# Patient Record
Sex: MALE | Race: BLACK OR AFRICAN AMERICAN | NOT HISPANIC OR LATINO | Employment: FULL TIME | URBAN - METROPOLITAN AREA
[De-identification: names, ages, dates, MRNs, and addresses within clinical notes are randomized per-mention and may not be internally consistent; named-entity substitution may affect disease eponyms.]

---

## 2017-09-06 ENCOUNTER — OFFICE VISIT (OUTPATIENT)
Dept: URGENT CARE | Facility: CLINIC | Age: 37
End: 2017-09-06
Payer: COMMERCIAL

## 2017-09-06 VITALS
BODY MASS INDEX: 29.84 KG/M2 | OXYGEN SATURATION: 97 % | WEIGHT: 240 LBS | DIASTOLIC BLOOD PRESSURE: 89 MMHG | HEART RATE: 89 BPM | TEMPERATURE: 97 F | SYSTOLIC BLOOD PRESSURE: 136 MMHG | RESPIRATION RATE: 18 BRPM | HEIGHT: 75 IN

## 2017-09-06 DIAGNOSIS — J01.00 ACUTE NON-RECURRENT MAXILLARY SINUSITIS: Primary | ICD-10-CM

## 2017-09-06 PROCEDURE — 96372 THER/PROPH/DIAG INJ SC/IM: CPT | Mod: S$GLB,,, | Performed by: PHYSICIAN ASSISTANT

## 2017-09-06 PROCEDURE — 99203 OFFICE O/P NEW LOW 30 MIN: CPT | Mod: S$GLB,,, | Performed by: PHYSICIAN ASSISTANT

## 2017-09-06 PROCEDURE — 3008F BODY MASS INDEX DOCD: CPT | Mod: S$GLB,,, | Performed by: PHYSICIAN ASSISTANT

## 2017-09-06 RX ORDER — FLUTICASONE PROPIONATE 50 MCG
2 SPRAY, SUSPENSION (ML) NASAL DAILY
Qty: 1 BOTTLE | Refills: 0 | Status: SHIPPED | OUTPATIENT
Start: 2017-09-06 | End: 2018-09-06

## 2017-09-06 RX ORDER — BETAMETHASONE SODIUM PHOSPHATE AND BETAMETHASONE ACETATE 3; 3 MG/ML; MG/ML
6 INJECTION, SUSPENSION INTRA-ARTICULAR; INTRALESIONAL; INTRAMUSCULAR; SOFT TISSUE
Status: COMPLETED | OUTPATIENT
Start: 2017-09-06 | End: 2017-09-06

## 2017-09-06 RX ORDER — AMOXICILLIN AND CLAVULANATE POTASSIUM 875; 125 MG/1; MG/1
1 TABLET, FILM COATED ORAL 2 TIMES DAILY
Qty: 20 TABLET | Refills: 0 | Status: SHIPPED | OUTPATIENT
Start: 2017-09-06 | End: 2017-09-16

## 2017-09-06 RX ADMIN — BETAMETHASONE SODIUM PHOSPHATE AND BETAMETHASONE ACETATE 6 MG: 3; 3 INJECTION, SUSPENSION INTRA-ARTICULAR; INTRALESIONAL; INTRAMUSCULAR; SOFT TISSUE at 02:09

## 2017-09-06 NOTE — PROGRESS NOTES
"Subjective:       Patient ID: Gallo Maria Jr. is a 37 y.o. male.    Vitals:  height is 6' 3" (1.905 m) and weight is 108.9 kg (240 lb). His temperature is 97.3 °F (36.3 °C). His blood pressure is 136/89 and his pulse is 89. His respiration is 18 and oxygen saturation is 97%.     Chief Complaint: Cough and Sinus Problem    Cough   This is a new problem. The current episode started in the past 7 days. The problem has been unchanged. The problem occurs constantly. The cough is productive of sputum. Associated symptoms include headaches, nasal congestion, postnasal drip and a sore throat. Pertinent negatives include no chest pain, chills, ear pain, eye redness, fever, myalgias, shortness of breath or wheezing. Treatments tried: Alkaselzter cold & cough. The treatment provided no relief. His past medical history is significant for asthma and bronchitis.   Sinus Problem   This is a new problem. The current episode started in the past 7 days. The problem is unchanged. There has been no fever. Associated symptoms include congestion, coughing, headaches, sinus pressure and a sore throat. Pertinent negatives include no chills, ear pain, hoarse voice or shortness of breath. Treatments tried: Alkaseltzer cold & cough. The treatment provided no relief.     Review of Systems   Constitution: Negative for chills, fever and malaise/fatigue.   HENT: Positive for congestion, postnasal drip, sinus pressure and sore throat. Negative for ear pain and hoarse voice.    Eyes: Negative for discharge and redness.   Cardiovascular: Negative for chest pain, dyspnea on exertion and leg swelling.   Respiratory: Positive for cough and sputum production. Negative for shortness of breath and wheezing.    Musculoskeletal: Negative for myalgias.   Gastrointestinal: Negative for abdominal pain and nausea.   Neurological: Positive for headaches.       Objective:      Physical Exam   Constitutional: He is oriented to person, place, and time. He " appears well-developed and well-nourished. He is cooperative.  Non-toxic appearance. He does not appear ill. No distress.   HENT:   Head: Normocephalic and atraumatic.   Right Ear: Hearing, tympanic membrane, external ear and ear canal normal.   Left Ear: Hearing, tympanic membrane, external ear and ear canal normal.   Nose: Mucosal edema and rhinorrhea present. No nasal deformity. No epistaxis. Right sinus exhibits maxillary sinus tenderness. Right sinus exhibits no frontal sinus tenderness. Left sinus exhibits maxillary sinus tenderness. Left sinus exhibits no frontal sinus tenderness.   Mouth/Throat: Uvula is midline and mucous membranes are normal. No trismus in the jaw. Normal dentition. No uvula swelling. Posterior oropharyngeal erythema present. No tonsillar exudate.   Eyes: Conjunctivae and lids are normal. No scleral icterus.   Sclera clear bilat   Neck: Trachea normal, full passive range of motion without pain and phonation normal. Neck supple.   Cardiovascular: Normal rate, regular rhythm, normal heart sounds, intact distal pulses and normal pulses.    Pulmonary/Chest: Effort normal and breath sounds normal. No respiratory distress.   Abdominal: Soft. Normal appearance and bowel sounds are normal. He exhibits no distension. There is no tenderness.   Musculoskeletal: Normal range of motion. He exhibits no edema or deformity.   Neurological: He is alert and oriented to person, place, and time. He exhibits normal muscle tone. Coordination normal.   Skin: Skin is warm, dry and intact. He is not diaphoretic. No pallor.   Psychiatric: He has a normal mood and affect. His speech is normal and behavior is normal. Judgment and thought content normal. Cognition and memory are normal.   Nursing note and vitals reviewed.      Assessment:       1. Acute non-recurrent maxillary sinusitis        Plan:         Acute non-recurrent maxillary sinusitis  -     betamethasone acetate-betamethasone sodium phosphate injection 6  mg; Inject 1 mL (6 mg total) into the muscle one time.  -     amoxicillin-clavulanate 875-125mg (AUGMENTIN) 875-125 mg per tablet; Take 1 tablet by mouth 2 (two) times daily.  Dispense: 20 tablet; Refill: 0  -     fluticasone (FLONASE) 50 mcg/actuation nasal spray; 2 sprays by Each Nare route once daily.  Dispense: 1 Bottle; Refill: 0

## 2017-09-06 NOTE — PATIENT INSTRUCTIONS
Sinusitis (Antibiotic Treatment)    The sinuses are air-filled spaces within the bones of the face. They connect to the inside of the nose. Sinusitis is an inflammation of the tissue lining the sinus cavity. Sinus inflammation can occur during a cold. It can also be due to allergies to pollens and other particles in the air. Sinusitis can cause symptoms of sinus congestion and fullness. A sinus infection causes fever, headache and facial pain. There is often green or yellow drainage from the nose or into the back of the throat (post-nasal drip). You have been given antibiotics to treat this condition.  Home care:  · Take the full course of antibiotics as instructed. Do not stop taking them, even if you feel better.  · Drink plenty of water, hot tea, and other liquids. This may help thin mucus. It also may promote sinus drainage.  · Heat may help soothe painful areas of the face. Use a towel soaked in hot water. Or,  the shower and direct the hot spray onto your face. Using a vaporizer along with a menthol rub at night may also help.   · An expectorant containing guaifenesin may help thin the mucus and promote drainage from the sinuses.  · Over-the-counter decongestants may be used unless a similar medicine was prescribed. Nasal sprays work the fastest. Use one that contains phenylephrine or oxymetazoline. First blow the nose gently. Then use the spray. Do not use these medicines more often than directed on the label or symptoms may get worse. You may also use tablets containing pseudoephedrine. Avoid products that combine ingredients, because side effects may be increased. Read labels. You can also ask the pharmacist for help. (NOTE: Persons with high blood pressure should not use decongestants. They can raise blood pressure.)  · Over-the-counter antihistamines may help if allergies contributed to your sinusitis.    · Do not use nasal rinses or irrigation during an acute sinus infection, unless told to by  your health care provider. Rinsing may spread the infection to other sinuses.  · Use acetaminophen or ibuprofen to control pain, unless another pain medicine was prescribed. (If you have chronic liver or kidney disease or ever had a stomach ulcer, talk with your doctor before using these medicines. Aspirin should never be used in anyone under 18 years of age who is ill with a fever. It may cause severe liver damage.)  · Don't smoke. This can worsen symptoms.  Follow-up care  Follow up with your healthcare provider or our staff if you are not improving within the next week.  When to seek medical advice  Call your healthcare provider if any of these occur:  · Facial pain or headache becoming more severe  · Stiff neck  · Unusual drowsiness or confusion  · Swelling of the forehead or eyelids  · Vision problems, including blurred or double vision  · Fever of 100.4ºF (38ºC) or higher, or as directed by your healthcare provider  · Seizure  · Breathing problems  · Symptoms not resolving within 10 days  Date Last Reviewed: 4/13/2015  © 1484-5917 [employer]. [address] [place] [address]. All rights reserved. This information is not intended as a substitute for professional medical care. Always follow your healthcare professional's instructions.      Please follow up with your Primary care provider within 2-5 days if your signs ans symptoms have not resolved or worsen.     If your condition worsens or fails to improve we recommend that you receive another evaluation at the emergency room immediately or contact your primary medical clinic to discuss your concerns.   You must understand that you have received an Urgent Care treatment only and that you may be released before all of your medical problems are known or treated. You, the patient, will arrange for follow up care as instructed.

## 2017-09-08 ENCOUNTER — TELEPHONE (OUTPATIENT)
Dept: URGENT CARE | Facility: CLINIC | Age: 37
End: 2017-09-08

## 2019-05-07 DIAGNOSIS — Z00.00 ROUTINE GENERAL MEDICAL EXAMINATION AT A HEALTH CARE FACILITY: Primary | ICD-10-CM

## 2019-05-09 ENCOUNTER — HOSPITAL ENCOUNTER (OUTPATIENT)
Dept: RADIOLOGY | Facility: HOSPITAL | Age: 39
Discharge: HOME | End: 2019-05-09
Attending: INTERNAL MEDICINE

## 2019-05-09 ENCOUNTER — OFFICE VISIT (OUTPATIENT)
Dept: INTERNAL MEDICINE | Facility: CLINIC | Age: 39
End: 2019-05-09

## 2019-05-09 ENCOUNTER — HOSPITAL ENCOUNTER (OUTPATIENT)
Dept: CARDIOLOGY | Facility: CLINIC | Age: 39
Discharge: HOME | End: 2019-05-09

## 2019-05-09 ENCOUNTER — CLINICAL SUPPORT (OUTPATIENT)
Dept: INTERNAL MEDICINE | Facility: CLINIC | Age: 39
End: 2019-05-09

## 2019-05-09 VITALS
HEIGHT: 75 IN | SYSTOLIC BLOOD PRESSURE: 110 MMHG | DIASTOLIC BLOOD PRESSURE: 78 MMHG | HEART RATE: 60 BPM | WEIGHT: 255.31 LBS | BODY MASS INDEX: 31.75 KG/M2

## 2019-05-09 DIAGNOSIS — E66.09 CLASS 1 OBESITY DUE TO EXCESS CALORIES WITHOUT SERIOUS COMORBIDITY WITH BODY MASS INDEX (BMI) OF 31.0 TO 31.9 IN ADULT: ICD-10-CM

## 2019-05-09 DIAGNOSIS — Z00.00 ROUTINE GENERAL MEDICAL EXAMINATION AT A HEALTH CARE FACILITY: ICD-10-CM

## 2019-05-09 DIAGNOSIS — Z00.00 ROUTINE GENERAL MEDICAL EXAMINATION AT A HEALTH CARE FACILITY: Primary | ICD-10-CM

## 2019-05-09 DIAGNOSIS — R73.09 ELEVATED HEMOGLOBIN A1C: Chronic | ICD-10-CM

## 2019-05-09 DIAGNOSIS — R79.89 HIGH SERUM LOW-DENSITY LIPOPROTEIN (LDL): ICD-10-CM

## 2019-05-09 LAB
ALBUMIN SERPL BCP-MCNC: 4 G/DL (ref 3.5–5.2)
ALP SERPL-CCNC: 46 U/L (ref 55–135)
ALT SERPL W/O P-5'-P-CCNC: 36 U/L (ref 10–44)
ANION GAP SERPL CALC-SCNC: 9 MMOL/L (ref 8–16)
AST SERPL-CCNC: 25 U/L (ref 10–40)
BILIRUB SERPL-MCNC: 0.4 MG/DL (ref 0.1–1)
BUN SERPL-MCNC: 11 MG/DL (ref 6–20)
CALCIUM SERPL-MCNC: 9.6 MG/DL (ref 8.7–10.5)
CHLORIDE SERPL-SCNC: 104 MMOL/L (ref 95–110)
CHOLEST SERPL-MCNC: 256 MG/DL (ref 120–199)
CHOLEST/HDLC SERPL: 4.7 {RATIO} (ref 2–5)
CO2 SERPL-SCNC: 27 MMOL/L (ref 23–29)
COMPLEXED PSA SERPL-MCNC: 1.3 NG/ML (ref 0–4)
CREAT SERPL-MCNC: 1 MG/DL (ref 0.5–1.4)
ERYTHROCYTE [DISTWIDTH] IN BLOOD BY AUTOMATED COUNT: 13 % (ref 11.5–14.5)
EST. GFR  (AFRICAN AMERICAN): >60 ML/MIN/1.73 M^2
EST. GFR  (NON AFRICAN AMERICAN): >60 ML/MIN/1.73 M^2
ESTIMATED AVG GLUCOSE: 123 MG/DL (ref 68–131)
GLUCOSE SERPL-MCNC: 95 MG/DL (ref 70–110)
HBA1C MFR BLD HPLC: 5.9 % (ref 4–5.6)
HCT VFR BLD AUTO: 43.3 % (ref 40–54)
HDLC SERPL-MCNC: 55 MG/DL (ref 40–75)
HDLC SERPL: 21.5 % (ref 20–50)
HGB BLD-MCNC: 14.4 G/DL (ref 14–18)
LDLC SERPL CALC-MCNC: 178 MG/DL (ref 63–159)
MCH RBC QN AUTO: 27.3 PG (ref 27–31)
MCHC RBC AUTO-ENTMCNC: 33.3 G/DL (ref 32–36)
MCV RBC AUTO: 82 FL (ref 82–98)
NONHDLC SERPL-MCNC: 201 MG/DL
PLATELET # BLD AUTO: 355 K/UL (ref 150–350)
PMV BLD AUTO: 9.5 FL (ref 9.2–12.9)
POTASSIUM SERPL-SCNC: 4.2 MMOL/L (ref 3.5–5.1)
PROT SERPL-MCNC: 7.8 G/DL (ref 6–8.4)
RBC # BLD AUTO: 5.28 M/UL (ref 4.6–6.2)
SODIUM SERPL-SCNC: 140 MMOL/L (ref 136–145)
TRIGL SERPL-MCNC: 115 MG/DL (ref 30–150)
TSH SERPL DL<=0.005 MIU/L-ACNC: 1.43 UIU/ML (ref 0.4–4)
WBC # BLD AUTO: 8.22 K/UL (ref 3.9–12.7)

## 2019-05-09 PROCEDURE — 84153 ASSAY OF PSA TOTAL: CPT

## 2019-05-09 PROCEDURE — 99385 PREV VISIT NEW AGE 18-39: CPT | Mod: S$GLB,,, | Performed by: INTERNAL MEDICINE

## 2019-05-09 PROCEDURE — 84443 ASSAY THYROID STIM HORMONE: CPT

## 2019-05-09 PROCEDURE — 71046 X-RAY EXAM CHEST 2 VIEWS: CPT | Mod: TC,FY

## 2019-05-09 PROCEDURE — 93010 ELECTROCARDIOGRAM REPORT: CPT | Mod: S$GLB,,, | Performed by: INTERNAL MEDICINE

## 2019-05-09 PROCEDURE — 99385 PR PREVENTIVE VISIT,NEW,18-39: ICD-10-PCS | Mod: S$GLB,,, | Performed by: INTERNAL MEDICINE

## 2019-05-09 PROCEDURE — 93005 ELECTROCARDIOGRAM TRACING: CPT | Mod: S$GLB,,, | Performed by: INTERNAL MEDICINE

## 2019-05-09 PROCEDURE — 80053 COMPREHEN METABOLIC PANEL: CPT

## 2019-05-09 PROCEDURE — 93005 EKG 12-LEAD: ICD-10-PCS | Mod: S$GLB,,, | Performed by: INTERNAL MEDICINE

## 2019-05-09 PROCEDURE — 71046 XR CHEST PA AND LATERAL: ICD-10-PCS | Mod: 26,,, | Performed by: RADIOLOGY

## 2019-05-09 PROCEDURE — 99999 PR PBB SHADOW E&M-EST. PATIENT-LVL III: ICD-10-PCS | Mod: PBBFAC,,, | Performed by: INTERNAL MEDICINE

## 2019-05-09 PROCEDURE — 85027 COMPLETE CBC AUTOMATED: CPT

## 2019-05-09 PROCEDURE — 83036 HEMOGLOBIN GLYCOSYLATED A1C: CPT

## 2019-05-09 PROCEDURE — 99999 PR PBB SHADOW E&M-EST. PATIENT-LVL III: CPT | Mod: PBBFAC,,, | Performed by: INTERNAL MEDICINE

## 2019-05-09 PROCEDURE — 93010 EKG 12-LEAD: ICD-10-PCS | Mod: S$GLB,,, | Performed by: INTERNAL MEDICINE

## 2019-05-09 PROCEDURE — 71046 X-RAY EXAM CHEST 2 VIEWS: CPT | Mod: 26,,, | Performed by: RADIOLOGY

## 2019-05-09 PROCEDURE — 80061 LIPID PANEL: CPT

## 2019-05-09 NOTE — LETTER
May 9, 2019     [address] St[address]by   [address]  [place] [address]    Dear Mr. Maria    Thank you for allowing me to serve you and perform your Executive Health exam on 5/9/2019.  Attached to this letter you will find a copy of your After Visit Summary which includes a copy of all labs and other results, your current medical problem list (if any), a list of all diagnoses from today (if any), a list of all current medication, your vital signs (including Blood pressure, Height, weight, BMI) and associated plans/goals that we discussed while you were in the office.     If you have any questions please feel free to contact the office.         HIRA Hadley II, MD

## 2019-05-09 NOTE — PATIENT INSTRUCTIONS
Here are the results of your recent labs and tests.   Any significant abnormalities have been discussed and those recommendations are found in the diagnosis list on this visit summary.    Your Chest xray was: Normal  Your EKG was normal and did not show any concerning changes.     Recent Results (from the past 168 hour(s))   Comprehensive metabolic panel    Collection Time: 05/09/19  7:23 AM   Result Value Ref Range    Sodium 140 136 - 145 mmol/L    Potassium 4.2 3.5 - 5.1 mmol/L    Chloride 104 95 - 110 mmol/L    CO2 27 23 - 29 mmol/L    Glucose 95 70 - 110 mg/dL    BUN, Bld 11 6 - 20 mg/dL    Creatinine 1.0 0.5 - 1.4 mg/dL    Calcium 9.6 8.7 - 10.5 mg/dL    Total Protein 7.8 6.0 - 8.4 g/dL    Albumin 4.0 3.5 - 5.2 g/dL    Total Bilirubin 0.4 0.1 - 1.0 mg/dL    Alkaline Phosphatase 46 (L) 55 - 135 U/L    AST 25 10 - 40 U/L    ALT 36 10 - 44 U/L    Anion Gap 9 8 - 16 mmol/L    eGFR if African American >60.0 >60 mL/min/1.73 m^2    eGFR if non African American >60.0 >60 mL/min/1.73 m^2   CBC Without Differential    Collection Time: 05/09/19  7:23 AM   Result Value Ref Range    WBC 8.22 3.90 - 12.70 K/uL    RBC 5.28 4.60 - 6.20 M/uL    Hemoglobin 14.4 14.0 - 18.0 g/dL    Hematocrit 43.3 40.0 - 54.0 %    Mean Corpuscular Volume 82 82 - 98 fL    Mean Corpuscular Hemoglobin 27.3 27.0 - 31.0 pg    Mean Corpuscular Hemoglobin Conc 33.3 32.0 - 36.0 g/dL    RDW 13.0 11.5 - 14.5 %    Platelets 355 (H) 150 - 350 K/uL    MPV 9.5 9.2 - 12.9 fL   Lipid panel    Collection Time: 05/09/19  7:23 AM   Result Value Ref Range    Cholesterol 256 (H) 120 - 199 mg/dL    Triglycerides 115 30 - 150 mg/dL    HDL 55 40 - 75 mg/dL    LDL Cholesterol 178.0 (H) 63.0 - 159.0 mg/dL    Hdl/Cholesterol Ratio 21.5 20.0 - 50.0 %    Total Cholesterol/HDL Ratio 4.7 2.0 - 5.0    Non-HDL Cholesterol 201 mg/dL   Hemoglobin A1c    Collection Time: 05/09/19  7:23 AM   Result Value Ref Range    Hemoglobin A1C 5.9 (H) 4.0 - 5.6 %    Estimated Avg Glucose 123 68  - 131 mg/dL   TSH    Collection Time: 05/09/19  7:23 AM   Result Value Ref Range    TSH 1.432 0.400 - 4.000 uIU/mL   PSA, Screening    Collection Time: 05/09/19  7:23 AM   Result Value Ref Range    PSA, SCREEN 1.3 0.00 - 4.00 ng/mL      Prevention Guidelines, Men Ages 40 to 49  Screening tests and vaccines are an important part of managing your health. Health counseling is essential, too. Below are guidelines for these, for men ages 40 to 49. Talk with your healthcare provider to make sure youre up to date on what you need.  Screening Who needs it How often   Alcohol misuse All men in this age group At routine exams   Blood pressure All men in this age group Every 2 years if your blood pressure reading is less than 120/80 mm Hg; yearly if your systolic blood pressure reading is 120 to 139 mm Hg, or your diastolic blood pressure reading is 80 to 89 mm Hg   Depression All men in this age group At routine exams   Type 2 diabetes or prediabetes All adults beginning at age 45 and adults without symptoms at any age who are overweight or obese and have 1 or more other risk factors for diabetes At least every 3 years (yearly if blood sugar has begun to rise)   Hepatitis C Men at increased risk for infection - talk with your healthcare provider At routine exams   High cholesterol or triglycerides All men ages 35 and older, and younger men at high risk for coronary artery disease At least every 5 years   HIV All men At routine exams   Obesity All men in this age group At routine exams   Prostate cancer Starting at age 45, talk to healthcare provider about risks and benefits of digital rectal exam (MANINDER) and prostate-specific antigen (PSA) screening1 At routine exams   Syphilis Men at increased risk for infection - talk with your healthcare provider At routine exams   Tuberculosis Men at increased risk for infection - talk with your healthcare provider Check with your healthcare provider   Vision All men in this age group  Every 2 to 4 years if no risk factors for eye disease2   Vaccine Who needs it How often   Chickenpox (varicella) All men in this age group who have no record of this infection or vaccine 2 doses; the second dose should be given at least 4 weeks after the first dose   Hepatitis A Men at increased risk for infection - talk with your healthcare provider 2 doses given at least 6 months apart   Hepatitis B Men at increased risk for infection - talk with your healthcare provider 3 doses over 6 months; second dose should be given 1 month after the first dose; the third dose should be given at least 2 months after the second dose and at least 4 months after the first dose   Haemophilus influenzae Type B (HIB) Men at increased risk for infection - talk with your healthcare provider 1 to 3 doses   Influenza (flu) All men in this age group Once a year   Measles, mumps, rubella (MMR) All men in this age group who have no record of these infections or vaccines 1 or 2 doses   Meningococcal Men at increased risk for infection - talk with your healthcare provider 1 or more doses   Pneumococcal conjugate vaccine (PCV13) and pneumococcal polysaccharide vaccine (PPSV23) Men at increased risk for infection - talk with your healthcare provider PCV13: 1 dose ages 19 to 65 (protects against 13 types of pneumococcal bacteria)     PPSV23: 1 to 2 doses through age 64, or 1 dose at 65 or older (protects against 23 types of pneumococcal bacteria)      Tetanus/diphtheria/  pertussis (Td/Tdap) booster All men in this age group Td every 10 years, or a one-time dose of Tdap instead of a Td booster after age 18, then Td every 10 years   Counseling Who needs it How often   Diet and exercise Men who are overweight or obese When diagnosed, and then at routine exams   Sexually transmitted infection prevention Men at increased risk for infection - talk with your healthcare provider At routine exams   Use of daily aspirin Men ages 45 to 79 at risk for  cardiovascular health problems At routine exams   Use of tobacco and the health effects it can cause All men in this age group Every exam   [address] [place] [address] [facility]   [facility] [employer]  Date Last Reviewed: 2/1/2017  © 6604-4164 [employer]. [address] [place] [address], [place] [address]. All rights reserved. This information is not intended as a substitute for professional medical care. Always follow your healthcare professional's instructions.

## 2019-05-09 NOTE — PROGRESS NOTES
"  Gallo Maria Jr. is a 38 y.o. Black or  male who presents for an Executive health visit with no  other complaints today.   All chronic problems as listed in the active problem list have been stable and well controlled recently. The active problem list was reviewed and reconciled today and is up to date as of today.  Patient Active Problem List   Diagnosis    Family history of prostate cancer    History of pituitary tumor    High serum low-density lipoprotein (LDL)    Elevated hemoglobin A1c - at risk for diabetes      He denies any recent ER visits or hospitalizations.     He states that he is compliant with all prescribed medications and he has experienced no side effects. I have reviewed all current medications and updated the current medication list during this encounter.     PMFSH: all information reviewed and updated today.     All labs and tests from earlier today reviewed. Abnormalities (if any) are addressed in the assessment and plan.     Review of Systems   Constitutional: Negative for chills, fatigue and fever.   Respiratory: Negative for cough, chest tightness, shortness of breath and wheezing.    Cardiovascular: Negative for chest pain, palpitations and leg swelling.   Gastrointestinal: Negative for abdominal pain, blood in stool, constipation and diarrhea.   Neurological: Negative for dizziness, tremors, syncope and headaches.   Psychiatric/Behavioral: Negative for agitation and dysphoric mood.     Vitals:    05/09/19 0820   BP: 110/78   BP Location: Left arm   Patient Position: Sitting   BP Method: Large (Manual)   Pulse: 60   Weight: 115.8 kg (255 lb 4.7 oz)   Height: 6' 3" (1.905 m)    Body mass index is 31.91 kg/m².    Physical Exam   Constitutional: He is oriented to person, place, and time. He appears well-developed and well-nourished.   HENT:   Nose: Nose normal.   Mouth/Throat: Oropharynx is clear and moist.   Eyes: Pupils are equal, round, and reactive to light. EOM " are normal.   Neck: Neck supple. No JVD present. No thyromegaly present.   Cardiovascular: Normal rate, regular rhythm, normal heart sounds and intact distal pulses.   Pulmonary/Chest: Effort normal and breath sounds normal. He has no wheezes. He has no rales.   Abdominal: Soft. Bowel sounds are normal. He exhibits no mass. There is no tenderness.   Musculoskeletal: Normal range of motion.   Lymphadenopathy:     He has no cervical adenopathy.   Neurological: He is alert and oriented to person, place, and time. He has normal reflexes.   Psychiatric: He has a normal mood and affect. His behavior is normal.   Vitals reviewed.        1. Routine general medical examination at a health care facility    2. High serum low-density lipoprotein (LDL)  Comments:  Diet, exercise and weight loss recommened.  Repeat Lipid profile recommended in 12 months.     3. Elevated hemoglobin A1c - at risk for diabetes  Comments:  Weight loss recommended.  Follow up with your PCP in 6 months for repeat a1c level.     4. Class 1 obesity due to excess calories without serious comorbidity with body mass index (BMI) of 31.0 to 31.9 in adult

## 2019-05-09 NOTE — LETTER
May 9, 2019        Jerome Christian MD  [address]  [address]  [facility] [address]             [facility] - Internal Medicine  [address]  [place] [address]  Phone: [phone]  Fax: [phone]   Patient: Gallo Maria Jr.   MR Number: [id number]   YOB: 1980   Date of Visit: 5/9/2019       Dear Dr. Christian:    Gallo Maria Jr. was seen today for an executive phsyical through his employer.  He has asked that I forward this information to you. I have asked that he follow up with you in 6 months to have his A1c rechecked and monitor his progress.  Please see the attached visit summary for details.     HIRA Hadley II, MD            CC  No Recipients    Enclosure

## 2019-05-28 ENCOUNTER — OFFICE VISIT (OUTPATIENT)
Dept: CARDIOLOGY | Facility: CLINIC | Age: 39
End: 2019-05-28
Payer: COMMERCIAL

## 2019-05-28 VITALS
SYSTOLIC BLOOD PRESSURE: 120 MMHG | OXYGEN SATURATION: 98 % | BODY MASS INDEX: 31.58 KG/M2 | WEIGHT: 254 LBS | DIASTOLIC BLOOD PRESSURE: 80 MMHG | HEIGHT: 75 IN | HEART RATE: 66 BPM

## 2019-05-28 DIAGNOSIS — R06.02 SOB (SHORTNESS OF BREATH): ICD-10-CM

## 2019-05-28 DIAGNOSIS — R07.89 CHEST TIGHTNESS: Primary | ICD-10-CM

## 2019-05-28 DIAGNOSIS — E78.2 MIXED HYPERLIPIDEMIA: ICD-10-CM

## 2019-05-28 DIAGNOSIS — Z87.898 HISTORY OF PITUITARY TUMOR: Chronic | ICD-10-CM

## 2019-05-28 PROCEDURE — 99205 PR OFFICE/OUTPT VISIT, NEW, LEVL V, 60-74 MIN: ICD-10-PCS | Mod: S$GLB,,, | Performed by: INTERNAL MEDICINE

## 2019-05-28 PROCEDURE — 99999 PR PBB SHADOW E&M-EST. PATIENT-LVL III: ICD-10-PCS | Mod: PBBFAC,,, | Performed by: INTERNAL MEDICINE

## 2019-05-28 PROCEDURE — 99999 PR PBB SHADOW E&M-EST. PATIENT-LVL III: CPT | Mod: PBBFAC,,, | Performed by: INTERNAL MEDICINE

## 2019-05-28 PROCEDURE — 99205 OFFICE O/P NEW HI 60 MIN: CPT | Mod: S$GLB,,, | Performed by: INTERNAL MEDICINE

## 2019-05-28 RX ORDER — ATORVASTATIN CALCIUM 80 MG/1
80 TABLET, FILM COATED ORAL DAILY
Qty: 90 TABLET | Refills: 3 | Status: SHIPPED | OUTPATIENT
Start: 2019-05-28 | End: 2020-02-17 | Stop reason: SDUPTHER

## 2019-05-28 NOTE — PROGRESS NOTES
[facility]      Chief Complaint   Patient presents with    Shortness of Breath    Chest Pain     Tightness       Patient ID: Gallo Maria Jr. is a 38 y.o. male with a past medical history of pituitary adenoma s/p surgical excision, ADD, who presents for an initial appointment.  Pertinent history/events are as follows:     -Pt presents for evaluation of chest tightness and SOB.     HPI:  Mr. Maria reports episodes of acute onset SOB with chest tightness, which started 3 weeks ago.  Episodes usually occur when resting or sitting down.  No episodes on exertion.  Episodes last approximately 1 minute.  No associated n/v/d.  No smoking history.  Family history of CAD with MI in his father at age 62.  Works as an  at [employer].  Works out 3 days a week with lifting weights and swimming.  Ten year ASCVD risk score is low at 2.9%,    Past Medical History:   Diagnosis Date    ADD (attention deficit disorder)     treated by outside psychiatrist.    Asthma     BPH with urinary obstruction 2/5/2015    Decreased libido 8/31/2015    Erectile dysfunction 10/27/2015    History of migraine headaches     Hypogonadism male 10/27/2015    Pituitary adenoma 8/9/2012     Past Surgical History:   Procedure Laterality Date    RESECTION-TRANSPHENOIDAL PITUITARY ADENOMA N/A 9/25/2015    Performed by Derek Blair MD at [facility] OR [address] [place] [address]    SURGICAL REMOVAL OF PITUITARY TUMOR BY TRANSSPHENOIDAL APPROACH  2015    Pituitary Adeonma via Dr. Blair 2015    WISDOM TOOTH EXTRACTION       Social History     Socioeconomic History    Marital status:      Spouse name: Not on file    Number of children: Not on file    Years of education: Not on file    Highest education level: Not on file   Occupational History    Not on file   Social Needs    Financial resource strain: Not on file    Food insecurity:     Worry: Not on file     Inability: Not on file    Transportation needs:     Medical:  Not on file     Non-medical: Not on file   Tobacco Use    Smoking status: Never Smoker    Smokeless tobacco: Never Used   Substance and Sexual Activity    Alcohol use: No    Drug use: No    Sexual activity: Yes     Partners: Female   Lifestyle    Physical activity:     Days per week: Not on file     Minutes per session: Not on file    Stress: Not on file   Relationships    Social connections:     Talks on phone: Not on file     Gets together: Not on file     Attends Yarsani service: Not on file     Active member of club or organization: Not on file     Attends meetings of clubs or organizations: Not on file     Relationship status: Not on file   Other Topics Concern    Not on file   Social History Narrative    Not on file     Family History   Problem Relation Age of Onset    Diabetes Maternal Grandmother     Diabetes Maternal Grandfather     Dementia Paternal Grandmother     Cancer Paternal Grandfather     Prostate cancer Paternal Grandfather     Hyperlipidemia Father     Benign prostatic hyperplasia Father     Prostate cancer Paternal Aunt     Prostate cancer Paternal Uncle     No Known Problems Mother     No Known Problems Sister     No Known Problems Brother     No Known Problems Brother     Coronary artery disease Neg Hx        Review of patient's allergies indicates:  No Known Allergies    Medication List with Changes/Refills   Current Medications    PSYLLIUM (METAMUCIL) PACKET    Take 1 packet by mouth once daily.       Review of Systems  Constitution: Denies chills, fever, and sweats.  HENT: Denies headaches or blurry vision.  Cardiovascular: Positive for chest tightness.  Respiratory: Positive for shortness of breath.  Gastrointestinal: Denies abdominal pain, nausea, or vomiting.  Musculoskeletal: Denies muscle cramps.  Neurological: Denies dizziness or focal weakness.  Psychiatric/Behavioral: Normal mental status.  Hematologic/Lymphatic: Denies bleeding problem or easy  "bruising/bleeding.  Skin: Denies rash or suspicious lesions    Physical Examination  /80 (BP Location: Left arm, Patient Position: Sitting, BP Method: X-Large (Manual))   Pulse 66   Ht 6' 3" (1.905 m)   Wt 115.2 kg (254 lb)   SpO2 98%   BMI 31.75 kg/m²     Constitutional: No acute distress, conversant  HEENT: Sclera anicteric, Pupils equal, round and reactive to light, extraocular motions intact, Oropharynx clear  Neck: No JVD, no carotid bruits  Cardiovascular: regular rate and rhythm, no murmur, rubs or gallops, normal S1/S2  Pulmonary: Clear to auscultation bilaterally  Abdominal: Abdomen soft, nontender, nondistended, positive bowel sounds  Extremities: No lower extremity edema,   Pulses:  Carotid pulses are 2+ on the right side, and 2+ on the left side.  Radial pulses are 2+ on the right side, and 2+ on the left side.   Femoral pulses are 2+ on the right side, and 2+ on the left side.  Popliteal pulses are 2+ on the right side, and 2+ on the left side.   Dorsalis pedis pulses are 2+ on the right side, and 2+ on the left side.   Posterior tibial pulses are 2+ on the right side, and 2+ on the left side.    Skin: No ecchymosis, erythema, or ulcers  Psych: Alert and oriented x 3, appropriate affect  Neuro: CNII-XII intact, no focal deficits    Labs:  Most Recent Data  CBC:   Lab Results   Component Value Date    WBC 8.22 05/09/2019    HGB 14.4 05/09/2019    HCT 43.3 05/09/2019     (H) 05/09/2019    MCV 82 05/09/2019    RDW 13.0 05/09/2019     BMP:   Lab Results   Component Value Date     05/09/2019    K 4.2 05/09/2019     05/09/2019    CO2 27 05/09/2019    BUN 11 05/09/2019    CREATININE 1.0 05/09/2019    GLU 95 05/09/2019    CALCIUM 9.6 05/09/2019    MG 2.4 09/28/2015    PHOS 2.8 09/28/2015     LFTS;   Lab Results   Component Value Date    PROT 7.8 05/09/2019    ALBUMIN 4.0 05/09/2019    BILITOT 0.4 05/09/2019    AST 25 05/09/2019    ALKPHOS 46 (L) 05/09/2019    ALT 36 05/09/2019 "     COAGS:   Lab Results   Component Value Date    INR 1.0 09/25/2015     FLP:   Lab Results   Component Value Date    CHOL 256 (H) 05/09/2019    HDL 55 05/09/2019    LDLCALC 178.0 (H) 05/09/2019    TRIG 115 05/09/2019    CHOLHDL 21.5 05/09/2019     CARDIAC: No results found for: TROPONINI, CKMB, BNP      EKG 5/9/2019:  Sinus bradycardia with no ischemic ST/T wave changes      Assessment/Plan:  Gallo Maria Jr. is a 38 y.o. male with a past medical history of HLD, obesity, pituitary adenoma s/p surgical excision, ADD, who presents for an initial appointment.      1. SOB/Chest Tightness- Pt presents with SOB and chest tightness as described.   Has several risk factors for CAD.  Check echo treadmill nuclear stress test to evaluate further.     2. HLD- Ten year ASCVD risk score is low at 2.9%, however LDL elevated at 178 with total cholesterol of 256.  Start atorvastatin 80 mg daily and ASA 81 mg daily..      2. Obesity- Refer to nutrition for assistance with weight loss.    Follow up in 2 weeks    Total duration of face to face visit time 30 minutes.  Total time spent counseling greater than fifty percent of total visit time.  Counseling included discussion regarding imaging findings, diagnosis, possibilities, treatment options, risks and benefits.  The patient had many questions regarding the options and long-term effects.    Tonny Child MD, PhD  Interventional Cardiology

## 2019-05-30 ENCOUNTER — TELEPHONE (OUTPATIENT)
Dept: NEUROSURGERY | Facility: CLINIC | Age: 39
End: 2019-05-30

## 2019-05-30 DIAGNOSIS — D35.2 PITUITARY ADENOMA: Primary | ICD-10-CM

## 2019-06-19 ENCOUNTER — OFFICE VISIT (OUTPATIENT)
Dept: CARDIOLOGY | Facility: CLINIC | Age: 39
End: 2019-06-19
Payer: COMMERCIAL

## 2019-06-19 VITALS
WEIGHT: 248.19 LBS | HEART RATE: 83 BPM | DIASTOLIC BLOOD PRESSURE: 82 MMHG | HEIGHT: 75 IN | OXYGEN SATURATION: 98 % | SYSTOLIC BLOOD PRESSURE: 110 MMHG | BODY MASS INDEX: 30.86 KG/M2

## 2019-06-19 DIAGNOSIS — R07.89 CHEST TIGHTNESS: Primary | ICD-10-CM

## 2019-06-19 DIAGNOSIS — R06.02 SOB (SHORTNESS OF BREATH): ICD-10-CM

## 2019-06-19 DIAGNOSIS — E78.2 MIXED HYPERLIPIDEMIA: ICD-10-CM

## 2019-06-19 DIAGNOSIS — E66.9 OBESITY (BMI 30-39.9): ICD-10-CM

## 2019-06-19 PROCEDURE — 99215 PR OFFICE/OUTPT VISIT, EST, LEVL V, 40-54 MIN: ICD-10-PCS | Mod: S$GLB,,, | Performed by: INTERNAL MEDICINE

## 2019-06-19 PROCEDURE — 99999 PR PBB SHADOW E&M-EST. PATIENT-LVL III: ICD-10-PCS | Mod: PBBFAC,,, | Performed by: INTERNAL MEDICINE

## 2019-06-19 PROCEDURE — 99999 PR PBB SHADOW E&M-EST. PATIENT-LVL III: CPT | Mod: PBBFAC,,, | Performed by: INTERNAL MEDICINE

## 2019-06-19 PROCEDURE — 99215 OFFICE O/P EST HI 40 MIN: CPT | Mod: S$GLB,,, | Performed by: INTERNAL MEDICINE

## 2019-06-19 NOTE — PROGRESS NOTES
[facility]      Chief Complaint   Patient presents with    Results     Echo, Nuclear       Patient ID: Gallo Maria Jr. is a 38 y.o. male with a past medical history of pituitary adenoma s/p surgical excision, ADD, who presents for a follow up appointment.  Pertinent history/events are as follows:     -Pt presents for evaluation of chest tightness and SOB.     -At our initial clinic visit on 5/28/2019, Mr. Maria reported episodes of acute onset SOB with chest tightness, which started 3 weeks ago.  Episodes usually occur when resting or sitting down.  No episodes on exertion.  Episodes last approximately 1 minute.  No associated n/v/d.  No smoking history.  Family history of CAD with MI in his father at age 62.  Works as an  at [employer].  Works out 3 days a week with lifting weights and swimming.  Ten year ASCVD risk score is low at 2.9%,  Plan:   SOB/Chest Tightness- Pt presents with SOB and chest tightness as described.   Has several risk factors for CAD.  Check echo treadmill nuclear stress test to evaluate further.   HLD- Ten year ASCVD risk score is low at 2.9%, however LDL elevated at 178 with total cholesterol of 256.  Start atorvastatin 80 mg daily and ASA 81 mg daily..    Obesity- Refer to nutrition for assistance with weight loss.    HPI:  Mr. Maria reports no further episodes of SOB and chest tightness as previously described.  Treadmill nuclear stress test from 6/5/2019 is negative for ischemia or infarct.  Echo from 6/5/2019 showed normal left ventricular systolic function with EF of 55%; normal LV diastolic function; normal right ventricular systolic function; intermediate central venous pressure (8 mm Hg).     Past Medical History:   Diagnosis Date    ADD (attention deficit disorder)     treated by outside psychiatrist.    Asthma     BPH with urinary obstruction 2/5/2015    Decreased libido 8/31/2015    Erectile dysfunction 10/27/2015    History of  migraine headaches     Hypogonadism male 10/27/2015    Pituitary adenoma 8/9/2012     Past Surgical History:   Procedure Laterality Date    RESECTION-TRANSPHENOIDAL PITUITARY ADENOMA N/A 9/25/2015    Performed by [facility] at [facility] OR [address] [place] [address]    SURGICAL REMOVAL OF PITUITARY TUMOR BY TRANSSPHENOIDAL APPROACH  2015    Pituitary Adeonma via Dr. Blair 2015    WISDOM TOOTH EXTRACTION       Social History     Socioeconomic History    Marital status:      Spouse name: Not on file    Number of children: Not on file    Years of education: Not on file    Highest education level: Not on file   Occupational History    Not on file   Social Needs    Financial resource strain: Not on file    Food insecurity:     Worry: Not on file     Inability: Not on file    Transportation needs:     Medical: Not on file     Non-medical: Not on file   Tobacco Use    Smoking status: Never Smoker    Smokeless tobacco: Never Used   Substance and Sexual Activity    Alcohol use: No    Drug use: No    Sexual activity: Yes     Partners: Female   Lifestyle    Physical activity:     Days per week: Not on file     Minutes per session: Not on file    Stress: Not on file   Relationships    Social connections:     Talks on phone: Not on file     Gets together: Not on file     Attends Jewish service: Not on file     Active member of club or organization: Not on file     Attends meetings of clubs or organizations: Not on file     Relationship status: Not on file   Other Topics Concern    Not on file   Social History Narrative    Not on file     Family History   Problem Relation Age of Onset    Diabetes Maternal Grandmother     Diabetes Maternal Grandfather     Dementia Paternal Grandmother     Cancer Paternal Grandfather     Prostate cancer Paternal Grandfather     Hyperlipidemia Father     Benign prostatic hyperplasia Father     Prostate cancer Paternal Aunt     Prostate cancer Paternal Uncle     No Known  "Problems Mother     No Known Problems Sister     No Known Problems Brother     No Known Problems Brother     Coronary artery disease Neg Hx        Review of patient's allergies indicates:  No Known Allergies    Medication List with Changes/Refills   Current Medications    ATORVASTATIN (LIPITOR) 80 MG TABLET    Take 1 tablet (80 mg total) by mouth once daily.    PSYLLIUM (METAMUCIL) PACKET    Take 1 packet by mouth once daily.       Review of Systems  Constitution: Denies chills, fever, and sweats.  HENT: Denies headaches or blurry vision.  Cardiovascular: Negative for chest tightness.  Respiratory: Negative  for shortness of breath.  Gastrointestinal: Denies abdominal pain, nausea, or vomiting.  Musculoskeletal: Denies muscle cramps.  Neurological: Denies dizziness or focal weakness.  Psychiatric/Behavioral: Normal mental status.  Hematologic/Lymphatic: Denies bleeding problem or easy bruising/bleeding.  Skin: Denies rash or suspicious lesions    Physical Examination  /82 (BP Location: Left arm, Patient Position: Sitting, BP Method: X-Large (Manual))   Pulse 83   Ht 6' 3" (1.905 m)   Wt 112.6 kg (248 lb 3.2 oz)   SpO2 98%   BMI 31.02 kg/m²     Constitutional: No acute distress, conversant  HEENT: Sclera anicteric, Pupils equal, round and reactive to light, extraocular motions intact, Oropharynx clear  Neck: No JVD, no carotid bruits  Cardiovascular: regular rate and rhythm, no murmur, rubs or gallops, normal S1/S2  Pulmonary: Clear to auscultation bilaterally  Abdominal: Abdomen soft, nontender, nondistended, positive bowel sounds  Extremities: No lower extremity edema,   Pulses:  Carotid pulses are 2+ on the right side, and 2+ on the left side.  Radial pulses are 2+ on the right side, and 2+ on the left side.   Femoral pulses are 2+ on the right side, and 2+ on the left side.  Popliteal pulses are 2+ on the right side, and 2+ on the left side.   Dorsalis pedis pulses are 2+ on the right side, and 2+ " on the left side.   Posterior tibial pulses are 2+ on the right side, and 2+ on the left side.    Skin: No ecchymosis, erythema, or ulcers  Psych: Alert and oriented x 3, appropriate affect  Neuro: CNII-XII intact, no focal deficits    Labs:  Most Recent Data  CBC:   Lab Results   Component Value Date    WBC 8.22 05/09/2019    HGB 14.4 05/09/2019    HCT 43.3 05/09/2019     (H) 05/09/2019    MCV 82 05/09/2019    RDW 13.0 05/09/2019     BMP:   Lab Results   Component Value Date     05/09/2019    K 4.2 05/09/2019     05/09/2019    CO2 27 05/09/2019    BUN 11 05/09/2019    CREATININE 1.0 05/09/2019    GLU 95 05/09/2019    CALCIUM 9.6 05/09/2019    MG 2.4 09/28/2015    PHOS 2.8 09/28/2015     LFTS;   Lab Results   Component Value Date    PROT 7.8 05/09/2019    ALBUMIN 4.0 05/09/2019    BILITOT 0.4 05/09/2019    AST 25 05/09/2019    ALKPHOS 46 (L) 05/09/2019    ALT 36 05/09/2019     COAGS:   Lab Results   Component Value Date    INR 1.0 09/25/2015     FLP:   Lab Results   Component Value Date    CHOL 256 (H) 05/09/2019    HDL 55 05/09/2019    LDLCALC 178.0 (H) 05/09/2019    TRIG 115 05/09/2019    CHOLHDL 21.5 05/09/2019     CARDIAC: No results found for: TROPONINI, CKMB, BNP      EKG 5/9/2019:  Sinus bradycardia with no ischemic ST/T wave changes    Treadmill Stress Test 6/5/2019:  EKG portion:  ECG Stress A Kalpesh protocol stress test was performed.    ECG Stress The patient reported no symptoms during the stress test.    Stress ECG: There was no ST segment deviation identified during the protocol.    Stress ECG: There were no arrhythmias during stress.    ECG Stress Overall, the patient's exercise capacity was average. Total exercise time was 10 minutes 21 seconds sec.    ECG Conclusion: The ECG portion of the test negative for ischemia.    ECG Stress Nuclear portion of this study will be reported separately.  Imaging portion:  There is appropriate wall motion with no significant fixed or  reversible perfusion defect.  The stress end-diastolic and end systolic volumes measure 78 and 26 mL respectively.  The stress ejection fraction is 67%.    Echo 6/5/2019:  · Normal left ventricular systolic function. The estimated ejection fraction is 55%  · Normal LV diastolic function.  · Normal right ventricular systolic function.  · Intermediate central venous pressure (8 mm Hg).      Assessment/Plan:  Gallo Maria Jr. is a 38 y.o. male with a past medical history of HLD, obesity, pituitary adenoma s/p surgical excision, ADD, who presents for an initial appointment.      1. SOB/Chest Tightness- Mr. Maria reports no further episodes of SOB and chest tightness as previously described.  Treadmill nuclear stress test from 6/5/2019 is negative for ischemia or infarct.  Echo from 6/5/2019 showed normal left ventricular systolic function with EF of 55%; normal LV diastolic function; normal right ventricular systolic function; intermediate central venous pressure (8 mm Hg).  Continue aggressive risk factor modification, including weight loss.     2. HLD- Ten year ASCVD risk score is low at 2.9%, however LDL elevated at 178 with total cholesterol of 256.  Start atorvastatin 80 mg daily and ASA 81 mg daily..      2. Obesity- Refer to nutrition for assistance with weight loss.    Follow up in 6 months with lipids and cmp prior    Total duration of face to face visit time 30 minutes.  Total time spent counseling greater than fifty percent of total visit time.  Counseling included discussion regarding imaging findings, diagnosis, possibilities, treatment options, risks and benefits.  The patient had many questions regarding the options and long-term effects.    Tonny Child MD, PhD  Interventional Cardiology

## 2019-06-19 NOTE — PATIENT INSTRUCTIONS
Assessment/Plan:  Gallo Maria Jr. is a 38 y.o. male with a past medical history of HLD, obesity, pituitary adenoma s/p surgical excision, ADD, who presents for an initial appointment.      1. SOB/Chest Tightness- Mr. Maria reports no further episodes of SOB and chest tightness as previously described.  Treadmill nuclear stress test from 6/5/2019 is negative for ischemia or infarct.  Echo from 6/5/2019 showed normal left ventricular systolic function with EF of 55%; normal LV diastolic function; normal right ventricular systolic function; intermediate central venous pressure (8 mm Hg).  Continue aggressive risk factor modification, including weight loss.     2. HLD- Ten year ASCVD risk score is low at 2.9%, however LDL elevated at 178 with total cholesterol of 256.  Start atorvastatin 80 mg daily and ASA 81 mg daily..      2. Obesity- Refer to nutrition for assistance with weight loss.    Follow up in 6 months with lipids and cmp prior

## 2019-07-12 ENCOUNTER — PATIENT MESSAGE (OUTPATIENT)
Dept: NEUROSURGERY | Facility: CLINIC | Age: 39
End: 2019-07-12

## 2019-08-06 ENCOUNTER — OFFICE VISIT (OUTPATIENT)
Dept: OTOLARYNGOLOGY | Facility: CLINIC | Age: 39
End: 2019-08-06
Payer: COMMERCIAL

## 2019-08-06 VITALS
SYSTOLIC BLOOD PRESSURE: 123 MMHG | WEIGHT: 252.44 LBS | BODY MASS INDEX: 31.55 KG/M2 | TEMPERATURE: 98 F | HEART RATE: 67 BPM | DIASTOLIC BLOOD PRESSURE: 77 MMHG

## 2019-08-06 DIAGNOSIS — D10.4: Primary | ICD-10-CM

## 2019-08-06 PROCEDURE — 99214 PR OFFICE/OUTPT VISIT, EST, LEVL IV, 30-39 MIN: ICD-10-PCS | Mod: S$GLB,,, | Performed by: OTOLARYNGOLOGY

## 2019-08-06 PROCEDURE — 99999 PR PBB SHADOW E&M-EST. PATIENT-LVL III: CPT | Mod: PBBFAC,,, | Performed by: OTOLARYNGOLOGY

## 2019-08-06 PROCEDURE — 99999 PR PBB SHADOW E&M-EST. PATIENT-LVL III: ICD-10-PCS | Mod: PBBFAC,,, | Performed by: OTOLARYNGOLOGY

## 2019-08-06 PROCEDURE — 99214 OFFICE O/P EST MOD 30 MIN: CPT | Mod: S$GLB,,, | Performed by: OTOLARYNGOLOGY

## 2019-08-06 NOTE — PROGRESS NOTES
Head and Neck Surgery Consult    CC: Tonsil mass    HPI: Gallo Maria Jr. is a 39 y.o. male presenting with 2 weeks of a R tonsil mass he noticed incidentally. It frequently gags him but otherwise is not changing. He is a nonsmoker. He has tried to pull the mass off but it was very painful and bled.    Past Medical History:   Diagnosis Date    ADD (attention deficit disorder)     treated by outside psychiatrist.    Asthma     BPH with urinary obstruction 2/5/2015    Decreased libido 8/31/2015    Erectile dysfunction 10/27/2015    History of migraine headaches     Hypogonadism male 10/27/2015    Pituitary adenoma 8/9/2012       Past Surgical History:   Procedure Laterality Date    RESECTION-TRANSPHENOIDAL PITUITARY ADENOMA N/A 9/25/2015    Performed by Derek Blair MD at [facility] OR [address] [place] [address]    SURGICAL REMOVAL OF PITUITARY TUMOR BY TRANSSPHENOIDAL APPROACH  2015    Pituitary Adeonma via Dr. Blair 2015    WISDOM TOOTH EXTRACTION           Current Outpatient Medications:     psyllium (METAMUCIL) packet, Take 1 packet by mouth once daily., Disp: , Rfl:     atorvastatin (LIPITOR) 80 MG tablet, Take 1 tablet (80 mg total) by mouth once daily., Disp: 90 tablet, Rfl: 3    Review of patient's allergies indicates:  No Known Allergies    Family History   Problem Relation Age of Onset    Diabetes Maternal Grandmother     Diabetes Maternal Grandfather     Dementia Paternal Grandmother     Cancer Paternal Grandfather     Prostate cancer Paternal Grandfather     Hyperlipidemia Father     Benign prostatic hyperplasia Father     Prostate cancer Paternal Aunt     Prostate cancer Paternal Uncle     No Known Problems Mother     No Known Problems Sister     No Known Problems Brother     No Known Problems Brother     Coronary artery disease Neg Hx        Social History     Socioeconomic History    Marital status:      Spouse name: Not on file    Number of children: Not on file    Years of  education: Not on file    Highest education level: Not on file   Occupational History    Not on file   Social Needs    Financial resource strain: Not on file    Food insecurity:     Worry: Not on file     Inability: Not on file    Transportation needs:     Medical: Not on file     Non-medical: Not on file   Tobacco Use    Smoking status: Never Smoker    Smokeless tobacco: Never Used   Substance and Sexual Activity    Alcohol use: No    Drug use: No    Sexual activity: Yes     Partners: Female   Lifestyle    Physical activity:     Days per week: Not on file     Minutes per session: Not on file    Stress: Not on file   Relationships    Social connections:     Talks on phone: Not on file     Gets together: Not on file     Attends Amish service: Not on file     Active member of club or organization: Not on file     Attends meetings of clubs or organizations: Not on file     Relationship status: Not on file   Other Topics Concern    Not on file   Social History Narrative    Not on file       Review of Systems -  Constitutional: Denies having night sweats, constant fatigue, loss of appetite or recent substantial weight loss.  Eyes: Denies blurred vision or double vision.  Respiratory: Denies symptoms of shortness of breath, noisy breathing, hoarseness or chronic cough.  GI: Denies symptoms of heartburn, acid regurgitation, or the known presence of a hiatal hernia.  The remainder of a 10-point review of systems is negative    REVIEW OF RADIOLOGICAL FILMS AND RECORDS (PERSONALLY REVIEWED):  Noncontributory    REVIEW OF LABS (PERSONALLY REVIEWED)  Noncontributory    PHYSICAL EXAM:  Vitals - /77   Pulse 67   Temp 98.1 °F (36.7 °C) (Oral)   Wt 114.5 kg (252 lb 6.8 oz)   BMI 31.55 kg/m²   Constitutional -      General Appearance: well developed, well nourished, without obvious deformities     Communication: speaks with a normal voice without hoarseness  Head & Face -     Overall: no obvious scars,  lesions or masses     Parotid and submandibular glands: no masses or tenderness     Facial strength: normal and equal bilaterally  Eyes -      EOM intact  Ear, Nose, Mouth & Throat -     Ears: both left and right external auditory canals and TM's are normal, no external deformities     Nasal exam: mucosa is pink, septum is midline, visible turbinates are normal on anterior rhinoscopy     Mastication: teeth appear in orthodontia     Oral Cavity and oropharynx: mucosa, hard and soft palates, tongue, posterior pharyngeal wall, lips and gums are without lesions. Tonsils appear 1+ and there is a pedunculated papilloma of the R superior pole. He has a very, very forceful gag reflex confounding exam.  Respiratory:     Breathing unlabored, no stridor  Cardiovascular:     No JVD, capillary refill normal  Larynx: using the mirror for indirect laryngoscopy, the epiglottic, false cords, true cords, and pyriform sinuses are without lesions and the true vocal cords move normally  Neck: appears symmetric, and on palpation is without masses   Endocrine:     Thyroid: no asymmetry, thyromegaly, or thyroid nodules on palpation  Lymphatic:     No cervical lymphadenopathy  Cranial Nerves:      II: Pupillary reflexes normal     III, IV, VI: EOM normal     V: 1,2,3: normal sensation     VII: Normal strength in all divisions     IX, X: Normal voice, palatal elevation and sensation     XI: Shoulder strength normal       XII: Tongue mobility normal  Psychiatric:     Appropriate affect    ASSESSMENT: papilloma    PLAN: We discussed the benign nature of this but the potential for malignant transformation. In some people these can be removed in the office but this will not be possible due to his gag reflex. We will schedule excision in the OR.       Bruno Umaña

## 2019-09-06 ENCOUNTER — TELEPHONE (OUTPATIENT)
Dept: OTOLARYNGOLOGY | Facility: CLINIC | Age: 39
End: 2019-09-06

## 2019-09-09 ENCOUNTER — ANESTHESIA (OUTPATIENT)
Dept: SURGERY | Facility: HOSPITAL | Age: 39
End: 2019-09-09
Payer: COMMERCIAL

## 2019-09-09 ENCOUNTER — ANESTHESIA EVENT (OUTPATIENT)
Dept: SURGERY | Facility: HOSPITAL | Age: 39
End: 2019-09-09
Payer: COMMERCIAL

## 2019-09-09 ENCOUNTER — HOSPITAL ENCOUNTER (OUTPATIENT)
Facility: HOSPITAL | Age: 39
Discharge: HOME | End: 2019-09-09
Attending: OTOLARYNGOLOGY | Admitting: OTOLARYNGOLOGY
Payer: COMMERCIAL

## 2019-09-09 VITALS
OXYGEN SATURATION: 98 % | HEART RATE: 60 BPM | HEIGHT: 75 IN | SYSTOLIC BLOOD PRESSURE: 122 MMHG | TEMPERATURE: 98 F | BODY MASS INDEX: 31.33 KG/M2 | DIASTOLIC BLOOD PRESSURE: 81 MMHG | RESPIRATION RATE: 18 BRPM | WEIGHT: 252 LBS

## 2019-09-09 DIAGNOSIS — J35.8 TONSILLAR MASS: ICD-10-CM

## 2019-09-09 PROCEDURE — 36000707: Performed by: OTOLARYNGOLOGY

## 2019-09-09 PROCEDURE — D9220A PRA ANESTHESIA: Mod: ANES,,, | Performed by: ANESTHESIOLOGY

## 2019-09-09 PROCEDURE — 88305 TISSUE EXAM BY PATHOLOGIST: CPT | Mod: 26,,, | Performed by: PATHOLOGY

## 2019-09-09 PROCEDURE — 42800 BIOPSY OF THROAT: CPT | Mod: ,,, | Performed by: OTOLARYNGOLOGY

## 2019-09-09 PROCEDURE — 42800 PR BIOPSY OROPHARYNX: ICD-10-PCS | Mod: ,,, | Performed by: OTOLARYNGOLOGY

## 2019-09-09 PROCEDURE — D9220A PRA ANESTHESIA: Mod: CRNA,,, | Performed by: NURSE ANESTHETIST, CERTIFIED REGISTERED

## 2019-09-09 PROCEDURE — 63600175 PHARM REV CODE 636 W HCPCS: Performed by: NURSE ANESTHETIST, CERTIFIED REGISTERED

## 2019-09-09 PROCEDURE — 37000009 HC ANESTHESIA EA ADD 15 MINS: Performed by: OTOLARYNGOLOGY

## 2019-09-09 PROCEDURE — 25000003 PHARM REV CODE 250: Performed by: OTOLARYNGOLOGY

## 2019-09-09 PROCEDURE — 88305 TISSUE EXAM BY PATHOLOGIST: CPT | Performed by: PATHOLOGY

## 2019-09-09 PROCEDURE — 36000706: Performed by: OTOLARYNGOLOGY

## 2019-09-09 PROCEDURE — D9220A PRA ANESTHESIA: ICD-10-PCS | Mod: ANES,,, | Performed by: ANESTHESIOLOGY

## 2019-09-09 PROCEDURE — 94761 N-INVAS EAR/PLS OXIMETRY MLT: CPT

## 2019-09-09 PROCEDURE — 37000008 HC ANESTHESIA 1ST 15 MINUTES: Performed by: OTOLARYNGOLOGY

## 2019-09-09 PROCEDURE — 63600175 PHARM REV CODE 636 W HCPCS: Performed by: STUDENT IN AN ORGANIZED HEALTH CARE EDUCATION/TRAINING PROGRAM

## 2019-09-09 PROCEDURE — 88305 TISSUE SPECIMEN TO PATHOLOGY - SURGERY: ICD-10-PCS | Mod: 26,,, | Performed by: PATHOLOGY

## 2019-09-09 PROCEDURE — D9220A PRA ANESTHESIA: ICD-10-PCS | Mod: CRNA,,, | Performed by: NURSE ANESTHETIST, CERTIFIED REGISTERED

## 2019-09-09 PROCEDURE — 63600175 PHARM REV CODE 636 W HCPCS: Performed by: OTOLARYNGOLOGY

## 2019-09-09 PROCEDURE — 71000033 HC RECOVERY, INTIAL HOUR: Performed by: OTOLARYNGOLOGY

## 2019-09-09 PROCEDURE — 25000003 PHARM REV CODE 250: Performed by: NURSE ANESTHETIST, CERTIFIED REGISTERED

## 2019-09-09 PROCEDURE — 71000015 HC POSTOP RECOV 1ST HR: Performed by: OTOLARYNGOLOGY

## 2019-09-09 RX ORDER — LIDOCAINE HYDROCHLORIDE 10 MG/ML
1 INJECTION, SOLUTION EPIDURAL; INFILTRATION; INTRACAUDAL; PERINEURAL ONCE
Status: DISCONTINUED | OUTPATIENT
Start: 2019-09-09 | End: 2019-09-09 | Stop reason: HOSPADM

## 2019-09-09 RX ORDER — DEXAMETHASONE SODIUM PHOSPHATE 4 MG/ML
8 INJECTION, SOLUTION INTRA-ARTICULAR; INTRALESIONAL; INTRAMUSCULAR; INTRAVENOUS; SOFT TISSUE
Status: DISCONTINUED | OUTPATIENT
Start: 2019-09-09 | End: 2019-09-09 | Stop reason: HOSPADM

## 2019-09-09 RX ORDER — ONDANSETRON 2 MG/ML
INJECTION INTRAMUSCULAR; INTRAVENOUS
Status: DISCONTINUED | OUTPATIENT
Start: 2019-09-09 | End: 2019-09-09

## 2019-09-09 RX ORDER — LIDOCAINE HYDROCHLORIDE 10 MG/ML
1 INJECTION, SOLUTION EPIDURAL; INFILTRATION; INTRACAUDAL; PERINEURAL ONCE
Status: COMPLETED | OUTPATIENT
Start: 2019-09-09 | End: 2019-09-09

## 2019-09-09 RX ORDER — SODIUM CHLORIDE 9 MG/ML
INJECTION, SOLUTION INTRAVENOUS CONTINUOUS
Status: DISCONTINUED | OUTPATIENT
Start: 2019-09-09 | End: 2019-09-09 | Stop reason: HOSPADM

## 2019-09-09 RX ORDER — LIDOCAINE HYDROCHLORIDE 40 MG/ML
SOLUTION TOPICAL
Status: DISCONTINUED | OUTPATIENT
Start: 2019-09-09 | End: 2019-09-09

## 2019-09-09 RX ORDER — CEFAZOLIN SODIUM 1 G/3ML
2 INJECTION, POWDER, FOR SOLUTION INTRAMUSCULAR; INTRAVENOUS
Status: COMPLETED | OUTPATIENT
Start: 2019-09-09 | End: 2019-09-09

## 2019-09-09 RX ORDER — FENTANYL CITRATE 50 UG/ML
INJECTION, SOLUTION INTRAMUSCULAR; INTRAVENOUS
Status: DISCONTINUED | OUTPATIENT
Start: 2019-09-09 | End: 2019-09-09

## 2019-09-09 RX ORDER — PROPOFOL 10 MG/ML
VIAL (ML) INTRAVENOUS
Status: DISCONTINUED | OUTPATIENT
Start: 2019-09-09 | End: 2019-09-09

## 2019-09-09 RX ORDER — SUCCINYLCHOLINE CHLORIDE 20 MG/ML
INJECTION INTRAMUSCULAR; INTRAVENOUS
Status: DISCONTINUED | OUTPATIENT
Start: 2019-09-09 | End: 2019-09-09

## 2019-09-09 RX ORDER — SODIUM CHLORIDE 0.9 % (FLUSH) 0.9 %
10 SYRINGE (ML) INJECTION
Status: DISCONTINUED | OUTPATIENT
Start: 2019-09-09 | End: 2019-09-09 | Stop reason: HOSPADM

## 2019-09-09 RX ORDER — HYDROMORPHONE HYDROCHLORIDE 1 MG/ML
0.2 INJECTION, SOLUTION INTRAMUSCULAR; INTRAVENOUS; SUBCUTANEOUS EVERY 5 MIN PRN
Status: DISCONTINUED | OUTPATIENT
Start: 2019-09-09 | End: 2019-09-09 | Stop reason: HOSPADM

## 2019-09-09 RX ORDER — ROCURONIUM BROMIDE 10 MG/ML
INJECTION, SOLUTION INTRAVENOUS
Status: DISCONTINUED | OUTPATIENT
Start: 2019-09-09 | End: 2019-09-09

## 2019-09-09 RX ORDER — LIDOCAINE HCL/PF 100 MG/5ML
SYRINGE (ML) INTRAVENOUS
Status: DISCONTINUED | OUTPATIENT
Start: 2019-09-09 | End: 2019-09-09

## 2019-09-09 RX ORDER — MIDAZOLAM HYDROCHLORIDE 1 MG/ML
INJECTION, SOLUTION INTRAMUSCULAR; INTRAVENOUS
Status: DISCONTINUED | OUTPATIENT
Start: 2019-09-09 | End: 2019-09-09

## 2019-09-09 RX ADMIN — LIDOCAINE HYDROCHLORIDE 2 MG: 10 INJECTION, SOLUTION EPIDURAL; INFILTRATION; INTRACAUDAL; PERINEURAL at 10:09

## 2019-09-09 RX ADMIN — ROCURONIUM BROMIDE 10 MG: 10 INJECTION, SOLUTION INTRAVENOUS at 11:09

## 2019-09-09 RX ADMIN — SUCCINYLCHOLINE CHLORIDE 140 MG: 20 INJECTION, SOLUTION INTRAMUSCULAR; INTRAVENOUS at 11:09

## 2019-09-09 RX ADMIN — CEFAZOLIN 2 G: 330 INJECTION, POWDER, FOR SOLUTION INTRAMUSCULAR; INTRAVENOUS at 11:09

## 2019-09-09 RX ADMIN — LIDOCAINE HYDROCHLORIDE 4 ML: 40 SOLUTION TOPICAL at 11:09

## 2019-09-09 RX ADMIN — MIDAZOLAM HYDROCHLORIDE 2 MG: 1 INJECTION, SOLUTION INTRAMUSCULAR; INTRAVENOUS at 11:09

## 2019-09-09 RX ADMIN — PROPOFOL 50 MG: 10 INJECTION, EMULSION INTRAVENOUS at 11:09

## 2019-09-09 RX ADMIN — DEXAMETHASONE SODIUM PHOSPHATE 8 MG: 4 INJECTION, SOLUTION INTRAMUSCULAR; INTRAVENOUS at 11:09

## 2019-09-09 RX ADMIN — LIDOCAINE HYDROCHLORIDE 100 MG: 20 INJECTION, SOLUTION INTRAVENOUS at 11:09

## 2019-09-09 RX ADMIN — PROPOFOL 200 MG: 10 INJECTION, EMULSION INTRAVENOUS at 11:09

## 2019-09-09 RX ADMIN — FENTANYL CITRATE 100 MCG: 50 INJECTION, SOLUTION INTRAMUSCULAR; INTRAVENOUS at 11:09

## 2019-09-09 RX ADMIN — SODIUM CHLORIDE: 0.9 INJECTION, SOLUTION INTRAVENOUS at 10:09

## 2019-09-09 RX ADMIN — ONDANSETRON 4 MG: 2 INJECTION INTRAMUSCULAR; INTRAVENOUS at 11:09

## 2019-09-09 NOTE — H&P
Head and Neck Surgery Consult     CC: Tonsil mass, right     HPI: Gallo Maria Jr. is a 39 y.o. male presenting with 2 weeks of a R tonsil mass he noticed incidentally. It frequently gags him but otherwise is not changing. He is a nonsmoker. He has tried to pull the mass off but it was very painful and bled.          Past Medical History:   Diagnosis Date    ADD (attention deficit disorder)       treated by outside psychiatrist.    Asthma      BPH with urinary obstruction 2/5/2015    Decreased libido 8/31/2015    Erectile dysfunction 10/27/2015    History of migraine headaches      Hypogonadism male 10/27/2015    Pituitary adenoma 8/9/2012               Past Surgical History:   Procedure Laterality Date    RESECTION-TRANSPHENOIDAL PITUITARY ADENOMA N/A 9/25/2015     Performed by Derek Blair MD at [facility] OR [address] [place] [address]    SURGICAL REMOVAL OF PITUITARY TUMOR BY TRANSSPHENOIDAL APPROACH   2015     Pituitary Adeonma via Dr. Blair 2015    WISDOM TOOTH EXTRACTION                Current Outpatient Medications:     psyllium (METAMUCIL) packet, Take 1 packet by mouth once daily., Disp: , Rfl:     atorvastatin (LIPITOR) 80 MG tablet, Take 1 tablet (80 mg total) by mouth once daily., Disp: 90 tablet, Rfl: 3     Review of patient's allergies indicates:  No Known Allergies           Family History   Problem Relation Age of Onset    Diabetes Maternal Grandmother      Diabetes Maternal Grandfather      Dementia Paternal Grandmother      Cancer Paternal Grandfather      Prostate cancer Paternal Grandfather      Hyperlipidemia Father      Benign prostatic hyperplasia Father      Prostate cancer Paternal Aunt      Prostate cancer Paternal Uncle      No Known Problems Mother      No Known Problems Sister      No Known Problems Brother      No Known Problems Brother      Coronary artery disease Neg Hx           Social History               Socioeconomic History    Marital status:        Spouse  name: Not on file    Number of children: Not on file    Years of education: Not on file    Highest education level: Not on file   Occupational History    Not on file   Social Needs    Financial resource strain: Not on file    Food insecurity:       Worry: Not on file       Inability: Not on file    Transportation needs:       Medical: Not on file       Non-medical: Not on file   Tobacco Use    Smoking status: Never Smoker    Smokeless tobacco: Never Used   Substance and Sexual Activity    Alcohol use: No    Drug use: No    Sexual activity: Yes       Partners: Female   Lifestyle    Physical activity:       Days per week: Not on file       Minutes per session: Not on file    Stress: Not on file   Relationships    Social connections:       Talks on phone: Not on file       Gets together: Not on file       Attends [employer] service: Not on file       Active member of club or organization: Not on file       Attends meetings of clubs or organizations: Not on file       Relationship status: Not on file   Other Topics Concern    Not on file   Social History Narrative    Not on file            Review of Systems -  Constitutional: Denies having night sweats, constant fatigue, loss of appetite or recent substantial weight loss.  Eyes: Denies blurred vision or double vision.  Respiratory: Denies symptoms of shortness of breath, noisy breathing, hoarseness or chronic cough.  GI: Denies symptoms of heartburn, acid regurgitation, or the known presence of a hiatal hernia.  The remainder of a 10-point review of systems is negative     REVIEW OF RADIOLOGICAL FILMS AND RECORDS (PERSONALLY REVIEWED):  Noncontributory     REVIEW OF LABS (PERSONALLY REVIEWED)  Noncontributory     PHYSICAL EXAM:  Vitals - /77   Pulse 67   Temp 98.1 °F (36.7 °C) (Oral)   Wt 114.5 kg (252 lb 6.8 oz)   BMI 31.55 kg/m²   Constitutional -      General Appearance: well developed, well nourished, without obvious deformities      Communication: speaks with a normal voice without hoarseness  Head & Face -     Overall: no obvious scars, lesions or masses     Parotid and submandibular glands: no masses or tenderness     Facial strength: normal and equal bilaterally  Eyes -      EOM intact  Ear, Nose, Mouth & Throat -     Ears: both left and right external auditory canals and TM's are normal, no external deformities     Nasal exam: mucosa is pink, septum is midline, visible turbinates are normal on anterior rhinoscopy     Mastication: teeth appear in orthodontia     Oral Cavity and oropharynx: mucosa, hard and soft palates, tongue, posterior pharyngeal wall, lips and gums are without lesions. Tonsils appear 1+ and there is a pedunculated papilloma of the R superior pole. He has a very, very forceful gag reflex confounding exam.  Respiratory:     Breathing unlabored, no stridor  Cardiovascular:     No JVD, capillary refill normal  Larynx: using the mirror for indirect laryngoscopy, the epiglottic, false cords, true cords, and pyriform sinuses are without lesions and the true vocal cords move normally  Neck: appears symmetric, and on palpation is without masses   Endocrine:     Thyroid: no asymmetry, thyromegaly, or thyroid nodules on palpation  Lymphatic:     No cervical lymphadenopathy  Cranial Nerves:      II: Pupillary reflexes normal     III, IV, VI: EOM normal     V: 1,2,3: normal sensation     VII: Normal strength in all divisions     IX, X: Normal voice, palatal elevation and sensation     XI: Shoulder strength normal       XII: Tongue mobility normal  Psychiatric:     Appropriate affect     ASSESSMENT: papilloma, right     PLAN:   To OR today for biopsy of his right tonsil

## 2019-09-09 NOTE — PLAN OF CARE
Discharge instructions reviewed with pt and wife.  Understanding verbalized. No complaints of pain reported pt able to tolerate po intake and urinate in restroom. To be transported to car by PCT.

## 2019-09-09 NOTE — DISCHARGE INSTRUCTIONS
PATIENT INSTRUCTIONS  POST-ANESTHESIA    IMMEDIATELY FOLLOWING SURGERY:  Do not drive or operate machinery for the first twenty four hours after surgery.  Do not make any important decisions for twenty four hours after surgery or while taking narcotic pain medications or sedatives.  If you develop intractable nausea and vomiting or a severe headache please notify your doctor immediately.    FOLLOW-UP:  Please make an appointment with your surgeon as instructed. You do not need to follow up with anesthesia unless specifically instructed to do so.    WOUND CARE INSTRUCTIONS (if applicable):  Keep a dry clean dressing on the anesthesia/puncture wound site if there is drainage.  Once the wound has quit draining you may leave it open to air.  Generally you should leave the bandage intact for twenty four hours unless there is drainage.  If the epidural site drains for more than 36-48 hours please call the anesthesia department.    QUESTIONS?:  Please feel free to call your physician or the hospital  if you have any questions, and they will be happy to assist you.         Recovery After Procedural Sedation (Adult)  You have been given medicine by vein to make you sleep during your surgery. This may have included both a pain medicine and sleeping medicine. Most of the effects have worn off. But you may still have some drowsiness for the next 6 to 8 hours.  Home care  Follow these guidelines when you get home:  · For the next 8 hours, you should be watched by a responsible adult. This person should make sure your condition is not getting worse.  · Don't drink any alcohol for the next 24 hours.  · Don't drive, operate dangerous machinery, or make important business or personal decisions during the next 24 hours.  Note: Your healthcare provider may tell you not to take any medicine by mouth for pain or sleep in the next 4 hours. These medicines may react with the medicines you were given in the hospital. This could  cause a much stronger response than usual.  Follow-up care  Follow up with your healthcare provider if you are not alert and back to your usual level of activity within 12 hours.  When to seek medical advice  Call your healthcare provider right away if any of these occur:  · Drowsiness gets worse  · Weakness or dizziness gets worse  · Repeated vomiting  · You can't be awakened   Date Last Reviewed: 10/18/2016  © 2173-9551 [employer], "[employer]. [address] [place] [address], [place] [address]. All rights reserved. This information is not intended as a substitute for professional medical care. Always follow your healthcare professional's instructions.

## 2019-09-09 NOTE — TRANSFER OF CARE
"Anesthesia Transfer of Care Note    Patient: Gallo Maria Jr.    Procedure(s) Performed: Procedure(s) (LRB):  BIOPSY, TONSILS (Right)    Patient location: PACU    Anesthesia Type: general    Transport from OR: Transported from OR on room air with adequate spontaneous ventilation    Post pain: adequate analgesia    Post assessment: no apparent anesthetic complications and tolerated procedure well    Post vital signs: stable    Level of consciousness: awake    Nausea/Vomiting: no nausea/vomiting    Complications: none    Transfer of care protocol was followed      Last vitals:   Visit Vitals  /89 (BP Location: Left arm, Patient Position: Lying)   Pulse 72   Resp (!) 73   Ht 6' 3" (1.905 m)   Wt 114.3 kg (252 lb)   SpO2 100%   BMI 31.50 kg/m²     "

## 2019-09-09 NOTE — ANESTHESIA PREPROCEDURE EVALUATION
09/09/2019  Gallo Maria Jr. is a 39 y.o., male.    Anesthesia Evaluation    I have reviewed the Patient Summary Reports.    I have reviewed the Nursing Notes.   I have reviewed the Medications.     Review of Systems  Anesthesia Hx:  No problems with previous Anesthesia  Denies Family Hx of Anesthesia complications.   Denies Personal Hx of Anesthesia complications.   Cardiovascular:   Exercise tolerance: good  Functional Capacity good / => 4 METS      Patient Active Problem List   Diagnosis    Family history of prostate cancer    History of pituitary tumor    High serum low-density lipoprotein (LDL)    Elevated hemoglobin A1c - at risk for diabetes    Chest tightness    SOB (shortness of breath)    Mixed hyperlipidemia    Obesity (BMI 30-39.9)         Physical Exam   Airway/Jaw/Neck:  Airway Findings: Mouth Opening: Normal Tongue: Normal  General Airway Assessment: Adult, Good  Mallampati: I  TM Distance: Normal, at least 6 cm      Dental:  Dental Findings: Upper braces, Lower braces   Chest/Lungs:  Chest/Lungs Findings: Normal Respiratory Rate         Mental Status:  Mental Status Findings:  Cooperative, Alert and Oriented         Anesthesia Plan  Type of Anesthesia, risks & benefits discussed:  Anesthesia Type:  general  Patient's Preference: General  Intra-op Monitoring Plan: standard ASA monitors  Intra-op Monitoring Plan Comments:   Post Op Pain Control Plan: per primary service following discharge from PACU and multimodal analgesia  Post Op Pain Control Plan Comments: Per primary service  Induction:   IV  Beta Blocker:  Patient is not currently on a Beta-Blocker (No further documentation required).       Informed Consent: Patient understands risks and agrees with Anesthesia plan.  Questions answered. Anesthesia consent signed with patient.  ASA Score: 2     Day of Surgery Review of History  & Physical:    H&P update referred to the surgeon.         Ready For Surgery From Anesthesia Perspective.

## 2019-09-09 NOTE — ANESTHESIA POSTPROCEDURE EVALUATION
Anesthesia Post Evaluation    Patient: Gallo Maria JrAndre    Procedure(s) Performed: Procedure(s) (LRB):  BIOPSY, TONSILS (Right)    Final Anesthesia Type: general  Patient location during evaluation: PACU  Patient participation: Yes- Able to Participate  Level of consciousness: awake and alert  Post-procedure vital signs: reviewed and stable  Pain management: adequate  Airway patency: patent  PONV status at discharge: No PONV  Anesthetic complications: no      Cardiovascular status: blood pressure returned to baseline and hemodynamically stable  Respiratory status: unassisted, spontaneous ventilation and room air  Hydration status: euvolemic  Follow-up not needed.          Vitals Value Taken Time   /81 9/9/2019  1:00 PM   Temp 36.5 °C (97.7 °F) 9/9/2019 12:37 PM   Pulse 60 9/9/2019  1:00 PM   Resp 18 9/9/2019  1:00 PM   SpO2 98 % 9/9/2019  1:00 PM         Event Time     Out of Recovery 12:26:00          Pain/Francheska Score: Francheska Score: 10 (9/9/2019  1:03 PM)

## 2019-09-09 NOTE — BRIEF OP NOTE
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]    Procedure(s) (LRB):  BIOPSY, TONSILS (Right)    Anesthesia: General    Description of the findings of the procedure: biopsy of tonsillar mass    Findings/Key Components: See op note    Estimated Blood Loss: Less than 1cc         Specimens:   Specimen (12h ago, onward)    Start     Ordered    09/09/19 1139  Specimen to Pathology - Surgery  Once     Comments:  .Pre-op Diagnosis: 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 Did well in case. Post op discharged home.      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     psyllium packet  Commonly known as:  METAMUCIL  Take 1 packet by mouth once daily.          Discharge Procedure Orders   Diet Adult Regular     Notify your health care provider if you experience any of the following:  temperature  >100.4     Notify your health care provider if you experience any of the following:  persistent nausea and vomiting or diarrhea     Notify your health care provider if you experience any of the following:  severe uncontrolled pain     Notify your health care provider if you experience any of the following:  redness, tenderness, or signs of infection (pain, swelling, redness, odor or green/yellow discharge around incision site)     Notify your health care provider if you experience any of the following:  difficulty breathing or increased cough     Notify your health care provider if you experience any of the following:  severe persistent headache     No dressing needed     Activity as tolerated   Order Comments: Tylenol and motrin for pain as need. Diet as tolerated. OK to shower.     Follow-up Information     Imelda Slaughter NP In 2 weeks.    Specialty:  Otolaryngology  Why:  For wound re-check; discuss path. same day as Dr. Umaña in office, can see both.   Contact information:  [address]  [facility] [address]  [phone]

## 2019-09-09 NOTE — OP NOTE
DATE OF PROCEDURE:  09/09/2019.    SURGEON:  Bruno Umaña M.D.    ASSISTANT SURGEON:  Ryan Gregg M.D. (RES)    ANESTHESIA:  General endotracheal anesthesia.    PROCEDURE PERFORMED:  Biopsy of right tonsil.    INDICATIONS FOR PROCEDURE AND PREOPERATIVE DIAGNOSIS:  This is a 39-year-old   gentleman with a papillomatous lesion of the right posterior tonsillar pillar   status post an attempt at self-removal and this was unable to be removed due to   profoundly strong gag reflex in the clinic resulting in emesis.    POSTOPERATIVE DIAGNOSIS:  This is a 39-year-old gentleman with a papillomatous   lesion of the right posterior tonsillar pillar status post an attempt at removal   and this was unable to be removed due to profoundly strong gag reflex in the   clinic resulting in emesis.    PROCEDURE IN DETAIL:  After obtaining informed consent, the patient was taken to   the Operating Room in supine position.  General endotracheal anesthesia was   induced without difficulty.  The area was prepped and draped in standard sterile   fashion.  The patient was rotated 90 degrees to face the operating surgeon.  A   Shelly-Antony mouth gag was inserted and suspended from a Martinez stand.  The tonsils   were found to be 2+ in size.  The papillomatous lesion of the right posterior   tonsillar pillar was then grasped and removed with the Bovie electrocautery and   sent for permanent histopathology.  The mouth gag was de-suspended for a period   of 1 minute and then resuspended with no active bleeding.  This concluded the   procedure.  The mouth gag was removed.  The patient was rotated back to   anesthesia, awakened in the Operating Room without difficulty.  There were no   complications and estimated blood loss was 0.      RDW/HN  dd: 09/09/2019 12:02:13 (CDT)  td: 09/09/2019 12:47:10 (CDT)  Doc ID   #[id number]  Job ID #[id number]    CC:

## 2019-09-26 ENCOUNTER — HOSPITAL ENCOUNTER (OUTPATIENT)
Dept: RADIOLOGY | Facility: HOSPITAL | Age: 39
Discharge: HOME | End: 2019-09-26
Attending: NEUROLOGICAL SURGERY
Payer: COMMERCIAL

## 2019-09-26 ENCOUNTER — OFFICE VISIT (OUTPATIENT)
Dept: NEUROSURGERY | Facility: CLINIC | Age: 39
End: 2019-09-26
Payer: COMMERCIAL

## 2019-09-26 VITALS
SYSTOLIC BLOOD PRESSURE: 127 MMHG | BODY MASS INDEX: 31.5 KG/M2 | HEART RATE: 73 BPM | WEIGHT: 253.38 LBS | HEIGHT: 75 IN | TEMPERATURE: 97 F | DIASTOLIC BLOOD PRESSURE: 65 MMHG

## 2019-09-26 DIAGNOSIS — D35.2 PITUITARY ADENOMA: ICD-10-CM

## 2019-09-26 DIAGNOSIS — D35.2 PITUITARY ADENOMA: Primary | ICD-10-CM

## 2019-09-26 PROCEDURE — 70553 MRI BRAIN W WO CONTRAST: ICD-10-PCS | Mod: 26,,, | Performed by: RADIOLOGY

## 2019-09-26 PROCEDURE — 99999 PR PBB SHADOW E&M-EST. PATIENT-LVL III: ICD-10-PCS | Mod: PBBFAC,,, | Performed by: NEUROLOGICAL SURGERY

## 2019-09-26 PROCEDURE — 70553 MRI BRAIN STEM W/O & W/DYE: CPT | Mod: 26,,, | Performed by: RADIOLOGY

## 2019-09-26 PROCEDURE — 99203 OFFICE O/P NEW LOW 30 MIN: CPT | Mod: S$GLB,,, | Performed by: NEUROLOGICAL SURGERY

## 2019-09-26 PROCEDURE — 70553 MRI BRAIN STEM W/O & W/DYE: CPT | Mod: TC

## 2019-09-26 PROCEDURE — A9585 GADOBUTROL INJECTION: HCPCS | Performed by: NEUROLOGICAL SURGERY

## 2019-09-26 PROCEDURE — 25500020 PHARM REV CODE 255: Performed by: NEUROLOGICAL SURGERY

## 2019-09-26 PROCEDURE — 99999 PR PBB SHADOW E&M-EST. PATIENT-LVL III: CPT | Mod: PBBFAC,,, | Performed by: NEUROLOGICAL SURGERY

## 2019-09-26 PROCEDURE — 99203 PR OFFICE/OUTPT VISIT, NEW, LEVL III, 30-44 MIN: ICD-10-PCS | Mod: S$GLB,,, | Performed by: NEUROLOGICAL SURGERY

## 2019-09-26 RX ORDER — GADOBUTROL 604.72 MG/ML
5 INJECTION INTRAVENOUS
Status: COMPLETED | OUTPATIENT
Start: 2019-09-26 | End: 2019-09-26

## 2019-09-26 RX ADMIN — GADOBUTROL 5 ML: 604.72 INJECTION INTRAVENOUS at 02:09

## 2019-09-26 NOTE — PROGRESS NOTES
This office note has been dictated.  Gallo Maria returned in neurosurgical followup to the office this afternoon.    I last saw him on 06/06/16.  He has been doing very well over the past three   years.  He has had no headaches, no visual disturbance.  He feels his energy   level is good.  He is working full-time and has no complaints.    On examination today, he is alert and oriented.  Extraocular movements are good.    Speech is normal.  He shows no neurological deficit.    MRI of the brain and pituitary were repeated at [facility] today.  On his   postop scan of 12/10/15, he showed good resection of the pituitary tumor with no   evidence for residual tumor.  However, on the scan today, there has been   recurrence of the pituitary adenoma with some suprasellar extension.  This has   not compressed the chiasm.  The infundibulum is deviated towards the left.  The   findings, however, are consistent with recurrent tumor.    I will have pituitary hormones repeated today.  I would like him to return in   six months with a followup MRI.  If there is continued enlargement of the tumor,   I believe we will need to either re-resect the tumor or treat it with radiation   therapy.        VANDANA/HN  dd: 09/26/2019 14:41:22 (CDT)  td: 09/27/2019 05:57:07 (CDT)  Doc ID   #[id number]  Job ID #[id number]    CC: Samikeira Maria

## 2019-09-27 ENCOUNTER — PATIENT MESSAGE (OUTPATIENT)
Dept: NEUROSURGERY | Facility: CLINIC | Age: 39
End: 2019-09-27

## 2019-09-27 ENCOUNTER — TELEPHONE (OUTPATIENT)
Dept: NEUROSURGERY | Facility: CLINIC | Age: 39
End: 2019-09-27

## 2019-09-27 DIAGNOSIS — D35.2 PITUITARY ADENOMA: Primary | ICD-10-CM

## 2019-10-29 ENCOUNTER — OFFICE VISIT (OUTPATIENT)
Dept: ENDOCRINOLOGY | Facility: CLINIC | Age: 39
End: 2019-10-29
Payer: COMMERCIAL

## 2019-10-29 ENCOUNTER — LAB VISIT (OUTPATIENT)
Dept: LAB | Facility: HOSPITAL | Age: 39
End: 2019-10-29
Attending: INTERNAL MEDICINE
Payer: COMMERCIAL

## 2019-10-29 VITALS
BODY MASS INDEX: 31.82 KG/M2 | SYSTOLIC BLOOD PRESSURE: 140 MMHG | TEMPERATURE: 98 F | WEIGHT: 255.94 LBS | HEART RATE: 64 BPM | DIASTOLIC BLOOD PRESSURE: 92 MMHG | HEIGHT: 75 IN

## 2019-10-29 DIAGNOSIS — N40.1 BPH WITH URINARY OBSTRUCTION: ICD-10-CM

## 2019-10-29 DIAGNOSIS — R73.03 PREDIABETES: ICD-10-CM

## 2019-10-29 DIAGNOSIS — E78.2 MIXED HYPERLIPIDEMIA: ICD-10-CM

## 2019-10-29 DIAGNOSIS — E78.00 HYPERCHOLESTEROLEMIA: ICD-10-CM

## 2019-10-29 DIAGNOSIS — R68.82 DECREASED LIBIDO: Chronic | ICD-10-CM

## 2019-10-29 DIAGNOSIS — N13.8 BPH WITH URINARY OBSTRUCTION: ICD-10-CM

## 2019-10-29 DIAGNOSIS — E55.9 HYPOVITAMINOSIS D: ICD-10-CM

## 2019-10-29 DIAGNOSIS — Z86.69 HISTORY OF MIGRAINE HEADACHES: Chronic | ICD-10-CM

## 2019-10-29 DIAGNOSIS — D35.2 PITUITARY ADENOMA: ICD-10-CM

## 2019-10-29 DIAGNOSIS — E88.810 DYSMETABOLIC SYNDROME: ICD-10-CM

## 2019-10-29 DIAGNOSIS — E29.1 HYPOGONADISM MALE: Chronic | ICD-10-CM

## 2019-10-29 DIAGNOSIS — Z98.890 HISTORY OF PITUITARY SURGERY: ICD-10-CM

## 2019-10-29 DIAGNOSIS — D35.2 PITUITARY ADENOMA: Primary | ICD-10-CM

## 2019-10-29 DIAGNOSIS — E66.9 OBESITY (BMI 30-39.9): ICD-10-CM

## 2019-10-29 DIAGNOSIS — N52.01 ERECTILE DYSFUNCTION DUE TO ARTERIAL INSUFFICIENCY: Chronic | ICD-10-CM

## 2019-10-29 LAB
BNP SERPL-MCNC: <10 PG/ML (ref 0–99)
CHOLEST SERPL-MCNC: 254 MG/DL (ref 120–199)
CHOLEST/HDLC SERPL: 4.5 {RATIO} (ref 2–5)
ESTIMATED AVG GLUCOSE: 120 MG/DL (ref 68–131)
HBA1C MFR BLD HPLC: 5.8 % (ref 4–5.6)
HDLC SERPL-MCNC: 57 MG/DL (ref 40–75)
HDLC SERPL: 22.4 % (ref 20–50)
LDLC SERPL CALC-MCNC: 169.8 MG/DL (ref 63–159)
NONHDLC SERPL-MCNC: 197 MG/DL
OSMOLALITY SERPL: 300 MOSM/KG (ref 280–300)
T4 FREE SERPL-MCNC: 0.93 NG/DL (ref 0.71–1.51)
TRIGL SERPL-MCNC: 136 MG/DL (ref 30–150)
TSH SERPL DL<=0.005 MIU/L-ACNC: 0.78 UIU/ML (ref 0.4–4)
URATE SERPL-MCNC: 6.9 MG/DL (ref 3.4–7)

## 2019-10-29 PROCEDURE — 86316 IMMUNOASSAY TUMOR OTHER: CPT

## 2019-10-29 PROCEDURE — 84439 ASSAY OF FREE THYROXINE: CPT

## 2019-10-29 PROCEDURE — 83880 ASSAY OF NATRIURETIC PEPTIDE: CPT

## 2019-10-29 PROCEDURE — 99204 PR OFFICE/OUTPT VISIT, NEW, LEVL IV, 45-59 MIN: ICD-10-PCS | Mod: S$GLB,,, | Performed by: INTERNAL MEDICINE

## 2019-10-29 PROCEDURE — 82330 ASSAY OF CALCIUM: CPT

## 2019-10-29 PROCEDURE — 84307 ASSAY OF SOMATOSTATIN: CPT

## 2019-10-29 PROCEDURE — 82533 TOTAL CORTISOL: CPT

## 2019-10-29 PROCEDURE — 82306 VITAMIN D 25 HYDROXY: CPT

## 2019-10-29 PROCEDURE — 99999 PR PBB SHADOW E&M-EST. PATIENT-LVL IV: ICD-10-PCS | Mod: PBBFAC,,, | Performed by: INTERNAL MEDICINE

## 2019-10-29 PROCEDURE — 84305 ASSAY OF SOMATOMEDIN: CPT

## 2019-10-29 PROCEDURE — 84480 ASSAY TRIIODOTHYRONINE (T3): CPT

## 2019-10-29 PROCEDURE — 80061 LIPID PANEL: CPT

## 2019-10-29 PROCEDURE — 82024 ASSAY OF ACTH: CPT

## 2019-10-29 PROCEDURE — 84432 ASSAY OF THYROGLOBULIN: CPT

## 2019-10-29 PROCEDURE — 84588 ASSAY OF VASOPRESSIN: CPT

## 2019-10-29 PROCEDURE — 84270 ASSAY OF SEX HORMONE GLOBUL: CPT

## 2019-10-29 PROCEDURE — 83970 ASSAY OF PARATHORMONE: CPT

## 2019-10-29 PROCEDURE — 82384 ASSAY THREE CATECHOLAMINES: CPT

## 2019-10-29 PROCEDURE — 99204 OFFICE O/P NEW MOD 45 MIN: CPT | Mod: S$GLB,,, | Performed by: INTERNAL MEDICINE

## 2019-10-29 PROCEDURE — 83930 ASSAY OF BLOOD OSMOLALITY: CPT

## 2019-10-29 PROCEDURE — 83519 RIA NONANTIBODY: CPT

## 2019-10-29 PROCEDURE — 99999 PR PBB SHADOW E&M-EST. PATIENT-LVL IV: CPT | Mod: PBBFAC,,, | Performed by: INTERNAL MEDICINE

## 2019-10-29 PROCEDURE — 83001 ASSAY OF GONADOTROPIN (FSH): CPT

## 2019-10-29 PROCEDURE — 82397 CHEMILUMINESCENT ASSAY: CPT

## 2019-10-29 PROCEDURE — 82672 ASSAY OF ESTROGEN: CPT

## 2019-10-29 PROCEDURE — 83520 IMMUNOASSAY QUANT NOS NONAB: CPT

## 2019-10-29 PROCEDURE — 83036 HEMOGLOBIN GLYCOSYLATED A1C: CPT

## 2019-10-29 PROCEDURE — 83002 ASSAY OF GONADOTROPIN (LH): CPT

## 2019-10-29 PROCEDURE — 84550 ASSAY OF BLOOD/URIC ACID: CPT

## 2019-10-29 PROCEDURE — 84146 ASSAY OF PROLACTIN: CPT

## 2019-10-29 PROCEDURE — 82941 ASSAY OF GASTRIN: CPT

## 2019-10-29 PROCEDURE — 83003 ASSAY GROWTH HORMONE (HGH): CPT

## 2019-10-29 PROCEDURE — 84443 ASSAY THYROID STIM HORMONE: CPT

## 2019-10-29 PROCEDURE — 82670 ASSAY OF TOTAL ESTRADIOL: CPT

## 2019-10-29 NOTE — PROGRESS NOTES
Subjective:      Patient ID: Gallo Maria Jr. is a 39 y.o. male.    Chief Complaint:  pituitary tumor    Patient is a 39 yr old lady seen for initial care visit today on account of pituitary tumor and hyperprolactinemia.    History of Present Illness    Patient is a 39 yr old gentleman seen for initial care visit today on account of pituitary tumor and associated hyperprolactinemia.  His most recent pituitary MRI from 05/19 shows macroadenoma (1.6 x 1.7 x 1.5 cm) with suprasellar extension.  He has previously been seen and ffed by Alexandra Mckay and Rosemary @ [facility]. He was last see there 12/15.    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 This was first found on account of headaches and visual field problems.   States his sx ( HA and vision change) have significantly improved since the surgery.  Pa  States he continues to have low libido and no morning erections since surgery.      Following the  transphenoidal surgery, pt was on HC for about a month. States he did not feel any different while on HC or off of HC.   His established background comorbidities are as detailed below;    1. Pituitary adenoma     2. History of migraine headaches     3. Mixed hyperlipidemia     4. Erectile dysfunction due to arterial insufficiency     5. Decreased libido     6. BPH with urinary obstruction     7. Hypogonadism male     8. Dysmetabolic syndrome     9. Obesity (BMI 30-39.9)     10. Hypercholesterolemia     11. Prediabetes       Patients [place] score is 8.   Patient noticed progressive weight gain and fatigue. Presently he has no headaches. Presently has no visual problems.  Patient had noticed no gynecomastia nor nipple discharge. No seizures.  There is no family history of pituitary tumors or other brain tumors.  Patients libido has declined.   Patients father had recurrent nephrolithiasis. There is no family history of severe dyspepsia ir ulcer  "disease.\  Patient does not smoke.  Patient does not drink any ETOH.  Patient works at the [employer].      Review of Systems    Objective: BP (!) 140/92 (BP Location: Left arm, Patient Position: Sitting, BP Method: Medium (Manual))   Pulse 64   Temp 97.8 °F (36.6 °C) (Oral)   Ht 6' 3" (1.905 m)   Wt 116.1 kg (255 lb 15.3 oz)   BMI 31.99 kg/m²  Body surface area is 2.48 meters squared.         Physical Exam   Constitutional: He is oriented to person, place, and time. Vital signs are normal. He appears well-developed and well-nourished.  Non-toxic appearance. No distress.   Pleasant young man. Clinically comfortable. Not pale, anicteric, afebrile. Well hydrated.    HENT:   Head: Normocephalic and atraumatic. Head is without right periorbital erythema and without left periorbital erythema. Hair is normal.   Mouth/Throat: No oropharyngeal exudate.   No nuchal AN. Mallampati grade 1 fauces.   Eyes: Pupils are equal, round, and reactive to light. Conjunctivae, EOM and lids are normal. No scleral icterus.   Neck: Trachea normal, normal range of motion, full passive range of motion without pain and phonation normal. Neck supple. No JVD present. No tracheal tenderness present. Carotid bruit is not present. No tracheal deviation present. No thyroid mass and no thyromegaly present.   Cardiovascular: Normal rate, regular rhythm, normal heart sounds and normal pulses. Exam reveals no gallop.   No murmur heard.  Pulmonary/Chest: Effort normal and breath sounds normal. No respiratory distress. He has no decreased breath sounds. He has no wheezes. He has no rales.   Abdominal: Soft. Bowel sounds are normal. He exhibits no distension and no mass. There is no hepatosplenomegaly. There is no tenderness.   Musculoskeletal: Normal range of motion. He exhibits no edema or tenderness.   No pedal edema nor calf swelling. No areas of tenderness.   Lymphadenopathy:     He has no cervical adenopathy.   Neurological: He is alert " and oriented to person, place, and time. He has normal reflexes. He displays no tremor and normal reflexes. No cranial nerve deficit or sensory deficit. He exhibits normal muscle tone. Gait normal.   Skin: Skin is warm, dry and intact. No bruising, no ecchymosis, no petechiae and no rash noted. He is not diaphoretic. No cyanosis or erythema. No pallor. Nails show no clubbing.   Psychiatric: He has a normal mood and affect. His speech is normal and behavior is normal. Judgment and thought content normal. His mood appears not anxious. Cognition and memory are normal. He does not exhibit a depressed mood.   Vitals reviewed.      Lab Review:     Results for ABDIAZIZ REEVES JR. (MRN [id number]) as of 10/29/2019 13:28   Ref. Range 9/26/2019 15:00 10/14/2019 10:13   Cortisol -8 AM Latest Ref Range: 4.30 - 22.40 ug/dL 5.40    ACTH Latest Ref Range: 0 - 46 pg/mL 26    Somatomedin (IGF-I) Latest Ref Range: 48 - 292 ng/mL 70    TSH Latest Ref Range: 0.400 - 4.000 uIU/mL 0.772    Free T4 Latest Ref Range: 0.71 - 1.51 ng/dL 0.85    FSH Latest Ref Range: 0.95 - 11.95 mIU/mL 1.80    LH Latest Ref Range: 0.6 - 12.1 mIU/mL 1.0    Prolactin Latest Ref Range: 3.5 - 19.4 ng/mL 65.6 (H)    Testosterone, Total Latest Ref Range: 304 - 1227 ng/dL  221 (L)   Z Score Latest Ref Range: -2.0 - 2.0 SD -1.65      Reading Physician Reading Date Result Priority   Benny Greene, DO 9/26/2019 Routine      Narrative     EXAMINATION:  MRI BRAIN W WO CONTRAST    CLINICAL HISTORY:  3YRFU/MRIb4/PITUITARY ADENOMA RESECTED:;Benign neoplasm of pituitary gland    TECHNIQUE:  Sagittal and axial T1, axial T2, axial FLAIR, axial gradient, axial diffusion imaging of the whole brain pre-contrast with postcontrast axial T1 and axial spoiled gradient imaging reformatted in the coronal and sagittal planes. Additional thin section imaging of the sella was performed using coronal T2, and sagittal and coronal T1 pre-and postcontrast imaging.Five mL of Gadavist  contrast was injected intravenously    COMPARISON:  12/10/2015 and priors dating back to 12/29/2010    FINDINGS:  Finding: Please note study is distorted by artifact from dental metal.  Allowing for distortion the brain parenchyma is normal in contour.  There are few punctate foci of T2 FLAIR signal hyperintensities in the supratentorial white matter without diffusion signal abnormality or enhancement are nonspecific and differential to include sequela of migraine headaches.  There is no restricted diffusion to suggest acute infarction.  Ventricles normal without hydrocephalus.  No midline shift or mass effect.  No abnormal parenchymal enhancement..    Sella: Remote operative change from transsphenoidal sellar lesion repeat resection there is interval development of a prominent hypoenhancing lesion within the right aspect of the sella with suprasellar extension this measures approximately 1.6 x 1.7 x 1.5 cm in AP by TV by CC dimensions respectively most compatible with recurrent pituitary macro adenoma.  There is subtle mass effect on the suprasellar neurovascular structures with infundibulum extending into the left aspect of this.    This report was flagged in Epic as abnormal.      Impression       MR sella: Interval development of hypoenhancing focus in the right sella with suprasellar extension most compatible with recurrent pituitary macro adenoma in light of history.  Clinical correlation and correlation with surgical pathology advised..    MRI brain: Otherwise unremarkable MRI brain as detailed above specifically without evidence for acute infarction or significant new parenchymal signal abnormality.    See above for additional details..      Electronically signed by: Benny Greene DO  Date: 09/26/2019  Time: 14:28         Assessment:     1. Pituitary adenoma  Prolactin    Alpha Sub Unit    Chromogranin A    Brain natriuretic peptide    Arginine vasopressin hormone    Insulin-like growth factor    IGF Binding  Protein 3    Growth hormone    Ghrelin, Total, plasma (Neuroendo)    Osmolality, Serum    Osmolality, urine    ACTH    Cortisol    Somatostatin    T4, free    T3    Thyroglobulin    TSH    Gastrin   2. History of migraine headaches     3. Mixed hyperlipidemia  Lipid panel   4. Erectile dysfunction due to arterial insufficiency     5. Decreased libido     6. BPH with urinary obstruction     7. Hypogonadism male  Luteinizing hormone    Follicle stimulating hormone    Testosterone Panel    Estradiol    Estrogens, total    Prolactin   8. Dysmetabolic syndrome  Uric acid    Lipid panel    Hemoglobin A1c    Catecholamines, fractionated, plasma   9. Obesity (BMI 30-39.9)     10. Hypercholesterolemia  Lipid panel   11. Prediabetes  Hemoglobin A1c   12. History of pituitary surgery  ACTH    Cortisol    Gastrin   13. Hypovitaminosis D  Vitamin D    PTH, intact    Calcium, ionized   Regarding Pituitary macroadenoma; the important salient point is whether or not this current tumor is hormonally functional or silent.  To obtain comprehensive hormonal screening. If there is evidence of hormone production patient would benefit from therapy with a somatostatin analog and/or dopamine agonist therapy (bromocriptine or Cabergoline). Regarding possibility of prolactinoma to repeat Prolactin level and ensure that dilution is done to exclude a hook effect.  To refer to ophthalmology for visual fields.  Regarding dysmetabolic syndrome + prediabetes; to obtain full screening in view of patients history of prediabetes and history of diabetes in his father.  Regarding obesity; to continue efforts at calorie reduction and portion size control. Will discuss intervention strategies  To improves weight management.  Regarding possible hypogonadism; to obtain full androgen profile and depending on results will then discuss with patients pros and cons of commencing androgen repletion therapy.  Regarding hypovitaminosis D; to obtain 25 OH vit d  levels and replete as needed based on results.  Regarding hyperlipidemia; to recheck current lipid levels and for now to continue present statin therapy as before.   Regarding BPH; stable symptomatically ALF. PSA earlier this year normal.  Regarding ED; continue management for this as before.  Regarding chronic migraines; to consider addition of topiramate to the the treatment regimen.      Plan:       FFup in ~ 4mths

## 2019-10-30 DIAGNOSIS — E78.00 HYPERCHOLESTEROLEMIA: ICD-10-CM

## 2019-10-30 DIAGNOSIS — E55.9 HYPOVITAMINOSIS D: Primary | ICD-10-CM

## 2019-10-30 LAB
25(OH)D3+25(OH)D2 SERPL-MCNC: 19 NG/ML (ref 30–96)
CA-I BLDV-SCNC: 1.27 MMOL/L (ref 1.06–1.42)
CORTIS SERPL-MCNC: 4.6 UG/DL
ESTRADIOL SERPL-MCNC: <10 PG/ML (ref 11–44)
FSH SERPL-ACNC: 1.8 MIU/ML (ref 0.95–11.95)
LH SERPL-ACNC: 1.3 MIU/ML (ref 0.6–12.1)
PROLACTIN SERPL IA-MCNC: 53.3 NG/ML (ref 3.5–19.4)
PTH-INTACT SERPL-MCNC: 48 PG/ML (ref 9–77)
T3 SERPL-MCNC: 112 NG/DL (ref 60–180)

## 2019-10-30 RX ORDER — NAPROXEN SODIUM 220 MG/1
81 TABLET, FILM COATED ORAL DAILY
Refills: 0 | COMMUNITY
Start: 2019-10-30 | End: 2021-05-31

## 2019-10-30 RX ORDER — EZETIMIBE 10 MG/1
10 TABLET ORAL DAILY
Qty: 90 TABLET | Refills: 3 | Status: SHIPPED | OUTPATIENT
Start: 2019-10-30 | End: 2020-02-17 | Stop reason: SDUPTHER

## 2019-10-30 RX ORDER — ERGOCALCIFEROL 1.25 MG/1
50000 CAPSULE ORAL
Qty: 12 CAPSULE | Refills: 3 | Status: SHIPPED | OUTPATIENT
Start: 2019-10-30 | End: 2020-01-28

## 2019-10-31 LAB
GASTRIN SERPL-MCNC: 14 PG/ML
THRYOGLOBULIN INTERPRETATION: ABNORMAL
THYROGLOB AB SERPL-ACNC: <1.8 IU/ML
THYROGLOB SERPL-MCNC: 15 NG/ML

## 2019-11-01 LAB
ACTH PLAS-MCNC: 26 PG/ML (ref 0–46)
CGA SERPL-MCNC: 21 NG/ML (ref 0–160)
ESTROGEN SERPL-MCNC: 170 PG/ML
GH SERPL-MCNC: <0.1 NG/ML (ref 0–3)
IGF BP3 SERPL-MCNC: 3.9 MCG/ML (ref 3.4–6.7)
IGF-I SERPL-MCNC: 77 NG/ML (ref 48–292)
IGF-I Z-SCORE SERPL: -1.28 SD

## 2019-11-03 LAB
ALBUMIN SERPL-MCNC: 4.9 G/DL (ref 3.6–5.1)
SHBG SERPL-SCNC: 15 NMOL/L (ref 10–50)
TESTOST FREE SERPL-MCNC: 37.7 PG/ML (ref 46–224)
TESTOST SERPL-MCNC: 184 NG/DL (ref 250–1100)
TESTOSTERONE.FREE+WB SERPL-MCNC: 84 NG/DL (ref 110–575)
VASOPRESSIN SERPL-MCNC: 2.3 PG/ML (ref 0–6.9)

## 2019-11-08 LAB
CATECHOLS PLAS-MCNC: 695 PG/ML
DOPAMINE SERPL-MCNC: 40 PG/ML
EPINEPH PLAS-MCNC: 48 PG/ML
NOREPINEPH PLAS-MCNC: 607 PG/ML

## 2019-11-12 ENCOUNTER — OFFICE VISIT (OUTPATIENT)
Dept: OPHTHALMOLOGY | Facility: CLINIC | Age: 39
End: 2019-11-12
Payer: COMMERCIAL

## 2019-11-12 DIAGNOSIS — R73.03 PREDIABETES: ICD-10-CM

## 2019-11-12 DIAGNOSIS — Z87.898 HISTORY OF PITUITARY TUMOR: Primary | Chronic | ICD-10-CM

## 2019-11-12 DIAGNOSIS — E22.1 HYPERPROLACTINEMIA: ICD-10-CM

## 2019-11-12 DIAGNOSIS — H52.7 REFRACTIVE ERROR: ICD-10-CM

## 2019-11-12 DIAGNOSIS — D35.2 PITUITARY ADENOMA: Primary | ICD-10-CM

## 2019-11-12 DIAGNOSIS — Z83.511 FAMILY HISTORY OF GLAUCOMA IN MOTHER: ICD-10-CM

## 2019-11-12 LAB
A-PGH SER-MCNC: <0.1 NG/ML
SOMATOSTAT PLAS-MCNC: <21 PG/ML

## 2019-11-12 PROCEDURE — 92004 COMPRE OPH EXAM NEW PT 1/>: CPT | Mod: S$GLB,,, | Performed by: OPHTHALMOLOGY

## 2019-11-12 PROCEDURE — 99999 PR PBB SHADOW E&M-EST. PATIENT-LVL II: CPT | Mod: PBBFAC,,, | Performed by: OPHTHALMOLOGY

## 2019-11-12 PROCEDURE — 99999 PR PBB SHADOW E&M-EST. PATIENT-LVL II: ICD-10-PCS | Mod: PBBFAC,,, | Performed by: OPHTHALMOLOGY

## 2019-11-12 PROCEDURE — 92004 PR EYE EXAM, NEW PATIENT,COMPREHESV: ICD-10-PCS | Mod: S$GLB,,, | Performed by: OPHTHALMOLOGY

## 2019-11-12 NOTE — PROGRESS NOTES
HPI     DLS: 1/4/16 (Dr. Diop)     New pt here for comprehensive eye exam. Ref by Neuro Dr. Kyle Blair, and   Endo: Dr Walker   Pt states no complaints with eyes, no changes in vision. Was told that   pituitary tumor has begun to grow again. And was told to get thorough eye   exam. Denies pain in eyes today, Not seeing any F/F. Pt states does not   use any gtts.     Agree with above. Maternal aunt had glaucoma, mother may have had it as   well.     Last edited by Yuko Caceres MD on 11/12/2019  9:24 AM.   (History)        ROS     Positive for: Cardiovascular (recent HTN - not yet on meds)    Negative for: Endocrine (Pituitary adenoma s/p resection, possible recent   enlargement on MRI; pre-DM), Eyes (denies surgery/trauma), Respiratory   (denies asthma), Heme/Lymph (not taking daily ASA)    Last edited by Yuko Caceres MD on 11/12/2019  9:24 AM.   (History)        Assessment /Plan     For exam results, see Encounter Report.    History of pituitary tumor  -     [place] Visual Field - OU - Extended - Both Eyes; Future    Prediabetes    Family history of glaucoma in mother    Refractive error          Recent enlargement on MRI. Last saw Dr. Diop 1/4/16. RTC for 30-2 HVF, send result to my inbox.    No DR.     C:D ratio and IOP appear WNL. Mother and maternal aunt with glaucoma. Observe.    Declines MRx

## 2019-11-12 NOTE — LETTER
November 12, 2019      Bhupendra Mckeon MD  [address]  [facility] [address]           [address]  [facility] [address]  Phone: [phone]          Patient: Gallo Maria Jr.   MR Number: [id number]   YOB: 1980   Date of Visit: 11/12/2019       Dear Dr. Bhupendra Mckeon:    Thank you for referring Gallo Maria to me for evaluation. Attached you will find relevant portions of my assessment and plan of care.    If you have questions, please do not hesitate to call me. I look forward to following Gallo Maria along with you.    Sincerely,    Yukokenia Caceres MD    Enclosure  CC:  No Recipients    If you would like to receive this communication electronically, please contact [email or web address] or [phone] to request more information on [employer] access.    For providers and/or their staff who would like to refer a patient to [facility], please contact us through our one-stop-shop provider referral line, [facility], at [phone].    If you feel you have received this communication in error or would no longer like to receive these types of communications, please e-mail [email or web address]

## 2019-11-13 ENCOUNTER — TELEPHONE (OUTPATIENT)
Dept: ENDOCRINOLOGY | Facility: CLINIC | Age: 39
End: 2019-11-13

## 2019-11-13 NOTE — TELEPHONE ENCOUNTER
----- Message from Bhupendra Mckeon MD sent at 11/13/2019 11:48 AM CST -----  Regarding: Repeat blood draw  Kindly inform the patient to kindly RTC at his earliest convenience to get his prolactin level rechecked. The orders are already in the system.  Thanks    Bhupendra Mckeon MD

## 2020-01-20 ENCOUNTER — OFFICE VISIT (OUTPATIENT)
Dept: OPHTHALMOLOGY | Facility: CLINIC | Age: 40
End: 2020-01-20
Payer: COMMERCIAL

## 2020-01-20 ENCOUNTER — CLINICAL SUPPORT (OUTPATIENT)
Dept: OPHTHALMOLOGY | Facility: CLINIC | Age: 40
End: 2020-01-20
Payer: COMMERCIAL

## 2020-01-20 DIAGNOSIS — Z87.898 HISTORY OF PITUITARY TUMOR: Primary | ICD-10-CM

## 2020-01-20 DIAGNOSIS — Z83.511 FAMILY HISTORY OF GLAUCOMA IN MOTHER: ICD-10-CM

## 2020-01-20 DIAGNOSIS — H52.7 REFRACTIVE ERROR: ICD-10-CM

## 2020-01-20 DIAGNOSIS — R73.03 PREDIABETES: ICD-10-CM

## 2020-01-20 DIAGNOSIS — Z87.898 HISTORY OF PITUITARY TUMOR: ICD-10-CM

## 2020-01-20 PROCEDURE — 99999 PR PBB SHADOW E&M-EST. PATIENT-LVL III: ICD-10-PCS | Mod: PBBFAC,,, | Performed by: OPHTHALMOLOGY

## 2020-01-20 PROCEDURE — 99999 PR PBB SHADOW E&M-EST. PATIENT-LVL III: CPT | Mod: PBBFAC,,, | Performed by: OPHTHALMOLOGY

## 2020-01-20 PROCEDURE — 92083 EXTENDED VISUAL FIELD XM: CPT | Mod: S$GLB,,, | Performed by: OPHTHALMOLOGY

## 2020-01-20 PROCEDURE — 99211 PR OFFICE/OUTPT VISIT, EST, LEVL I: ICD-10-PCS | Mod: S$GLB,,, | Performed by: OPHTHALMOLOGY

## 2020-01-20 PROCEDURE — 92083 HUMPHREY VISUAL FIELD - OU - BOTH EYES: ICD-10-PCS | Mod: S$GLB,,, | Performed by: OPHTHALMOLOGY

## 2020-01-20 PROCEDURE — 99211 OFF/OP EST MAY X REQ PHY/QHP: CPT | Mod: S$GLB,,, | Performed by: OPHTHALMOLOGY

## 2020-01-20 NOTE — PROGRESS NOTES
HPI     Pt here for 30-2 HVF. States no new complaints. Denies pain in eyes or   F/F. Not using any gtts.     Last edited by Cydney Sanchez on 1/20/2020  9:19 AM. (History)            Assessment /Plan     For exam results, see Encounter Report.    History of pituitary tumor    Prediabetes    Family history of glaucoma in mother    Refractive error          Recent enlargement on MRI. Last saw Dr. Diop 1/4/16. 30-2 HVF WNL OU today.    No DR.     C:D ratio and IOP appear WNL. Mother and maternal aunt with glaucoma. Observe.    Declined last MRx

## 2020-01-30 ENCOUNTER — OFFICE VISIT (OUTPATIENT)
Dept: ENDOCRINOLOGY | Facility: CLINIC | Age: 40
End: 2020-01-30
Payer: COMMERCIAL

## 2020-01-30 VITALS
SYSTOLIC BLOOD PRESSURE: 120 MMHG | TEMPERATURE: 98 F | HEART RATE: 66 BPM | DIASTOLIC BLOOD PRESSURE: 84 MMHG | WEIGHT: 257.63 LBS | BODY MASS INDEX: 32.03 KG/M2 | HEIGHT: 75 IN

## 2020-01-30 DIAGNOSIS — E22.1 HYPERPROLACTINEMIA: ICD-10-CM

## 2020-01-30 DIAGNOSIS — D35.2 PITUITARY ADENOMA: ICD-10-CM

## 2020-01-30 DIAGNOSIS — E29.1 SECONDARY MALE HYPOGONADISM: Primary | ICD-10-CM

## 2020-01-30 DIAGNOSIS — E66.9 OBESITY (BMI 30-39.9): ICD-10-CM

## 2020-01-30 DIAGNOSIS — R73.03 PREDIABETES: ICD-10-CM

## 2020-01-30 PROCEDURE — 99214 PR OFFICE/OUTPT VISIT, EST, LEVL IV, 30-39 MIN: ICD-10-PCS | Mod: S$GLB,,, | Performed by: INTERNAL MEDICINE

## 2020-01-30 PROCEDURE — 99999 PR PBB SHADOW E&M-EST. PATIENT-LVL III: ICD-10-PCS | Mod: PBBFAC,,, | Performed by: INTERNAL MEDICINE

## 2020-01-30 PROCEDURE — 99214 OFFICE O/P EST MOD 30 MIN: CPT | Mod: S$GLB,,, | Performed by: INTERNAL MEDICINE

## 2020-01-30 PROCEDURE — 99999 PR PBB SHADOW E&M-EST. PATIENT-LVL III: CPT | Mod: PBBFAC,,, | Performed by: INTERNAL MEDICINE

## 2020-01-30 NOTE — ASSESSMENT & PLAN NOTE
bmi 32.2   - with pre-diabetes   - encourage pt to exercise as tolerated. Maintain healthy eating habits   - monitor weight

## 2020-01-30 NOTE — PATIENT INSTRUCTIONS
For the pituitary: Recheck 8am, fasting labs to ensure testosterone is safe and recheck proalctin.   - If prolactin still elevated, would recommend cabergoline 0.5 mg twice a week (and recheck level after 1-2 months to ensure it's the right dose for you)    For testosterone: Options include gel, patch, injections   For the shot, would use 1 mL every 2 weeks.    Follow-up with neurosurgery around March/April to check on the MRI before.

## 2020-01-30 NOTE — ASSESSMENT & PLAN NOTE
Last A1C still in prediabetes range   - IGF normal, no evidence for cushings   - monitor for now

## 2020-01-30 NOTE — ASSESSMENT & PLAN NOTE
Testosterone low   - LH, FSH normal   - suggestive of secondary hypongadism, 2/2 pituitary tumor   - recheck 8am fasting testosterone to confirm along with PSA, CBC to ensure safe to supplement   - pt not interested in additional kids. Discussed decreased spermatogenesis with testosterone supplementation   - discussed dose forms, pt prefers injections   - will see labs and then start treatment. Monitor labs for response, dose adjustment

## 2020-01-30 NOTE — ASSESSMENT & PLAN NOTE
Pit macroadenoma   - last MRI 9/2019   - s/p surgery 2015, not hormonally active based on pathology at that time   - IGF, TSH, free T4 normal   - prolactin high   - testosterone low. LH, FSH inappropriately normal.   - Sodium normal, no DI symptoms   - has repeat MRI ordered, recommend pt f/u with neurosurgery for repeat MRI after about 6 months (around March 2020) to ensure stability   - already up to date on visual fields, normal per patient 1/2020.   - continue to monitor for new/concerning symptoms in addition to regular follow-up

## 2020-01-30 NOTE — ASSESSMENT & PLAN NOTE
Prolactin mildly elevated. Stable on last check   - wasn't elevated before initial surgery   - discussed potential for this now becoming a prolactinoma vs more likely stalk effect (tumor pressing on stalk releasing extra proalctin)   - recheck, macroprolactin. Consider cabergoline if still elevated

## 2020-01-30 NOTE — Clinical Note
Hey,This is a yolanda you had seen back in September. Pituitary tumor, surgery 2015, looks like he's got some recurrence on his last MRI from September. There is a repeat MRI order in the system from you, but doesn't look like it is scheduled yet and I couldn't see if he had any follow-up appointments set up for you. Did you want to see this yolanda again at some point? March would be 6 months from prior MRI.Thanks, - Kyle

## 2020-01-30 NOTE — PROGRESS NOTES
Subjective:      Chief Complaint: Hypogonadism and Brain Tumor (pituitary macroadenoma)    HPI: Gallo Maria Jr. is a 39 y.o. male who is here for a follow-up evaluation for pituitary adenoma. Last seen 10/2019, though this is his first visit with me.    Hx pituitary macroedenoma   - s/p surgery 9/25/2015 (1.7x0.8 cm in size), path with null cell tumor. Dx on imaging for headaches, visual field problems.     Last MRI 5/2019:   1.6x1.7x1.5 cm hypoenhancing lesion in the right aspect of the sella w/o suprasellar extension. Subtle mass effecct on neurovascular structures.     Last labs: 10/2019, 320 pm   Prolactin 53 (down from 65 in 9/2019)   Testosterone 184   Free T4 0.93   TSH 0.7   ACTH 26   IGF normal (77)   A1C 5.8    Today, pt reports feeling okay overall. Decreased libido. Denies ED. Decreased AM erections.   No headaches. Had visual fields checekd 1/20/2020, was okay per patient.         Reviewed past medical, family, social history and updated as appropriate.    Review of Systems   Constitutional: Negative for unexpected weight change.   HENT: Negative for trouble swallowing.    Eyes: Negative for visual disturbance.   Respiratory: Negative for shortness of breath.    Cardiovascular: Negative for palpitations.   Gastrointestinal: Negative for constipation and diarrhea.   Endocrine: Negative for polydipsia and polyuria.   Genitourinary: Negative for frequency.        +ED   Musculoskeletal: Negative for back pain.   Neurological: Negative for headaches.     Objective:     Vitals:    01/30/20 1132   BP: 120/84   Pulse: 66   Temp: 97.8 °F (36.6 °C)     BP Readings from Last 5 Encounters:   01/30/20 120/84   10/29/19 (!) 140/92   09/26/19 127/65   09/09/19 122/81   08/06/19 123/77     Physical Exam    Wt Readings from Last 10 Encounters:   01/30/20 1132 116.8 kg (257 lb 9.7 oz)   10/29/19 1314 116.1 kg (255 lb 15.3 oz)   09/26/19 1411 114.9 kg (253 lb 6.4 oz)   09/09/19 1008 114.3 kg (252 lb)   08/06/19  1309 114.5 kg (252 lb 6.8 oz)   06/19/19 0853 112.6 kg (248 lb 3.2 oz)   05/28/19 0834 115.2 kg (254 lb)   05/09/19 0820 115.8 kg (255 lb 4.7 oz)   09/06/17 1444 108.9 kg (240 lb)   10/31/16 1418 104.9 kg (231 lb 6 oz)       Lab Results   Component Value Date    HGBA1C 5.8 (H) 10/29/2019     Lab Results   Component Value Date    CHOL 254 (H) 10/29/2019    HDL 57 10/29/2019    LDLCALC 169.8 (H) 10/29/2019    TRIG 136 10/29/2019    CHOLHDL 22.4 10/29/2019     Lab Results   Component Value Date     05/09/2019    K 4.2 05/09/2019     05/09/2019    CO2 27 05/09/2019    GLU 95 05/09/2019    BUN 11 05/09/2019    CREATININE 1.0 05/09/2019    CALCIUM 9.6 05/09/2019    PROT 7.8 05/09/2019    ALBUMIN 4.9 10/29/2019    BILITOT 0.4 05/09/2019    ALKPHOS 46 (L) 05/09/2019    AST 25 05/09/2019    ALT 36 05/09/2019    ANIONGAP 9 05/09/2019    ESTGFRAFRICA >60.0 05/09/2019    EGFRNONAA >60.0 05/09/2019    TSH 0.779 10/29/2019      No results found for: MICALBCREAT    Assessment/Plan:     Pituitary adenoma  Pit macroadenoma   - last MRI 9/2019   - s/p surgery 2015, not hormonally active based on pathology at that time   - IGF, TSH, free T4 normal   - prolactin high   - testosterone low. LH, FSH inappropriately normal.   - Sodium normal, no DI symptoms   - has repeat MRI ordered, recommend pt f/u with neurosurgery for repeat MRI after about 6 months (around March 2020) to ensure stability   - already up to date on visual fields, normal per patient 1/2020.   - continue to monitor for new/concerning symptoms in addition to regular follow-up    Secondary male hypogonadism  Testosterone low   - LH, FSH normal   - suggestive of secondary hypongadism, 2/2 pituitary tumor   - recheck 8am fasting testosterone to confirm along with PSA, CBC to ensure safe to supplement   - pt not interested in additional kids. Discussed decreased spermatogenesis with testosterone supplementation   - discussed dose forms, pt prefers injections   -  will see labs and then start treatment. Monitor labs for response, dose adjustment      Hyperprolactinemia  Prolactin mildly elevated. Stable on last check   - wasn't elevated before initial surgery   - discussed potential for this now becoming a prolactinoma vs more likely stalk effect (tumor pressing on stalk releasing extra proalctin)   - recheck, macroprolactin. Consider cabergoline if still elevated      Prediabetes  Last A1C still in prediabetes range   - IGF normal, no evidence for cushings   - monitor for now      Obesity (BMI 30-39.9)  bmi 32.2   - with pre-diabetes   - encourage pt to exercise as tolerated. Maintain healthy eating habits   - monitor weight        Follow up in about 6 months (around 7/30/2020).

## 2020-02-08 ENCOUNTER — PATIENT MESSAGE (OUTPATIENT)
Dept: ENDOCRINOLOGY | Facility: CLINIC | Age: 40
End: 2020-02-08

## 2020-02-12 ENCOUNTER — LAB VISIT (OUTPATIENT)
Dept: LAB | Facility: HOSPITAL | Age: 40
End: 2020-02-12
Attending: INTERNAL MEDICINE
Payer: COMMERCIAL

## 2020-02-12 ENCOUNTER — OFFICE VISIT (OUTPATIENT)
Dept: URGENT CARE | Facility: CLINIC | Age: 40
End: 2020-02-12
Payer: COMMERCIAL

## 2020-02-12 VITALS
HEIGHT: 75 IN | HEART RATE: 84 BPM | OXYGEN SATURATION: 96 % | TEMPERATURE: 99 F | WEIGHT: 257 LBS | RESPIRATION RATE: 16 BRPM | BODY MASS INDEX: 31.95 KG/M2 | DIASTOLIC BLOOD PRESSURE: 75 MMHG | SYSTOLIC BLOOD PRESSURE: 123 MMHG

## 2020-02-12 DIAGNOSIS — E22.1 HYPERPROLACTINEMIA: ICD-10-CM

## 2020-02-12 DIAGNOSIS — J32.9 SINUSITIS, UNSPECIFIED CHRONICITY, UNSPECIFIED LOCATION: Primary | ICD-10-CM

## 2020-02-12 DIAGNOSIS — R06.2 NOCTURNAL COUGH WITH WHEEZE: ICD-10-CM

## 2020-02-12 DIAGNOSIS — E29.1 SECONDARY MALE HYPOGONADISM: ICD-10-CM

## 2020-02-12 DIAGNOSIS — R05.8 NOCTURNAL COUGH WITH WHEEZE: ICD-10-CM

## 2020-02-12 DIAGNOSIS — D35.2 PITUITARY ADENOMA: ICD-10-CM

## 2020-02-12 LAB
BASOPHILS # BLD AUTO: 0.03 K/UL (ref 0–0.2)
BASOPHILS NFR BLD: 0.3 % (ref 0–1.9)
COMPLEXED PSA SERPL-MCNC: 1.1 NG/ML (ref 0–4)
DIFFERENTIAL METHOD: ABNORMAL
EOSINOPHIL # BLD AUTO: 0.4 K/UL (ref 0–0.5)
EOSINOPHIL NFR BLD: 4.3 % (ref 0–8)
ERYTHROCYTE [DISTWIDTH] IN BLOOD BY AUTOMATED COUNT: 13.3 % (ref 11.5–14.5)
HCT VFR BLD AUTO: 41.8 % (ref 40–54)
HGB BLD-MCNC: 13.6 G/DL (ref 14–18)
IMM GRANULOCYTES # BLD AUTO: 0.07 K/UL (ref 0–0.04)
IMM GRANULOCYTES NFR BLD AUTO: 0.7 % (ref 0–0.5)
LYMPHOCYTES # BLD AUTO: 2.6 K/UL (ref 1–4.8)
LYMPHOCYTES NFR BLD: 26 % (ref 18–48)
MCH RBC QN AUTO: 27.1 PG (ref 27–31)
MCHC RBC AUTO-ENTMCNC: 32.5 G/DL (ref 32–36)
MCV RBC AUTO: 83 FL (ref 82–98)
MONOCYTES # BLD AUTO: 1 K/UL (ref 0.3–1)
MONOCYTES NFR BLD: 9.4 % (ref 4–15)
NEUTROPHILS # BLD AUTO: 6 K/UL (ref 1.8–7.7)
NEUTROPHILS NFR BLD: 59.3 % (ref 38–73)
NRBC BLD-RTO: 0 /100 WBC
PLATELET # BLD AUTO: 354 K/UL (ref 150–350)
PMV BLD AUTO: 9.9 FL (ref 9.2–12.9)
RBC # BLD AUTO: 5.01 M/UL (ref 4.6–6.2)
WBC # BLD AUTO: 10.15 K/UL (ref 3.9–12.7)

## 2020-02-12 PROCEDURE — 83001 ASSAY OF GONADOTROPIN (FSH): CPT

## 2020-02-12 PROCEDURE — 85025 COMPLETE CBC W/AUTO DIFF WBC: CPT

## 2020-02-12 PROCEDURE — 83002 ASSAY OF GONADOTROPIN (LH): CPT

## 2020-02-12 PROCEDURE — 84153 ASSAY OF PSA TOTAL: CPT

## 2020-02-12 PROCEDURE — 84403 ASSAY OF TOTAL TESTOSTERONE: CPT

## 2020-02-12 PROCEDURE — 84146 ASSAY OF PROLACTIN: CPT

## 2020-02-12 PROCEDURE — 36415 COLL VENOUS BLD VENIPUNCTURE: CPT

## 2020-02-12 PROCEDURE — 99214 OFFICE O/P EST MOD 30 MIN: CPT | Mod: S$GLB,,, | Performed by: NURSE PRACTITIONER

## 2020-02-12 PROCEDURE — 99214 PR OFFICE/OUTPT VISIT, EST, LEVL IV, 30-39 MIN: ICD-10-PCS | Mod: S$GLB,,, | Performed by: NURSE PRACTITIONER

## 2020-02-12 RX ORDER — AMOXICILLIN AND CLAVULANATE POTASSIUM 875; 125 MG/1; MG/1
1 TABLET, FILM COATED ORAL 2 TIMES DAILY
Qty: 20 TABLET | Refills: 0 | Status: SHIPPED | OUTPATIENT
Start: 2020-02-12 | End: 2020-02-22

## 2020-02-12 RX ORDER — ALBUTEROL SULFATE 90 UG/1
1-2 AEROSOL, METERED RESPIRATORY (INHALATION)
Qty: 18 G | Refills: 0 | Status: SHIPPED | OUTPATIENT
Start: 2020-02-12 | End: 2021-05-31

## 2020-02-12 RX ORDER — BENZONATATE 100 MG/1
200 CAPSULE ORAL 3 TIMES DAILY PRN
Qty: 40 CAPSULE | Refills: 0 | Status: SHIPPED | OUTPATIENT
Start: 2020-02-12 | End: 2021-05-31

## 2020-02-12 NOTE — PROGRESS NOTES
"Subjective:       Patient ID: Gallo Maria Jr. is a 39 y.o. male.    Vitals:  height is 6' 3" (1.905 m) and weight is 116.6 kg (257 lb). His oral temperature is 98.7 °F (37.1 °C). His blood pressure is 123/75 and his pulse is 84. His respiration is 16 and oxygen saturation is 96%.     Chief Complaint: Sinus Problem    Sinus Problem   This is a new problem. Episode onset: 2 weeks. The problem has been gradually worsening since onset. There has been no fever. He is experiencing no pain. Associated symptoms include coughing, headaches, a hoarse voice, sinus pressure, sneezing and a sore throat. Pertinent negatives include no chills, congestion, diaphoresis, ear pain, neck pain, shortness of breath or swollen glands. Treatments tried: claritin, thera flu, alfred seltza cold. The treatment provided no relief.       Constitution: Negative for chills, sweating, fatigue and fever.   HENT: Positive for sinus pressure and sore throat. Negative for ear pain, congestion, sinus pain and voice change.    Neck: Negative for neck pain and painful lymph nodes.   Eyes: Negative for eye redness.   Respiratory: Positive for chest tightness, cough and sputum production. Negative for bloody sputum, COPD, shortness of breath, stridor, wheezing and asthma.    Gastrointestinal: Negative for nausea and vomiting.   Musculoskeletal: Negative for muscle ache.   Skin: Negative for rash.   Allergic/Immunologic: Positive for sneezing. Negative for seasonal allergies and asthma.   Neurological: Positive for headaches.   Hematologic/Lymphatic: Negative for swollen lymph nodes.       Objective:      Physical Exam   Constitutional: He is oriented to person, place, and time. He appears well-developed and well-nourished. He is cooperative.  Non-toxic appearance. He does not have a sickly appearance. He does not appear ill. No distress.   HENT:   Head: Normocephalic and atraumatic.   Right Ear: Hearing, tympanic membrane, external ear and ear canal " normal.   Left Ear: Hearing, tympanic membrane, external ear and ear canal normal.   Nose: Mucosal edema, rhinorrhea and purulent discharge present. No nasal deformity. No epistaxis. Right sinus exhibits maxillary sinus tenderness. Right sinus exhibits no frontal sinus tenderness. Left sinus exhibits maxillary sinus tenderness. Left sinus exhibits no frontal sinus tenderness.   Mouth/Throat: Uvula is midline, oropharynx is clear and moist and mucous membranes are normal. No trismus in the jaw. Normal dentition. No uvula swelling. No oropharyngeal exudate, posterior oropharyngeal edema or posterior oropharyngeal erythema.   Eyes: Conjunctivae and lids are normal. No scleral icterus.   Neck: Trachea normal, full passive range of motion without pain and phonation normal. Neck supple. No neck rigidity. No edema and no erythema present.   Cardiovascular: Normal rate, regular rhythm, normal heart sounds, intact distal pulses and normal pulses.   Pulmonary/Chest: Effort normal and breath sounds normal. No accessory muscle usage or stridor. No tachypnea. No respiratory distress. He has no decreased breath sounds. He has no wheezes. He has no rhonchi. He has no rales.   Abdominal: Normal appearance.   Musculoskeletal: Normal range of motion. He exhibits no edema or deformity.   Lymphadenopathy:     He has no cervical adenopathy.        Right cervical: No superficial cervical, no deep cervical and no posterior cervical adenopathy present.       Left cervical: No superficial cervical, no deep cervical and no posterior cervical adenopathy present.   Neurological: He is alert and oriented to person, place, and time. He exhibits normal muscle tone. Coordination normal.   Skin: Skin is warm, dry, intact, not diaphoretic and not pale.   Psychiatric: He has a normal mood and affect. His speech is normal and behavior is normal. Judgment and thought content normal. Cognition and memory are normal.   Nursing note and vitals  "reviewed.        Assessment:       1. Sinusitis, unspecified chronicity, unspecified location    2. Nocturnal cough with wheeze        Plan:         Sinusitis, unspecified chronicity, unspecified location  -     amoxicillin-clavulanate 875-125mg (AUGMENTIN) 875-125 mg per tablet; Take 1 tablet by mouth 2 (two) times daily. for 10 days  Dispense: 20 tablet; Refill: 0    Nocturnal cough with wheeze  -     albuterol (PROVENTIL/VENTOLIN HFA) 90 mcg/actuation inhaler; Inhale 1-2 puffs into the lungs every 4 to 6 hours as needed. Rescue  Dispense: 18 g; Refill: 0  -     benzonatate (TESSALON PERLES) 100 MG capsule; Take 2 capsules (200 mg total) by mouth 3 (three) times daily as needed for Cough. 1-2 capsules  Dispense: 40 capsule; Refill: 0      Patient Instructions   Please follow up with your Primary care provider within 2-5 days if your signs and symptoms have not resolved or worsen.  The usual course of cold symptoms are 10-14 days.     If your condition worsens or fails to improve we recommend that you receive another evaluation at the emergency room immediately or contact your primary medical clinic to discuss your concerns.     You must understand that you have received an Urgent Care treatment only and that you may be released before all of your medical problems are known or treated.   You, the patient, will arrange for follow up care as instructed.     Tylenol or Ibuprofen can also be used as directed for pain/fever unless you have an allergy to them or medical condition such as stomach ulcers, kidney or liver disease or blood thinners etc for which you should not be taking these type of medications.     Take over the counter cough medication as directed as needed for cough.  You should avoid medications with pseudoephedrine or phenylephrine (any medication with "D") if you have high blood pressure as this can cause an elevation in your blood pressure. Instead consider Corcidin HBP as needed to prevent an " elevated blood pressure.     Natural remedies of symptoms (as needed) include humidification, saline nasal sprays, and/or steamy showers.  Increase fluids, warm tea with honey, cough drops as needed.  You may also use salt water gargles for sore throat.    IF you received a steroid shot today - As discussed, this can elevate your blood pressure, elevate your blood sugar, water weight gain, nervous energy, redness to the face and dimpling of the skin at the injection site.       You have been given Augmentin for an infection today.  Please take all the antibiotic as prescribed on the bottle.      Augmentin is hard on the stomach and should always be taken with food.      Augmentin can cause diarrhea.  As with any antibiotics, use probiotics and/or high culture yogurt about 2 hours apart from the antibiotic and about 1 week after the antibiotic to replace the gut oscar lost with antibiotic use.      If you are female and on BCP use additional methods to prevent pregnancy while on antibiotics and for one cycle after.       Sinusitis (Antibiotic Treatment)    The sinuses are air-filled spaces within the bones of the face. They connect to the inside of the nose. Sinusitis is an inflammation of the tissue lining the sinus cavity. Sinus inflammation can occur during a cold. It can also be due to allergies to pollens and other particles in the air. Sinusitis can cause symptoms of sinus congestion and fullness. A sinus infection causes fever, headache and facial pain. There is often green or yellow drainage from the nose or into the back of the throat (post-nasal drip). You have been given antibiotics to treat this condition.  Home care:  · Take the full course of antibiotics as instructed. Do not stop taking them, even if you feel better.  · Drink plenty of water, hot tea, and other liquids. This may help thin mucus. It also may promote sinus drainage.  · Heat may help soothe painful areas of the face. Use a towel soaked in  hot water. Or,  the shower and direct the hot spray onto your face. Using a vaporizer along with a menthol rub at night may also help.   · An expectorant containing guaifenesin may help thin the mucus and promote drainage from the sinuses.  · Over-the-counter decongestants may be used unless a similar medicine was prescribed. Nasal sprays work the fastest. Use one that contains phenylephrine or oxymetazoline. First blow the nose gently. Then use the spray. Do not use these medicines more often than directed on the label or symptoms may get worse. You may also use tablets containing pseudoephedrine. Avoid products that combine ingredients, because side effects may be increased. Read labels. You can also ask the pharmacist for help. (NOTE: Persons with high blood pressure should not use decongestants. They can raise blood pressure.)  · Over-the-counter antihistamines may help if allergies contributed to your sinusitis.    · Do not use nasal rinses or irrigation during an acute sinus infection, unless told to by your health care provider. Rinsing may spread the infection to other sinuses.  · Use acetaminophen or ibuprofen to control pain, unless another pain medicine was prescribed. (If you have chronic liver or kidney disease or ever had a stomach ulcer, talk with your doctor before using these medicines. Aspirin should never be used in anyone under 18 years of age who is ill with a fever. It may cause severe liver damage.)  · Don't smoke. This can worsen symptoms.  Follow-up care  Follow up with your healthcare provider or our staff if you are not improving within the next week.  When to seek medical advice  Call your healthcare provider if any of these occur:  · Facial pain or headache becoming more severe  · Stiff neck  · Unusual drowsiness or confusion  · Swelling of the forehead or eyelids  · Vision problems, including blurred or double vision  · Fever of 100.4ºF (38ºC) or higher, or as directed by your  healthcare provider  · Seizure  · Breathing problems  · Symptoms not resolving within 10 days  Date Last Reviewed: 4/13/2015  © 9070-8578 [employer]. [address] [place] [address], [place] [address]. All rights reserved. This information is not intended as a substitute for professional medical care. Always follow your healthcare professional's instructions.

## 2020-02-12 NOTE — PATIENT INSTRUCTIONS
"Please follow up with your Primary care provider within 2-5 days if your signs and symptoms have not resolved or worsen.  The usual course of cold symptoms are 10-14 days.     If your condition worsens or fails to improve we recommend that you receive another evaluation at the emergency room immediately or contact your primary medical clinic to discuss your concerns.     You must understand that you have received an Urgent Care treatment only and that you may be released before all of your medical problems are known or treated.   You, the patient, will arrange for follow up care as instructed.     Tylenol or Ibuprofen can also be used as directed for pain/fever unless you have an allergy to them or medical condition such as stomach ulcers, kidney or liver disease or blood thinners etc for which you should not be taking these type of medications.     Take over the counter cough medication as directed as needed for cough.  You should avoid medications with pseudoephedrine or phenylephrine (any medication with "D") if you have high blood pressure as this can cause an elevation in your blood pressure. Instead consider Corcidin HBP as needed to prevent an elevated blood pressure.     Natural remedies of symptoms (as needed) include humidification, saline nasal sprays, and/or steamy showers.  Increase fluids, warm tea with honey, cough drops as needed.  You may also use salt water gargles for sore throat.    IF you received a steroid shot today - As discussed, this can elevate your blood pressure, elevate your blood sugar, water weight gain, nervous energy, redness to the face and dimpling of the skin at the injection site.       You have been given Augmentin for an infection today.  Please take all the antibiotic as prescribed on the bottle.      Augmentin is hard on the stomach and should always be taken with food.      Augmentin can cause diarrhea.  As with any antibiotics, use probiotics and/or high culture yogurt " about 2 hours apart from the antibiotic and about 1 week after the antibiotic to replace the gut oscar lost with antibiotic use.      If you are female and on BCP use additional methods to prevent pregnancy while on antibiotics and for one cycle after.       Sinusitis (Antibiotic Treatment)    The sinuses are air-filled spaces within the bones of the face. They connect to the inside of the nose. Sinusitis is an inflammation of the tissue lining the sinus cavity. Sinus inflammation can occur during a cold. It can also be due to allergies to pollens and other particles in the air. Sinusitis can cause symptoms of sinus congestion and fullness. A sinus infection causes fever, headache and facial pain. There is often green or yellow drainage from the nose or into the back of the throat (post-nasal drip). You have been given antibiotics to treat this condition.  Home care:  · Take the full course of antibiotics as instructed. Do not stop taking them, even if you feel better.  · Drink plenty of water, hot tea, and other liquids. This may help thin mucus. It also may promote sinus drainage.  · Heat may help soothe painful areas of the face. Use a towel soaked in hot water. Or,  the shower and direct the hot spray onto your face. Using a vaporizer along with a menthol rub at night may also help.   · An expectorant containing guaifenesin may help thin the mucus and promote drainage from the sinuses.  · Over-the-counter decongestants may be used unless a similar medicine was prescribed. Nasal sprays work the fastest. Use one that contains phenylephrine or oxymetazoline. First blow the nose gently. Then use the spray. Do not use these medicines more often than directed on the label or symptoms may get worse. You may also use tablets containing pseudoephedrine. Avoid products that combine ingredients, because side effects may be increased. Read labels. You can also ask the pharmacist for help. (NOTE: Persons with high  blood pressure should not use decongestants. They can raise blood pressure.)  · Over-the-counter antihistamines may help if allergies contributed to your sinusitis.    · Do not use nasal rinses or irrigation during an acute sinus infection, unless told to by your health care provider. Rinsing may spread the infection to other sinuses.  · Use acetaminophen or ibuprofen to control pain, unless another pain medicine was prescribed. (If you have chronic liver or kidney disease or ever had a stomach ulcer, talk with your doctor before using these medicines. Aspirin should never be used in anyone under 18 years of age who is ill with a fever. It may cause severe liver damage.)  · Don't smoke. This can worsen symptoms.  Follow-up care  Follow up with your healthcare provider or our staff if you are not improving within the next week.  When to seek medical advice  Call your healthcare provider if any of these occur:  · Facial pain or headache becoming more severe  · Stiff neck  · Unusual drowsiness or confusion  · Swelling of the forehead or eyelids  · Vision problems, including blurred or double vision  · Fever of 100.4ºF (38ºC) or higher, or as directed by your healthcare provider  · Seizure  · Breathing problems  · Symptoms not resolving within 10 days  Date Last Reviewed: 4/13/2015  © 4413-1888 [employer]. [address] [place] [address], [place] [address]. All rights reserved. This information is not intended as a substitute for professional medical care. Always follow your healthcare professional's instructions.

## 2020-02-13 ENCOUNTER — PATIENT MESSAGE (OUTPATIENT)
Dept: ENDOCRINOLOGY | Facility: CLINIC | Age: 40
End: 2020-02-13

## 2020-02-13 LAB
FSH SERPL-ACNC: 2.5 MIU/ML (ref 0.95–11.95)
LH SERPL-ACNC: 2.9 MIU/ML (ref 0.6–12.1)
TESTOST SERPL-MCNC: 131 NG/DL (ref 304–1227)

## 2020-02-14 LAB
ANNOTATION COMMENT IMP: ABNORMAL
MACROPROLACTIN SERPL-MCNC: 56.6 NG/ML (ref 2.7–13.1)
MACROPROLACTIN/PROLACTIN MFR SERPL: 10 %
PROLACTIN SERPL IA-MCNC: 63.2 NG/ML (ref 4–15.2)

## 2020-02-15 ENCOUNTER — TELEPHONE (OUTPATIENT)
Dept: URGENT CARE | Facility: CLINIC | Age: 40
End: 2020-02-15

## 2020-02-15 NOTE — TELEPHONE ENCOUNTER
Patient call back --dos ----02/12/20 patient did not answer and voicemail was full . Unable to leave a message .

## 2020-02-17 ENCOUNTER — TELEPHONE (OUTPATIENT)
Dept: ENDOCRINOLOGY | Facility: CLINIC | Age: 40
End: 2020-02-17

## 2020-02-17 ENCOUNTER — PATIENT MESSAGE (OUTPATIENT)
Dept: ENDOCRINOLOGY | Facility: CLINIC | Age: 40
End: 2020-02-17

## 2020-02-17 DIAGNOSIS — E29.1 SECONDARY MALE HYPOGONADISM: ICD-10-CM

## 2020-02-17 DIAGNOSIS — E78.00 HYPERCHOLESTEROLEMIA: ICD-10-CM

## 2020-02-17 DIAGNOSIS — D35.2 PITUITARY ADENOMA: Primary | ICD-10-CM

## 2020-02-17 DIAGNOSIS — E22.1 HYPERPROLACTINEMIA: ICD-10-CM

## 2020-02-17 RX ORDER — ATORVASTATIN CALCIUM 80 MG/1
80 TABLET, FILM COATED ORAL DAILY
Qty: 90 TABLET | Refills: 3 | Status: SHIPPED | OUTPATIENT
Start: 2020-02-17 | End: 2021-02-23

## 2020-02-17 RX ORDER — CABERGOLINE 0.5 MG/1
0.5 TABLET ORAL
Qty: 10 TABLET | Refills: 11 | Status: SHIPPED | OUTPATIENT
Start: 2020-02-17 | End: 2021-02-23

## 2020-02-17 RX ORDER — EZETIMIBE 10 MG/1
10 TABLET ORAL DAILY
Qty: 90 TABLET | Refills: 3 | Status: SHIPPED | OUTPATIENT
Start: 2020-02-17 | End: 2021-05-31

## 2020-02-17 RX ORDER — TESTOSTERONE CYPIONATE 200 MG/ML
200 INJECTION, SOLUTION INTRAMUSCULAR
Qty: 2 ML | Refills: 5 | Status: SHIPPED | OUTPATIENT
Start: 2020-02-17 | End: 2020-08-11

## 2020-02-17 NOTE — TELEPHONE ENCOUNTER
Labs reviewed.    Testosterone low.    PSA normal    Hgb low.    Okay to start testosterone. 200 mg IM q2 weeks. Repeat labs mid-way between injections before next visit.    Prolactin still elevated.   Cabergoline 0.5 mg twice a week.    Due for repeat MRI next month.    Has visit with Dr. Mckeon 4/13, should get repeat labs before then.

## 2020-02-28 ENCOUNTER — PATIENT MESSAGE (OUTPATIENT)
Dept: NEUROSURGERY | Facility: CLINIC | Age: 40
End: 2020-02-28

## 2020-03-03 ENCOUNTER — HOSPITAL ENCOUNTER (OUTPATIENT)
Dept: RADIOLOGY | Facility: HOSPITAL | Age: 40
Discharge: HOME | End: 2020-03-03
Attending: NEUROLOGICAL SURGERY
Payer: COMMERCIAL

## 2020-03-03 DIAGNOSIS — D35.2 PITUITARY ADENOMA: ICD-10-CM

## 2020-03-03 PROCEDURE — 70553 MRI BRAIN STEM W/O & W/DYE: CPT | Mod: 26,,, | Performed by: RADIOLOGY

## 2020-03-03 PROCEDURE — 70553 MRI BRAIN STEM W/O & W/DYE: CPT | Mod: TC

## 2020-03-03 PROCEDURE — 70553 MRI BRAIN W WO CONTRAST: ICD-10-PCS | Mod: 26,,, | Performed by: RADIOLOGY

## 2020-03-03 PROCEDURE — 25500020 PHARM REV CODE 255: Performed by: NEUROLOGICAL SURGERY

## 2020-03-03 PROCEDURE — A9585 GADOBUTROL INJECTION: HCPCS | Performed by: NEUROLOGICAL SURGERY

## 2020-03-03 RX ORDER — GADOBUTROL 604.72 MG/ML
5 INJECTION INTRAVENOUS
Status: COMPLETED | OUTPATIENT
Start: 2020-03-03 | End: 2020-03-03

## 2020-03-03 RX ADMIN — GADOBUTROL 5 ML: 604.72 INJECTION INTRAVENOUS at 09:03

## 2020-04-06 ENCOUNTER — OFFICE VISIT (OUTPATIENT)
Dept: NEUROSURGERY | Facility: CLINIC | Age: 40
End: 2020-04-06
Payer: COMMERCIAL

## 2020-04-06 DIAGNOSIS — D35.2 PITUITARY ADENOMA: Primary | ICD-10-CM

## 2020-04-06 PROCEDURE — 99213 PR OFFICE/OUTPT VISIT, EST, LEVL III, 20-29 MIN: ICD-10-PCS | Mod: 95,,, | Performed by: NEUROLOGICAL SURGERY

## 2020-04-06 PROCEDURE — 99213 OFFICE O/P EST LOW 20 MIN: CPT | Mod: 95,,, | Performed by: NEUROLOGICAL SURGERY

## 2020-04-06 NOTE — PROGRESS NOTES
Gallo Maria was seen as a virtual visit this morning.  Headaches have improved over the last several months.  He feels his vision is stable and visual fields done at [facility] on 01/20/20 were normal.  He has had endocrinological follow-up with Dr. Holt.  Prolactin was somewhat elevated to around 60 and he was started on Dostinex.  Original pathology did not suggest that this is a prolactin producing tumor and some of this may represent stalk effect.  However the patient feels that he has somewhat better energy since being on the medication.  He says he has gained some weight.  He continues to work full-time.    On brief exam through the virtual visit he is alert and oriented and speaking clearly.  He shows full extraocular movements and equal pupils.  As noted, formal visual fields in January were normal.  He shows no specific focal neurological deficit.    MRI of the brain and pituitary was done at [facility] on 3/03/2020 and compared to his last study of 09/26/2020.  There may be very slight enlargement of the pituitary adenoma.  The vertical diameter was measured as about 1 mm larger on the current scan and the CSF space between the optic chiasm and diaphragma is less apparent on the present scan.    At this point he is stable with respect to vision and overall his endocrine status is reasonably stable.  He does have decreased testosterone.  Testosterone injection was apparently considered but has not been done yet.  He remains on Dostinex for his mildly elevated prolactin.  I believe we can leave we can follow up with MRI in about 6 months.  It is likely that he will eventually require surgical retreatment for the pituitary adenoma.

## 2020-04-07 ENCOUNTER — NURSE TRIAGE (OUTPATIENT)
Dept: ADMINISTRATIVE | Facility: CLINIC | Age: 40
End: 2020-04-07

## 2020-04-07 NOTE — TELEPHONE ENCOUNTER
Samikeira Patel Candace Irby called in asking about COVID19 precaution and days to remain home before returning to work.  He was advised to remain home for isolation for at least 7 days after the onset of symptoms and his symptoms should be improving and afebrile. He was advised to contact HR for company specific guideline on when to return to work.      His co-worker was tested positive for COVID19 on Friday, and he developed a fever on Saturday.  He was seen by a doctor and got sent for testing.  However, his test was denined because his fever broke. Right now, he is only endorsing mild SOB, dry cough, without any fever.  He prefers stay home, and would not get further testing. He's advised to call in again or schedule virtual visits with PCP if fever returns or symptom worsen, call 911 for severe SOB, severe weakness or bluish lips or face or other life-thretening signs or symptoms.     He is enrolled in the COVID19 symptom tracker.     Reason for Disposition   1] COVID-19 infection diagnosed or suspected AND [2] mild symptoms (fever, cough) AND [2] no trouble breathing or other complications   COVID-19 Home Isolation, questions about    Additional Information   Negative: SEVERE difficulty breathing (e.g., struggling for each breath, speaks in single words)   Negative: Difficult to awaken or acting confused (e.g., disoriented, slurred speech)   Negative: Bluish (or gray) lips or face now   Negative: Shock suspected (e.g., cold/pale/clammy skin, too weak to stand, low BP, rapid pulse)   Negative: Sounds like a life-threatening emergency to the triager   Negative: [1] COVID-19 suspected (e.g., cough, fever, shortness of breath) AND [2] public health department recommends testing   Negative: [1] COVID-19 exposure AND [2] no symptoms   Negative: COVID-19 and Breastfeeding, questions about   Negative: SEVERE or constant chest pain (Exception: mild central chest pain, present only when coughing)   Negative:  MODERATE difficulty breathing (e.g., speaks in phrases, SOB even at rest, pulse 100-120)   Negative: Patient sounds very sick or weak to the triager   Negative: Chest pain   Negative: Fever > 103 F (39.4 C)   Negative: [1] Fever > 101 F (38.3 C) AND [2] age > 60   Negative: [1] Fever > 100.0 F (37.8 C) AND [2] bedridden (e.g., nursing home patient, CVA, chronic illness, recovering from surgery)   Negative: HIGH RISK patient (e.g., age > 64 years, diabetes, heart or lung disease, weak immune system)   Negative: Fever present > 3 days (72 hours)   Negative: [1] Fever returns after gone for over 24 hours AND [2] symptoms worse or not improved   Negative: [1] Continuous (nonstop) coughing interferes with work or school AND [2] no improvement using cough treatment per protocol   Negative: Cough present > 3 weeks   Negative: COVID-19, questions about   Negative: COVID-19 Prevention and Healthy Living, questions about   Negative: COVID-19 Testing, questions about   Negative: MILD difficulty breathing (e.g., minimal/no SOB at rest, SOB with walking, pulse <100)    Protocols used: CORONAVIRUS (COVID-19) DIAGNOSED OR [id number]

## 2020-04-13 ENCOUNTER — OFFICE VISIT (OUTPATIENT)
Dept: ENDOCRINOLOGY | Facility: CLINIC | Age: 40
End: 2020-04-13
Payer: COMMERCIAL

## 2020-04-13 DIAGNOSIS — E78.00 HYPERCHOLESTEROLEMIA: ICD-10-CM

## 2020-04-13 DIAGNOSIS — E78.2 MIXED HYPERLIPIDEMIA: ICD-10-CM

## 2020-04-13 DIAGNOSIS — E22.1 HYPERPROLACTINEMIA: ICD-10-CM

## 2020-04-13 DIAGNOSIS — R73.03 PREDIABETES: ICD-10-CM

## 2020-04-13 DIAGNOSIS — N40.0 BENIGN PROSTATIC HYPERPLASIA, UNSPECIFIED WHETHER LOWER URINARY TRACT SYMPTOMS PRESENT: ICD-10-CM

## 2020-04-13 DIAGNOSIS — E29.1 SECONDARY MALE HYPOGONADISM: ICD-10-CM

## 2020-04-13 DIAGNOSIS — D35.2 PITUITARY ADENOMA: Primary | ICD-10-CM

## 2020-04-13 DIAGNOSIS — Z98.890 HISTORY OF PITUITARY SURGERY: ICD-10-CM

## 2020-04-13 PROCEDURE — 99214 OFFICE O/P EST MOD 30 MIN: CPT | Mod: 95,,, | Performed by: INTERNAL MEDICINE

## 2020-04-13 PROCEDURE — 99214 PR OFFICE/OUTPT VISIT, EST, LEVL IV, 30-39 MIN: ICD-10-PCS | Mod: 95,,, | Performed by: INTERNAL MEDICINE

## 2020-04-13 NOTE — PROGRESS NOTES
Subjective:      Patient ID: Gallo Maria Jr. is a 39 y.o. male.    Chief Complaint:    Patient is a 39 yr old gentleman seen in [facility] today on account of pituitary tumor and hyperprolactinemia.  He is being seen in [facility] via video televisit. Since this is a televisit, examination portions of this visit are limited.    History of Present Illness    Patient is a 39 yr old gentleman seen in [facility]  today on account of pituitary tumor and associated hyperprolactinemia. He is being seen via vide televisit.  The patient location is: home  The chief complaint leading to consultation is: [facility] regarding pituitary adenoma + hyperprolactinemia  Visit type: Virtual visit with synchronous audio and video  Total time spent with patient: ~ 30 min  Each patient to whom he or she provides medical services by telemedicine is:  (1) informed of the relationship between the physician and patient and the respective role of any other health care provider with respect to management of the patient; and (2) notified that he or she may decline to receive medical services by telemedicine and may withdraw from such care at any time.    Notes:    His most recent pituitary MRI from 03/2020 shows macroadenoma (1.7 x 1.7 x 1.6 cm) with suprasellar extension which suggest subtle increase in lesion size compared to dimensions from 2019. He remains on dostinex 0.5mg 2 x weekly and testosterone repletion therapy on account of hypogonadism;  20mmg every 14 days  He has previously been seen and ffed by Alexandra Mckay and Rosemary @ [facility]. He was last see there 12/15.     Patient had   transphenoidal pituitary surgery 9/25/15  for Pituitary macroadenoma; path - Pituitary null cells. This was first found on account of headaches and visual field problems.   States his sx ( HA and vision change) have significantly improved since the surgery.  Patient has not noted any nipple discharge. No gynecomastia noted. He has not any significant change in smeel  sesnation  States he continues to have low libido and no morning erections since surgery.      Following the  transphenoidal surgery, pt was on HC for about a month. States he did not feel any different while on HC or off of HC.   His established background comorbidities are as detailed below;     1. Pituitary adenoma      2. History of migraine headaches      3. Mixed hyperlipidemia      4. Erectile dysfunction due to arterial insufficiency      5. Decreased libido      6. BPH with urinary obstruction      7. Hypogonadism male      8. Dysmetabolic syndrome      9. Obesity (BMI 30-39.9)      10. Hypercholesterolemia      11. Prediabetes         Patients [place] score is 8.   Patient noticed progressive weight gain and fatigue. Presently he has no headaches. Presently has no visual problems.  Patient had noticed no gynecomastia nor nipple discharge. No seizures.  There is no family history of pituitary tumors or other brain tumors.  Patients libido has declined.   Patients father had recurrent nephrolithiasis. There is no family history of severe dyspepsia ir ulcer disease.\  Patient does not smoke.  Patient does not drink any ETOH.  Patient works at the [employer].  Patient has not started androgen repletion yet.    Review of Systems   Constitutional: Negative for fatigue and unexpected weight change.   HENT: Negative for facial swelling, trouble swallowing and voice change.    Eyes: Negative for photophobia and visual disturbance (no visual blurring nor visual field defects).   Respiratory: Negative for shortness of breath.    Cardiovascular: Negative for chest pain, palpitations and leg swelling.   Gastrointestinal: Negative for constipation, diarrhea, nausea and vomiting.   Endocrine: Negative for polydipsia and polyuria.   Genitourinary: Negative for dysuria and frequency.        +ED   Musculoskeletal: Negative for back pain and myalgias.   Skin: Negative for color change, pallor and rash.    Neurological: Negative for headaches.   Hematological: Does not bruise/bleed easily.   Psychiatric/Behavioral: Negative for confusion and sleep disturbance.       Objective: Nil; televisit     Physical Exam    Lab Review:     Results for ABDIAZIZ REEVES JR. (MRN [id number]) as of 4/13/2020 08:27   Ref. Range 10/29/2019 15:20 2/12/2020 09:45 3/3/2020 09:30   WBC Latest Ref Range: 3.90 - 12.70 K/uL  10.15    RBC Latest Ref Range: 4.60 - 6.20 M/uL  5.01    Hemoglobin Latest Ref Range: 14.0 - 18.0 g/dL  13.6 (L)    Hematocrit Latest Ref Range: 40.0 - 54.0 %  41.8    MCV Latest Ref Range: 82 - 98 fL  83    MCH Latest Ref Range: 27.0 - 31.0 pg  27.1    MCHC Latest Ref Range: 32.0 - 36.0 g/dL  32.5    RDW Latest Ref Range: 11.5 - 14.5 %  13.3    Platelets Latest Ref Range: 150 - 350 K/uL  354 (H)    MPV Latest Ref Range: 9.2 - 12.9 fL  9.9    Gran% Latest Ref Range: 38.0 - 73.0 %  59.3    Gran # (ANC) Latest Ref Range: 1.8 - 7.7 K/uL  6.0    Lymph% Latest Ref Range: 18.0 - 48.0 %  26.0    Lymph # Latest Ref Range: 1.0 - 4.8 K/uL  2.6    Mono% Latest Ref Range: 4.0 - 15.0 %  9.4    Mono # Latest Ref Range: 0.3 - 1.0 K/uL  1.0    Eosinophil% Latest Ref Range: 0.0 - 8.0 %  4.3    Eos # Latest Ref Range: 0.0 - 0.5 K/uL  0.4    Basophil% Latest Ref Range: 0.0 - 1.9 %  0.3    Baso # Latest Ref Range: 0.00 - 0.20 K/uL  0.03    nRBC Latest Ref Range: 0 /100 WBC  0    Differential Method Unknown  Automated    Immature Grans (Abs) Latest Ref Range: 0.00 - 0.04 K/uL  0.07 (H)    Immature Granulocytes Latest Ref Range: 0.0 - 0.5 %  0.7 (H)    Gastrin Latest Ref Range: <100 pg/mL 14     Calcium, Ion Latest Ref Range: 1.06 - 1.42 mmol/L 1.27     Uric Acid Latest Ref Range: 3.4 - 7.0 mg/dL 6.9     Triglycerides Latest Ref Range: 30 - 150 mg/dL 136     Cholesterol Latest Ref Range: 120 - 199 mg/dL 254 (H)     HDL Latest Ref Range: 40 - 75 mg/dL 57     Hdl/Cholesterol Ratio Latest Ref Range: 20.0 - 50.0 % 22.4     LDL Cholesterol  External Latest Ref Range: 63.0 - 159.0 mg/dL 169.8 (H)     Non-HDL Cholesterol Latest Units: mg/dL 197     Total Cholesterol/HDL Ratio Latest Ref Range: 2.0 - 5.0  4.5     BNP Latest Ref Range: 0 - 99 pg/mL <10     Macroprolactin Comment Unknown  SEE BELOW    Vit D, 25-Hydroxy Latest Ref Range: 30 - 96 ng/mL 19 (L)     Antidiuretic Hormone Latest Ref Range: 0.0 - 6.9 pg/mL 2.3     Catecholamine, Plasma Latest Units: pg/mL 695     Cortisol Latest Units: ug/dL 4.60     Dopamine Latest Units: pg/mL 40 (H)     Epinephrine Latest Units: pg/mL 48     Norepinephrine Latest Units: pg/mL 607     Hemoglobin A1C External Latest Ref Range: 4.0 - 5.6 % 5.8 (H)     Estimated Avg Glucose Latest Ref Range: 68 - 131 mg/dL 120     ACTH Latest Ref Range: 0 - 46 pg/mL 26     Growth Hormone Latest Ref Range: 0.0 - 3.0 ng/mL <0.1     Insulin-Like GFBP-3 Latest Ref Range: 3.4 - 6.7 mcg/mL 3.9     Somatomedin (IGF-I) Latest Ref Range: 48 - 292 ng/mL 77     MCRPL Percent Latest Ref Range: <=60 %  10    Prolactin, Total Latest Ref Range: 4.0 - 15.2 ng/mL  63.2 (H)    Prolactin, Unprecipitated Latest Ref Range: 2.7 - 13.1 ng/mL  56.6 (H)    TSH Latest Ref Range: 0.400 - 4.000 uIU/mL 0.779     T3, Total Latest Ref Range: 60 - 180 ng/dL 112     Free T4 Latest Ref Range: 0.71 - 1.51 ng/dL 0.93     Thyroglobulin Interpretation Unknown SEE BELOW     Thyroglobulin Antibody Screen Latest Ref Range: <4.0 IU/mL <1.8     Thyroglobulin, Tumor Marker Latest Units: ng/mL 15 (H)     PTH Latest Ref Range: 9.0 - 77.0 pg/mL 48.0     Alpha Sub Unit, Serum Latest Ref Range: <=0.5 ng/mL <0.1     Chromogranin A Latest Ref Range: 0 - 160 ng/mL 21     PSA, SCREEN Latest Ref Range: 0.00 - 4.00 ng/mL  1.1    Albumin Latest Ref Range: 3.6 - 5.1 g/dL 4.9     Estradiol Latest Ref Range: 11 - 44 pg/mL <10 (A)     Estrogen Latest Units: pg/mL 170     FSH Latest Ref Range: 0.95 - 11.95 mIU/mL 1.80 2.50    LH Latest Ref Range: 0.6 - 12.1 mIU/mL 1.3 2.9    Prolactin Latest  Ref Range: 3.5 - 19.4 ng/mL 53.3 (H)     Sex Hormone Binding Globulin Latest Ref Range: 10 - 50 nmol/L 15     Testosterone Latest Ref Range: 250 - 1100 ng/dL 184 (L)     Testosterone, Bioavailable Latest Ref Range: 110.0 - 575.0 ng/dL 84.0 (L)     Testosterone, Free Latest Ref Range: 46.0 - 224.0 pg/mL 37.7 (L)     Testosterone, Total Latest Ref Range: 304 - 1227 ng/dL  131 (L)    Z Score Latest Ref Range: -2.0 - 2.0 SD -1.28     MRI BRAIN W WO CONTRAST Unknown   Rpt   Osmolality Latest Ref Range: 280 - 300 mOsm/kg 300     Somatostatin Latest Units: pg/mL <21         Assessment:     1. Pituitary adenoma  Chromogranin A    Prolactin    Alpha Sub Unit    Macroprolactin, Serum   2. Hypercholesterolemia     3. History of pituitary surgery     4. Secondary male hypogonadism  Testosterone Panel    Testosterone Panel    Dihydrotestosterone    Estradiol    Estrogens, total   5. Hyperprolactinemia  Prolactin    Macroprolactin, Serum   6. Prediabetes  Comprehensive metabolic panel    Uric acid    Hemoglobin A1c   7. Mixed hyperlipidemia  Lipid panel   8. Benign prostatic hyperplasia, unspecified whether lower urinary tract symptoms present  PSA, total and free        Regarding Pituitary macroadenoma; the important salient point is whether or not this current tumor is hormonally functional or silent.  To continue dostinex at present dose for unless results of ffup labs show evidence of increasing levels. Regarding possibility of prolactinoma to repeat Prolactin level and ensure that dilution is done to exclude a hook effect.  To refer to ophthalmology for visual fields.  Regarding dysmetabolic syndrome + prediabetes; to obtain full screening in view of patients history of prediabetes and history of diabetes in his father.  Regarding obesity; to continue efforts at calorie reduction and portion size control. Will discuss intervention strategies  To improves weight management.  Regarding possible hypogonadism; to obtain repeat  full androgen profile and after that to commence prescribed androgen repletion therapy.  Regarding hypovitaminosis D; to obtain 25 OH vit d levels and replete as needed based on results.  Regarding hyperlipidemia; to recheck current lipid levels and for now to continue present statin therapy as before.   Regarding BPH; stable symptomatically ALF. To recheck PSA.  Regarding ED; continue management for this as before.  Regarding chronic migraines; to consider addition of topiramate to the the treatment regimen.    Plan:     FFup in ~ 6mths.

## 2020-06-26 ENCOUNTER — LAB VISIT (OUTPATIENT)
Dept: LAB | Facility: HOSPITAL | Age: 40
End: 2020-06-26
Attending: INTERNAL MEDICINE
Payer: COMMERCIAL

## 2020-06-26 DIAGNOSIS — E22.1 HYPERPROLACTINEMIA: ICD-10-CM

## 2020-06-26 DIAGNOSIS — E29.1 SECONDARY MALE HYPOGONADISM: ICD-10-CM

## 2020-06-26 DIAGNOSIS — E78.2 MIXED HYPERLIPIDEMIA: ICD-10-CM

## 2020-06-26 DIAGNOSIS — D35.2 PITUITARY ADENOMA: ICD-10-CM

## 2020-06-26 DIAGNOSIS — R73.03 PREDIABETES: ICD-10-CM

## 2020-06-26 DIAGNOSIS — N40.0 BENIGN PROSTATIC HYPERPLASIA, UNSPECIFIED WHETHER LOWER URINARY TRACT SYMPTOMS PRESENT: ICD-10-CM

## 2020-06-26 LAB
ALBUMIN SERPL BCP-MCNC: 4.2 G/DL (ref 3.5–5.2)
ALP SERPL-CCNC: 50 U/L (ref 55–135)
ALT SERPL W/O P-5'-P-CCNC: 50 U/L (ref 10–44)
ANION GAP SERPL CALC-SCNC: 8 MMOL/L (ref 8–16)
AST SERPL-CCNC: 29 U/L (ref 10–40)
BILIRUB SERPL-MCNC: 0.4 MG/DL (ref 0.1–1)
BUN SERPL-MCNC: 13 MG/DL (ref 6–20)
CALCIUM SERPL-MCNC: 9.5 MG/DL (ref 8.7–10.5)
CHLORIDE SERPL-SCNC: 106 MMOL/L (ref 95–110)
CHOLEST SERPL-MCNC: 214 MG/DL (ref 120–199)
CHOLEST/HDLC SERPL: 5 {RATIO} (ref 2–5)
CO2 SERPL-SCNC: 27 MMOL/L (ref 23–29)
CREAT SERPL-MCNC: 0.9 MG/DL (ref 0.5–1.4)
EST. GFR  (AFRICAN AMERICAN): >60 ML/MIN/1.73 M^2
EST. GFR  (NON AFRICAN AMERICAN): >60 ML/MIN/1.73 M^2
ESTIMATED AVG GLUCOSE: 120 MG/DL (ref 68–131)
ESTRADIOL SERPL-MCNC: 20 PG/ML (ref 11–44)
GLUCOSE SERPL-MCNC: 97 MG/DL (ref 70–110)
HBA1C MFR BLD HPLC: 5.8 % (ref 4–5.6)
HDLC SERPL-MCNC: 43 MG/DL (ref 40–75)
HDLC SERPL: 20.1 % (ref 20–50)
LDLC SERPL CALC-MCNC: 155.6 MG/DL (ref 63–159)
NONHDLC SERPL-MCNC: 171 MG/DL
POTASSIUM SERPL-SCNC: 4 MMOL/L (ref 3.5–5.1)
PROLACTIN SERPL IA-MCNC: 1.6 NG/ML (ref 3.5–19.4)
PROSTATE SPECIFIC ANTIGEN, TOTAL: 1.4 NG/ML (ref 0–4)
PROT SERPL-MCNC: 7.8 G/DL (ref 6–8.4)
PSA FREE MFR SERPL: 27.86 %
PSA FREE SERPL-MCNC: 0.39 NG/ML (ref 0.01–1.5)
SODIUM SERPL-SCNC: 141 MMOL/L (ref 136–145)
TRIGL SERPL-MCNC: 77 MG/DL (ref 30–150)
URATE SERPL-MCNC: 7.2 MG/DL (ref 3.4–7)

## 2020-06-26 PROCEDURE — 80061 LIPID PANEL: CPT

## 2020-06-26 PROCEDURE — 80053 COMPREHEN METABOLIC PANEL: CPT

## 2020-06-26 PROCEDURE — 82670 ASSAY OF TOTAL ESTRADIOL: CPT

## 2020-06-26 PROCEDURE — 84550 ASSAY OF BLOOD/URIC ACID: CPT

## 2020-06-26 PROCEDURE — 83036 HEMOGLOBIN GLYCOSYLATED A1C: CPT

## 2020-06-26 PROCEDURE — 82397 CHEMILUMINESCENT ASSAY: CPT

## 2020-06-26 PROCEDURE — 84154 ASSAY OF PSA FREE: CPT

## 2020-06-26 PROCEDURE — 82040 ASSAY OF SERUM ALBUMIN: CPT | Mod: 91

## 2020-06-26 PROCEDURE — 84146 ASSAY OF PROLACTIN: CPT

## 2020-06-26 PROCEDURE — 86316 IMMUNOASSAY TUMOR OTHER: CPT

## 2020-06-26 PROCEDURE — 82642 DIHYDROTESTOSTERONE: CPT

## 2020-06-26 PROCEDURE — 84146 ASSAY OF PROLACTIN: CPT | Mod: 91

## 2020-06-26 PROCEDURE — 82672 ASSAY OF ESTROGEN: CPT

## 2020-06-26 PROCEDURE — 36415 COLL VENOUS BLD VENIPUNCTURE: CPT

## 2020-06-28 LAB
ANNOTATION COMMENT IMP: ABNORMAL
MACROPROLACTIN SERPL-MCNC: <2 NG/ML (ref 2.7–13.1)
MACROPROLACTIN/PROLACTIN MFR SERPL: ABNORMAL %
PROLACTIN SERPL IA-MCNC: 1.9 NG/ML (ref 4–15.2)

## 2020-06-29 LAB — ESTROGEN SERPL-MCNC: 121 PG/ML

## 2020-06-30 LAB
ANDROSTANOLONE SERPL-MCNC: 136 PG/ML (ref 112–955)
CGA SERPL-MCNC: 32 NG/ML (ref 0–160)

## 2020-07-01 LAB — A-PGH SER-MCNC: 0.1 NG/ML

## 2020-07-02 LAB
ALBUMIN SERPL-MCNC: 4.5 G/DL (ref 3.6–5.1)
SHBG SERPL-SCNC: 15 NMOL/L (ref 10–50)
TESTOST FREE SERPL-MCNC: 63.8 PG/ML (ref 46–224)
TESTOST SERPL-MCNC: 288 NG/DL (ref 250–1100)
TESTOSTERONE.FREE+WB SERPL-MCNC: 131.1 NG/DL (ref 110–575)

## 2020-09-18 ENCOUNTER — LAB VISIT (OUTPATIENT)
Dept: LAB | Facility: HOSPITAL | Age: 40
End: 2020-09-18
Attending: INTERNAL MEDICINE
Payer: COMMERCIAL

## 2020-09-18 ENCOUNTER — OFFICE VISIT (OUTPATIENT)
Dept: ENDOCRINOLOGY | Facility: CLINIC | Age: 40
End: 2020-09-18
Payer: COMMERCIAL

## 2020-09-18 VITALS
BODY MASS INDEX: 32.38 KG/M2 | SYSTOLIC BLOOD PRESSURE: 120 MMHG | WEIGHT: 260.38 LBS | HEART RATE: 56 BPM | DIASTOLIC BLOOD PRESSURE: 80 MMHG | HEIGHT: 75 IN | TEMPERATURE: 97 F

## 2020-09-18 DIAGNOSIS — N13.8 BPH WITH URINARY OBSTRUCTION: ICD-10-CM

## 2020-09-18 DIAGNOSIS — E78.2 MIXED HYPERLIPIDEMIA: ICD-10-CM

## 2020-09-18 DIAGNOSIS — N52.01 ERECTILE DYSFUNCTION DUE TO ARTERIAL INSUFFICIENCY: Chronic | ICD-10-CM

## 2020-09-18 DIAGNOSIS — Z87.898 HISTORY OF PITUITARY TUMOR: Chronic | ICD-10-CM

## 2020-09-18 DIAGNOSIS — E55.9 HYPOVITAMINOSIS D: ICD-10-CM

## 2020-09-18 DIAGNOSIS — R73.03 PREDIABETES: ICD-10-CM

## 2020-09-18 DIAGNOSIS — E29.1 HYPOGONADISM MALE: Chronic | ICD-10-CM

## 2020-09-18 DIAGNOSIS — Z98.890 HISTORY OF PITUITARY SURGERY: ICD-10-CM

## 2020-09-18 DIAGNOSIS — E88.810 DYSMETABOLIC SYNDROME: ICD-10-CM

## 2020-09-18 DIAGNOSIS — E78.00 HYPERCHOLESTEROLEMIA: ICD-10-CM

## 2020-09-18 DIAGNOSIS — N40.1 BPH WITH URINARY OBSTRUCTION: ICD-10-CM

## 2020-09-18 DIAGNOSIS — R68.82 DECREASED LIBIDO: Chronic | ICD-10-CM

## 2020-09-18 DIAGNOSIS — D35.2 PITUITARY ADENOMA: Primary | ICD-10-CM

## 2020-09-18 DIAGNOSIS — Z86.69 HISTORY OF MIGRAINE HEADACHES: Chronic | ICD-10-CM

## 2020-09-18 LAB
25(OH)D3+25(OH)D2 SERPL-MCNC: 20 NG/ML (ref 30–96)
BASOPHILS # BLD AUTO: 0.03 K/UL (ref 0–0.2)
BASOPHILS NFR BLD: 0.4 % (ref 0–1.9)
CA-I BLDV-SCNC: 1.24 MMOL/L (ref 1.06–1.42)
DIFFERENTIAL METHOD: ABNORMAL
EOSINOPHIL # BLD AUTO: 0.3 K/UL (ref 0–0.5)
EOSINOPHIL NFR BLD: 4.7 % (ref 0–8)
ERYTHROCYTE [DISTWIDTH] IN BLOOD BY AUTOMATED COUNT: 15 % (ref 11.5–14.5)
ESTIMATED AVG GLUCOSE: 114 MG/DL (ref 68–131)
HBA1C MFR BLD HPLC: 5.6 % (ref 4–5.6)
HCT VFR BLD AUTO: 51.7 % (ref 40–54)
HGB BLD-MCNC: 16 G/DL (ref 14–18)
IMM GRANULOCYTES # BLD AUTO: 0.01 K/UL (ref 0–0.04)
IMM GRANULOCYTES NFR BLD AUTO: 0.1 % (ref 0–0.5)
LYMPHOCYTES # BLD AUTO: 2.6 K/UL (ref 1–4.8)
LYMPHOCYTES NFR BLD: 37.9 % (ref 18–48)
MCH RBC QN AUTO: 26 PG (ref 27–31)
MCHC RBC AUTO-ENTMCNC: 30.9 G/DL (ref 32–36)
MCV RBC AUTO: 84 FL (ref 82–98)
MONOCYTES # BLD AUTO: 0.5 K/UL (ref 0.3–1)
MONOCYTES NFR BLD: 7.1 % (ref 4–15)
NEUTROPHILS # BLD AUTO: 3.4 K/UL (ref 1.8–7.7)
NEUTROPHILS NFR BLD: 49.8 % (ref 38–73)
NRBC BLD-RTO: 0 /100 WBC
PLATELET # BLD AUTO: 348 K/UL (ref 150–350)
PMV BLD AUTO: 10.5 FL (ref 9.2–12.9)
PTH-INTACT SERPL-MCNC: 40 PG/ML (ref 9–77)
RBC # BLD AUTO: 6.15 M/UL (ref 4.6–6.2)
URATE SERPL-MCNC: 7.2 MG/DL (ref 3.4–7)
WBC # BLD AUTO: 6.86 K/UL (ref 3.9–12.7)

## 2020-09-18 PROCEDURE — 85025 COMPLETE CBC W/AUTO DIFF WBC: CPT

## 2020-09-18 PROCEDURE — 99999 PR PBB SHADOW E&M-EST. PATIENT-LVL III: CPT | Mod: PBBFAC,,, | Performed by: INTERNAL MEDICINE

## 2020-09-18 PROCEDURE — 82040 ASSAY OF SERUM ALBUMIN: CPT

## 2020-09-18 PROCEDURE — 84550 ASSAY OF BLOOD/URIC ACID: CPT

## 2020-09-18 PROCEDURE — 82330 ASSAY OF CALCIUM: CPT

## 2020-09-18 PROCEDURE — 99999 PR PBB SHADOW E&M-EST. PATIENT-LVL III: ICD-10-PCS | Mod: PBBFAC,,, | Performed by: INTERNAL MEDICINE

## 2020-09-18 PROCEDURE — 83970 ASSAY OF PARATHORMONE: CPT

## 2020-09-18 PROCEDURE — 99214 OFFICE O/P EST MOD 30 MIN: CPT | Mod: S$GLB,,, | Performed by: INTERNAL MEDICINE

## 2020-09-18 PROCEDURE — 82306 VITAMIN D 25 HYDROXY: CPT

## 2020-09-18 PROCEDURE — 99214 PR OFFICE/OUTPT VISIT, EST, LEVL IV, 30-39 MIN: ICD-10-PCS | Mod: S$GLB,,, | Performed by: INTERNAL MEDICINE

## 2020-09-18 PROCEDURE — 83036 HEMOGLOBIN GLYCOSYLATED A1C: CPT

## 2020-09-18 NOTE — PROGRESS NOTES
Subjective:      Patient ID: Gallo Maria Jr. is a 40 y.o. male.    Chief Complaint:      Patient is a 40 yr old gentleman seen in [facility] today on account of pituitary tumor and hyperprolactinemia.      History of Present Illness    Patient is a 40 yr old gentleman seen in [facility]  today on account of pituitary tumor and associated hyperprolactinemia.    His most recent pituitary MRI from 03/2020 shows macroadenoma (1.7 x 1.7 x 1.6 cm) with suprasellar extension which suggest subtle increase in lesion size compared to dimensions from 2019. He remains on dostinex 0.5mg 2 x weekly and testosterone repletion therapy on account of hypogonadism;  20mmg every 14 days  He has previously been seen and ffed by Alexandra Mckay and Rosemary @ [facility]. He was last see there 12/15.     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 This was first found on account of headaches and visual field problems.   States his sx ( HA and vision change) have significantly improved since the surgery.  Patient has not noted any nipple discharge. No gynecomastia noted. He has not any significant change in smeel sesnation  States he continues to have low libido and no morning erections since surgery.      Following the  transphenoidal surgery, pt was on HC for about a month. States he did not feel any different while on HC or off of HC.   His established background comorbidities are as detailed below;     1. Pituitary adenoma      2. History of migraine headaches      3. Mixed hyperlipidemia      4. Erectile dysfunction due to arterial insufficiency      5. Decreased libido      6. BPH with urinary obstruction      7. Hypogonadism male      8. Dysmetabolic syndrome      9. Obesity (BMI 30-39.9)      10. Hypercholesterolemia      11. Prediabetes         Patients [place] score is 8.   Patient noticed progressive weight gain and fatigue. Presently he has no headaches. Presently has no  "visual problems; no blurring, tunnel vision nor peripheral visual deficits.  Patient had noticed no gynecomastia nor nipple discharge. No seizures.  There is no family history of pituitary tumors or other brain tumors.  Patients libido has declined.   Patients father had recurrent nephrolithiasis. There is no family history of severe dyspepsia ir ulcer disease.\  Patient does not smoke.  Patient does not drink any ETOH.  Patient works at the [employer].  Patient has not started androgen repletion yet.  Patient is presently on testosterone injections; 100mg every 7 days.      Review of Systems   Constitutional: Negative for fatigue and unexpected weight change.   HENT: Negative for facial swelling, trouble swallowing and voice change.    Eyes: Negative for photophobia and visual disturbance (no visual blurring nor visual field defects).   Respiratory: Negative for shortness of breath.    Cardiovascular: Negative for chest pain, palpitations and leg swelling.   Gastrointestinal: Negative for constipation, diarrhea, nausea and vomiting.   Endocrine: Negative for polydipsia and polyuria.   Genitourinary: Negative for dysuria and frequency.        +ED   Musculoskeletal: Negative for back pain and myalgias.   Skin: Negative for color change, pallor and rash.   Neurological: Negative for headaches.   Hematological: Does not bruise/bleed easily.   Psychiatric/Behavioral: Negative for confusion and sleep disturbance.       Objective:   /80 (BP Location: Right arm, Patient Position: Sitting, BP Method: Large (Manual))   Pulse (!) 56   Temp 97.3 °F (36.3 °C) (Temporal)   Ht 6' 3" (1.905 m)   Wt 118.1 kg (260 lb 5.8 oz)   BMI 32.54 kg/m²  Body surface area is 2.5 meters squared.         Physical Exam  Vitals signs reviewed.   Constitutional:       General: He is not in acute distress.     Appearance: He is well-developed. He is not toxic-appearing or diaphoretic.      Comments: Pleasant young man. Clinically " comfortable. Not pale, anicteric, afebrile. Well hydrated.    HENT:      Head: Normocephalic and atraumatic. No right periorbital erythema or left periorbital erythema. Hair is normal.      Mouth/Throat:      Pharynx: No oropharyngeal exudate.   Eyes:      General: Lids are normal. No scleral icterus.     Conjunctiva/sclera: Conjunctivae normal.      Pupils: Pupils are equal, round, and reactive to light.   Neck:      Musculoskeletal: Full passive range of motion without pain, normal range of motion and neck supple.      Thyroid: No thyroid mass or thyromegaly.      Vascular: No carotid bruit or JVD.      Trachea: Trachea and phonation normal. No tracheal tenderness or tracheal deviation.   Cardiovascular:      Rate and Rhythm: Normal rate and regular rhythm.      Pulses: Normal pulses.      Heart sounds: Normal heart sounds. No murmur. No gallop.    Pulmonary:      Effort: Pulmonary effort is normal. No respiratory distress.      Breath sounds: Normal breath sounds. No decreased breath sounds, wheezing or rales.   Abdominal:      General: Bowel sounds are normal. There is no distension.      Palpations: Abdomen is soft. There is no mass.      Tenderness: There is no abdominal tenderness.   Musculoskeletal: Normal range of motion.         General: No swelling or tenderness.      Comments: No pedal edema nor calf swelling. No areas of tenderness.   Lymphadenopathy:      Cervical: No cervical adenopathy.   Skin:     General: Skin is warm and dry.      Coloration: Skin is not pale.      Findings: No bruising, ecchymosis, erythema, petechiae or rash.      Nails: There is no clubbing.     Neurological:      Mental Status: He is alert and oriented to person, place, and time.      Cranial Nerves: No cranial nerve deficit.      Sensory: No sensory deficit.      Motor: No tremor or abnormal muscle tone.      Gait: Gait normal.      Deep Tendon Reflexes: Reflexes are normal and symmetric. Reflexes normal.   Psychiatric:          Mood and Affect: Mood is not anxious or depressed.         Speech: Speech normal.         Behavior: Behavior normal.         Thought Content: Thought content normal.         Judgment: Judgment normal.         Lab Review:     Results for ABDIAZIZ REEVES JR. (MRN [id number]) as of 9/18/2020 08:38   Ref. Range 6/26/2020 07:58   Sodium Latest Ref Range: 136 - 145 mmol/L 141   Potassium Latest Ref Range: 3.5 - 5.1 mmol/L 4.0   Chloride Latest Ref Range: 95 - 110 mmol/L 106   CO2 Latest Ref Range: 23 - 29 mmol/L 27   Anion Gap Latest Ref Range: 8 - 16 mmol/L 8   BUN, Bld Latest Ref Range: 6 - 20 mg/dL 13   Creatinine Latest Ref Range: 0.5 - 1.4 mg/dL 0.9   eGFR if non African American Latest Ref Range: >60 mL/min/1.73 m^2 >60   eGFR if  Latest Ref Range: >60 mL/min/1.73 m^2 >60   Glucose Latest Ref Range: 70 - 110 mg/dL 97   Calcium Latest Ref Range: 8.7 - 10.5 mg/dL 9.5   Alkaline Phosphatase Latest Ref Range: 55 - 135 U/L 50 (L)   PROTEIN TOTAL Latest Ref Range: 6.0 - 8.4 g/dL 7.8   Albumin Latest Ref Range: 3.5 - 5.2 g/dL 4.2   Uric Acid Latest Ref Range: 3.4 - 7.0 mg/dL 7.2 (H)   BILIRUBIN TOTAL Latest Ref Range: 0.1 - 1.0 mg/dL 0.4   AST Latest Ref Range: 10 - 40 U/L 29   ALT Latest Ref Range: 10 - 44 U/L 50 (H)   Triglycerides Latest Ref Range: 30 - 150 mg/dL 77   Cholesterol Latest Ref Range: 120 - 199 mg/dL 214 (H)   HDL Latest Ref Range: 40 - 75 mg/dL 43   Hdl/Cholesterol Ratio Latest Ref Range: 20.0 - 50.0 % 20.1   LDL Cholesterol External Latest Ref Range: 63.0 - 159.0 mg/dL 155.6   Non-HDL Cholesterol Latest Units: mg/dL 171   Total Cholesterol/HDL Ratio Latest Ref Range: 2.0 - 5.0  5.0   Macroprolactin Comment Unknown SEE BELOW   Hemoglobin A1C External Latest Ref Range: 4.0 - 5.6 % 5.8 (H)   Estimated Avg Glucose Latest Ref Range: 68 - 131 mg/dL 120   MCRPL Percent Unknown Test Not Performed   Prolactin, Total Latest Ref Range: 4.0 - 15.2 ng/mL 1.9 (L)   Prolactin, Unprecipitated Latest Ref  Range: 2.7 - 13.1 ng/mL <2.0 (L)   Alpha Sub Unit, Serum Latest Ref Range: <=0.5 ng/mL 0.1   Chromogranin A Latest Ref Range: 0 - 160 ng/mL 32   PSA Total Latest Ref Range: 0.00 - 4.00 ng/mL 1.4   PSA, Free Latest Ref Range: 0.01 - 1.50 ng/mL 0.39   PSA, Free Pct Latest Ref Range: Not established % 27.86   Albumin Latest Ref Range: 3.6 - 5.1 g/dL 4.5   Dihydrotestosterone Latest Ref Range: 112 - 955 pg/mL 136   Estradiol Latest Ref Range: 11 - 44 pg/mL 20   Estrogen Latest Units: pg/mL 121   Prolactin Latest Ref Range: 3.5 - 19.4 ng/mL 1.6 (L)   Sex Hormone Binding Globulin Latest Ref Range: 10 - 50 nmol/L 15   Testosterone Latest Ref Range: 250 - 1100 ng/dL 288   Testosterone, Bioavailable Latest Ref Range: 110.0 - 575.0 ng/dL 131.1   Testosterone, Free Latest Ref Range: 46.0 - 224.0 pg/mL 63.8       Assessment:     1. Pituitary adenoma     2. History of pituitary tumor     3. History of pituitary surgery     4. Dysmetabolic syndrome     5. Hypovitaminosis D  Vitamin D    PTH, intact    Calcium, Ionized    CBC auto differential   6. Hypogonadism male  CBC auto differential   7. BPH with urinary obstruction     8. Erectile dysfunction due to arterial insufficiency     9. Decreased libido     10. Mixed hyperlipidemia  Uric acid   11. Hypercholesterolemia  Uric acid   12. History of migraine headaches     13. Prediabetes  Hemoglobin A1C        Regarding Pituitary macroadenoma; the important salient point is whether or not this current tumor is hormonally functional or silent.  To continue dostinex at present dose for unless results of ffup labs show evidence of increasing levels. Regarding possibility of prolactinoma to repeat Prolactin level and ensure that dilution is done to exclude a hook effect.  To refer to ophthalmology for visual fields.  Regarding dysmetabolic syndrome + prediabetes; to obtain full screening in view of patients history of prediabetes and history of diabetes in his father.  Regarding obesity;  to continue efforts at calorie reduction and portion size control. Will discuss intervention strategies  To improves weight management.  Regarding possible hypogonadism; to continue androgen repletion therapy ~ 100mg every 7 days.. To obtain peak and trough levels of tesosterone  Regarding hypovitaminosis D; to obtain 25 OH vit d levels and replete as needed based on results.  Regarding hyperlipidemia; to recheck current lipid levels and for now to continue present statin therapy as before.   Regarding BPH; stable symptomatically ALF. To recheck PSA.  Regarding ED; continue management for this as before.  Regarding chronic migraines; to consider addition of topiramate to the the treatment regimen.       Plan:     FFup in ~ 6mths.

## 2020-09-19 DIAGNOSIS — E55.9 HYPOVITAMINOSIS D: Primary | ICD-10-CM

## 2020-09-19 RX ORDER — ERGOCALCIFEROL 1.25 MG/1
50000 CAPSULE ORAL
Qty: 12 CAPSULE | Refills: 3 | Status: SHIPPED | OUTPATIENT
Start: 2020-09-19 | End: 2020-12-18

## 2020-09-24 ENCOUNTER — PATIENT MESSAGE (OUTPATIENT)
Dept: ENDOCRINOLOGY | Facility: CLINIC | Age: 40
End: 2020-09-24

## 2020-09-24 LAB
ALBUMIN SERPL-MCNC: 4.8 G/DL (ref 3.6–5.1)
SHBG SERPL-SCNC: 19 NMOL/L (ref 10–50)
TESTOST FREE SERPL-MCNC: 197.7 PG/ML (ref 46–224)
TESTOST SERPL-MCNC: 878 NG/DL (ref 250–1100)
TESTOSTERONE.FREE+WB SERPL-MCNC: 432.5 NG/DL (ref 110–575)

## 2020-11-19 ENCOUNTER — OFFICE VISIT (OUTPATIENT)
Dept: URGENT CARE | Facility: CLINIC | Age: 40
End: 2020-11-19
Payer: COMMERCIAL

## 2020-11-19 VITALS
RESPIRATION RATE: 18 BRPM | DIASTOLIC BLOOD PRESSURE: 85 MMHG | HEART RATE: 76 BPM | HEIGHT: 75 IN | OXYGEN SATURATION: 97 % | SYSTOLIC BLOOD PRESSURE: 135 MMHG | TEMPERATURE: 98 F | BODY MASS INDEX: 30.84 KG/M2 | WEIGHT: 248 LBS
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
## 2020-11-19 DIAGNOSIS — M54.50 ACUTE BILATERAL LOW BACK PAIN WITHOUT SCIATICA: ICD-10-CM

## 2020-11-19 PROCEDURE — 99214 OFFICE O/P EST MOD 30 MIN: CPT | Mod: S$GLB,,, | Performed by: NURSE PRACTITIONER

## 2020-11-19 PROCEDURE — 99214 PR OFFICE/OUTPT VISIT, EST, LEVL IV, 30-39 MIN: ICD-10-PCS | Mod: S$GLB,,, | Performed by: NURSE PRACTITIONER

## 2020-11-19 PROCEDURE — 72100 X-RAY EXAM L-S SPINE 2/3 VWS: CPT | Mod: FY,S$GLB,, | Performed by: RADIOLOGY

## 2020-11-19 PROCEDURE — 72100 XR LUMBAR SPINE AP AND LATERAL: ICD-10-PCS | Mod: FY,S$GLB,, | Performed by: RADIOLOGY

## 2020-11-19 RX ORDER — METHOCARBAMOL 500 MG/1
500 TABLET, FILM COATED ORAL 3 TIMES DAILY
Qty: 21 TABLET | Refills: 0 | Status: SHIPPED | OUTPATIENT
Start: 2020-11-19 | End: 2020-11-26

## 2020-11-19 RX ORDER — NAPROXEN 500 MG/1
500 TABLET ORAL 2 TIMES DAILY WITH MEALS
Qty: 20 TABLET | Refills: 0 | Status: SHIPPED | OUTPATIENT
Start: 2020-11-19 | End: 2020-11-29
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
## 2020-11-22 ENCOUNTER — TELEPHONE (OUTPATIENT)
Dept: URGENT CARE | Facility: CLINIC | Age: 40
End: 2020-11-22

## 2020-11-22 NOTE — TELEPHONE ENCOUNTER
Call back DOS 11/19/20 - Spoke with patient, he said he is still sore but thinks he is getting better. He said that he has been using a TENS unit and muscle cream for back pain.

## 2020-11-24 ENCOUNTER — TELEPHONE (OUTPATIENT)
Dept: ORTHOPEDICS | Facility: CLINIC | Age: 40
End: 2020-11-24

## 2020-11-24 DIAGNOSIS — M51.36 DDD (DEGENERATIVE DISC DISEASE), LUMBAR: Primary | ICD-10-CM

## 2020-12-03 ENCOUNTER — OFFICE VISIT (OUTPATIENT)
Dept: ORTHOPEDICS | Facility: CLINIC | Age: 40
End: 2020-12-03
Payer: COMMERCIAL

## 2020-12-03 VITALS — BODY MASS INDEX: 33 KG/M2 | HEIGHT: 75 IN | WEIGHT: 265.44 LBS

## 2020-12-03 DIAGNOSIS — M54.50 ACUTE BILATERAL LOW BACK PAIN WITHOUT SCIATICA: Primary | ICD-10-CM

## 2020-12-03 PROCEDURE — 99999 PR PBB SHADOW E&M-EST. PATIENT-LVL III: CPT | Mod: PBBFAC,,, | Performed by: ORTHOPAEDIC SURGERY

## 2020-12-03 PROCEDURE — 99204 PR OFFICE/OUTPT VISIT, NEW, LEVL IV, 45-59 MIN: ICD-10-PCS | Mod: S$GLB,,, | Performed by: ORTHOPAEDIC SURGERY

## 2020-12-03 PROCEDURE — 99999 PR PBB SHADOW E&M-EST. PATIENT-LVL III: ICD-10-PCS | Mod: PBBFAC,,, | Performed by: ORTHOPAEDIC SURGERY

## 2020-12-03 PROCEDURE — 99204 OFFICE O/P NEW MOD 45 MIN: CPT | Mod: S$GLB,,, | Performed by: ORTHOPAEDIC SURGERY

## 2020-12-03 NOTE — LETTER
December 3, 2020      Leah Schumacher, NOEMY  [address]  [place] [address]           [facility] [address]  [address]  [facility] [address]  Phone: [phone]          Patient: Gallo Maria Jr.   MR Number: [id number]   YOB: 1980   Date of Visit: 12/3/2020       Dear Leah Schumacher:    Thank you for referring Gallo Maria to me for evaluation. Attached you will find relevant portions of my assessment and plan of care.    If you have questions, please do not hesitate to call me. I look forward to following Gallo Maria along with you.    Sincerely,    Neelam Case PA-C    Enclosure  CC:  No Recipients    If you would like to receive this communication electronically, please contact [email or web address] or [phone] to request more information on [employer] Park[employer] Link access.    For providers and/or their staff who would like to refer a patient to [facility], please contact us through our one-stop-shop provider referral line, [facility] , at [phone].    If you feel you have received this communication in error or would no longer like to receive these types of communications, please e-mail [email or web address]
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
## 2020-12-06 ENCOUNTER — PATIENT MESSAGE (OUTPATIENT)
Dept: ORTHOPEDICS | Facility: CLINIC | Age: 40
End: 2020-12-06

## 2020-12-07 ENCOUNTER — TELEPHONE (OUTPATIENT)
Dept: ORTHOPEDICS | Facility: CLINIC | Age: 40
End: 2020-12-07

## 2020-12-07 ENCOUNTER — PATIENT MESSAGE (OUTPATIENT)
Dept: ORTHOPEDICS | Facility: CLINIC | Age: 40
End: 2020-12-07

## 2020-12-07 DIAGNOSIS — M54.50 ACUTE BILATERAL LOW BACK PAIN WITHOUT SCIATICA: Primary | ICD-10-CM

## 2020-12-09 ENCOUNTER — TELEPHONE (OUTPATIENT)
Dept: ORTHOPEDICS | Facility: CLINIC | Age: 40
End: 2020-12-09

## 2020-12-09 ENCOUNTER — PATIENT MESSAGE (OUTPATIENT)
Dept: ORTHOPEDICS | Facility: CLINIC | Age: 40
End: 2020-12-09

## 2020-12-09 NOTE — TELEPHONE ENCOUNTER
Spoke to patient in regards to his paperwork being faxed to the office. Stated to patient that I will check with Neelam Case and give him a call back to confirm that it has been taken care of. Patient stated thank you. Thanks.

## 2020-12-09 NOTE — TELEPHONE ENCOUNTER
Return call to patient to confirm that I just received his paper work, and that Neelam was in the room with a patinet. Stated that I will make sure that she get the paper work. Patient stated thank you very much. Stated you are very welcome. Thanks.

## 2020-12-11 RX ORDER — NAPROXEN 500 MG/1
500 TABLET ORAL 2 TIMES DAILY
Qty: 60 TABLET | Refills: 0 | Status: SHIPPED | OUTPATIENT
Start: 2020-12-11 | End: 2021-01-07

## 2020-12-11 RX ORDER — METHOCARBAMOL 750 MG/1
750 TABLET, FILM COATED ORAL 3 TIMES DAILY
Qty: 90 TABLET | Refills: 0 | Status: SHIPPED | OUTPATIENT
Start: 2020-12-11 | End: 2021-01-20

## 2020-12-15 ENCOUNTER — TELEPHONE (OUTPATIENT)
Dept: ORTHOPEDICS | Facility: CLINIC | Age: 40
End: 2020-12-15
Payer: COMMERCIAL

## 2020-12-15 NOTE — TELEPHONE ENCOUNTER
----- Message from Neelam Case PA-C sent at 12/15/2020  8:17 AM CST -----  Contact: deena benitez office  Hey didn't we leave a message for this lady last week? He definitely can go full duty by the time he finishes physical therapy, if not sooner.  ----- Message -----  From: Kimmy Young LPN  Sent: 12/14/2020   5:00 PM CST  To: Neelam Case PA-C      ----- Message -----  From: Melvi Duran  Sent: 12/14/2020   4:04 PM CST  To: Manpreet Hussein is asking for a call in regards to when the pts restrictions will end  Will he be able to do full duty 02/04  Contact lena- [phone]

## 2020-12-15 NOTE — TELEPHONE ENCOUNTER
Left message advising that we have ordered physical therapy for this patient and that if he is needing testing done to determine what his abilities are at work, that would require an FCE test, which is not some thing that we offer or follow. We requested light duty if it was available but if not, he is able to return to work at regular capacity.

## 2021-01-21 ENCOUNTER — PATIENT MESSAGE (OUTPATIENT)
Dept: ENDOCRINOLOGY | Facility: CLINIC | Age: 41
End: 2021-01-21

## 2021-01-21 DIAGNOSIS — E29.1 SECONDARY MALE HYPOGONADISM: ICD-10-CM

## 2021-01-21 RX ORDER — TESTOSTERONE CYPIONATE 200 MG/ML
INJECTION, SOLUTION INTRAMUSCULAR
Qty: 6 ML | Refills: 3 | Status: SHIPPED | OUTPATIENT
Start: 2021-01-21 | End: 2021-07-21

## 2021-01-31 ENCOUNTER — PATIENT MESSAGE (OUTPATIENT)
Dept: ENDOCRINOLOGY | Facility: CLINIC | Age: 41
End: 2021-01-31

## 2021-02-01 ENCOUNTER — PATIENT MESSAGE (OUTPATIENT)
Dept: ENDOCRINOLOGY | Facility: CLINIC | Age: 41
End: 2021-02-01

## 2021-03-12 ENCOUNTER — PATIENT MESSAGE (OUTPATIENT)
Dept: ENDOCRINOLOGY | Facility: CLINIC | Age: 41
End: 2021-03-12

## 2021-05-04 ENCOUNTER — PATIENT MESSAGE (OUTPATIENT)
Dept: RESEARCH | Facility: HOSPITAL | Age: 41
End: 2021-05-04

## 2021-05-10 ENCOUNTER — PATIENT MESSAGE (OUTPATIENT)
Dept: RESEARCH | Facility: HOSPITAL | Age: 41
End: 2021-05-10

## 2021-05-10 ENCOUNTER — TELEPHONE (OUTPATIENT)
Dept: NEUROSURGERY | Facility: CLINIC | Age: 41
End: 2021-05-10

## 2021-05-10 ENCOUNTER — PATIENT MESSAGE (OUTPATIENT)
Dept: NEUROSURGERY | Facility: CLINIC | Age: 41
End: 2021-05-10

## 2021-05-10 DIAGNOSIS — D35.2 BENIGN NEOPLASM OF PITUITARY GLAND: ICD-10-CM

## 2021-05-12 ENCOUNTER — PATIENT MESSAGE (OUTPATIENT)
Dept: NEUROSURGERY | Facility: CLINIC | Age: 41
End: 2021-05-12

## 2021-05-31 ENCOUNTER — HOSPITAL ENCOUNTER (OUTPATIENT)
Dept: RADIOLOGY | Facility: HOSPITAL | Age: 41
Discharge: HOME | End: 2021-05-31
Attending: NEUROLOGICAL SURGERY
Payer: COMMERCIAL

## 2021-05-31 ENCOUNTER — OFFICE VISIT (OUTPATIENT)
Dept: NEUROSURGERY | Facility: CLINIC | Age: 41
End: 2021-05-31
Payer: COMMERCIAL

## 2021-05-31 VITALS
BODY MASS INDEX: 31.83 KG/M2 | WEIGHT: 256 LBS | TEMPERATURE: 98 F | HEIGHT: 75 IN | HEART RATE: 68 BPM | DIASTOLIC BLOOD PRESSURE: 86 MMHG | SYSTOLIC BLOOD PRESSURE: 136 MMHG

## 2021-05-31 DIAGNOSIS — D35.2 BENIGN NEOPLASM OF PITUITARY GLAND: ICD-10-CM

## 2021-05-31 DIAGNOSIS — D35.2 PITUITARY ADENOMA: Primary | ICD-10-CM

## 2021-05-31 PROCEDURE — 25500020 PHARM REV CODE 255: Performed by: NEUROLOGICAL SURGERY

## 2021-05-31 PROCEDURE — 99213 PR OFFICE/OUTPT VISIT, EST, LEVL III, 20-29 MIN: ICD-10-PCS | Mod: S$GLB,,, | Performed by: NEUROLOGICAL SURGERY

## 2021-05-31 PROCEDURE — 99999 PR PBB SHADOW E&M-EST. PATIENT-LVL III: CPT | Mod: PBBFAC,,, | Performed by: NEUROLOGICAL SURGERY

## 2021-05-31 PROCEDURE — 99213 OFFICE O/P EST LOW 20 MIN: CPT | Mod: S$GLB,,, | Performed by: NEUROLOGICAL SURGERY

## 2021-05-31 PROCEDURE — 99999 PR PBB SHADOW E&M-EST. PATIENT-LVL III: ICD-10-PCS | Mod: PBBFAC,,, | Performed by: NEUROLOGICAL SURGERY

## 2021-05-31 PROCEDURE — 70553 MRI BRAIN STEM W/O & W/DYE: CPT | Mod: TC

## 2021-05-31 PROCEDURE — A9585 GADOBUTROL INJECTION: HCPCS | Performed by: NEUROLOGICAL SURGERY

## 2021-05-31 PROCEDURE — 70553 MRI BRAIN STEM W/O & W/DYE: CPT | Mod: 26,,, | Performed by: RADIOLOGY

## 2021-05-31 PROCEDURE — 70553 MRI BRAIN W WO CONTRAST: ICD-10-PCS | Mod: 26,,, | Performed by: RADIOLOGY

## 2021-05-31 RX ORDER — GADOBUTROL 604.72 MG/ML
5 INJECTION INTRAVENOUS
Status: COMPLETED | OUTPATIENT
Start: 2021-05-31 | End: 2021-05-31

## 2021-05-31 RX ORDER — ERGOCALCIFEROL 1.25 MG/1
50000 CAPSULE ORAL
COMMUNITY
Start: 2021-05-24 | End: 2021-09-20 | Stop reason: SDUPTHER

## 2021-05-31 RX ADMIN — GADOBUTROL 5 ML: 604.72 INJECTION INTRAVENOUS at 10:05

## 2021-09-03 ENCOUNTER — PATIENT MESSAGE (OUTPATIENT)
Dept: NEUROSURGERY | Facility: CLINIC | Age: 41
End: 2021-09-03

## 2021-09-13 ENCOUNTER — OFFICE VISIT (OUTPATIENT)
Dept: ENDOCRINOLOGY | Facility: CLINIC | Age: 41
End: 2021-09-13
Payer: COMMERCIAL

## 2021-09-13 DIAGNOSIS — R68.82 DECREASED LIBIDO: Chronic | ICD-10-CM

## 2021-09-13 DIAGNOSIS — N52.01 ERECTILE DYSFUNCTION DUE TO ARTERIAL INSUFFICIENCY: Chronic | ICD-10-CM

## 2021-09-13 DIAGNOSIS — E29.1 HYPOGONADISM MALE: Chronic | ICD-10-CM

## 2021-09-13 DIAGNOSIS — E88.810 DYSMETABOLIC SYNDROME: ICD-10-CM

## 2021-09-13 DIAGNOSIS — N13.8 BPH WITH URINARY OBSTRUCTION: ICD-10-CM

## 2021-09-13 DIAGNOSIS — D35.2 PITUITARY ADENOMA: Primary | ICD-10-CM

## 2021-09-13 DIAGNOSIS — E78.2 MIXED HYPERLIPIDEMIA: ICD-10-CM

## 2021-09-13 DIAGNOSIS — N40.1 BPH WITH URINARY OBSTRUCTION: ICD-10-CM

## 2021-09-13 DIAGNOSIS — E55.9 HYPOVITAMINOSIS D: ICD-10-CM

## 2021-09-13 DIAGNOSIS — R73.03 PREDIABETES: ICD-10-CM

## 2021-09-13 DIAGNOSIS — Z98.890 HISTORY OF PITUITARY SURGERY: ICD-10-CM

## 2021-09-13 DIAGNOSIS — D35.2 PITUITARY MACROADENOMA: ICD-10-CM

## 2021-09-13 DIAGNOSIS — Z86.69 HISTORY OF MIGRAINE HEADACHES: Chronic | ICD-10-CM

## 2021-09-13 DIAGNOSIS — R73.09 DYSGLYCEMIA: ICD-10-CM

## 2021-09-13 DIAGNOSIS — E78.00 HYPERCHOLESTEROLEMIA: ICD-10-CM

## 2021-09-13 DIAGNOSIS — E66.9 OBESITY (BMI 30-39.9): ICD-10-CM

## 2021-09-13 DIAGNOSIS — E22.1 HYPERPROLACTINEMIA: ICD-10-CM

## 2021-09-13 DIAGNOSIS — Z80.42 FAMILY HISTORY OF PROSTATE CANCER: Chronic | ICD-10-CM

## 2021-09-13 PROCEDURE — 99215 PR OFFICE/OUTPT VISIT, EST, LEVL V, 40-54 MIN: ICD-10-PCS | Mod: 95,,, | Performed by: INTERNAL MEDICINE

## 2021-09-13 PROCEDURE — 99215 OFFICE O/P EST HI 40 MIN: CPT | Mod: 95,,, | Performed by: INTERNAL MEDICINE

## 2021-09-15 ENCOUNTER — LAB VISIT (OUTPATIENT)
Dept: LAB | Facility: HOSPITAL | Age: 41
End: 2021-09-15
Attending: INTERNAL MEDICINE
Payer: COMMERCIAL

## 2021-09-15 DIAGNOSIS — D35.2 PITUITARY ADENOMA: ICD-10-CM

## 2021-09-15 DIAGNOSIS — R68.82 DECREASED LIBIDO: Chronic | ICD-10-CM

## 2021-09-15 DIAGNOSIS — E22.1 HYPERPROLACTINEMIA: ICD-10-CM

## 2021-09-15 DIAGNOSIS — R73.09 DYSGLYCEMIA: ICD-10-CM

## 2021-09-15 DIAGNOSIS — D35.2 PITUITARY MACROADENOMA: ICD-10-CM

## 2021-09-15 DIAGNOSIS — E88.810 DYSMETABOLIC SYNDROME: ICD-10-CM

## 2021-09-15 DIAGNOSIS — R73.03 PREDIABETES: ICD-10-CM

## 2021-09-15 DIAGNOSIS — E29.1 HYPOGONADISM MALE: Chronic | ICD-10-CM

## 2021-09-15 DIAGNOSIS — N13.8 BPH WITH URINARY OBSTRUCTION: ICD-10-CM

## 2021-09-15 DIAGNOSIS — E55.9 HYPOVITAMINOSIS D: ICD-10-CM

## 2021-09-15 DIAGNOSIS — N40.1 BPH WITH URINARY OBSTRUCTION: ICD-10-CM

## 2021-09-15 DIAGNOSIS — E78.00 HYPERCHOLESTEROLEMIA: ICD-10-CM

## 2021-09-15 DIAGNOSIS — E78.2 MIXED HYPERLIPIDEMIA: ICD-10-CM

## 2021-09-15 PROBLEM — E79.0 HYPERURICEMIA: Status: ACTIVE | Noted: 2021-09-15

## 2021-09-15 LAB
25(OH)D3+25(OH)D2 SERPL-MCNC: 19 NG/ML (ref 30–96)
BNP SERPL-MCNC: <10 PG/ML (ref 0–99)
CA-I BLDV-SCNC: 1.31 MMOL/L (ref 1.06–1.42)
CHOLEST SERPL-MCNC: 211 MG/DL (ref 120–199)
CHOLEST/HDLC SERPL: 5 {RATIO} (ref 2–5)
CORTIS SERPL-MCNC: 11.1 UG/DL
ESTIMATED AVG GLUCOSE: 117 MG/DL (ref 68–131)
ESTRADIOL SERPL-MCNC: 32 PG/ML (ref 11–44)
FSH SERPL-ACNC: <0.1 MIU/ML (ref 0.95–11.95)
HBA1C MFR BLD: 5.7 % (ref 4–5.6)
HDLC SERPL-MCNC: 42 MG/DL (ref 40–75)
HDLC SERPL: 19.9 % (ref 20–50)
LDLC SERPL CALC-MCNC: 145.2 MG/DL (ref 63–159)
LH SERPL-ACNC: <0.2 MIU/ML (ref 0.6–12.1)
NONHDLC SERPL-MCNC: 169 MG/DL
OSMOLALITY SERPL: 297 MOSM/KG (ref 280–300)
PROGEST SERPL-MCNC: 0.2 NG/ML
PROLACTIN SERPL IA-MCNC: 2.1 NG/ML (ref 3.5–19.4)
PROSTATE SPECIFIC ANTIGEN, TOTAL: 2.4 NG/ML (ref 0–4)
PSA FREE MFR SERPL: 22.92 %
PSA FREE SERPL-MCNC: 0.55 NG/ML (ref 0–1.5)
PTH-INTACT SERPL-MCNC: 44.4 PG/ML (ref 9–77)
T3 SERPL-MCNC: 114 NG/DL (ref 60–180)
T4 FREE SERPL-MCNC: 0.87 NG/DL (ref 0.71–1.51)
TRIGL SERPL-MCNC: 119 MG/DL (ref 30–150)
TSH SERPL DL<=0.005 MIU/L-ACNC: 1.56 UIU/ML (ref 0.4–4)
URATE SERPL-MCNC: 7.9 MG/DL (ref 3.4–7)

## 2021-09-15 PROCEDURE — 84403 ASSAY OF TOTAL TESTOSTERONE: CPT | Performed by: INTERNAL MEDICINE

## 2021-09-15 PROCEDURE — 83880 ASSAY OF NATRIURETIC PEPTIDE: CPT | Performed by: INTERNAL MEDICINE

## 2021-09-15 PROCEDURE — 83001 ASSAY OF GONADOTROPIN (FSH): CPT | Performed by: INTERNAL MEDICINE

## 2021-09-15 PROCEDURE — 86316 IMMUNOASSAY TUMOR OTHER: CPT | Performed by: INTERNAL MEDICINE

## 2021-09-15 PROCEDURE — 84439 ASSAY OF FREE THYROXINE: CPT | Performed by: INTERNAL MEDICINE

## 2021-09-15 PROCEDURE — 84153 ASSAY OF PSA TOTAL: CPT | Performed by: INTERNAL MEDICINE

## 2021-09-15 PROCEDURE — 82024 ASSAY OF ACTH: CPT | Performed by: INTERNAL MEDICINE

## 2021-09-15 PROCEDURE — 83880 ASSAY OF NATRIURETIC PEPTIDE: CPT | Mod: 91 | Performed by: INTERNAL MEDICINE

## 2021-09-15 PROCEDURE — 83003 ASSAY GROWTH HORMONE (HGH): CPT | Performed by: INTERNAL MEDICINE

## 2021-09-15 PROCEDURE — 84702 CHORIONIC GONADOTROPIN TEST: CPT | Performed by: INTERNAL MEDICINE

## 2021-09-15 PROCEDURE — 80061 LIPID PANEL: CPT | Performed by: INTERNAL MEDICINE

## 2021-09-15 PROCEDURE — 84146 ASSAY OF PROLACTIN: CPT | Mod: 91 | Performed by: INTERNAL MEDICINE

## 2021-09-15 PROCEDURE — 86800 THYROGLOBULIN ANTIBODY: CPT | Performed by: INTERNAL MEDICINE

## 2021-09-15 PROCEDURE — 84144 ASSAY OF PROGESTERONE: CPT | Performed by: INTERNAL MEDICINE

## 2021-09-15 PROCEDURE — 83520 IMMUNOASSAY QUANT NOS NONAB: CPT | Mod: 59 | Performed by: INTERNAL MEDICINE

## 2021-09-15 PROCEDURE — 84146 ASSAY OF PROLACTIN: CPT | Performed by: INTERNAL MEDICINE

## 2021-09-15 PROCEDURE — 83930 ASSAY OF BLOOD OSMOLALITY: CPT | Performed by: INTERNAL MEDICINE

## 2021-09-15 PROCEDURE — 84588 ASSAY OF VASOPRESSIN: CPT | Performed by: INTERNAL MEDICINE

## 2021-09-15 PROCEDURE — 82397 CHEMILUMINESCENT ASSAY: CPT | Performed by: INTERNAL MEDICINE

## 2021-09-15 PROCEDURE — 82672 ASSAY OF ESTROGEN: CPT | Performed by: INTERNAL MEDICINE

## 2021-09-15 PROCEDURE — 82642 DIHYDROTESTOSTERONE: CPT | Performed by: INTERNAL MEDICINE

## 2021-09-15 PROCEDURE — 83970 ASSAY OF PARATHORMONE: CPT | Performed by: INTERNAL MEDICINE

## 2021-09-15 PROCEDURE — 83002 ASSAY OF GONADOTROPIN (LH): CPT | Performed by: INTERNAL MEDICINE

## 2021-09-15 PROCEDURE — 84307 ASSAY OF SOMATOSTATIN: CPT | Performed by: INTERNAL MEDICINE

## 2021-09-15 PROCEDURE — 82330 ASSAY OF CALCIUM: CPT | Performed by: INTERNAL MEDICINE

## 2021-09-15 PROCEDURE — 82670 ASSAY OF TOTAL ESTRADIOL: CPT | Performed by: INTERNAL MEDICINE

## 2021-09-15 PROCEDURE — 83520 IMMUNOASSAY QUANT NOS NONAB: CPT | Performed by: INTERNAL MEDICINE

## 2021-09-15 PROCEDURE — 82533 TOTAL CORTISOL: CPT | Performed by: INTERNAL MEDICINE

## 2021-09-15 PROCEDURE — 84305 ASSAY OF SOMATOMEDIN: CPT | Performed by: INTERNAL MEDICINE

## 2021-09-15 PROCEDURE — 84550 ASSAY OF BLOOD/URIC ACID: CPT | Performed by: INTERNAL MEDICINE

## 2021-09-15 PROCEDURE — 84443 ASSAY THYROID STIM HORMONE: CPT | Performed by: INTERNAL MEDICINE

## 2021-09-15 PROCEDURE — 82306 VITAMIN D 25 HYDROXY: CPT | Performed by: INTERNAL MEDICINE

## 2021-09-15 PROCEDURE — 84480 ASSAY TRIIODOTHYRONINE (T3): CPT | Performed by: INTERNAL MEDICINE

## 2021-09-15 PROCEDURE — 83036 HEMOGLOBIN GLYCOSYLATED A1C: CPT | Performed by: INTERNAL MEDICINE

## 2021-09-16 LAB
THRYOGLOBULIN INTERPRETATION: ABNORMAL
THYROGLOB AB SERPL-ACNC: <1.8 IU/ML
THYROGLOB SERPL-MCNC: 20 NG/ML

## 2021-09-17 ENCOUNTER — PATIENT MESSAGE (OUTPATIENT)
Dept: ENDOCRINOLOGY | Facility: CLINIC | Age: 41
End: 2021-09-17

## 2021-09-17 LAB
ACTH PLAS-MCNC: 29 PG/ML (ref 0–46)
B-HCG SERPL-ACNC: <0.6 IU/L
CGA SERPL-MCNC: 26 NG/ML
GH SERPL-MCNC: <0.1 NG/ML (ref 0–3)

## 2021-09-18 ENCOUNTER — LAB VISIT (OUTPATIENT)
Dept: LAB | Facility: HOSPITAL | Age: 41
End: 2021-09-18
Attending: INTERNAL MEDICINE
Payer: COMMERCIAL

## 2021-09-18 DIAGNOSIS — E29.1 HYPOGONADISM MALE: Chronic | ICD-10-CM

## 2021-09-18 LAB
ANNOTATION COMMENT IMP: ABNORMAL
MACROPROLACTIN SERPL-MCNC: <2 NG/ML (ref 2.7–13.1)
MACROPROLACTIN/PROLACTIN MFR SERPL: ABNORMAL %
NT-PROBNP SERPL-MCNC: <25 PG/ML
PROLACTIN SERPL IA-MCNC: 2.4 NG/ML (ref 4–15.2)

## 2021-09-18 PROCEDURE — 36415 COLL VENOUS BLD VENIPUNCTURE: CPT | Performed by: INTERNAL MEDICINE

## 2021-09-18 PROCEDURE — 84403 ASSAY OF TOTAL TESTOSTERONE: CPT | Performed by: INTERNAL MEDICINE

## 2021-09-20 DIAGNOSIS — E55.9 HYPOVITAMINOSIS D: Primary | ICD-10-CM

## 2021-09-20 LAB
A-PGH SER-MCNC: 0.1 NG/ML
ANDROSTANOLONE SERPL-MCNC: 201 PG/ML (ref 112–955)
ESTROGEN SERPL-MCNC: 264 PG/ML
IGF BP3 SERPL-MCNC: 3.8 MCG/ML (ref 3.3–6.6)

## 2021-09-20 RX ORDER — ERGOCALCIFEROL 1.25 MG/1
100000 CAPSULE ORAL
Qty: 24 CAPSULE | Refills: 3 | Status: SHIPPED | OUTPATIENT
Start: 2021-09-20 | End: 2021-12-19

## 2021-09-21 LAB
ALBUMIN SERPL-MCNC: 4.8 G/DL (ref 3.6–5.1)
ALBUMIN SERPL-MCNC: 4.8 G/DL (ref 3.6–5.1)
SHBG SERPL-SCNC: 17 NMOL/L (ref 10–50)
SHBG SERPL-SCNC: 17 NMOL/L (ref 10–50)
TESTOST FREE SERPL-MCNC: 101.2 PG/ML (ref 46–224)
TESTOST FREE SERPL-MCNC: 101.2 PG/ML (ref 46–224)
TESTOST SERPL-MCNC: 476 NG/DL (ref 250–1100)
TESTOST SERPL-MCNC: 476 NG/DL (ref 250–1100)
TESTOSTERONE.FREE+WB SERPL-MCNC: 221.3 NG/DL (ref 110–575)
TESTOSTERONE.FREE+WB SERPL-MCNC: 221.3 NG/DL (ref 110–575)

## 2021-09-24 LAB
IGF-I SERPL-MCNC: 74 NG/ML (ref 44–275)
IGF-I Z-SCORE SERPL: -1.28 SD

## 2021-09-25 LAB
ALBUMIN SERPL-MCNC: 4.6 G/DL (ref 3.6–5.1)
SHBG SERPL-SCNC: 15 NMOL/L (ref 10–50)
TESTOST FREE SERPL-MCNC: 70 PG/ML (ref 46–224)
TESTOST SERPL-MCNC: 316 NG/DL (ref 250–1100)
TESTOSTERONE.FREE+WB SERPL-MCNC: 147 NG/DL (ref 110–575)

## 2021-09-26 LAB — VASOPRESSIN SERPL-MCNC: 1.9 PG/ML (ref 0–6.9)

## 2021-09-29 LAB — GHRELIN SERPL-MCNC: 102 NG/L

## 2021-10-02 LAB — SOMATOSTAT PLAS-MCNC: <21 PG/ML

## 2022-04-12 ENCOUNTER — OFFICE VISIT (OUTPATIENT)
Dept: ENDOCRINOLOGY | Facility: CLINIC | Age: 42
End: 2022-04-12
Payer: COMMERCIAL

## 2022-04-12 VITALS
OXYGEN SATURATION: 98 % | BODY MASS INDEX: 31.14 KG/M2 | TEMPERATURE: 98 F | WEIGHT: 250.44 LBS | HEART RATE: 70 BPM | DIASTOLIC BLOOD PRESSURE: 80 MMHG | HEIGHT: 75 IN | SYSTOLIC BLOOD PRESSURE: 124 MMHG | RESPIRATION RATE: 17 BRPM

## 2022-04-12 DIAGNOSIS — R73.03 PREDIABETES: ICD-10-CM

## 2022-04-12 DIAGNOSIS — E55.9 HYPOVITAMINOSIS D: ICD-10-CM

## 2022-04-12 DIAGNOSIS — Z79.890 LONG-TERM CURRENT USE OF TESTOSTERONE REPLACEMENT THERAPY: ICD-10-CM

## 2022-04-12 DIAGNOSIS — R73.09 DYSGLYCEMIA: ICD-10-CM

## 2022-04-12 DIAGNOSIS — Z87.898 HISTORY OF PITUITARY TUMOR: Chronic | ICD-10-CM

## 2022-04-12 DIAGNOSIS — D35.2 PROLACTINOMA: ICD-10-CM

## 2022-04-12 DIAGNOSIS — N40.0 BENIGN PROSTATIC HYPERPLASIA, UNSPECIFIED WHETHER LOWER URINARY TRACT SYMPTOMS PRESENT: ICD-10-CM

## 2022-04-12 DIAGNOSIS — E78.2 MIXED HYPERLIPIDEMIA: ICD-10-CM

## 2022-04-12 DIAGNOSIS — Z86.69 HISTORY OF MIGRAINE HEADACHES: Chronic | ICD-10-CM

## 2022-04-12 DIAGNOSIS — E88.810 DYSMETABOLIC SYNDROME: ICD-10-CM

## 2022-04-12 DIAGNOSIS — Z98.890 HISTORY OF PITUITARY SURGERY: ICD-10-CM

## 2022-04-12 DIAGNOSIS — E78.5 DYSLIPIDEMIA: ICD-10-CM

## 2022-04-12 DIAGNOSIS — E79.0 HYPERURICEMIA: ICD-10-CM

## 2022-04-12 DIAGNOSIS — E29.1 SECONDARY MALE HYPOGONADISM: ICD-10-CM

## 2022-04-12 DIAGNOSIS — E22.1 HYPERPROLACTINEMIA: Primary | ICD-10-CM

## 2022-04-12 DIAGNOSIS — E66.9 OBESITY (BMI 30-39.9): ICD-10-CM

## 2022-04-12 DIAGNOSIS — E78.00 HYPERCHOLESTEROLEMIA: ICD-10-CM

## 2022-04-12 DIAGNOSIS — D35.2 PITUITARY ADENOMA: ICD-10-CM

## 2022-04-12 PROCEDURE — 99999 PR PBB SHADOW E&M-EST. PATIENT-LVL III: CPT | Mod: PBBFAC,,, | Performed by: INTERNAL MEDICINE

## 2022-04-12 PROCEDURE — 99214 PR OFFICE/OUTPT VISIT, EST, LEVL IV, 30-39 MIN: ICD-10-PCS | Mod: S$GLB,,, | Performed by: INTERNAL MEDICINE

## 2022-04-12 PROCEDURE — 99999 PR PBB SHADOW E&M-EST. PATIENT-LVL III: ICD-10-PCS | Mod: PBBFAC,,, | Performed by: INTERNAL MEDICINE

## 2022-04-12 PROCEDURE — 99214 OFFICE O/P EST MOD 30 MIN: CPT | Mod: S$GLB,,, | Performed by: INTERNAL MEDICINE

## 2022-04-12 NOTE — PROGRESS NOTES
Subjective:      Patient ID: Gallo Maria Jr. is a 41 y.o. male.    Chief Complaint:      Patient is a 41 yr old gentleman seen in [facility] today on account of pituitary tumor and hyperprolactinemia.     History of Present Illness    The patient, Mr Maria is a 41 yr old gentleman seen in [facility]  today on account of pituitary tumor and associated hyperprolactinemia.      His recent pituitary MRI from 03/2020 shows macroadenoma (1.7 x 1.7 x 1.6 cm) with suprasellar extension which suggest subtle increase in lesion size compared to dimensions from 2019. He remains on dostinex 0.5mg 2 x weekly and testosterone repletion therapy on account of hypogonadism;  20mmg every 14 days.  His most recent brain MRI continues to show interval enlargement of the residual pituitary tumor (lesion now measured @ 2 x 2 x 2cm with associated suprasellar extension. There is reported associated leftward shift of the pituitary stalk and some protrusion into the right carvenous sinus.  He has previously been seen and ffed by Alexandra Mckay and Rosemary @ [facility]. He was last see there 12/15.       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 This was first found on account of headaches and visual field problems.  this was done by Dr Derek allen @[facility].   States his sx ( HA and vision change) have significantly improved since the surgery.  Patient has not noted any nipple discharge. No gynecomastia noted. He has not any significant change in smeel sesnation  States he continues to have low libido and no morning erections since surgery.      Following the  transphenoidal surgery, pt was on HC for about a month. States he did not feel any different while on HC or off of HC.   His established background comorbidities are as detailed below;     1. Pituitary adenoma      2. History of migraine headaches      3. Mixed hyperlipidemia      4. Erectile dysfunction due to arterial  insufficiency      5. Decreased libido      6. BPH with urinary obstruction      7. Hypogonadism male      8. Dysmetabolic syndrome      9. Obesity (BMI 30-39.9)      10. Hypercholesterolemia      11. Prediabetes         Patients [place] score is 8.   Patient noticed progressive weight gain and fatigue. Presently he has no headaches. Presently has no visual problems; no blurring, tunnel vision nor peripheral visual deficits.  Patient had noticed no gynecomastia nor nipple discharge. No seizures.  There is no family history of pituitary tumors or other brain tumors.  Patients libido has declined.   Patients father had recurrent nephrolithiasis. There is no family history of severe dyspepsia ir ulcer disease.\  Patient does not smoke.  Patient does not drink any ETOH.  Patient works at the [employer].  Patient has not started androgen repletion yet.  Patient is presently on testosterone injections; 100mg every 7 days.  He self administers the testosterone.   He has no current headaches. He has no nipple discharge nor increased breast growth. He has noticed no dysguesia nor paraosmias.     He currently had no headaches and no seizures and also has no visual dysfunction (blurring of vision, lateral visual field deficits etc)    Review of Systems   Constitutional: Negative for fatigue and unexpected weight change.   HENT: Negative for facial swelling, trouble swallowing and voice change.    Eyes: Negative for photophobia and visual disturbance (no visual blurring nor visual field defects).   Respiratory: Negative for shortness of breath.    Cardiovascular: Negative for chest pain, palpitations and leg swelling.   Gastrointestinal: Negative for constipation, diarrhea, nausea and vomiting.   Endocrine: Negative for polydipsia and polyuria.   Genitourinary: Negative for dysuria and frequency.        +ED   Musculoskeletal: Negative for back pain and myalgias.   Skin: Negative for color change, pallor and rash.  "  Neurological: Negative for seizures, syncope, weakness and headaches.   Hematological: Does not bruise/bleed easily.   Psychiatric/Behavioral: Negative for confusion and sleep disturbance.       Objective:    /80 (BP Location: Right arm, Patient Position: Sitting, BP Method: Large (Manual))   Pulse 70   Temp 97.9 °F (36.6 °C) (Oral)   Resp 17   Ht 6' 3" (1.905 m)   Wt 113.6 kg (250 lb 7.1 oz)   SpO2 98%   BMI 31.30 kg/m²  Body surface area is 2.45 meters squared.         Physical Exam    Lab Review:      Latest Reference Range & Units 09/15/21 08:48 09/15/21 09:19 09/18/21 08:45   Ionized Calcium 1.06 - 1.42 mmol/L  1.31    Uric Acid 3.4 - 7.0 mg/dL  7.9 (H)    Triglycerides 30 - 150 mg/dL  119 [1]    Osmolality 280 - 300 mOsm/kg  297    Cholesterol 120 - 199 mg/dL  211 (H) [2]    HDL 40 - 75 mg/dL  42 [3]    HDL/Cholesterol Ratio 20.0 - 50.0 %  19.9 (L)    LDL Cholesterol External 63.0 - 159.0 mg/dL  145.2 [4]    Non-HDL Cholesterol mg/dL  169 [5]    Total Cholesterol/HDL Ratio 2.0 - 5.0   5.0    BNP 0 - 99 pg/mL  <10 [6]    NT-proBNP <=51 pg/mL  <25 [7]    Macroprolactin Comment   SEE BELOW [8]    Vit D, 25-Hydroxy 30 - 96 ng/mL  19 (L) [9]    Antidiuretic Hormone 0.0 - 6.9 pg/mL  1.9 [10]    Cortisol ug/dL  11.10 [11]    Hemoglobin A1C External 4.0 - 5.6 %  5.7 (H) [12]    Estimated Avg Glucose 68 - 131 mg/dL  117    ACTH 0 - 46 pg/mL  29    Growth Hormone 0.0 - 3.0 ng/mL  <0.1 [13]    Insulin-Like GFBP-3 3.3 - 6.6 mcg/mL  3.8 [14]    Somatomedin (IGF-I) 44 - 275 ng/mL  74    Ghrelin, Total (plasma)   102 [15]    MCRPL Percent   Test Not Performed    Prolactin, Total 4.0 - 15.2 ng/mL  2.4 (L)    Prolactin, Unprecipitated 2.7 - 13.1 ng/mL  <2.0 (L)    TSH 0.400 - 4.000 uIU/mL  1.558    T3, Total 60 - 180 ng/dL  114    Free T4 0.71 - 1.51 ng/dL  0.87    Thyroglobulin Interpretation   SEE BELOW [16]    Thyroglobulin Antibody Screen <1.8 IU/mL  <1.8    Thyroglobulin, Tumor Marker ng/mL  20 (H) [17]  "   PTH 9.0 - 77.0 pg/mL  44.4    Alpha Sub Unit, Serum <=0.5 ng/mL  0.1 [18]    Chromogranin A <93 ng/mL  26 [19]    PSA Total 0.00 - 4.00 ng/mL  2.4 [20]    PSA, Free 0.00 - 1.50 ng/mL  0.55    PSA, Free % Not established %  22.92    Beta-hCG, Quantitative (Tumor Marker) <1.4 IU/L  <0.6 [21]    Albumin 3.6 - 5.1 g/dL  4.8  4.8 4.6   Dihydrotestosterone 112 - 955 pg/mL  201 [22]    Estradiol 11 - 44 pg/mL  32 [23]    Estrogen pg/mL  264 [24]    FSH 0.95 - 11.95 mIU/mL  <0.10 (L) [25]    LH 0.6 - 12.1 mIU/mL  <0.2 (L) [26]    Progesterone <0.2 ng/mL  0.2 (H) [27]    Prolactin 3.5 - 19.4 ng/mL  2.1 (L)    Sex Hormone Binding Globulin 10 - 50 nmol/L  17  17 15   Testosterone 250 - 1100 ng/dL  476 [28]  476 [29] 316 [30]   Testosterone, Bioavailable 110.0 - 575.0 ng/dL  221.3  221.3 147.0   Testosterone, Free 46.0 - 224.0 pg/mL  101.2  101.2 70.0   Specimen UA  Urine, Unspecified     Color, UA Yellow, Straw, Eva  Yellow     Appearance, UA Clear  Clear     Specific Gravity, UA 1.005 - 1.030  1.025     PH, UA 5.0 - 8.0  6.0     Protein, UA Negative  Negative [31]     Glucose, UA Negative  Negative     Ketones, UA Negative  Negative     Occult Blood UA Negative  1+ !     NITRITE UA Negative  Negative     UROBILINOGEN UA <2.0 EU/dL Negative     Bilirubin (UA) Negative  Negative     Leukocytes, UA Negative  Negative     RBC, UA 0 - 4 /hpf 4     WBC, UA 0 - 5 /hpf 0     Squam Epithel, UA /hpf 0     Microscopic Comment  SEE COMMENT [32]     Creatinine, Urine 23.0 - 375.0 mg/dL 152.0     Microalbumin, Urine ug/mL 13.0     MICROALB/CREAT RATIO 0.0 - 30.0 ug/mg 8.6     Osmolality, Urine 50 - 1200 mOsm/kg 836 [33]     Z Score -2.0 - 2.0 SD  -1.28 [34]    Somatostatin pg/mL  <21 [35]    (H): Data is abnormally high  (L): Data is abnormally low  !: Data is abnormal  [1] The National Cholesterol Education Program (NCEP) has set the   following guidelines (reference values) for triglycerides:   Normal......................<150 mg/dL    Borderline High.............150-199 mg/dL   High........................200-499 mg/dL     [2] The National Cholesterol Education Program (NCEP) has set the   following guidelines (reference ranges) for Cholesterol:   Optimal.....................<200 mg/dL   Borderline High.............200-239 mg/dL   High........................> or = 240 mg/dL     [3] The National Cholesterol Education Program (NCEP) has set the   following guidelines (reference values) for HDL Cholesterol:   Low...............<40 mg/dL   Optimal...........>60 mg/dL     [4] The National Cholesterol Education Program (NCEP) has set the   following guidelines (reference values) for LDL Cholesterol:   Optimal.......................<130 mg/dL   Borderline High...............130-159 mg/dL   High..........................160-189 mg/dL   Very High.....................>190 mg/dL     [5] Risk category and Non-HDL cholesterol goals:   Coronary heart disease (CHD)or equivalent (10-year risk of CHD >20%):   Non-HDL cholesterol goal     <130 mg/dL   Two or more CHD risk factors and 10-year risk of CHD <= 20%:   Non-HDL cholesterol goal     <160 mg/dL   0 to 1 CHD risk factor:   Non-HDL cholesterol goal     <190 mg/dL     [6] Values of less than 100 pg/ml are consistent with non-CHF populations.  [7] NT-proBNP values less than 300 pg/mL have a 99% negative    predictive value for excluding acute congestive heart    failure. A cutoff of 1200 pg/mL for patients with an    eGFR<60 yields a diagnostic sensitivity and specificity of    89% and 72% for acute congestive heart failure.  NT-proBNP    values greater than 450 pg/mL are consistent with CHF in    adults under 50 years of age.      Test Performed by:   [place] [address]   : Tonny Steinberg M.D. Ph.D.; CLIA# [id number]     [8] Total prolactin is below the age/gender stratified reference   interval. Unable to calculate  percent PEG-precipitated   prolactin. These results should be interpreted in the    context of patient presentation and other clinical    findings. Prolactin was measured using the Roche Desi e    immunoassay analyzer.      Test Performed by:   [facility] - [address] [address]   : Tonny Steinberg M.D. Ph.D.; CLIA# [id number]     [9] Vitamin D deficiency.........<10 ng/mL                               Vitamin D insufficiency......10-29 ng/mL        Vitamin D sufficiency........> or equal to 30 ng/mL   Vitamin D toxicity............>100 ng/mL     [10] INTERPRETIVE INFORMATION: Arginine Vasopressin Hormone      This test was developed and its performance characteristics    determined by [facility]. It has not been cleared or    approved by the US Food and Drug Administration. This test    was performed in a CLIA certified laboratory and is    intended for clinical purposes.   Performed By: [facility]   [address] [place] [id number]   : Shanelle Tejeda MD     [11] Cortisol Reference Range:   Before 10:00 am: 4.46-22.7 ug/dL   After 5:00 pm:    1.7-14.1 ug/dL     [12] ADA Screening Guidelines:   5.7-6.4%  Consistent with prediabetes   >or=6.5%  Consistent with diabetes      High levels of fetal hemoglobin interfere with the HbA1C   assay. Heterozygous hemoglobin variants (HbS, HgC, etc)do   not significantly interfere with this assay.    However, presence of multiple variants may affect accuracy.     [13] For children, please refer to special pediatric ranges.  [14] Test Performed by:   [facility]   [address] [place] [address]   : Tonny Steinberg M.D. Ph.D.; CLIA# [id number]     [15] -------------------ADDITIONAL INFORMATION-------------------   Adult Reference Range(s)      Normal weight/control subjects: 520 - 700 pg/mL               Obese subjects prior to diet: 340-450 pg/mL   Levels:    8:00 am - 12:00 pm: Up to 420 pg/mL   6:00pm:             Up to 480 pg/mL      Obese subjects post induced weight loss: 450 - 600 pg/mL   Levels:    8:00 am - 12:00 pm: Up to 575 pg/mL   6:00 pm:            Up to 600 pg/mL      Obese subjects post gastric-bypass surgery: Up to 120    pg/mL      This test was Performed using a kit that has not been   cleared or approved by the FDA and is designated as   research use only. The analytic performance    characteristics of this test have been determined by   [facility]. This test is not intended    for diagnosis or patient management decisions without   confirmation by other medically established means.      Test Performed by:   [facility]   [address] [place] [address]     [16] Thyroglobulin (Tg) levels must be interpreted in the context   of TSH levels, serial Tg measurements and radioiodine    ablation status.  Tg levels of > or = 10 ng/mL in athyrotic   individuals on suppressive therapy indicate a significant    (>25%) risk of clinically detectable recurrent    papillary/follicular thyroid cancer.         -------------------ADDITIONAL INFORMATION-------------------   PLEASE NOTE: A thyroglobulin antibody (TgAb) reference   cutoff of <4.0 IU/mL may be more suitable for the    evaluation of autoimmune thyroiditis.   Thyroglobulin flagging is based on athyrotic   reference values.      The thyroglobulin and thyroglobulin antibody testing    methods are immunoenzymatic assays manufactured by [employer] and performed on the evolso .      Values obtained from different assay methods or kits   may be different and cannot be used interchangeably.      The results cannot be interpreted as absolute evidence   for the presence or absence of malignant disease.      Test Performed by:   [facility] [address]   [address],  [place] [address]   : Tonny Steinberg M.D. Ph.D.; CLIA# [id number]     [17] -------------------REFERENCE VALUE--------------------------   Athyrotic <0.1    Intact Thyroid <=33     [18] -------------------ADDITIONAL INFORMATION-------------------   Alpha Subunit Pituitary Tumor Marker is a chemiluminescent    immunoassay. This test is frequently used as a tumor    marker. Values obtained with different assay methods or    kits may be different and cannot be used interchangeably.    Test results cannot be interpreted as absolute evidence for    the presence or absence of malignant disease.   This test was developed and its performance characteristics    determined by [facility] in a manner consistent with CLIA    requirements. This test has not been cleared or approved by    the U.S. Food and Drug Administration.      Test Performed by:   [facility] [place] [address]   : Tonny Steinberg M.D. Ph.D.; CLIA# [id number]     [19] -------------------ADDITIONAL INFORMATION-------------------   This test was developed and its performance characteristics   determined by [facility] in a manner consistent with CLIA   requirements. This test has not been cleared or approved by    the U.S. Food and Drug Administration.      The testing method is a homogeneous time-resolved    immunofluorescent assay manufactured by [employer]    and performed on the [employer] Kryptor Compact Plus.      Values obtained with different assay methods or kits may be    different and cannot be used interchangeably.      Test results cannot be interpreted as absolute evidence for    the presence or absence of malignant disease.      Test Performed by:   [place] [address]   : Tonny Steinberg M.D. Ph.D.; CLIA# [id number]     [20] PSA Expected levels:   Hormonal  Therapy: <0.05 ng/ml   Prostatectomy: <0.01 ng/ml   Radiation Therapy: <1.00 ng/ml     [21] -------------------ADDITIONAL INFORMATION-------------------   This test has been modified from the 's    instructions. Its performance characteristics were    determined by [facility] in a manner consistent with    CLIA requirements. This test has not been cleared or    approved by the U.S. Food and Drug Administration.      The testing method is an electrochemiluminescence    assay manufactured by [employer] and    performed on the Modular or Desi system.       Values obtained with different assay methods or kits    may be different and cannot be used interchangeably.      Test results cannot be interpreted as absolute evidence    for the presence or absence of malignant disease.      Test Performed by:   [facility] [place] [address]   : Tonny Steinberg M.D. Ph.D.; CLIA# [id number]     [22] -------------------ADDITIONAL INFORMATION-------------------   This test was developed and its performance characteristics    determined by [facility] in a manner consistent with CLIA    requirements. This test has not been cleared or approved by    the U.S. Food and Drug Administration.      Test Performed by:   [place] [address]   : Tonny Steinberg M.D. Ph.D.; CLIA# [id number]     [23] Estradiol Reference Ranges (Female):   Follicular phase:  pg/mL   Midcycle:          pg/mL   Luteal phase:      pg/mL   Post-menopausal(Not on HRT): <10-28 pg/mL   Post-menopausal(On HRT): < pg/mL   Males: 11-44 pg/mL      The drug Fulvestrant (Faslodex) may interfere with the    assay leading to falsely elevated Estradiol results.      Patients treated with Mifepristone should not be tested with   the  or Producteev I  Estradiol assay for up to two    weeks due to the interference of the drug in this assay.     [24] Females:    Prepubertal:                                <40 pg/mL   Postmenopausal:                             <40 pg/mL      Female Menstrual Cycle:     1-10 days:                                   pg/mL   11-20 days:                                 122-437 pg/mL   21-30 days:                                 156-350 pg/mL      HMG Treatment    (Therapeutic range):                        400-800 pg/mL      Males:    Prepubertal:                                <40 pg/mL    Adult:                                       pg/mL      Test performed at [facility],   [facility] [place]  [id number] [phone]   Tonny Douglas MD  - Medical Director     [25] Female Reference Ranges:   Follicular Phase.................3.03-8.08 mIU/mL   Midcycle Peak....................2.55-16.69 mIU/mL   Luteal Phase.....................1.38-5.47 mIU/mL   Postmenopausal...................26..41 mIU/mL   Male Reference Range:............0.95-11.95 mIU/mL     [26] Female Reference Ranges:   Follicular phase.............1.8-11.8 mIU/mL   Midcycle phase...............7.6-89.1 mIU/mL   Luteal phase.................0.6-14.0 mIU/mL   Post-menopausal without HRT..5.2-62.0 mIU/mL   Male Reference Interval......0.6-12.1 mIU/mL     [27] Female Reference Ranges:   Follicular phase...............<0.3 ng/mL   Luteal phase...................1.2-15.9 ng/mL   Post-menopausal................<0.2 ng/mL   Pregnant, 1st Trimester........2.8-147.3 ng/mL   Pregnant, 2nd Trimester........22.5-95.3 ng/mL   Pregnant, 3rd Trimester........27.9-243 ng/mL   Male Reference range...........<0.2 ng/mL     [28] For additional information, please refer to    [email or web address]   TestosteroneLCMSMS     (This link is being provided for informational/e   ducational purposes only.)      This test was  developed and its analytical performance    characteristics have been determined by [facility]. It has not been cleared or approved by the   FDA. This assay has been validated pursuant to the CLIA    regulations and is used for clinical purposes.      TEST PERFORMED AT:   [id number] [place] [address]   YUNG ENRIQUEZ MD     [29] For additional information, please refer to    [email or web address]   TestosteroneLCMSMS     (This link is being provided for informational/e   ducational purposes only.)      This test was developed and its analytical performance    characteristics have been determined by [facility]. It has not been cleared or approved by the   FDA. This assay has been validated pursuant to the CLIA    regulations and is used for clinical purposes.      TEST PERFORMED AT:   [id number] [place] [address]   YUNG ENRIQUEZ MD     [30] For additional information, please refer to    [email or web address]   TestosteroneLCMSMS     (This link is being provided for informational/e   ducational purposes only.)      This test was developed and its analytical performance    characteristics have been determined by [facility]. It has not been cleared or approved by the   FDA. This assay has been validated pursuant to the CLIA    regulations and is used for clinical purposes.      TEST PERFORMED AT:   [id number] [place] [address]   YUNG ENRIQUEZ MD     [31] Recommend a 24 hour urine protein or a urine    protein/creatinine ratio if globulin induced proteinuria is   clinically suspected.     [32] Other formed elements not mentioned in the report are not    present in the microscopic examination.      [33] The random urine reference ranges provided were established    for 24 hour urine  collections.  No reference ranges exist for   random urine specimens.  Correlate clinically.     [34] -------------------ADDITIONAL INFORMATION-------------------   This test was developed and its performance characteristics    determined by [facility] in a manner consistent with CLIA    requirements. This test has not been cleared or approved by    the U.S. Food and Drug Administration.      Test Performed by:   [facility]   [address] [place] [address]   : Tonny Steinberg M.D. Ph.D.; CLIA# [id number]     [35] Reference Range:   ADULT: < OR = 30   This test was developed and its analytical performance   characteristics have been determined by [facility]. It has not been   cleared or approved by FDA. This assay has been validated   pursuant to the CLIA regulations and is used for clinical   purposes.   Performed by: [facility]   [address] [place] [address]   DIANA Sifuentes MD, PhD, DANILO,         Assessment:     1. Hyperprolactinemia  Prolactin    Macroprolactin, Serum   2. Secondary male hypogonadism  PSA, Total and Free    Testosterone Panel    Testosterone Panel    CBC Auto Differential   3. History of pituitary surgery     4. Hypovitaminosis D  CBC Auto Differential    Vitamin D    PTH, Intact    Calcium, Ionized   5. Obesity (BMI 30-39.9)     6. Dysmetabolic syndrome  Comprehensive Metabolic Panel    Lipid Panel    Uric Acid    Microalbumin/Creatinine Ratio, Urine    Urinalysis    Hemoglobin A1C   7. Prediabetes  Comprehensive Metabolic Panel   8. History of pituitary tumor  CBC Auto Differential   9. Hyperuricemia     10. Mixed hyperlipidemia     11. Hypercholesterolemia     12. History of migraine headaches     13. Pituitary adenoma  Prolactin    Macroprolactin, Serum   14. Prolactinoma  Prolactin    Macroprolactin, Serum   15. Benign prostatic  hyperplasia, unspecified whether lower urinary tract symptoms present  PSA, Total and Free   16. Long-term current use of testosterone replacement therapy  PSA, Total and Free   17. Dyslipidemia  Lipid Panel    Uric Acid   18. Dysglycemia  Hemoglobin A1C        Regarding Pituitary macroadenoma; the important salient point is whether or not this current tumor is hormonally functional or silent in view of the slow but progressive interval grwoth. He is being serially ffed by his neurosurgeon @ [facility] Dr Derek Blair as well. I have reiterated with him the side effects he needs to be on the watch for that may portend pituitary apoplexy.  Will need to explore the potential utility of adding octreotide or other SST agonists to his treatment profile based on current hormonal testing results. May also need to obtain screening octreotide scan to demonstrate if the current pituitary lesion expresses SST receptors that could then be potentially exploited therapeutically.  To continue dostinex at present dose for unless results of ffup labs show evidence of increasing levels. Regarding possibility of prolactinoma to repeat Prolactin level and ensure that dilution is done to exclude a hook effect.  To refer to ophthalmology for visual fields.  Regarding dysmetabolic syndrome + prediabetes; to obtain full screening in view of patients history of prediabetes and history of diabetes in his father.  Regarding obesity; to continue efforts at calorie reduction and portion size control. Will discuss intervention strategies  To improves weight management.  Regarding possible hypogonadism; to continue androgen repletion therapy ~ 100mg every 7 days.. To obtain peak and trough levels of tesosterone  Regarding hypovitaminosis D; to obtain 25 OH vit d levels and replete as needed based on results.  Regarding hyperlipidemia; to recheck current lipid levels and for now to continue present statin therapy as before.   Regarding BPH; stable  symptomatically ALF. To recheck PSA.  Regarding ED; continue management for this as before.  Regarding chronic migraines; to consider addition of topiramate to the the treatment regimen. Presently clinically stable.      Plan:       FFup in ~ 6mths

## 2022-04-14 ENCOUNTER — LAB VISIT (OUTPATIENT)
Dept: LAB | Facility: HOSPITAL | Age: 42
End: 2022-04-14
Attending: INTERNAL MEDICINE
Payer: COMMERCIAL

## 2022-04-14 DIAGNOSIS — E78.00 HYPERCHOLESTEROLEMIA: ICD-10-CM

## 2022-04-14 DIAGNOSIS — E22.1 HYPERPROLACTINEMIA: ICD-10-CM

## 2022-04-14 DIAGNOSIS — N40.0 BENIGN PROSTATIC HYPERPLASIA, UNSPECIFIED WHETHER LOWER URINARY TRACT SYMPTOMS PRESENT: ICD-10-CM

## 2022-04-14 DIAGNOSIS — E88.810 DYSMETABOLIC SYNDROME: ICD-10-CM

## 2022-04-14 DIAGNOSIS — D35.2 PROLACTINOMA: ICD-10-CM

## 2022-04-14 DIAGNOSIS — D35.2 PITUITARY ADENOMA: ICD-10-CM

## 2022-04-14 DIAGNOSIS — R73.09 DYSGLYCEMIA: ICD-10-CM

## 2022-04-14 DIAGNOSIS — Z79.890 LONG-TERM CURRENT USE OF TESTOSTERONE REPLACEMENT THERAPY: ICD-10-CM

## 2022-04-14 DIAGNOSIS — E78.5 DYSLIPIDEMIA: ICD-10-CM

## 2022-04-14 DIAGNOSIS — E55.9 HYPOVITAMINOSIS D: ICD-10-CM

## 2022-04-14 DIAGNOSIS — R73.03 PREDIABETES: ICD-10-CM

## 2022-04-14 DIAGNOSIS — K76.0 FATTY LIVER: Primary | ICD-10-CM

## 2022-04-14 DIAGNOSIS — E29.1 SECONDARY MALE HYPOGONADISM: ICD-10-CM

## 2022-04-14 DIAGNOSIS — Z87.898 HISTORY OF PITUITARY TUMOR: Chronic | ICD-10-CM

## 2022-04-14 LAB
25(OH)D3+25(OH)D2 SERPL-MCNC: 13 NG/ML (ref 30–96)
ALBUMIN SERPL BCP-MCNC: 4.6 G/DL (ref 3.5–5.2)
ALP SERPL-CCNC: 42 U/L (ref 55–135)
ALT SERPL W/O P-5'-P-CCNC: 70 U/L (ref 10–44)
ANION GAP SERPL CALC-SCNC: 11 MMOL/L (ref 8–16)
AST SERPL-CCNC: 41 U/L (ref 10–40)
BASOPHILS # BLD AUTO: 0.03 K/UL (ref 0–0.2)
BASOPHILS NFR BLD: 0.4 % (ref 0–1.9)
BILIRUB SERPL-MCNC: 0.8 MG/DL (ref 0.1–1)
BUN SERPL-MCNC: 10 MG/DL (ref 6–20)
CA-I BLDV-SCNC: 1.19 MMOL/L (ref 1.06–1.42)
CALCIUM SERPL-MCNC: 9.7 MG/DL (ref 8.7–10.5)
CHLORIDE SERPL-SCNC: 103 MMOL/L (ref 95–110)
CHOLEST SERPL-MCNC: 265 MG/DL (ref 120–199)
CHOLEST/HDLC SERPL: 5.5 {RATIO} (ref 2–5)
CO2 SERPL-SCNC: 27 MMOL/L (ref 23–29)
CREAT SERPL-MCNC: 1.1 MG/DL (ref 0.5–1.4)
DIFFERENTIAL METHOD: ABNORMAL
EOSINOPHIL # BLD AUTO: 0.4 K/UL (ref 0–0.5)
EOSINOPHIL NFR BLD: 4.5 % (ref 0–8)
ERYTHROCYTE [DISTWIDTH] IN BLOOD BY AUTOMATED COUNT: 15 % (ref 11.5–14.5)
EST. GFR  (AFRICAN AMERICAN): >60 ML/MIN/1.73 M^2
EST. GFR  (NON AFRICAN AMERICAN): >60 ML/MIN/1.73 M^2
ESTIMATED AVG GLUCOSE: 117 MG/DL (ref 68–131)
GLUCOSE SERPL-MCNC: 92 MG/DL (ref 70–110)
HBA1C MFR BLD: 5.7 % (ref 4–5.6)
HCT VFR BLD AUTO: 45.9 % (ref 40–54)
HDLC SERPL-MCNC: 48 MG/DL (ref 40–75)
HDLC SERPL: 18.1 % (ref 20–50)
HGB BLD-MCNC: 15.2 G/DL (ref 14–18)
IMM GRANULOCYTES # BLD AUTO: 0.02 K/UL (ref 0–0.04)
IMM GRANULOCYTES NFR BLD AUTO: 0.2 % (ref 0–0.5)
LDLC SERPL CALC-MCNC: 198 MG/DL (ref 63–159)
LYMPHOCYTES # BLD AUTO: 3.1 K/UL (ref 1–4.8)
LYMPHOCYTES NFR BLD: 38.3 % (ref 18–48)
MCH RBC QN AUTO: 28.2 PG (ref 27–31)
MCHC RBC AUTO-ENTMCNC: 33.1 G/DL (ref 32–36)
MCV RBC AUTO: 85 FL (ref 82–98)
MONOCYTES # BLD AUTO: 0.6 K/UL (ref 0.3–1)
MONOCYTES NFR BLD: 7.1 % (ref 4–15)
NEUTROPHILS # BLD AUTO: 4.1 K/UL (ref 1.8–7.7)
NEUTROPHILS NFR BLD: 49.5 % (ref 38–73)
NONHDLC SERPL-MCNC: 217 MG/DL
NRBC BLD-RTO: 0 /100 WBC
PLATELET # BLD AUTO: 310 K/UL (ref 150–450)
PMV BLD AUTO: 9.9 FL (ref 9.2–12.9)
POTASSIUM SERPL-SCNC: 4.3 MMOL/L (ref 3.5–5.1)
PROLACTIN SERPL IA-MCNC: 4.5 NG/ML (ref 3.5–19.4)
PROSTATE SPECIFIC ANTIGEN, TOTAL: 1.9 NG/ML (ref 0–4)
PROT SERPL-MCNC: 8 G/DL (ref 6–8.4)
PSA FREE MFR SERPL: 27.89 %
PSA FREE SERPL-MCNC: 0.53 NG/ML (ref 0–1.5)
PTH-INTACT SERPL-MCNC: 89.7 PG/ML (ref 9–77)
RBC # BLD AUTO: 5.39 M/UL (ref 4.6–6.2)
SODIUM SERPL-SCNC: 141 MMOL/L (ref 136–145)
TRIGL SERPL-MCNC: 95 MG/DL (ref 30–150)
URATE SERPL-MCNC: 8.5 MG/DL (ref 3.4–7)
WBC # BLD AUTO: 8.19 K/UL (ref 3.9–12.7)

## 2022-04-14 PROCEDURE — 82330 ASSAY OF CALCIUM: CPT | Performed by: INTERNAL MEDICINE

## 2022-04-14 PROCEDURE — 82306 VITAMIN D 25 HYDROXY: CPT | Performed by: INTERNAL MEDICINE

## 2022-04-14 PROCEDURE — 80053 COMPREHEN METABOLIC PANEL: CPT | Performed by: INTERNAL MEDICINE

## 2022-04-14 PROCEDURE — 84146 ASSAY OF PROLACTIN: CPT | Performed by: INTERNAL MEDICINE

## 2022-04-14 PROCEDURE — 80061 LIPID PANEL: CPT | Performed by: INTERNAL MEDICINE

## 2022-04-14 PROCEDURE — 36415 COLL VENOUS BLD VENIPUNCTURE: CPT | Performed by: INTERNAL MEDICINE

## 2022-04-14 PROCEDURE — 82040 ASSAY OF SERUM ALBUMIN: CPT | Mod: 59 | Performed by: INTERNAL MEDICINE

## 2022-04-14 PROCEDURE — 84550 ASSAY OF BLOOD/URIC ACID: CPT | Performed by: INTERNAL MEDICINE

## 2022-04-14 PROCEDURE — 83036 HEMOGLOBIN GLYCOSYLATED A1C: CPT | Performed by: INTERNAL MEDICINE

## 2022-04-14 PROCEDURE — 84146 ASSAY OF PROLACTIN: CPT | Mod: 91 | Performed by: INTERNAL MEDICINE

## 2022-04-14 PROCEDURE — 85025 COMPLETE CBC W/AUTO DIFF WBC: CPT | Performed by: INTERNAL MEDICINE

## 2022-04-14 PROCEDURE — 84153 ASSAY OF PSA TOTAL: CPT | Performed by: INTERNAL MEDICINE

## 2022-04-14 PROCEDURE — 83970 ASSAY OF PARATHORMONE: CPT | Performed by: INTERNAL MEDICINE

## 2022-04-14 PROCEDURE — 84154 ASSAY OF PSA FREE: CPT | Performed by: INTERNAL MEDICINE

## 2022-04-14 RX ORDER — NAPROXEN SODIUM 220 MG/1
81 TABLET, FILM COATED ORAL DAILY
Qty: 90 TABLET | Refills: 3 | Status: SHIPPED | OUTPATIENT
Start: 2022-04-14 | End: 2022-10-24 | Stop reason: SDUPTHER

## 2022-04-14 RX ORDER — PRAVASTATIN SODIUM 20 MG/1
20 TABLET ORAL DAILY
Qty: 90 TABLET | Refills: 3 | Status: SHIPPED | OUTPATIENT
Start: 2022-04-14 | End: 2023-04-11

## 2022-04-15 DIAGNOSIS — E55.9 HYPOVITAMINOSIS D: Primary | ICD-10-CM

## 2022-04-15 RX ORDER — ERGOCALCIFEROL 1.25 MG/1
50000 CAPSULE ORAL
Qty: 12 CAPSULE | Refills: 3 | Status: SHIPPED | OUTPATIENT
Start: 2022-04-15 | End: 2022-07-14

## 2022-04-17 LAB
ANNOTATION COMMENT IMP: NORMAL
MACROPROLACTIN SERPL-MCNC: 3.8 NG/ML (ref 2.7–13.1)
MACROPROLACTIN/PROLACTIN MFR SERPL: 22 %
PROLACTIN SERPL IA-MCNC: 4.9 NG/ML (ref 4–15.2)

## 2022-04-19 ENCOUNTER — TELEPHONE (OUTPATIENT)
Dept: ENDOCRINOLOGY | Facility: CLINIC | Age: 42
End: 2022-04-19
Payer: COMMERCIAL

## 2022-04-19 NOTE — TELEPHONE ENCOUNTER
PA SUBMITTED FOR Testosterone Cypionate 200MG/ML intramuscular solution through covermymeds.  Key: [id number] - PA Case ID: [id number] - Rx #: [id number]

## 2022-04-19 NOTE — TELEPHONE ENCOUNTER
Informed pt. of message from Dr. Mckeon. Pt verbalized an understanding & reports is taking pravastatin (PRAVACHOL) 20 MG tablet prescribed by you & has never taken Atorvastatin (Lipitor).

## 2022-04-19 NOTE — TELEPHONE ENCOUNTER
"----- Message from Bhupendra Mckeon MD sent at 4/18/2022  5:12 PM CDT -----  Regarding: duplicate medications  Kindly reach out to the patient. As per our records here in Epic we have him on pravastatin 20mg daily. I have been notified by his insurance carrier that another physician has prescribed for him to be taking lipitor ( aka atorvastatin) kindly inform that both these medications are " cousin" medicines and should not be taken together. He should take one or the other depending on his preference and let us know which one he is actually taking so our medical records for him accurately reflect this.  Thanks    Bhupendra Mckeon MD    "

## 2022-04-20 LAB
ALBUMIN SERPL-MCNC: 4.7 G/DL (ref 3.6–5.1)
SHBG SERPL-SCNC: 17 NMOL/L (ref 10–50)
TESTOST FREE SERPL-MCNC: 209.6 PG/ML (ref 46–224)
TESTOST SERPL-MCNC: 862 NG/DL (ref 250–1100)
TESTOSTERONE.FREE+WB SERPL-MCNC: 449.3 NG/DL (ref 110–575)

## 2022-04-20 NOTE — TELEPHONE ENCOUNTER
Outcome    Approved on April 19  Your PA request has been approved.     Drug  Testosterone Cypionate 200MG/ML intramuscular solution    Key: [id number] - PA Case ID: [id number] - Rx #: [id number]

## 2022-04-27 ENCOUNTER — HOSPITAL ENCOUNTER (OUTPATIENT)
Dept: RADIOLOGY | Facility: HOSPITAL | Age: 42
Discharge: HOME | End: 2022-04-27
Attending: INTERNAL MEDICINE
Payer: COMMERCIAL

## 2022-04-27 DIAGNOSIS — K76.0 FATTY LIVER: ICD-10-CM

## 2022-04-27 PROCEDURE — 76705 US ABDOMEN LIMITED: ICD-10-PCS | Mod: 26,,, | Performed by: RADIOLOGY

## 2022-04-27 PROCEDURE — 76705 ECHO EXAM OF ABDOMEN: CPT | Mod: 26,,, | Performed by: RADIOLOGY

## 2022-04-27 PROCEDURE — 76705 ECHO EXAM OF ABDOMEN: CPT | Mod: TC

## 2022-05-27 NOTE — TELEPHONE ENCOUNTER
Patient's name noted on Care Transitions candidate list. Attempted to contact patient by phone on three separate occasions and left messages with each attempt. Patient did not return calls. Will close case at this time but would be happy to work with patient in the future if needs arise.   Pt will have labs on 11/15/19

## 2022-07-14 ENCOUNTER — PATIENT MESSAGE (OUTPATIENT)
Dept: NEUROSURGERY | Facility: CLINIC | Age: 42
End: 2022-07-14
Payer: COMMERCIAL

## 2022-07-14 ENCOUNTER — TELEPHONE (OUTPATIENT)
Dept: NEUROSURGERY | Facility: CLINIC | Age: 42
End: 2022-07-14
Payer: COMMERCIAL

## 2022-07-14 DIAGNOSIS — D35.2 BENIGN NEOPLASM OF PITUITARY GLAND: Primary | ICD-10-CM

## 2022-07-19 NOTE — PATIENT INSTRUCTIONS
Assessment/Plan:  Gallo Maria Jr. is a 38 y.o. male with a past medical history of HLD, obesity, pituitary adenoma s/p surgical excision, ADD, who presents for an initial appointment.      1. SOB/Chest Tightness- Pt presents with SOB and chest tightness as described.   Has several risk factors for CAD.  Check echo treadmill nuclear stress test to evaluate further.     2. HLD- Ten year ASCVD risk score is low at 2.9%, however LDL elevated at 178 with total cholesterol of 256.  Start atorvastatin 80 mg daily and ASA 81 mg daily..      2. Obesity- Refer to nutrition for assistance with weight loss.    Follow up in 2 weeks   S/w pt to schedule AWV- declined due to recent death in family and other family issues

## 2022-08-01 ENCOUNTER — HOSPITAL ENCOUNTER (OUTPATIENT)
Dept: RADIOLOGY | Facility: HOSPITAL | Age: 42
Discharge: HOME | End: 2022-08-01
Attending: NEUROLOGICAL SURGERY
Payer: COMMERCIAL

## 2022-08-01 DIAGNOSIS — D35.2 BENIGN NEOPLASM OF PITUITARY GLAND: ICD-10-CM

## 2022-08-01 PROCEDURE — A9585 GADOBUTROL INJECTION: HCPCS | Performed by: NEUROLOGICAL SURGERY

## 2022-08-01 PROCEDURE — 25500020 PHARM REV CODE 255: Performed by: NEUROLOGICAL SURGERY

## 2022-08-01 PROCEDURE — 70553 MRI BRAIN STEM W/O & W/DYE: CPT | Mod: TC

## 2022-08-01 PROCEDURE — 70553 MRI BRAIN W WO CONTRAST: ICD-10-PCS | Mod: 26,,, | Performed by: RADIOLOGY

## 2022-08-01 PROCEDURE — 70553 MRI BRAIN STEM W/O & W/DYE: CPT | Mod: 26,,, | Performed by: RADIOLOGY

## 2022-08-01 RX ORDER — GADOBUTROL 604.72 MG/ML
5 INJECTION INTRAVENOUS
Status: COMPLETED | OUTPATIENT
Start: 2022-08-01 | End: 2022-08-01

## 2022-08-01 RX ADMIN — GADOBUTROL 5 ML: 604.72 INJECTION INTRAVENOUS at 07:08

## 2022-08-04 ENCOUNTER — OFFICE VISIT (OUTPATIENT)
Dept: NEUROSURGERY | Facility: CLINIC | Age: 42
End: 2022-08-04
Payer: COMMERCIAL

## 2022-08-04 VITALS
SYSTOLIC BLOOD PRESSURE: 130 MMHG | BODY MASS INDEX: 30.59 KG/M2 | WEIGHT: 246 LBS | DIASTOLIC BLOOD PRESSURE: 91 MMHG | HEART RATE: 70 BPM | HEIGHT: 75 IN

## 2022-08-04 DIAGNOSIS — D35.2 PITUITARY ADENOMA: Primary | ICD-10-CM

## 2022-08-04 PROCEDURE — 99999 PR PBB SHADOW E&M-EST. PATIENT-LVL IV: ICD-10-PCS | Mod: PBBFAC,,, | Performed by: NEUROLOGICAL SURGERY

## 2022-08-04 PROCEDURE — 99213 OFFICE O/P EST LOW 20 MIN: CPT | Mod: S$GLB,,, | Performed by: NEUROLOGICAL SURGERY

## 2022-08-04 PROCEDURE — 99999 PR PBB SHADOW E&M-EST. PATIENT-LVL IV: CPT | Mod: PBBFAC,,, | Performed by: NEUROLOGICAL SURGERY

## 2022-08-04 PROCEDURE — 99213 PR OFFICE/OUTPT VISIT, EST, LEVL III, 20-29 MIN: ICD-10-PCS | Mod: S$GLB,,, | Performed by: NEUROLOGICAL SURGERY

## 2022-08-04 RX ORDER — VITAMIN E MIXED 400 UNIT
CAPSULE ORAL
COMMUNITY
Start: 2022-07-11 | End: 2023-11-16

## 2022-08-04 RX ORDER — CHOLECALCIFEROL (VITAMIN D3) 25 MCG
1000 TABLET ORAL DAILY
COMMUNITY
End: 2023-11-16

## 2022-08-04 NOTE — PROGRESS NOTES
Gallo Maria returned in neurosurgical follow-up to the office this morning.  When I last saw him on 05/31/2021 it was noted that recurrent pituitary adenoma had shown some increase in size and was beginning to elevate the optic chiasm.  Over the past year he has been generally stable but in the last few weeks has begun having frontal headache and a feeling of pressure behind his eyes.  He feels that his vision remains stable.  He has been followed in endocrinology by Dr. Mckeon. Testosterone remains low and he is on Depo-Testosterone injections.  He also remains on Dostinex although pathology did not suggest a prolactin producing tumor.  His other pituitary hormones drawn on 09/21/2021 were normal.    I did not reexamine him in detail today.  He is alert and cooperative.  Extraocular movements are good and pupils equal.  He sees fingers moving in the peripheral visual fields bilaterally.  Speech is clear.  He shows no focal weakness, no difficulty with gait or balance.    MRI of the brain and pituitary were repeated at [facility] on 08/01/2022 and compared to his previous study of 5/31/2021.  There has continued to be a little additional growth of tumor.  There is no longer a CSF space between the diaphragma and the optic chiasm and the chiasm is somewhat more draped over the tumor.  There is now a what appears to be a proteinaceous cyst in the superior part of the tumor.    The tumor seems to be gradually enlarging.  It was subtotally resected in 2015. Believe the tumor should be reresected while his vision remains generally intact.  We will plan to get this done in the next couple of months.

## 2022-09-20 ENCOUNTER — TELEPHONE (OUTPATIENT)
Dept: NEUROSURGERY | Facility: CLINIC | Age: 42
End: 2022-09-20
Payer: COMMERCIAL

## 2022-10-04 ENCOUNTER — OFFICE VISIT (OUTPATIENT)
Dept: NEUROSURGERY | Facility: CLINIC | Age: 42
End: 2022-10-04
Payer: COMMERCIAL

## 2022-10-04 ENCOUNTER — TELEPHONE (OUTPATIENT)
Dept: NEUROSURGERY | Facility: CLINIC | Age: 42
End: 2022-10-04
Payer: COMMERCIAL

## 2022-10-04 DIAGNOSIS — D35.2 PITUITARY MACROADENOMA: Primary | ICD-10-CM

## 2022-10-04 PROCEDURE — 99214 OFFICE O/P EST MOD 30 MIN: CPT | Mod: 95,,, | Performed by: NEUROLOGICAL SURGERY

## 2022-10-04 PROCEDURE — 99214 PR OFFICE/OUTPT VISIT, EST, LEVL IV, 30-39 MIN: ICD-10-PCS | Mod: 95,,, | Performed by: NEUROLOGICAL SURGERY

## 2022-10-04 NOTE — TELEPHONE ENCOUNTER
Left message on pt vm stating that Dr. Cavazos received a message from a friend about his case and I was calling to see if he wanted to come in today or have a virtual visit with Dr. Cvaazos to discuss what's going on. Also stated that we can schedule it from a different day if interested.

## 2022-10-24 ENCOUNTER — LAB VISIT (OUTPATIENT)
Dept: LAB | Facility: HOSPITAL | Age: 42
End: 2022-10-24
Attending: INTERNAL MEDICINE
Payer: COMMERCIAL

## 2022-10-24 ENCOUNTER — OFFICE VISIT (OUTPATIENT)
Dept: ENDOCRINOLOGY | Facility: CLINIC | Age: 42
End: 2022-10-24
Payer: COMMERCIAL

## 2022-10-24 VITALS
DIASTOLIC BLOOD PRESSURE: 70 MMHG | OXYGEN SATURATION: 98 % | HEART RATE: 57 BPM | SYSTOLIC BLOOD PRESSURE: 100 MMHG | HEIGHT: 75 IN | TEMPERATURE: 98 F | WEIGHT: 254.94 LBS | BODY MASS INDEX: 31.7 KG/M2

## 2022-10-24 DIAGNOSIS — D35.2 PITUITARY ADENOMA: ICD-10-CM

## 2022-10-24 DIAGNOSIS — E88.810 DYSMETABOLIC SYNDROME: ICD-10-CM

## 2022-10-24 DIAGNOSIS — E55.9 HYPOVITAMINOSIS D: ICD-10-CM

## 2022-10-24 DIAGNOSIS — Z86.69 HISTORY OF MIGRAINE HEADACHES: Chronic | ICD-10-CM

## 2022-10-24 DIAGNOSIS — Z98.890 HISTORY OF PITUITARY SURGERY: ICD-10-CM

## 2022-10-24 DIAGNOSIS — E66.9 OBESITY (BMI 30-39.9): ICD-10-CM

## 2022-10-24 DIAGNOSIS — E23.6 PITUITARY MASS: ICD-10-CM

## 2022-10-24 DIAGNOSIS — E79.0 HYPERURICEMIA: ICD-10-CM

## 2022-10-24 DIAGNOSIS — D35.2 PITUITARY MACROADENOMA: ICD-10-CM

## 2022-10-24 DIAGNOSIS — Z87.898 HISTORY OF PITUITARY TUMOR: Chronic | ICD-10-CM

## 2022-10-24 DIAGNOSIS — N52.01 ERECTILE DYSFUNCTION DUE TO ARTERIAL INSUFFICIENCY: Chronic | ICD-10-CM

## 2022-10-24 DIAGNOSIS — Z80.42 FAMILY HISTORY OF PROSTATE CANCER: Chronic | ICD-10-CM

## 2022-10-24 DIAGNOSIS — R94.6 THYROID FUNCTION TEST ABNORMAL: ICD-10-CM

## 2022-10-24 DIAGNOSIS — E78.00 HYPERCHOLESTEROLEMIA: ICD-10-CM

## 2022-10-24 DIAGNOSIS — Z87.898 HISTORY OF PITUITARY TUMOR: Primary | Chronic | ICD-10-CM

## 2022-10-24 DIAGNOSIS — N13.8 BPH WITH URINARY OBSTRUCTION: ICD-10-CM

## 2022-10-24 DIAGNOSIS — D35.2 PROLACTINOMA: ICD-10-CM

## 2022-10-24 DIAGNOSIS — E29.1 HYPOGONADISM MALE: Chronic | ICD-10-CM

## 2022-10-24 DIAGNOSIS — K76.0 FATTY LIVER: ICD-10-CM

## 2022-10-24 DIAGNOSIS — N40.1 BPH WITH URINARY OBSTRUCTION: ICD-10-CM

## 2022-10-24 DIAGNOSIS — E78.2 MIXED HYPERLIPIDEMIA: ICD-10-CM

## 2022-10-24 DIAGNOSIS — R73.03 PREDIABETES: ICD-10-CM

## 2022-10-24 LAB
CHOLEST SERPL-MCNC: 255 MG/DL (ref 120–199)
CHOLEST/HDLC SERPL: 5.4 {RATIO} (ref 2–5)
CRP SERPL-MCNC: 1.92 MG/L (ref 0–3.19)
ESTRADIOL SERPL-MCNC: 13 PG/ML (ref 11–44)
HDLC SERPL-MCNC: 47 MG/DL (ref 40–75)
HDLC SERPL: 18.4 % (ref 20–50)
LDLC SERPL CALC-MCNC: 190.8 MG/DL (ref 63–159)
NONHDLC SERPL-MCNC: 208 MG/DL
PROLACTIN SERPL IA-MCNC: 1.7 NG/ML (ref 3.5–19.4)
TRIGL SERPL-MCNC: 86 MG/DL (ref 30–150)

## 2022-10-24 PROCEDURE — 80061 LIPID PANEL: CPT | Mod: 59 | Performed by: INTERNAL MEDICINE

## 2022-10-24 PROCEDURE — 84146 ASSAY OF PROLACTIN: CPT | Mod: 91 | Performed by: INTERNAL MEDICINE

## 2022-10-24 PROCEDURE — 86316 IMMUNOASSAY TUMOR OTHER: CPT | Performed by: INTERNAL MEDICINE

## 2022-10-24 PROCEDURE — 83701 LIPOPROTEIN BLD HR FRACTION: CPT | Performed by: INTERNAL MEDICINE

## 2022-10-24 PROCEDURE — 99214 PR OFFICE/OUTPT VISIT, EST, LEVL IV, 30-39 MIN: ICD-10-PCS | Mod: S$GLB,,, | Performed by: INTERNAL MEDICINE

## 2022-10-24 PROCEDURE — 82670 ASSAY OF TOTAL ESTRADIOL: CPT | Performed by: INTERNAL MEDICINE

## 2022-10-24 PROCEDURE — 99999 PR PBB SHADOW E&M-EST. PATIENT-LVL IV: CPT | Mod: PBBFAC,,, | Performed by: INTERNAL MEDICINE

## 2022-10-24 PROCEDURE — 85420 FIBRINOLYTIC PLASMINOGEN: CPT | Performed by: INTERNAL MEDICINE

## 2022-10-24 PROCEDURE — 84270 ASSAY OF SEX HORMONE GLOBUL: CPT | Performed by: INTERNAL MEDICINE

## 2022-10-24 PROCEDURE — 80061 LIPID PANEL: CPT | Mod: 91 | Performed by: INTERNAL MEDICINE

## 2022-10-24 PROCEDURE — 30000890 MISCELLANEOUS SENDOUT TEST, BLOOD: Performed by: INTERNAL MEDICINE

## 2022-10-24 PROCEDURE — 82172 ASSAY OF APOLIPOPROTEIN: CPT | Performed by: INTERNAL MEDICINE

## 2022-10-24 PROCEDURE — 83698 ASSAY LIPOPROTEIN PLA2: CPT | Performed by: INTERNAL MEDICINE

## 2022-10-24 PROCEDURE — 82672 ASSAY OF ESTROGEN: CPT | Performed by: INTERNAL MEDICINE

## 2022-10-24 PROCEDURE — 84146 ASSAY OF PROLACTIN: CPT | Performed by: INTERNAL MEDICINE

## 2022-10-24 PROCEDURE — 99999 PR PBB SHADOW E&M-EST. PATIENT-LVL IV: ICD-10-PCS | Mod: PBBFAC,,, | Performed by: INTERNAL MEDICINE

## 2022-10-24 PROCEDURE — 99214 OFFICE O/P EST MOD 30 MIN: CPT | Mod: S$GLB,,, | Performed by: INTERNAL MEDICINE

## 2022-10-24 PROCEDURE — 83695 ASSAY OF LIPOPROTEIN(A): CPT | Performed by: INTERNAL MEDICINE

## 2022-10-24 PROCEDURE — 86141 C-REACTIVE PROTEIN HS: CPT | Performed by: INTERNAL MEDICINE

## 2022-10-24 PROCEDURE — 82397 CHEMILUMINESCENT ASSAY: CPT | Performed by: INTERNAL MEDICINE

## 2022-10-24 RX ORDER — OCTREOTIDE ACETATE,MI-SPHERES 20 MG
20 VIAL (EA) INTRAMUSCULAR
Qty: 90 EACH | Refills: 3 | Status: SHIPPED | OUTPATIENT
Start: 2022-10-24 | End: 2022-11-07 | Stop reason: ALTCHOICE

## 2022-10-24 RX ORDER — NAPROXEN SODIUM 220 MG/1
81 TABLET, FILM COATED ORAL DAILY
Qty: 90 TABLET | Refills: 3 | Status: SHIPPED | OUTPATIENT
Start: 2022-10-24 | End: 2023-05-02

## 2022-10-24 NOTE — PROGRESS NOTES
Subjective:      Patient ID: Gallo Maria Jr. is a 42 y.o. male.    Chief Complaint:      Patient is a 42 yr old gentleman seen in [facility] today on account of pituitary tumor and hyperprolactinemia.     History of Present Illness    The patient, Mr Maria is a 42 yr old gentleman seen in [facility]  today on account of pituitary tumor and associated hyperprolactinemia.      His recent pituitary MRI from 03/2020 shows macroadenoma (1.7 x 1.7 x 1.6 cm) with suprasellar extension which suggest subtle increase in lesion size compared to dimensions from 2019. He remains on dostinex 0.5mg 2 x weekly and testosterone repletion therapy on account of hypogonadism;  200 mg every 14 days.  His most recent brain MRI continues to show interval enlargement of the residual pituitary tumor (lesion now measured @ 2 x 2 x 2cm with associated suprasellar extension. There is reported associated leftward shift of the pituitary stalk and some protrusion into the right carvenous sinus.  He has previously been seen and ffed by Alexandra Mckay and Rosemary @ [facility]. He was last see there 12/15.       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 This was first found on account of headaches and visual field problems.  this was done by Dr Derek allen @[facility].   States his sx ( HA and vision change) have significantly improved since the surgery.  Patient has not noted any nipple discharge. No gynecomastia noted. He has not any significant change in smeel sesnation  States he continues to have low libido and no morning erections since surgery.      Following the  transphenoidal surgery, pt was on HC for about a month. States he did not feel any different while on HC or off of HC.   His established background comorbidities are as detailed below;     1. Pituitary adenoma      2. History of migraine headaches      3. Mixed hyperlipidemia      4. Erectile dysfunction due to  arterial insufficiency      5. Decreased libido      6. BPH with urinary obstruction      7. Hypogonadism male      8. Dysmetabolic syndrome      9. Obesity (BMI 30-39.9)      10. Hypercholesterolemia      11. Prediabetes         Patients [place] score is 8.   Patient noticed progressive weight gain and fatigue. Presently he has no headaches. Presently has no visual problems; no blurring, tunnel vision nor peripheral visual deficits.  Patient had noticed no gynecomastia nor nipple discharge. No seizures.  There is no family history of pituitary tumors or other brain tumors.  Patients libido has declined.   Patients father had recurrent nephrolithiasis. There is no family history of severe dyspepsia ir ulcer disease.\  Patient does not smoke.  Patient does not drink any ETOH.  Patient works at the [employer].  Patient has not started androgen repletion yet.  Patient is presently on testosterone injections; 100mg every 7 days.  He self administers the testosterone.   He has no current headaches. He has no nipple discharge nor increased breast growth. He has noticed no dysguesia nor paraosmias.     He currently had no headaches and no seizures and also has no visual dysfunction (blurring of vision, lateral visual field deficits etc)  Patients most recent pituitary MRI from 08/22 indicated evidence of pituitary macroadenoma and evidence of interval growth with some cystic degeneration.  Patients maternal uncles and father both have high cholesterol.  Patients father had an MI @ age 62.   He admits that since the last visit he has not been taking the pravastatin and asa but has been more careful with his diet and has been exercising regularly.    Review of Systems   Constitutional:  Negative for fatigue and unexpected weight change.   HENT:  Negative for facial swelling, trouble swallowing and voice change.    Eyes:  Negative for photophobia and visual disturbance (no visual blurring nor visual field defects).  "  Respiratory:  Negative for shortness of breath.    Cardiovascular:  Negative for chest pain, palpitations and leg swelling.   Gastrointestinal:  Negative for constipation, diarrhea, nausea and vomiting.   Endocrine: Negative for polydipsia and polyuria.   Genitourinary:  Negative for dysuria and frequency.        +ED   Musculoskeletal:  Negative for back pain and myalgias.   Skin:  Negative for color change, pallor and rash.   Neurological:  Negative for seizures, syncope, weakness and headaches.   Hematological:  Does not bruise/bleed easily.   Psychiatric/Behavioral:  Negative for confusion and sleep disturbance.      Objective:      /70 (BP Location: Left arm, Patient Position: Sitting, BP Method: Large (Manual))   Pulse (!) 57   Temp 97.5 °F (36.4 °C) (Oral)   Ht 6' 3" (1.905 m)   Wt 115.6 kg (254 lb 15.4 oz)   SpO2 98%   BMI 31.87 kg/m² Body surface area is 2.47 meters squared.             Physical Exam    Lab Review:      Latest Reference Range & Units 04/14/22 12:10 04/14/22 12:19   WBC 3.90 - 12.70 K/uL  8.19   RBC 4.60 - 6.20 M/uL  5.39   Hemoglobin 14.0 - 18.0 g/dL  15.2   Hematocrit 40.0 - 54.0 %  45.9   MCV 82 - 98 fL  85   MCH 27.0 - 31.0 pg  28.2   MCHC 32.0 - 36.0 g/dL  33.1   RDW 11.5 - 14.5 %  15.0 (H)   Platelets 150 - 450 K/uL  310   MPV 9.2 - 12.9 fL  9.9   Gran % 38.0 - 73.0 %  49.5   Lymph % 18.0 - 48.0 %  38.3   Mono % 4.0 - 15.0 %  7.1   Eosinophil % 0.0 - 8.0 %  4.5   Basophil % 0.0 - 1.9 %  0.4   Immature Granulocytes 0.0 - 0.5 %  0.2   Gran # (ANC) 1.8 - 7.7 K/uL  4.1   Lymph # 1.0 - 4.8 K/uL  3.1   Mono # 0.3 - 1.0 K/uL  0.6   Eos # 0.0 - 0.5 K/uL  0.4   Baso # 0.00 - 0.20 K/uL  0.03   Immature Grans (Abs) 0.00 - 0.04 K/uL  0.02   nRBC 0 /100 WBC  0   Differential Method   Automated   Sodium 136 - 145 mmol/L  141   Potassium 3.5 - 5.1 mmol/L  4.3   Chloride 95 - 110 mmol/L  103   CO2 23 - 29 mmol/L  27   Anion Gap 8 - 16 mmol/L  11   BUN 6 - 20 mg/dL  10   Creatinine 0.5 - " 1.4 mg/dL  1.1   eGFR if non African American >60 mL/min/1.73 m^2  >60   eGFR if African American >60 mL/min/1.73 m^2  >60   Glucose 70 - 110 mg/dL  92   Calcium 8.7 - 10.5 mg/dL  9.7   Ionized Calcium 1.06 - 1.42 mmol/L  1.19   Alkaline Phosphatase 55 - 135 U/L  42 (L)   PROTEIN TOTAL 6.0 - 8.4 g/dL  8.0   Albumin 3.5 - 5.2 g/dL  4.6   Uric Acid 3.4 - 7.0 mg/dL  8.5 (H)   BILIRUBIN TOTAL 0.1 - 1.0 mg/dL  0.8   AST 10 - 40 U/L  41 (H)   ALT 10 - 44 U/L  70 (H)   Cholesterol 120 - 199 mg/dL  265 (H)   HDL 40 - 75 mg/dL  48   HDL/Cholesterol Ratio 20.0 - 50.0 %  18.1 (L)   LDL Cholesterol External 63.0 - 159.0 mg/dL  198.0 (H)   Non-HDL Cholesterol mg/dL  217   Total Cholesterol/HDL Ratio 2.0 - 5.0   5.5 (H)   Triglycerides 30 - 150 mg/dL  95   Macroprolactin Comment   SEE BELOW   Vit D, 25-Hydroxy 30 - 96 ng/mL  13 (L)   Hemoglobin A1C External 4.0 - 5.6 %  5.7 (H)   Estimated Avg Glucose 68 - 131 mg/dL  117   MCRPL Percent <=60 %  22   Prolactin, Total 4.0 - 15.2 ng/mL  4.9   Prolactin, Unprecipitated 2.7 - 13.1 ng/mL  3.8   PTH 9.0 - 77.0 pg/mL  89.7 (H)   PSA Total 0.00 - 4.00 ng/mL  1.9   PSA, Free 0.00 - 1.50 ng/mL  0.53   PSA, Free % Not established %  27.89   Albumin 3.6 - 5.1 g/dL  4.7   Prolactin 3.5 - 19.4 ng/mL  4.5   Sex Hormone Binding Globulin 10 - 50 nmol/L  17   Testosterone 250 - 1100 ng/dL  862   Testosterone, Bioavailable 110.0 - 575.0 ng/dL  449.3   Testosterone, Free 46.0 - 224.0 pg/mL  209.6   Specimen UA  Urine, Clean Catch    Color, UA Yellow, Straw, Eva  Yellow    Appearance, UA Clear  Clear    Specific Gravity, UA 1.005 - 1.030  1.015    pH, UA 5.0 - 8.0  7.0    Protein, UA Negative  Negative    Glucose, UA Negative  Negative    Ketones, UA Negative  Negative    Occult Blood UA Negative  Trace !    NITRITE UA Negative  Negative    UROBILINOGEN UA <2.0 EU/dL Negative    Bilirubin (UA) Negative  Negative    Leukocytes, UA Negative  Negative    Creatinine, Urine 23.0 - 375.0 mg/dL 90.0     Microalbumin, Urine ug/mL 7.0    MICROALB/CREAT RATIO 0.0 - 30.0 ug/mg 7.8    (H): Data is abnormally high  (L): Data is abnormally low  !: Data is abnormal      Assessment:     1. History of pituitary tumor  Alpha Sub Unit    Chromogranin A    Prolactin    Macroprolactin, Serum      2. Pituitary adenoma  Alpha Sub Unit    Chromogranin A    Prolactin    Macroprolactin, Serum      3. Hypogonadism male  Testosterone Panel    Testosterone Panel    Estradiol    Estrogens, Total      4. Prediabetes        5. Dysmetabolic syndrome        6. Obesity (BMI 30-39.9)        7. History of pituitary surgery        8. Hypovitaminosis D  Vitamin D    Calcium, Ionized    PTH, Intact      9. Prolactinoma  Testosterone Panel    Testosterone Panel    Estradiol    Estrogens, Total      10. Fatty liver  Lactic Acid, Plasma    Ammonia    Gamma GT    Misc Sendout Test, Blood Transforming Growth Factor Beta (TGF-B)    AFP Tumor Marker    Uric Acid    Sedimentation rate    Misc Sendout Test, Blood Enhanced Liver Fibrosis (ELF) test    Homocysteine, Serum    Comprehensive Metabolic Panel    US Elastography Liver    US Abdomen Limited    US Elastography Liver    US Abdomen Limited      11. Hyperuricemia  Uric Acid      12. Family history of prostate cancer        13. Erectile dysfunction due to arterial insufficiency        14. BPH with urinary obstruction        15. Mixed hyperlipidemia  Homocysteine, Serum      16. Hypercholesterolemia  Lipoprotein A (LPA)    CRP, High Sensitivity    LDL Cholesterol, Direct    Lipid Panel    Misc Sendout Test, Blood APO E genotyping    Apolipoprotein A1 and B    Plasminogen Activity    Misc Sendout Test, Blood LP-PLA2 activity; Lipoprotein associated Phospholipase A 2    Misc Sendout Test, Blood NMR lipo-profile    Familial hypercholesterolemia screening    Homocysteine, Serum      17. History of migraine headaches        18. Pituitary macroadenoma        19. Pituitary mass        20. Thyroid function  test abnormal  TSH             Regarding Pituitary macroadenoma; the important salient point is whether or not this current tumor is hormonally functional or silent in view of the slow but progressive interval grwoth. He is being serially ffed by his neurosurgeon @ [facility] Dr Derek Blair as well. I have reiterated with him the side effects he needs to be on the watch for that may portend pituitary apoplexy.  Will need to explore the potential utility of adding octreotide or other SST agonists to his treatment profile based on current hormonal testing results. May also need to obtain screening octreotide scan to demonstrate if the current pituitary lesion expresses SST receptors that could then be potentially exploited therapeutically.  To continue dostinex at present dose and now in view of the interval grwoth of the pituitary mass to commence sandostatin LAR 20mg IM monthly pending the planned pituitary surgery.  To refer to ophthalmology for visual fields.  Regarding dysmetabolic syndrome + prediabetes; to obtain full screening in view of patients history of prediabetes and history of diabetes in his father.  Regarding obesity; to continue efforts at calorie reduction and portion size control. Will discuss intervention strategies  To improves weight management.  Regarding possible hypogonadism; to continue androgen repletion therapy ~ 100mg every 7 days.. To obtain peak and trough levels of tesosterone  Regarding hypovitaminosis D; to obtain 25 OH vit d levels and replete as needed based on results.  Regarding hyperlipidemia; to recheck current lipid levels and for now to continue present statin therapy as before.   Regarding BPH; stable symptomatically ALF. To recheck PSA.  Regarding ED; continue management for this as before.  Regarding chronic migraines; to consider addition of topiramate to the the treatment regimen. Presently clinically stable.      Plan:       FFup in ~ 6mths

## 2022-10-27 LAB
ESTROGEN SERPL-MCNC: 122 PG/ML
LDLC SERPL-MCNC: 199 MG/DL
PLG ACT/NOR PPP CHRO: 83 % (ref 75–140)

## 2022-10-28 LAB — LPA SERPL-MCNC: 41 MG/DL (ref 0–30)

## 2022-10-28 NOTE — PROGRESS NOTES
The patient location is: home  The chief complaint leading to consultation is: pituitary adenoma    Visit type: audiovisual    Face to Face time with patient: 20 minutes  45 minutes of total time spent on the encounter, which includes face to face time and non-face to face time preparing to see the patient (eg, review of tests), Obtaining and/or reviewing separately obtained history, Documenting clinical information in the electronic or other health record, Independently interpreting results (not separately reported) and communicating results to the patient/family/caregiver, or Care coordination (not separately reported).         Each patient to whom he or she provides medical services by telemedicine is:  (1) informed of the relationship between the physician and patient and the respective role of any other health care provider with respect to management of the patient; and (2) notified that he or she may decline to receive medical services by telemedicine and may withdraw from such care at any time.    Notes:     Neurosurgery  Established Patient    SUBJECTIVE:     History of Present Illness:  Mr. Maria is a 42-year-old male who is seeing me today in follow-up.  His last neurosurgery clinic appointment was on August 4, 2022 with Dr. Derek Blair.  He is seeing me as a 2nd opinion.  He initially was seen by Dr. Blair in 2015.  He had a known pituitary adenoma at that time.  Presented with increasing headaches and imaging at that time showed an increase in the size of the tumor with pressure on the optic chiasm.  Given his symptoms and radiographic findings, he was taken to the operating room for a transnasal, transsphenoidal resection of pituitary adenoma.  There has been a question of whether this is a prolactinoma, but pathology from the 2015 resection showed a normal cell adenoma.  The patient had been doing well for several years.  However, more recently he complains of frontal headaches and feeling of  pressure behind his eyes.  He states that his vision is stable and he denies any peripheral vision loss.  He is set to follow up with Ophthalmology in the near future.  The patient has been on Dostinex for a slightly elevated prolactin level.  He is seeing me today due to the fact that despite Dostinex there has been an increase in size of his pituitary adenoma on follow-up imaging studies.  Currently, he complains of some frontal pressure like headaches.  He denies visual changes.  Denies any galactorrhea.  He denies any signs or symptoms of CSF leak.    Review of patient's allergies indicates:  No Known Allergies    Current Outpatient Medications   Medication Sig Dispense Refill    aspirin 81 MG Chew Take 1 tablet (81 mg total) by mouth once daily. 90 tablet 3    cabergoline (DOSTINEX) 0.5 mg tablet TAKE 1 TABLET (0.5 MG TOTAL) BY MOUTH TWICE A WEEK. 24 tablet 3    pravastatin (PRAVACHOL) 20 MG tablet Take 1 tablet (20 mg total) by mouth once daily. (Patient not taking: Reported on 8/4/2022) 90 tablet 3    SANDOSTATIN LAR DEPOT 20 mg SSRR injection Inject 20 mg into the muscle every 30 days. for 90 doses 90 each 3    testosterone cypionate (DEPOTESTOTERONE CYPIONATE) 200 mg/mL injection INJECT 1 ML INTO MUSCLE EVERY 14 DAYS. 10 mL 3    vitamin D (VITAMIN D3) 1000 units Tab Take 1,000 Units by mouth once daily.      vitamin E, dl, acetate, 90 mg (200 unit) Cap TAKE 1 CAPSULE (200 UNITS TOTAL) BY MOUTH ONCE DAILY.       Current Facility-Administered Medications   Medication Dose Route Frequency Provider Last Rate Last Admin    octreotide (SANDOSTATIN LAR) injection 20 mg  20 mg Intramuscular 1 time in Clinic/HOD Bhupendra Mckeon MD           Past Medical History:   Diagnosis Date    ADD (attention deficit disorder)     treated by outside psychiatrist.    Asthma     BPH with urinary obstruction 2/5/2015    Decreased libido 8/31/2015    Erectile dysfunction 10/27/2015    History of migraine headaches      Hypogonadism male 10/27/2015    Pituitary adenoma 8/9/2012     Past Surgical History:   Procedure Laterality Date    BIOPSY OF TONSILS Right 9/9/2019    Procedure: BIOPSY, TONSILS;  Surgeon: Bruno Umaña MD;  Location: [facility] OR [address] [place] [address];  Service: ENT;  Laterality: Right;    SURGICAL REMOVAL OF PITUITARY TUMOR BY TRANSSPHENOIDAL APPROACH  2015    Pituitary Adeonma via Dr. Blair 2015    WISDOM TOOTH EXTRACTION       Family History       Problem Relation (Age of Onset)    Benign prostatic hyperplasia Father    Cancer Paternal Grandfather, Father    Dementia Paternal Grandmother    Diabetes Maternal Grandmother, Maternal Grandfather    Hyperlipidemia Father    No Known Problems Mother, Sister, Brother, Brother    Prostate cancer Paternal Grandfather, Paternal Aunt, Paternal Uncle          Social History     Socioeconomic History    Marital status:    Tobacco Use    Smoking status: Never    Smokeless tobacco: Never   Substance and Sexual Activity    Alcohol use: No    Drug use: No    Sexual activity: Yes     Partners: Female       Review of Systems    OBJECTIVE:     Vital Signs     There is no height or weight on file to calculate BMI.    Physical Exam:    Constitutional: He appears well-developed and well-nourished. No distress.     Eyes: Pupils are equal, round, and reactive to light. Conjunctivae and EOM are normal.     Cardiovascular: No edema.     Skin: Skin displays no rash on trunk and no rash on extremities. Skin displays no lesions on trunk and no lesions on extremities.     Psych/Behavior: He is alert. He is oriented to person, place, and time. He has a normal mood and affect.     Musculoskeletal:        Neck: Range of motion is full. There is no tenderness. Tone is normal.        Back: Range of motion is full. There is no tenderness. Tone is normal.        Right Upper Extremities: Range of motion is full. There is no tenderness. Tone is normal.        Left Upper Extremities: Range of motion is full.  There is no tenderness. Tone is normal.       Right Lower Extremities: Range of motion is full. There is no tenderness. Tone is normal.        Left Lower Extremities: Range of motion is full. There is no tenderness. Tone is normal.     Neurological:        Sensory: There is no sensory deficit in the trunk. There is no sensory deficit in the extremities.        Cranial nerves: Cranial nerve(s) II, III, IV, V, VI, VII, VIII, IX, X, XI and XII are intact.       Diagnostic Results:  He has an MRI with and without contrast of the brain available for review which I personally reviewed.  This shows a known pituitary adenoma.  When compared with his last MRI of the brain from May 2021, this has grown significantly in size.  It now measures 3.3 x 2.2 x 2.2 cm.  Previously it measured 2 x 2 x 2 cm.  There is now compression of the optic chiasm.    ASSESSMENT/PLAN:     Mr. Maria is a 42-year-old male who is seeing me as a 2nd opinion.  He had a pituitary adenoma resected in 2015.  Follow-up imaging studies have shown steady increase in the size of the pituitary adenoma.  From 2021 till now there has been a significant increase in the size of the recurrent pituitary adenoma and it is now having significant mass effect on the optic chiasm.  The patient is again having pressure-like headaches.  I counseled the patient that given the radiographic findings and significant compression of the optic chiasm, I would agree with Dr. Blair that I would recommend resection.  The patient is still considering surgery at this time.  I counseled the patient that I would be involved with surgery with Dr. Blair as both Dr. Bliar and I performed his 1st surgery.  The patient states that he will call back when he is ready to proceed with surgery.  He knows he can call with any further questions or concerns in the meantime.        Note dictated with voice recognition software, please excuse any grammatical errors.

## 2022-10-30 LAB
CHOLEST SERPL-MCNC: 279 MG/DL
HDL SERPL QN: 8.5 NM
HDL SERPL-SCNC: 31.4 UMOL/L
HDLC SERPL-MCNC: 51 MG/DL (ref 40–59)
HLD.LARGE SERPL-SCNC: <2.8 UMOL/L
LDL SERPL QN: 21.1 NM
LDL SERPL-SCNC: 2407 NMOL/L
LDL SMALL SERPL-SCNC: 971 NMOL/L
LDLC SERPL CALC-MCNC: 209 MG/DL
PATHOLOGY STUDY: ABNORMAL
TRIGL SERPL-MCNC: 95 MG/DL (ref 30–149)
VLDL LARGE SERPL-SCNC: 1.8 NMOL/L
VLDL SERPL QN: 43.8 NM

## 2022-10-31 LAB — A-PGH SER-MCNC: 0.1 NG/ML

## 2022-11-01 LAB
ALBUMIN SERPL-MCNC: 5 G/DL (ref 3.6–5.1)
SHBG SERPL-SCNC: 17 NMOL/L (ref 10–50)
TESTOST FREE SERPL-MCNC: 22.7 PG/ML (ref 46–224)
TESTOST SERPL-MCNC: 123 NG/DL (ref 250–1100)
TESTOSTERONE.FREE+WB SERPL-MCNC: 51.5 NG/DL (ref 110–575)

## 2022-11-03 DIAGNOSIS — E78.00 HYPERCHOLESTEROLEMIA: Primary | ICD-10-CM

## 2022-11-04 ENCOUNTER — TELEPHONE (OUTPATIENT)
Dept: ENDOCRINOLOGY | Facility: CLINIC | Age: 42
End: 2022-11-04
Payer: COMMERCIAL

## 2022-11-04 LAB
ANNOTATION COMMENT IMP: ABNORMAL
APO A-I SERPL-MCNC: 133 MG/DL
APO B SERPL-MCNC: 141 MG/DL
APOLIPOPROTEIN B/A1 RATIO: 1.1
CGA SERPL-MCNC: 21 NG/ML
MACROPROLACTIN SERPL-MCNC: <2 NG/ML (ref 2.7–13.1)
MACROPROLACTIN/PROLACTIN MFR SERPL: ABNORMAL %
PROLACTIN SERPL IA-MCNC: 1.6 NG/ML (ref 4–15.2)

## 2022-11-04 NOTE — TELEPHONE ENCOUNTER
PA submitted for SandoSTATIN LAR [place] 20MG [facility] [id number] - PA Case ID: [id number]    Fax DENIAL

## 2022-11-07 DIAGNOSIS — D35.2 PROLACTINOMA: ICD-10-CM

## 2022-11-07 DIAGNOSIS — D35.2 PITUITARY ADENOMA: ICD-10-CM

## 2022-11-07 DIAGNOSIS — D35.2 PITUITARY MACROADENOMA: Primary | ICD-10-CM

## 2022-11-07 DIAGNOSIS — E22.1 HYPERPROLACTINEMIA: ICD-10-CM

## 2022-11-07 RX ORDER — LANREOTIDE ACETATE 90 MG/.3ML
90 INJECTION SUBCUTANEOUS
Qty: 0.9 ML | Refills: 3 | Status: ON HOLD | OUTPATIENT
Start: 2022-11-07 | End: 2023-04-23 | Stop reason: HOSPADM

## 2022-11-09 LAB
MISCELLANEOUS TEST NAME: NORMAL
REFERENCE LAB: NORMAL
SPECIMEN TYPE: NORMAL
TEST RESULT: NORMAL

## 2022-11-14 ENCOUNTER — LAB VISIT (OUTPATIENT)
Dept: LAB | Facility: HOSPITAL | Age: 42
End: 2022-11-14
Attending: INTERNAL MEDICINE
Payer: COMMERCIAL

## 2022-11-14 DIAGNOSIS — R94.6 THYROID FUNCTION TEST ABNORMAL: ICD-10-CM

## 2022-11-14 DIAGNOSIS — K76.0 FATTY LIVER: ICD-10-CM

## 2022-11-14 DIAGNOSIS — E55.9 HYPOVITAMINOSIS D: ICD-10-CM

## 2022-11-14 DIAGNOSIS — E29.1 HYPOGONADISM MALE: Chronic | ICD-10-CM

## 2022-11-14 DIAGNOSIS — E79.0 HYPERURICEMIA: ICD-10-CM

## 2022-11-14 DIAGNOSIS — E55.9 HYPOVITAMINOSIS D: Primary | ICD-10-CM

## 2022-11-14 DIAGNOSIS — D35.2 PROLACTINOMA: ICD-10-CM

## 2022-11-14 DIAGNOSIS — E78.00 HYPERCHOLESTEROLEMIA: ICD-10-CM

## 2022-11-14 DIAGNOSIS — E78.2 MIXED HYPERLIPIDEMIA: ICD-10-CM

## 2022-11-14 LAB
25(OH)D3+25(OH)D2 SERPL-MCNC: 17 NG/ML (ref 30–96)
AFP SERPL-MCNC: <2 NG/ML (ref 0–8.4)
ALBUMIN SERPL BCP-MCNC: 4.6 G/DL (ref 3.5–5.2)
ALP SERPL-CCNC: 45 U/L (ref 55–135)
ALT SERPL W/O P-5'-P-CCNC: 43 U/L (ref 10–44)
AMMONIA PLAS-SCNC: 41 UMOL/L (ref 10–50)
ANION GAP SERPL CALC-SCNC: 11 MMOL/L (ref 8–16)
AST SERPL-CCNC: 33 U/L (ref 10–40)
BILIRUB SERPL-MCNC: 0.7 MG/DL (ref 0.1–1)
BUN SERPL-MCNC: 14 MG/DL (ref 6–20)
CA-I BLDV-SCNC: 1.3 MMOL/L (ref 1.06–1.42)
CALCIUM SERPL-MCNC: 9.9 MG/DL (ref 8.7–10.5)
CHLORIDE SERPL-SCNC: 104 MMOL/L (ref 95–110)
CO2 SERPL-SCNC: 24 MMOL/L (ref 23–29)
CREAT SERPL-MCNC: 1 MG/DL (ref 0.5–1.4)
ERYTHROCYTE [SEDIMENTATION RATE] IN BLOOD BY PHOTOMETRIC METHOD: <2 MM/HR (ref 0–23)
EST. GFR  (NO RACE VARIABLE): >60 ML/MIN/1.73 M^2
GGT SERPL-CCNC: 37 U/L (ref 8–55)
GLUCOSE SERPL-MCNC: 83 MG/DL (ref 70–110)
HCYS SERPL-SCNC: 7.1 UMOL/L (ref 4–16.5)
LACTATE SERPL-SCNC: 1.1 MMOL/L (ref 0.5–2.2)
POTASSIUM SERPL-SCNC: 4.5 MMOL/L (ref 3.5–5.1)
PROT SERPL-MCNC: 8 G/DL (ref 6–8.4)
PTH-INTACT SERPL-MCNC: 56.5 PG/ML (ref 9–77)
SODIUM SERPL-SCNC: 139 MMOL/L (ref 136–145)
TSH SERPL DL<=0.005 MIU/L-ACNC: 1.3 UIU/ML (ref 0.4–4)
URATE SERPL-MCNC: 7.7 MG/DL (ref 3.4–7)

## 2022-11-14 PROCEDURE — 83520 IMMUNOASSAY QUANT NOS NONAB: CPT | Performed by: INTERNAL MEDICINE

## 2022-11-14 PROCEDURE — 36415 COLL VENOUS BLD VENIPUNCTURE: CPT | Mod: PO | Performed by: INTERNAL MEDICINE

## 2022-11-14 PROCEDURE — 82040 ASSAY OF SERUM ALBUMIN: CPT | Performed by: INTERNAL MEDICINE

## 2022-11-14 PROCEDURE — 30000890 MISCELLANEOUS SENDOUT TEST, BLOOD: Performed by: INTERNAL MEDICINE

## 2022-11-14 PROCEDURE — 85652 RBC SED RATE AUTOMATED: CPT | Performed by: INTERNAL MEDICINE

## 2022-11-14 PROCEDURE — 82105 ALPHA-FETOPROTEIN SERUM: CPT | Performed by: INTERNAL MEDICINE

## 2022-11-14 PROCEDURE — 84443 ASSAY THYROID STIM HORMONE: CPT | Performed by: INTERNAL MEDICINE

## 2022-11-14 PROCEDURE — 82306 VITAMIN D 25 HYDROXY: CPT | Performed by: INTERNAL MEDICINE

## 2022-11-14 PROCEDURE — 82140 ASSAY OF AMMONIA: CPT | Performed by: INTERNAL MEDICINE

## 2022-11-14 PROCEDURE — 83090 ASSAY OF HOMOCYSTEINE: CPT | Performed by: INTERNAL MEDICINE

## 2022-11-14 PROCEDURE — 83970 ASSAY OF PARATHORMONE: CPT | Performed by: INTERNAL MEDICINE

## 2022-11-14 PROCEDURE — 80053 COMPREHEN METABOLIC PANEL: CPT | Performed by: INTERNAL MEDICINE

## 2022-11-14 PROCEDURE — 82330 ASSAY OF CALCIUM: CPT | Performed by: INTERNAL MEDICINE

## 2022-11-14 PROCEDURE — 0014M MISCELLANEOUS SENDOUT TEST, BLOOD: CPT | Performed by: INTERNAL MEDICINE

## 2022-11-14 PROCEDURE — 0014M MAYO MISCELLANEOUS TEST (REFLEX): CPT | Performed by: INTERNAL MEDICINE

## 2022-11-14 PROCEDURE — 84550 ASSAY OF BLOOD/URIC ACID: CPT | Performed by: INTERNAL MEDICINE

## 2022-11-14 PROCEDURE — 83605 ASSAY OF LACTIC ACID: CPT | Performed by: INTERNAL MEDICINE

## 2022-11-14 PROCEDURE — 30000890 MAYO MISCELLANEOUS TEST (REFLEX): Performed by: INTERNAL MEDICINE

## 2022-11-14 PROCEDURE — 82977 ASSAY OF GGT: CPT | Performed by: INTERNAL MEDICINE

## 2022-11-14 RX ORDER — ERGOCALCIFEROL 1.25 MG/1
50000 CAPSULE ORAL
Qty: 12 CAPSULE | Refills: 3 | Status: SHIPPED | OUTPATIENT
Start: 2022-11-14 | End: 2023-02-12

## 2022-11-15 DIAGNOSIS — E78.00 HYPERCHOLESTEROLEMIA: Primary | ICD-10-CM

## 2022-11-20 LAB
ALBUMIN SERPL-MCNC: 4.9 G/DL (ref 3.6–5.1)
SHBG SERPL-SCNC: 22 NMOL/L (ref 10–50)
TESTOST FREE SERPL-MCNC: 38.8 PG/ML (ref 46–224)
TESTOST SERPL-MCNC: 237 NG/DL (ref 250–1100)
TESTOSTERONE.FREE+WB SERPL-MCNC: 86.5 NG/DL (ref 110–575)

## 2022-11-21 DIAGNOSIS — E29.1 SECONDARY MALE HYPOGONADISM: ICD-10-CM

## 2022-11-21 RX ORDER — TESTOSTERONE CYPIONATE 200 MG/ML
300 INJECTION, SOLUTION INTRAMUSCULAR
Qty: 10 ML | Refills: 3 | Status: SHIPPED | OUTPATIENT
Start: 2022-11-21 | End: 2023-05-24 | Stop reason: SDUPTHER

## 2022-11-28 LAB
MAYO MISCELLANEOUS RESULT (REF): NORMAL
MISCELLANEOUS TEST NAME: NORMAL
REFERENCE LAB: NORMAL
SPECIMEN TYPE: NORMAL
TEST RESULT: NORMAL

## 2022-11-29 ENCOUNTER — DOCUMENTATION ONLY (OUTPATIENT)
Dept: ENDOCRINOLOGY | Facility: CLINIC | Age: 42
End: 2022-11-29
Payer: COMMERCIAL

## 2022-11-30 NOTE — PROGRESS NOTES
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 Will await results of the hepatic fibroscan for validation.

## 2023-03-07 ENCOUNTER — PATIENT MESSAGE (OUTPATIENT)
Dept: NEUROSURGERY | Facility: CLINIC | Age: 43
End: 2023-03-07
Payer: COMMERCIAL

## 2023-03-20 ENCOUNTER — OFFICE VISIT (OUTPATIENT)
Dept: OTOLARYNGOLOGY | Facility: CLINIC | Age: 43
End: 2023-03-20
Payer: COMMERCIAL

## 2023-03-20 VITALS
DIASTOLIC BLOOD PRESSURE: 80 MMHG | SYSTOLIC BLOOD PRESSURE: 118 MMHG | BODY MASS INDEX: 31.3 KG/M2 | WEIGHT: 251.75 LBS | HEIGHT: 75 IN | HEART RATE: 67 BPM

## 2023-03-20 DIAGNOSIS — Z87.898 HISTORY OF PITUITARY TUMOR: Primary | Chronic | ICD-10-CM

## 2023-03-20 PROCEDURE — 99999 PR PBB SHADOW E&M-EST. PATIENT-LVL III: CPT | Mod: PBBFAC,,, | Performed by: OTOLARYNGOLOGY

## 2023-03-20 PROCEDURE — 99203 PR OFFICE/OUTPT VISIT, NEW, LEVL III, 30-44 MIN: ICD-10-PCS | Mod: S$GLB,,, | Performed by: OTOLARYNGOLOGY

## 2023-03-20 PROCEDURE — 99203 OFFICE O/P NEW LOW 30 MIN: CPT | Mod: S$GLB,,, | Performed by: OTOLARYNGOLOGY

## 2023-03-20 PROCEDURE — 99999 PR PBB SHADOW E&M-EST. PATIENT-LVL III: ICD-10-PCS | Mod: PBBFAC,,, | Performed by: OTOLARYNGOLOGY

## 2023-03-20 NOTE — H&P (VIEW-ONLY)
CC: Pituitary tumor      HPI   42 y.o. male presents in anticipation of transsphenoidal resection of pituitary tumor.  He has a history of prior resection.  He has no ENT complaints.  He denies nasal obstruction.    Review of Systems   Constitutional: Negative for fatigue and unexpected weight change.   HENT: Per HPI.  Eyes: Negative for visual disturbance.   Respiratory: Negative for shortness of breath, hemoptysis   Cardiovascular: Negative for chest pain and palpitations.   Musculoskeletal: Negative for decreased ROM, back pain.   Skin: Negative for rash, sunburn, itching.   Neurological: Negative for dizziness and seizures.   Hematological: Negative for adenopathy. Does not bruise/bleed easily.   Endocrine: Negative for rapid weight loss/weight gain, heat/cold intolerance.     Past Medical History   Patient Active Problem List   Diagnosis    Pituitary adenoma    Family history of prostate cancer    History of pituitary tumor    High serum low-density lipoprotein (LDL)    Elevated hemoglobin A1c - at risk for diabetes    Chest tightness    SOB (shortness of breath)    Mixed hyperlipidemia    Obesity (BMI 30-39.9)    Tonsillar mass    Dysmetabolic syndrome    Hypercholesterolemia    Prediabetes    History of pituitary surgery    Hypovitaminosis D    Secondary male hypogonadism    Hyperprolactinemia    Hyperuricemia    Prolactinoma    Fatty liver    Pituitary macroadenoma    Pituitary mass           Past Surgical History   Past Surgical History:   Procedure Laterality Date    BIOPSY OF TONSILS Right 9/9/2019    Procedure: BIOPSY, TONSILS;  Surgeon: Bruno Umaña MD;  Location: [facility] OR [address] [place] [address];  Service: ENT;  Laterality: Right;    SURGICAL REMOVAL OF PITUITARY TUMOR BY TRANSSPHENOIDAL APPROACH  2015    Pituitary Adeonma via Dr. Blair 2015    WISDOM TOOTH EXTRACTION           Family History   Family History   Problem Relation Age of Onset    Diabetes Maternal Grandmother     Diabetes Maternal Grandfather      Dementia Paternal Grandmother     Cancer Paternal Grandfather     Prostate cancer Paternal Grandfather     Hyperlipidemia Father     Benign prostatic hyperplasia Father     Cancer Father     Prostate cancer Paternal Aunt     Prostate cancer Paternal Uncle     No Known Problems Mother     No Known Problems Sister     No Known Problems Brother     No Known Problems Brother     Coronary artery disease Neg Hx            Social History   .  Social History     Socioeconomic History    Marital status:    Tobacco Use    Smoking status: Never    Smokeless tobacco: Never   Substance and Sexual Activity    Alcohol use: No    Drug use: No    Sexual activity: Yes     Partners: Female         Allergies   Review of patient's allergies indicates:  No Known Allergies        Physical Exam     Vitals:    03/20/23 1101   BP: 118/80   Pulse: 67         Body mass index is 31.47 kg/m².      General: AOx3, NAD   Respiratory:  Symmetric chest rise, normal effort  Nose:  Left nasal septal deviation. Inferior Turbinates WNL bilaterally. No septal perforation. No masses/lesions.   Oral Cavity:  Oral Tongue mobile, no lesions noted. Hard Palate WNL. No buccal or FOM lesions.  Oropharynx:  No masses/lesions of the posterior pharyngeal wall. Tonsillar fossa without lesions. Soft palate without masses. Midline uvula.   Neck: No scars.  No cervical lymphadenopathy, thyromegaly or thyroid nodules.  Normal range of motion.    Face: House Brackmann I bilaterally.     Assessment/Plan  Problem List Items Addressed This Visit          Endocrine    History of pituitary tumor - Primary (Chronic)     Will be available to assist neurosurgery with approach.

## 2023-03-20 NOTE — PROGRESS NOTES
CC: Pituitary tumor      HPI   42 y.o. male presents in anticipation of transsphenoidal resection of pituitary tumor.  He has a history of prior resection.  He has no ENT complaints.  He denies nasal obstruction.    Review of Systems   Constitutional: Negative for fatigue and unexpected weight change.   HENT: Per HPI.  Eyes: Negative for visual disturbance.   Respiratory: Negative for shortness of breath, hemoptysis   Cardiovascular: Negative for chest pain and palpitations.   Musculoskeletal: Negative for decreased ROM, back pain.   Skin: Negative for rash, sunburn, itching.   Neurological: Negative for dizziness and seizures.   Hematological: Negative for adenopathy. Does not bruise/bleed easily.   Endocrine: Negative for rapid weight loss/weight gain, heat/cold intolerance.     Past Medical History   Patient Active Problem List   Diagnosis    Pituitary adenoma    Family history of prostate cancer    History of pituitary tumor    High serum low-density lipoprotein (LDL)    Elevated hemoglobin A1c - at risk for diabetes    Chest tightness    SOB (shortness of breath)    Mixed hyperlipidemia    Obesity (BMI 30-39.9)    Tonsillar mass    Dysmetabolic syndrome    Hypercholesterolemia    Prediabetes    History of pituitary surgery    Hypovitaminosis D    Secondary male hypogonadism    Hyperprolactinemia    Hyperuricemia    Prolactinoma    Fatty liver    Pituitary macroadenoma    Pituitary mass           Past Surgical History   Past Surgical History:   Procedure Laterality Date    BIOPSY OF TONSILS Right 9/9/2019    Procedure: BIOPSY, TONSILS;  Surgeon: Bruno Umaña MD;  Location: [facility] OR [address] [place] [address];  Service: ENT;  Laterality: Right;    SURGICAL REMOVAL OF PITUITARY TUMOR BY TRANSSPHENOIDAL APPROACH  2015    Pituitary Adeonma via Dr. Blair 2015    WISDOM TOOTH EXTRACTION           Family History   Family History   Problem Relation Age of Onset    Diabetes Maternal Grandmother     Diabetes Maternal Grandfather      Dementia Paternal Grandmother     Cancer Paternal Grandfather     Prostate cancer Paternal Grandfather     Hyperlipidemia Father     Benign prostatic hyperplasia Father     Cancer Father     Prostate cancer Paternal Aunt     Prostate cancer Paternal Uncle     No Known Problems Mother     No Known Problems Sister     No Known Problems Brother     No Known Problems Brother     Coronary artery disease Neg Hx            Social History   .  Social History     Socioeconomic History    Marital status:    Tobacco Use    Smoking status: Never    Smokeless tobacco: Never   Substance and Sexual Activity    Alcohol use: No    Drug use: No    Sexual activity: Yes     Partners: Female         Allergies   Review of patient's allergies indicates:  No Known Allergies        Physical Exam     Vitals:    03/20/23 1101   BP: 118/80   Pulse: 67         Body mass index is 31.47 kg/m².      General: AOx3, NAD   Respiratory:  Symmetric chest rise, normal effort  Nose:  Left nasal septal deviation. Inferior Turbinates WNL bilaterally. No septal perforation. No masses/lesions.   Oral Cavity:  Oral Tongue mobile, no lesions noted. Hard Palate WNL. No buccal or FOM lesions.  Oropharynx:  No masses/lesions of the posterior pharyngeal wall. Tonsillar fossa without lesions. Soft palate without masses. Midline uvula.   Neck: No scars.  No cervical lymphadenopathy, thyromegaly or thyroid nodules.  Normal range of motion.    Face: House Brackmann I bilaterally.     Assessment/Plan  Problem List Items Addressed This Visit          Endocrine    History of pituitary tumor - Primary (Chronic)     Will be available to assist neurosurgery with approach.

## 2023-03-31 ENCOUNTER — TELEPHONE (OUTPATIENT)
Dept: NEUROSURGERY | Facility: CLINIC | Age: 43
End: 2023-03-31
Payer: COMMERCIAL

## 2023-03-31 DIAGNOSIS — D35.2 PITUITARY ADENOMA: Primary | ICD-10-CM

## 2023-04-03 ENCOUNTER — PATIENT MESSAGE (OUTPATIENT)
Dept: SURGERY | Facility: HOSPITAL | Age: 43
End: 2023-04-03
Payer: COMMERCIAL

## 2023-04-12 ENCOUNTER — PATIENT MESSAGE (OUTPATIENT)
Dept: SURGERY | Facility: HOSPITAL | Age: 43
End: 2023-04-12
Payer: COMMERCIAL

## 2023-04-13 ENCOUNTER — HOSPITAL ENCOUNTER (OUTPATIENT)
Dept: RADIOLOGY | Facility: HOSPITAL | Age: 43
Discharge: HOME | End: 2023-04-13
Attending: NEUROLOGICAL SURGERY
Payer: COMMERCIAL

## 2023-04-13 DIAGNOSIS — D35.2 PITUITARY ADENOMA: ICD-10-CM

## 2023-04-13 PROCEDURE — 70553 MRI BRAIN W WO CONTRAST: ICD-10-PCS | Mod: 26,,, | Performed by: RADIOLOGY

## 2023-04-13 PROCEDURE — A9585 GADOBUTROL INJECTION: HCPCS | Performed by: NEUROLOGICAL SURGERY

## 2023-04-13 PROCEDURE — 70553 MRI BRAIN STEM W/O & W/DYE: CPT | Mod: 26,,, | Performed by: RADIOLOGY

## 2023-04-13 PROCEDURE — 70553 MRI BRAIN STEM W/O & W/DYE: CPT | Mod: TC

## 2023-04-13 PROCEDURE — 25500020 PHARM REV CODE 255: Performed by: NEUROLOGICAL SURGERY

## 2023-04-13 RX ORDER — GADOBUTROL 604.72 MG/ML
10 INJECTION INTRAVENOUS
Status: COMPLETED | OUTPATIENT
Start: 2023-04-13 | End: 2023-04-13

## 2023-04-13 RX ADMIN — GADOBUTROL 5 ML: 604.72 INJECTION INTRAVENOUS at 06:04

## 2023-04-17 ENCOUNTER — PATIENT MESSAGE (OUTPATIENT)
Dept: SURGERY | Facility: HOSPITAL | Age: 43
End: 2023-04-17
Payer: COMMERCIAL

## 2023-04-18 ENCOUNTER — HOSPITAL ENCOUNTER (OUTPATIENT)
Dept: CARDIOLOGY | Facility: CLINIC | Age: 43
Discharge: HOME | DRG: 614 | End: 2023-04-18
Payer: COMMERCIAL

## 2023-04-18 ENCOUNTER — ANESTHESIA EVENT (OUTPATIENT)
Dept: SURGERY | Facility: HOSPITAL | Age: 43
DRG: 614 | End: 2023-04-18
Payer: COMMERCIAL

## 2023-04-18 ENCOUNTER — TELEPHONE (OUTPATIENT)
Dept: NEUROSURGERY | Facility: CLINIC | Age: 43
End: 2023-04-18
Payer: COMMERCIAL

## 2023-04-18 ENCOUNTER — HOSPITAL ENCOUNTER (OUTPATIENT)
Dept: RADIOLOGY | Facility: HOSPITAL | Age: 43
Discharge: HOME | DRG: 614 | End: 2023-04-18
Attending: NEUROLOGICAL SURGERY
Payer: COMMERCIAL

## 2023-04-18 ENCOUNTER — PATIENT MESSAGE (OUTPATIENT)
Dept: SURGERY | Facility: HOSPITAL | Age: 43
End: 2023-04-18
Payer: COMMERCIAL

## 2023-04-18 DIAGNOSIS — D35.2 PITUITARY ADENOMA: ICD-10-CM

## 2023-04-18 DIAGNOSIS — D35.2 PITUITARY ADENOMA: Primary | ICD-10-CM

## 2023-04-18 PROCEDURE — 93010 ELECTROCARDIOGRAM REPORT: CPT | Mod: S$GLB,,, | Performed by: INTERNAL MEDICINE

## 2023-04-18 PROCEDURE — 93010 EKG 12-LEAD: ICD-10-PCS | Mod: S$GLB,,, | Performed by: INTERNAL MEDICINE

## 2023-04-18 PROCEDURE — 71046 X-RAY EXAM CHEST 2 VIEWS: CPT | Mod: 26,,, | Performed by: RADIOLOGY

## 2023-04-18 PROCEDURE — 71046 XR CHEST PA AND LATERAL: ICD-10-PCS | Mod: 26,,, | Performed by: RADIOLOGY

## 2023-04-18 PROCEDURE — 93005 EKG 12-LEAD: ICD-10-PCS | Mod: S$GLB,,, | Performed by: NEUROLOGICAL SURGERY

## 2023-04-18 PROCEDURE — 71046 X-RAY EXAM CHEST 2 VIEWS: CPT | Mod: TC

## 2023-04-18 PROCEDURE — 93005 ELECTROCARDIOGRAM TRACING: CPT | Mod: S$GLB,,, | Performed by: NEUROLOGICAL SURGERY

## 2023-04-18 RX ORDER — HYDROMORPHONE HYDROCHLORIDE 1 MG/ML
0.2 INJECTION, SOLUTION INTRAMUSCULAR; INTRAVENOUS; SUBCUTANEOUS EVERY 5 MIN PRN
Status: CANCELLED | OUTPATIENT
Start: 2023-04-18

## 2023-04-18 RX ORDER — HALOPERIDOL 5 MG/ML
0.5 INJECTION INTRAMUSCULAR EVERY 10 MIN PRN
Status: CANCELLED | OUTPATIENT
Start: 2023-04-18

## 2023-04-18 RX ORDER — SODIUM CHLORIDE 0.9 % (FLUSH) 0.9 %
10 SYRINGE (ML) INJECTION
Status: CANCELLED | OUTPATIENT
Start: 2023-04-18

## 2023-04-18 RX ORDER — FENTANYL CITRATE 50 UG/ML
25 INJECTION, SOLUTION INTRAMUSCULAR; INTRAVENOUS EVERY 5 MIN PRN
Status: CANCELLED | OUTPATIENT
Start: 2023-04-18

## 2023-04-18 NOTE — PRE-PROCEDURE INSTRUCTIONS
Returned call to JAYA to discuss pt's Pre-op.   Msg sent to Huong Camejo RN, [facility] for advisment

## 2023-04-18 NOTE — PRE-PROCEDURE INSTRUCTIONS
PreOp Instructions given:   - Verbal medication information (what to hold and what to take)   - NPO guidelines 2300  - Arrival place directions given; time to be given the day before procedure by the   Surgeon's Office dosc  - Bathing with antibacterial soap   - Don't wear any jewelry or bring any valuables AM of surgery   - No makeup or moisturizer to face   - No perfume/cologne, powder, lotions or aftershave   Pt. verbalized understanding.   Pt denies any h/o Anesthesia/Sedation complications or side effects.

## 2023-04-18 NOTE — ANESTHESIA PREPROCEDURE EVALUATION
[facility]  Anesthesia Pre-Operative Evaluation        Patient Name: Gallo Maria Jr.  YOB: 1980  MRN: [id number]    SUBJECTIVE:     Pre-operative Evaluation for Procedure(s) (LRB):  RESECTION, NEOPLASM, PITUITARY, TRANSSPHENOIDAL APPROACH (N/A)     04/18/2023    Gallo Maria Jr. is a 42 y.o. male with a PMHx significant for HLD, morbid obesity, asthma, prediabetes, and pituitary adenoma (prev resection in 2015).      He now presents for the above procedure(s).    Previous Airway (9/25/15):   Method of Intubation: Direct laryngoscopy; Inserted by: Anesthesia Resident; Airway Device: Endotracheal Tube; Mask Ventilation: Easy; Intubated: Postinduction; Blade: Bonilla #2; Airway Device Size: 8.0; Style: Cuffed; Cuff Inflation: Minimal occlusive pressure; Inflation Amount: 8; Placement Verified By: Auscultation, Capnometry; Grade: Grade I; Complicating Factors: None; Intubation Findings: Positive EtCO2, Bilateral breath sounds, Atraumatic/Condition of teeth unchanged;  Depth of Insertion: 25; Securment: Lips; Complications: None; Breath Sounds: Equal Bilateral; Insertion Attempts:     TTE:  Results for orders placed during the hospital encounter of 06/05/19  Interpretation Summary  · Normal left ventricular systolic function. The estimated ejection fraction is 55%  · Normal LV diastolic function.  · Normal right ventricular systolic function.  · Intermediate central venous pressure (8 mm Hg).      Patient Active Problem List   Diagnosis    Pituitary adenoma    Family history of prostate cancer    History of pituitary tumor    High serum low-density lipoprotein (LDL)    Elevated hemoglobin A1c - at risk for diabetes    Chest tightness    SOB (shortness of breath)    Mixed hyperlipidemia    Obesity (BMI 30-39.9)    Tonsillar mass    Dysmetabolic syndrome    Hypercholesterolemia    Prediabetes    History of pituitary surgery    Hypovitaminosis D    Secondary  male hypogonadism    Hyperprolactinemia    Hyperuricemia    Prolactinoma    Fatty liver    Pituitary macroadenoma    Pituitary mass       Review of patient's allergies indicates:  No Known Allergies    Current Outpatient Medications   Medication Instructions    aspirin 81 mg, Oral, Daily    cabergoline (DOSTINEX) 0.5 mg, Oral, Twice weekly    pravastatin (PRAVACHOL) 20 MG tablet TAKE 1 TABLET BY MOUTH EVERY DAY    SOMATULINE DEPOT 90 mg, Subcutaneous, Every 30 days    testosterone cypionate (DEPOTESTOTERONE CYPIONATE) 300 mg, Intramuscular, Every 14 days    vitamin D (VITAMIN D3) 1,000 Units, Oral, Daily    vitamin E, dl, acetate, 90 mg (200 unit) Cap TAKE 1 CAPSULE (200 UNITS TOTAL) BY MOUTH ONCE DAILY.       Past Surgical History:   Procedure Laterality Date    BIOPSY OF TONSILS Right 9/9/2019    Procedure: BIOPSY, TONSILS;  Surgeon: Bruno Umaña MD;  Location: [facility] OR [address] [place] [address];  Service: ENT;  Laterality: Right;    SURGICAL REMOVAL OF PITUITARY TUMOR BY TRANSSPHENOIDAL APPROACH  2015    Pituitary Adeonma via Dr. Blair 2015    WISDOM TOOTH EXTRACTION         Social History     Substance and Sexual Activity   Drug Use No     Alcohol Use: Not on file     Tobacco Use: Low Risk     Smoking Tobacco Use: Never    Smokeless Tobacco Use: Never    Passive Exposure: Not on file       OBJECTIVE:     Vital Signs Range (Last 24H):         Significant Labs    Heme Profile  Lab Results   Component Value Date    WBC 8.19 04/14/2022    HGB 15.2 04/14/2022    HCT 45.9 04/14/2022     04/14/2022       Coagulation Studies  Lab Results   Component Value Date    LABPROT 10.9 09/25/2015    INR 1.0 09/25/2015    APTT 23.9 09/25/2015       BMP  Lab Results   Component Value Date     11/14/2022    K 4.5 11/14/2022     11/14/2022    CO2 24 11/14/2022    BUN 14 11/14/2022    CREATININE 1.0 11/14/2022    MG 2.4 09/28/2015    PHOS 2.8 09/28/2015    CAION 1.30 11/14/2022       Liver Function Tests  Lab  Results   Component Value Date    AST 33 11/14/2022    ALT 43 11/14/2022    ALKPHOS 45 (L) 11/14/2022    BILITOT 0.7 11/14/2022    PROT 8.0 11/14/2022    ALBUMIN 4.9 11/14/2022    ALBUMIN 4.6 11/14/2022       Lipid Profile  Lab Results   Component Value Date    CHOL 255 (H) 10/24/2022    HDL 47 10/24/2022    TRIG 86 10/24/2022       Endocrine Profile  Lab Results   Component Value Date    HGBA1C 5.7 (H) 04/14/2022    TSH 1.305 11/14/2022       Cardiac Studies    EKG:   Results for orders placed or performed during the hospital encounter of 05/09/19   EKG 12-lead    Collection Time: 05/09/19  7:39 AM    Narrative    Test Reason : Z00.00,    Vent. Rate : 055 BPM     Atrial Rate : 055 BPM     P-R Int : 182 ms          QRS Dur : 094 ms      QT Int : 420 ms       P-R-T Axes : 031 035 023 degrees     QTc Int : 401 ms    Sinus bradycardia  Otherwise normal ECG  When compared with ECG of 16-SEP-2015 16:10,  No significant change was found  Confirmed by Breanna Álvarez MD ([id number]) on 5/9/2019 10:35:46 AM    Referred By: HIRA BONE           Confirmed By:Breanna Álvarez MD       TTE   Results for orders placed during the hospital encounter of 06/05/19  Interpretation Summary  · Normal left ventricular systolic function. The estimated ejection fraction is 55%  · Normal LV diastolic function.  · Normal right ventricular systolic function.  · Intermediate central venous pressure (8 mm Hg).        ASSESSMENT/PLAN:      04/18/2023  Gallo Maria Jr. is a 42 y.o., male.      Pre-op Assessment    I have reviewed the Patient Summary Reports.     I have reviewed the Nursing Notes. I have reviewed the NPO Status.   I have reviewed the Medications.     Review of Systems  Anesthesia Hx:  No problems with previous Anesthesia  History of prior surgery of interest to airway management or planning: Previous anesthesia: General  Denies Personal Hx of Anesthesia complications.   Social:  Non-Smoker    Hematology/Oncology:     Oncology Normal      EENT/Dental:EENT/Dental Normal   Cardiovascular:   hyperlipidemia    Pulmonary:   Asthma    Renal/:   BPH    Hepatic/GI:   Liver Disease,    Musculoskeletal:  Musculoskeletal Normal    Neurological:  Neurology Normal  Brain Tumor Pituitary adenoma    Endocrine:  Endocrine Normal  Morbid Obesity / BMI > 40  Dermatological:  Skin Normal    Psych:   Psychiatric History          Physical Exam  General: Well nourished, Cooperative and Alert    Airway:  Mallampati: II   Mouth Opening: Normal  TM Distance: Normal  Tongue: Normal  Neck ROM: Normal ROM    Dental:  Intact        Anesthesia Plan  Type of Anesthesia, risks & benefits discussed:    Anesthesia Type: Gen ETT  Intra-op Monitoring Plan: Standard ASA Monitors  Post Op Pain Control Plan: multimodal analgesia and IV/PO Opioids PRN  Induction:  IV  Airway Plan: Direct, Post-Induction  Informed Consent: Informed consent signed with the Patient and all parties understand the risks and agree with anesthesia plan.  All questions answered.   ASA Score: 3  Day of Surgery Review of History & Physical: H&P Update referred to the surgeon/provider.I have interviewed and examined the patient. I have reviewed the patient's H&P dated: There are no significant changes.     Ready For Surgery From Anesthesia Perspective.     .

## 2023-04-19 ENCOUNTER — PATIENT MESSAGE (OUTPATIENT)
Dept: RESEARCH | Facility: HOSPITAL | Age: 43
End: 2023-04-19
Payer: COMMERCIAL

## 2023-04-19 ENCOUNTER — HOSPITAL ENCOUNTER (INPATIENT)
Facility: HOSPITAL | Age: 43
LOS: 4 days | Discharge: HOME | DRG: 614 | End: 2023-04-23
Attending: NEUROLOGICAL SURGERY | Admitting: NEUROLOGICAL SURGERY
Payer: COMMERCIAL

## 2023-04-19 ENCOUNTER — ANESTHESIA (OUTPATIENT)
Dept: SURGERY | Facility: HOSPITAL | Age: 43
DRG: 614 | End: 2023-04-19
Payer: COMMERCIAL

## 2023-04-19 DIAGNOSIS — D35.2 PITUITARY MACROADENOMA: ICD-10-CM

## 2023-04-19 LAB
ABO + RH BLD: NORMAL
ANION GAP SERPL CALC-SCNC: 9 MMOL/L (ref 8–16)
APTT PPP: 36.5 SEC (ref 21–32)
BASOPHILS # BLD AUTO: 0.04 K/UL (ref 0–0.2)
BASOPHILS NFR BLD: 0.6 % (ref 0–1.9)
BILIRUB UR QL STRIP: NEGATIVE
BLD GP AB SCN CELLS X3 SERPL QL: NORMAL
BUN SERPL-MCNC: 12 MG/DL (ref 6–20)
CALCIUM SERPL-MCNC: 9.3 MG/DL (ref 8.7–10.5)
CHLORIDE SERPL-SCNC: 102 MMOL/L (ref 95–110)
CLARITY UR REFRACT.AUTO: CLEAR
CO2 SERPL-SCNC: 28 MMOL/L (ref 23–29)
COLOR UR AUTO: YELLOW
CREAT SERPL-MCNC: 0.9 MG/DL (ref 0.5–1.4)
DIFFERENTIAL METHOD: NORMAL
EOSINOPHIL # BLD AUTO: 0.4 K/UL (ref 0–0.5)
EOSINOPHIL NFR BLD: 5.8 % (ref 0–8)
ERYTHROCYTE [DISTWIDTH] IN BLOOD BY AUTOMATED COUNT: 13.8 % (ref 11.5–14.5)
EST. GFR  (NO RACE VARIABLE): >60 ML/MIN/1.73 M^2
GLUCOSE SERPL-MCNC: 85 MG/DL (ref 70–110)
GLUCOSE UR QL STRIP: NEGATIVE
HCT VFR BLD AUTO: 42.8 % (ref 40–54)
HGB BLD-MCNC: 14.5 G/DL (ref 14–18)
HGB UR QL STRIP: ABNORMAL
IMM GRANULOCYTES # BLD AUTO: 0.02 K/UL (ref 0–0.04)
IMM GRANULOCYTES NFR BLD AUTO: 0.3 % (ref 0–0.5)
INR PPP: 1.2 (ref 0.8–1.2)
KETONES UR QL STRIP: NEGATIVE
LEUKOCYTE ESTERASE UR QL STRIP: NEGATIVE
LYMPHOCYTES # BLD AUTO: 2.9 K/UL (ref 1–4.8)
LYMPHOCYTES NFR BLD: 43.7 % (ref 18–48)
MCH RBC QN AUTO: 28 PG (ref 27–31)
MCHC RBC AUTO-ENTMCNC: 33.9 G/DL (ref 32–36)
MCV RBC AUTO: 83 FL (ref 82–98)
MONOCYTES # BLD AUTO: 0.6 K/UL (ref 0.3–1)
MONOCYTES NFR BLD: 8.4 % (ref 4–15)
NEUTROPHILS # BLD AUTO: 2.7 K/UL (ref 1.8–7.7)
NEUTROPHILS NFR BLD: 41.2 % (ref 38–73)
NITRITE UR QL STRIP: NEGATIVE
NRBC BLD-RTO: 0 /100 WBC
PH UR STRIP: 6 [PH] (ref 5–8)
PLATELET # BLD AUTO: 196 K/UL (ref 150–450)
PMV BLD AUTO: 9.7 FL (ref 9.2–12.9)
POTASSIUM SERPL-SCNC: 4 MMOL/L (ref 3.5–5.1)
PROT UR QL STRIP: NEGATIVE
PROTHROMBIN TIME: 12.7 SEC (ref 9–12.5)
RBC # BLD AUTO: 5.17 M/UL (ref 4.6–6.2)
SODIUM SERPL-SCNC: 137 MMOL/L (ref 136–145)
SODIUM SERPL-SCNC: 139 MMOL/L (ref 136–145)
SP GR UR STRIP: 1.01 (ref 1–1.03)
SPECIMEN OUTDATE: NORMAL
URN SPEC COLLECT METH UR: ABNORMAL
WBC # BLD AUTO: 6.52 K/UL (ref 3.9–12.7)

## 2023-04-19 PROCEDURE — 84295 ASSAY OF SERUM SODIUM: CPT | Performed by: STUDENT IN AN ORGANIZED HEALTH CARE EDUCATION/TRAINING PROGRAM

## 2023-04-19 PROCEDURE — 37000008 HC ANESTHESIA 1ST 15 MINUTES: Performed by: NEUROLOGICAL SURGERY

## 2023-04-19 PROCEDURE — 94761 N-INVAS EAR/PLS OXIMETRY MLT: CPT

## 2023-04-19 PROCEDURE — 27201423 OPTIME MED/SURG SUP & DEVICES STERILE SUPPLY: Performed by: NEUROLOGICAL SURGERY

## 2023-04-19 PROCEDURE — 88307 TISSUE EXAM BY PATHOLOGIST: CPT | Mod: 26,,, | Performed by: PATHOLOGY

## 2023-04-19 PROCEDURE — 61781 SCAN PROC CRANIAL INTRA: CPT | Mod: ,,, | Performed by: NEUROLOGICAL SURGERY

## 2023-04-19 PROCEDURE — D9220A PRA ANESTHESIA: ICD-10-PCS | Mod: ANES,,, | Performed by: ANESTHESIOLOGY

## 2023-04-19 PROCEDURE — 88331 PR  PATH CONSULT IN SURG,W FRZ SEC: ICD-10-PCS | Mod: 26,,, | Performed by: PATHOLOGY

## 2023-04-19 PROCEDURE — 25000003 PHARM REV CODE 250: Performed by: STUDENT IN AN ORGANIZED HEALTH CARE EDUCATION/TRAINING PROGRAM

## 2023-04-19 PROCEDURE — 63600175 PHARM REV CODE 636 W HCPCS

## 2023-04-19 PROCEDURE — 88331 PATH CONSLTJ SURG 1 BLK 1SPC: CPT | Mod: 26,,, | Performed by: PATHOLOGY

## 2023-04-19 PROCEDURE — 63600175 PHARM REV CODE 636 W HCPCS: Performed by: STUDENT IN AN ORGANIZED HEALTH CARE EDUCATION/TRAINING PROGRAM

## 2023-04-19 PROCEDURE — 80048 BASIC METABOLIC PNL TOTAL CA: CPT | Performed by: STUDENT IN AN ORGANIZED HEALTH CARE EDUCATION/TRAINING PROGRAM

## 2023-04-19 PROCEDURE — 99900035 HC TECH TIME PER 15 MIN (STAT)

## 2023-04-19 PROCEDURE — 25000003 PHARM REV CODE 250: Performed by: NEUROLOGICAL SURGERY

## 2023-04-19 PROCEDURE — 27201037 HC PRESSURE MONITORING SET UP

## 2023-04-19 PROCEDURE — 86900 BLOOD TYPING SEROLOGIC ABO: CPT | Performed by: STUDENT IN AN ORGANIZED HEALTH CARE EDUCATION/TRAINING PROGRAM

## 2023-04-19 PROCEDURE — 61548 PR EXCIS PITUITARY,TRANSNASAL/SEPTAL: ICD-10-PCS | Mod: 62,,, | Performed by: OTOLARYNGOLOGY

## 2023-04-19 PROCEDURE — 88331 PATH CONSLTJ SURG 1 BLK 1SPC: CPT | Performed by: PATHOLOGY

## 2023-04-19 PROCEDURE — 86920 COMPATIBILITY TEST SPIN: CPT | Performed by: NEUROLOGICAL SURGERY

## 2023-04-19 PROCEDURE — 88307 TISSUE EXAM BY PATHOLOGIST: CPT | Performed by: PATHOLOGY

## 2023-04-19 PROCEDURE — 81001 URINALYSIS AUTO W/SCOPE: CPT | Performed by: STUDENT IN AN ORGANIZED HEALTH CARE EDUCATION/TRAINING PROGRAM

## 2023-04-19 PROCEDURE — 36000710: Performed by: NEUROLOGICAL SURGERY

## 2023-04-19 PROCEDURE — D9220A PRA ANESTHESIA: Mod: ANES,,, | Performed by: ANESTHESIOLOGY

## 2023-04-19 PROCEDURE — 81003 URINALYSIS AUTO W/O SCOPE: CPT | Performed by: STUDENT IN AN ORGANIZED HEALTH CARE EDUCATION/TRAINING PROGRAM

## 2023-04-19 PROCEDURE — 61548 REMOVAL OF PITUITARY GLAND: CPT | Mod: 62,,, | Performed by: NEUROLOGICAL SURGERY

## 2023-04-19 PROCEDURE — 25000003 PHARM REV CODE 250

## 2023-04-19 PROCEDURE — 27000221 HC OXYGEN, UP TO 24 HOURS

## 2023-04-19 PROCEDURE — D9220A PRA ANESTHESIA: Mod: CRNA,,, | Performed by: NURSE ANESTHETIST, CERTIFIED REGISTERED

## 2023-04-19 PROCEDURE — 27800903 OPTIME MED/SURG SUP & DEVICES OTHER IMPLANTS: Performed by: NEUROLOGICAL SURGERY

## 2023-04-19 PROCEDURE — C1713 ANCHOR/SCREW BN/BN,TIS/BN: HCPCS | Performed by: NEUROLOGICAL SURGERY

## 2023-04-19 PROCEDURE — 36000711: Performed by: NEUROLOGICAL SURGERY

## 2023-04-19 PROCEDURE — 63600175 PHARM REV CODE 636 W HCPCS: Performed by: NEUROLOGICAL SURGERY

## 2023-04-19 PROCEDURE — 85025 COMPLETE CBC W/AUTO DIFF WBC: CPT | Performed by: STUDENT IN AN ORGANIZED HEALTH CARE EDUCATION/TRAINING PROGRAM

## 2023-04-19 PROCEDURE — 85730 THROMBOPLASTIN TIME PARTIAL: CPT | Performed by: STUDENT IN AN ORGANIZED HEALTH CARE EDUCATION/TRAINING PROGRAM

## 2023-04-19 PROCEDURE — 85610 PROTHROMBIN TIME: CPT | Performed by: STUDENT IN AN ORGANIZED HEALTH CARE EDUCATION/TRAINING PROGRAM

## 2023-04-19 PROCEDURE — 61548 PR EXCIS PITUITARY,TRANSNASAL/SEPTAL: ICD-10-PCS | Mod: 62,,, | Performed by: NEUROLOGICAL SURGERY

## 2023-04-19 PROCEDURE — 36620 INSERTION CATHETER ARTERY: CPT | Mod: 59,,, | Performed by: ANESTHESIOLOGY

## 2023-04-19 PROCEDURE — 61781 PR STEREOTACTIC COMP ASSIST PROC,CRANIAL,INTRADURAL: ICD-10-PCS | Mod: ,,, | Performed by: NEUROLOGICAL SURGERY

## 2023-04-19 PROCEDURE — 63600175 PHARM REV CODE 636 W HCPCS: Performed by: NURSE ANESTHETIST, CERTIFIED REGISTERED

## 2023-04-19 PROCEDURE — 20000000 HC ICU ROOM

## 2023-04-19 PROCEDURE — 61548 REMOVAL OF PITUITARY GLAND: CPT | Mod: 62,,, | Performed by: OTOLARYNGOLOGY

## 2023-04-19 PROCEDURE — 88307 PR  SURG PATH,LEVEL V: ICD-10-PCS | Mod: 26,,, | Performed by: PATHOLOGY

## 2023-04-19 PROCEDURE — D9220A PRA ANESTHESIA: ICD-10-PCS | Mod: CRNA,,, | Performed by: NURSE ANESTHETIST, CERTIFIED REGISTERED

## 2023-04-19 PROCEDURE — 36620 ARTERIAL: ICD-10-PCS | Mod: 59,,, | Performed by: ANESTHESIOLOGY

## 2023-04-19 PROCEDURE — 37000009 HC ANESTHESIA EA ADD 15 MINS: Performed by: NEUROLOGICAL SURGERY

## 2023-04-19 DEVICE — GRAFT DURAGEN PLUS DURAL 2X2IN: Type: IMPLANTABLE DEVICE | Site: EPIDURAL SPACE | Status: FUNCTIONAL

## 2023-04-19 RX ORDER — CALCIUM GLUCONATE 20 MG/ML
1 INJECTION, SOLUTION INTRAVENOUS
Status: DISCONTINUED | OUTPATIENT
Start: 2023-04-19 | End: 2023-04-23 | Stop reason: HOSPADM

## 2023-04-19 RX ORDER — MORPHINE SULFATE 2 MG/ML
1 INJECTION, SOLUTION INTRAMUSCULAR; INTRAVENOUS EVERY 4 HOURS PRN
Status: DISCONTINUED | OUTPATIENT
Start: 2023-04-19 | End: 2023-04-23 | Stop reason: HOSPADM

## 2023-04-19 RX ORDER — DEXMEDETOMIDINE HYDROCHLORIDE 100 UG/ML
INJECTION, SOLUTION INTRAVENOUS
Status: DISCONTINUED | OUTPATIENT
Start: 2023-04-19 | End: 2023-04-19

## 2023-04-19 RX ORDER — NICARDIPINE HYDROCHLORIDE 0.2 MG/ML
0-15 INJECTION INTRAVENOUS CONTINUOUS
Status: DISCONTINUED | OUTPATIENT
Start: 2023-04-19 | End: 2023-04-20

## 2023-04-19 RX ORDER — SODIUM CHLORIDE 9 MG/ML
INJECTION, SOLUTION INTRAVENOUS CONTINUOUS
Status: DISCONTINUED | OUTPATIENT
Start: 2023-04-19 | End: 2023-04-19

## 2023-04-19 RX ORDER — CALCIUM GLUCONATE 20 MG/ML
2 INJECTION, SOLUTION INTRAVENOUS
Status: DISCONTINUED | OUTPATIENT
Start: 2023-04-19 | End: 2023-04-23 | Stop reason: HOSPADM

## 2023-04-19 RX ORDER — CALCIUM GLUCONATE 20 MG/ML
3 INJECTION, SOLUTION INTRAVENOUS
Status: DISCONTINUED | OUTPATIENT
Start: 2023-04-19 | End: 2023-04-23 | Stop reason: HOSPADM

## 2023-04-19 RX ORDER — MUPIROCIN 20 MG/G
1 OINTMENT TOPICAL 2 TIMES DAILY
Status: DISCONTINUED | OUTPATIENT
Start: 2023-04-19 | End: 2023-04-19 | Stop reason: HOSPADM

## 2023-04-19 RX ORDER — HYDROMORPHONE HYDROCHLORIDE 2 MG/ML
INJECTION, SOLUTION INTRAMUSCULAR; INTRAVENOUS; SUBCUTANEOUS
Status: DISCONTINUED | OUTPATIENT
Start: 2023-04-19 | End: 2023-04-19

## 2023-04-19 RX ORDER — ROCURONIUM BROMIDE 10 MG/ML
INJECTION, SOLUTION INTRAVENOUS
Status: DISCONTINUED | OUTPATIENT
Start: 2023-04-19 | End: 2023-04-19

## 2023-04-19 RX ORDER — LIDOCAINE HYDROCHLORIDE 20 MG/ML
INJECTION, SOLUTION EPIDURAL; INFILTRATION; INTRACAUDAL; PERINEURAL
Status: DISCONTINUED | OUTPATIENT
Start: 2023-04-19 | End: 2023-04-19

## 2023-04-19 RX ORDER — OXYCODONE HYDROCHLORIDE 5 MG/1
5 TABLET ORAL EVERY 6 HOURS PRN
Status: DISCONTINUED | OUTPATIENT
Start: 2023-04-19 | End: 2023-04-23 | Stop reason: HOSPADM

## 2023-04-19 RX ORDER — PHENYLEPHRINE HCL IN 0.9% NACL 1 MG/10 ML
SYRINGE (ML) INTRAVENOUS
Status: DISCONTINUED | OUTPATIENT
Start: 2023-04-19 | End: 2023-04-19

## 2023-04-19 RX ORDER — POTASSIUM CHLORIDE 7.45 MG/ML
40 INJECTION INTRAVENOUS
Status: DISCONTINUED | OUTPATIENT
Start: 2023-04-19 | End: 2023-04-23 | Stop reason: HOSPADM

## 2023-04-19 RX ORDER — DEXAMETHASONE SODIUM PHOSPHATE 4 MG/ML
INJECTION, SOLUTION INTRA-ARTICULAR; INTRALESIONAL; INTRAMUSCULAR; INTRAVENOUS; SOFT TISSUE
Status: DISCONTINUED | OUTPATIENT
Start: 2023-04-19 | End: 2023-04-19

## 2023-04-19 RX ORDER — MAGNESIUM SULFATE HEPTAHYDRATE 40 MG/ML
2 INJECTION, SOLUTION INTRAVENOUS
Status: DISCONTINUED | OUTPATIENT
Start: 2023-04-19 | End: 2023-04-23 | Stop reason: HOSPADM

## 2023-04-19 RX ORDER — ONDANSETRON 2 MG/ML
INJECTION INTRAMUSCULAR; INTRAVENOUS
Status: DISCONTINUED | OUTPATIENT
Start: 2023-04-19 | End: 2023-04-19

## 2023-04-19 RX ORDER — CEFTRIAXONE 1 G/1
INJECTION, POWDER, FOR SOLUTION INTRAMUSCULAR; INTRAVENOUS
Status: DISCONTINUED | OUTPATIENT
Start: 2023-04-19 | End: 2023-04-19 | Stop reason: HOSPADM

## 2023-04-19 RX ORDER — NEOSTIGMINE METHYLSULFATE 0.5 MG/ML
INJECTION, SOLUTION INTRAVENOUS
Status: DISCONTINUED | OUTPATIENT
Start: 2023-04-19 | End: 2023-04-19

## 2023-04-19 RX ORDER — MUPIROCIN 20 MG/G
OINTMENT TOPICAL
Status: DISCONTINUED | OUTPATIENT
Start: 2023-04-19 | End: 2023-04-19 | Stop reason: HOSPADM

## 2023-04-19 RX ORDER — SODIUM CHLORIDE 0.9 % (FLUSH) 0.9 %
10 SYRINGE (ML) INJECTION
Status: DISCONTINUED | OUTPATIENT
Start: 2023-04-19 | End: 2023-04-23 | Stop reason: HOSPADM

## 2023-04-19 RX ORDER — FAMOTIDINE 20 MG/1
20 TABLET, FILM COATED ORAL 2 TIMES DAILY
Status: DISCONTINUED | OUTPATIENT
Start: 2023-04-19 | End: 2023-04-23 | Stop reason: HOSPADM

## 2023-04-19 RX ORDER — MAGNESIUM SULFATE HEPTAHYDRATE 40 MG/ML
4 INJECTION, SOLUTION INTRAVENOUS
Status: DISCONTINUED | OUTPATIENT
Start: 2023-04-19 | End: 2023-04-23 | Stop reason: HOSPADM

## 2023-04-19 RX ORDER — FENTANYL CITRATE 50 UG/ML
INJECTION, SOLUTION INTRAMUSCULAR; INTRAVENOUS
Status: DISCONTINUED | OUTPATIENT
Start: 2023-04-19 | End: 2023-04-19

## 2023-04-19 RX ORDER — PROPOFOL 10 MG/ML
VIAL (ML) INTRAVENOUS
Status: DISCONTINUED | OUTPATIENT
Start: 2023-04-19 | End: 2023-04-19

## 2023-04-19 RX ORDER — ONDANSETRON 2 MG/ML
4 INJECTION INTRAMUSCULAR; INTRAVENOUS EVERY 8 HOURS PRN
Status: DISCONTINUED | OUTPATIENT
Start: 2023-04-19 | End: 2023-04-23 | Stop reason: HOSPADM

## 2023-04-19 RX ORDER — OXYMETAZOLINE HCL 0.05 %
SPRAY, NON-AEROSOL (ML) NASAL
Status: DISCONTINUED | OUTPATIENT
Start: 2023-04-19 | End: 2023-04-19 | Stop reason: HOSPADM

## 2023-04-19 RX ORDER — REMIFENTANIL HYDROCHLORIDE 1 MG/ML
INJECTION, POWDER, LYOPHILIZED, FOR SOLUTION INTRAVENOUS CONTINUOUS PRN
Status: DISCONTINUED | OUTPATIENT
Start: 2023-04-19 | End: 2023-04-19

## 2023-04-19 RX ORDER — POTASSIUM CHLORIDE 7.45 MG/ML
80 INJECTION INTRAVENOUS
Status: DISCONTINUED | OUTPATIENT
Start: 2023-04-19 | End: 2023-04-23 | Stop reason: HOSPADM

## 2023-04-19 RX ORDER — EPHEDRINE SULFATE 50 MG/ML
INJECTION, SOLUTION INTRAVENOUS
Status: DISCONTINUED | OUTPATIENT
Start: 2023-04-19 | End: 2023-04-19

## 2023-04-19 RX ORDER — POTASSIUM CHLORIDE 7.45 MG/ML
60 INJECTION INTRAVENOUS
Status: DISCONTINUED | OUTPATIENT
Start: 2023-04-19 | End: 2023-04-23 | Stop reason: HOSPADM

## 2023-04-19 RX ORDER — PHENYLEPHRINE HYDROCHLORIDE 10 MG/ML
INJECTION INTRAVENOUS
Status: DISCONTINUED | OUTPATIENT
Start: 2023-04-19 | End: 2023-04-19

## 2023-04-19 RX ORDER — MIDAZOLAM HYDROCHLORIDE 1 MG/ML
INJECTION INTRAMUSCULAR; INTRAVENOUS
Status: DISCONTINUED | OUTPATIENT
Start: 2023-04-19 | End: 2023-04-19

## 2023-04-19 RX ADMIN — HYDROCORTISONE SODIUM SUCCINATE 100 MG: 100 INJECTION, POWDER, FOR SOLUTION INTRAMUSCULAR; INTRAVENOUS at 03:04

## 2023-04-19 RX ADMIN — HYDROMORPHONE HYDROCHLORIDE 0.2 MG: 2 INJECTION, SOLUTION INTRAMUSCULAR; INTRAVENOUS; SUBCUTANEOUS at 04:04

## 2023-04-19 RX ADMIN — Medication 100 MCG: at 11:04

## 2023-04-19 RX ADMIN — CEFTRIAXONE SODIUM 2 G: 1 INJECTION, SOLUTION INTRAVENOUS at 11:04

## 2023-04-19 RX ADMIN — DEXMEDETOMIDINE HYDROCHLORIDE 8 MCG: 100 INJECTION, SOLUTION INTRAVENOUS at 12:04

## 2023-04-19 RX ADMIN — HYDROCORTISONE SODIUM SUCCINATE 100 MG: 100 INJECTION, POWDER, FOR SOLUTION INTRAMUSCULAR; INTRAVENOUS at 08:04

## 2023-04-19 RX ADMIN — GLYCOPYRROLATE 0.8 MG: 0.2 INJECTION, SOLUTION INTRAMUSCULAR; INTRAVENOUS at 04:04

## 2023-04-19 RX ADMIN — EPHEDRINE SULFATE 5 MG: 50 INJECTION INTRAVENOUS at 11:04

## 2023-04-19 RX ADMIN — CEFAZOLIN 1 G: 1 INJECTION, POWDER, FOR SOLUTION INTRAMUSCULAR; INTRAVENOUS at 05:04

## 2023-04-19 RX ADMIN — LIDOCAINE HYDROCHLORIDE 100 MG: 20 INJECTION, SOLUTION EPIDURAL; INFILTRATION; INTRACAUDAL; PERINEURAL at 11:04

## 2023-04-19 RX ADMIN — EPHEDRINE SULFATE 10 MG: 50 INJECTION INTRAVENOUS at 11:04

## 2023-04-19 RX ADMIN — ONDANSETRON 4 MG: 2 INJECTION INTRAMUSCULAR; INTRAVENOUS at 03:04

## 2023-04-19 RX ADMIN — HYDROMORPHONE HYDROCHLORIDE 0.4 MG: 2 INJECTION, SOLUTION INTRAMUSCULAR; INTRAVENOUS; SUBCUTANEOUS at 04:04

## 2023-04-19 RX ADMIN — Medication 300 MCG: at 11:04

## 2023-04-19 RX ADMIN — ROCURONIUM BROMIDE 20 MG: 10 INJECTION, SOLUTION INTRAVENOUS at 01:04

## 2023-04-19 RX ADMIN — NEOSTIGMINE METHYLSULFATE 5 MG: 0.5 INJECTION INTRAVENOUS at 04:04

## 2023-04-19 RX ADMIN — FENTANYL CITRATE 100 MCG: 50 INJECTION, SOLUTION INTRAMUSCULAR; INTRAVENOUS at 11:04

## 2023-04-19 RX ADMIN — MIDAZOLAM HYDROCHLORIDE 2 MG: 1 INJECTION INTRAMUSCULAR; INTRAVENOUS at 10:04

## 2023-04-19 RX ADMIN — PROPOFOL 200 MG: 10 INJECTION, EMULSION INTRAVENOUS at 11:04

## 2023-04-19 RX ADMIN — ROCURONIUM BROMIDE 50 MG: 10 INJECTION, SOLUTION INTRAVENOUS at 11:04

## 2023-04-19 RX ADMIN — PROPOFOL 60 MG: 10 INJECTION, EMULSION INTRAVENOUS at 12:04

## 2023-04-19 RX ADMIN — Medication 200 MCG: at 11:04

## 2023-04-19 RX ADMIN — ROCURONIUM BROMIDE 20 MG: 10 INJECTION, SOLUTION INTRAVENOUS at 12:04

## 2023-04-19 RX ADMIN — GLYCOPYRROLATE 0.2 MG: 0.2 INJECTION, SOLUTION INTRAMUSCULAR; INTRAVENOUS at 11:04

## 2023-04-19 RX ADMIN — Medication 100 MCG: at 12:04

## 2023-04-19 RX ADMIN — SODIUM CHLORIDE: 0.9 INJECTION, SOLUTION INTRAVENOUS at 10:04

## 2023-04-19 RX ADMIN — PHENYLEPHRINE HYDROCHLORIDE 0.4 MCG/KG/MIN: 10 INJECTION INTRAVENOUS at 11:04

## 2023-04-19 RX ADMIN — PROPOFOL 40 MG: 10 INJECTION, EMULSION INTRAVENOUS at 12:04

## 2023-04-19 RX ADMIN — ROCURONIUM BROMIDE 10 MG: 10 INJECTION, SOLUTION INTRAVENOUS at 02:04

## 2023-04-19 RX ADMIN — ROCURONIUM BROMIDE 20 MG: 10 INJECTION, SOLUTION INTRAVENOUS at 02:04

## 2023-04-19 RX ADMIN — SODIUM CHLORIDE, SODIUM GLUCONATE, SODIUM ACETATE, POTASSIUM CHLORIDE, MAGNESIUM CHLORIDE, SODIUM PHOSPHATE, DIBASIC, AND POTASSIUM PHOSPHATE: .53; .5; .37; .037; .03; .012; .00082 INJECTION, SOLUTION INTRAVENOUS at 11:04

## 2023-04-19 RX ADMIN — DEXAMETHASONE SODIUM PHOSPHATE 4 MG: 4 INJECTION INTRA-ARTICULAR; INTRALESIONAL; INTRAMUSCULAR; INTRAVENOUS; SOFT TISSUE at 11:04

## 2023-04-19 RX ADMIN — DEXMEDETOMIDINE HYDROCHLORIDE 20 MCG: 100 INJECTION, SOLUTION INTRAVENOUS at 04:04

## 2023-04-19 RX ADMIN — ROCURONIUM BROMIDE 30 MG: 10 INJECTION, SOLUTION INTRAVENOUS at 11:04

## 2023-04-19 RX ADMIN — DEXMEDETOMIDINE HYDROCHLORIDE 12 MCG: 100 INJECTION, SOLUTION INTRAVENOUS at 12:04

## 2023-04-19 RX ADMIN — SODIUM CHLORIDE: 0.9 INJECTION, SOLUTION INTRAVENOUS at 08:04

## 2023-04-19 RX ADMIN — EPHEDRINE SULFATE 10 MG: 50 INJECTION INTRAVENOUS at 12:04

## 2023-04-19 RX ADMIN — REMIFENTANIL HYDROCHLORIDE 0.1 MCG/KG/MIN: 1 INJECTION, POWDER, LYOPHILIZED, FOR SOLUTION INTRAVENOUS at 12:04

## 2023-04-19 RX ADMIN — MUPIROCIN: 20 OINTMENT TOPICAL at 09:04

## 2023-04-19 NOTE — SUBJECTIVE & OBJECTIVE
Past Medical History:   Diagnosis Date    ADD (attention deficit disorder)     treated by outside psychiatrist.    Asthma     BPH with urinary obstruction 2/5/2015    Decreased libido 8/31/2015    Erectile dysfunction 10/27/2015    History of migraine headaches     Hypogonadism male 10/27/2015    Pituitary adenoma 8/9/2012     Past Surgical History:   Procedure Laterality Date    BIOPSY OF TONSILS Right 9/9/2019    Procedure: BIOPSY, TONSILS;  Surgeon: Bruno Umaña MD;  Location: [facility] OR [address] [place] [address];  Service: ENT;  Laterality: Right;    SURGICAL REMOVAL OF PITUITARY TUMOR BY TRANSSPHENOIDAL APPROACH  2015    Pituitary Adeonma via Dr. Blair 2015    WISDOM TOOTH EXTRACTION        No current facility-administered medications on file prior to encounter.     Current Outpatient Medications on File Prior to Encounter   Medication Sig Dispense Refill    vitamin D (VITAMIN D3) 1000 units Tab Take 1,000 Units by mouth once daily.      vitamin E, dl, acetate, 90 mg (200 unit) Cap TAKE 1 CAPSULE (200 UNITS TOTAL) BY MOUTH ONCE DAILY.      aspirin 81 MG Chew Take 1 tablet (81 mg total) by mouth once daily. 90 tablet 3    cabergoline (DOSTINEX) 0.5 mg tablet TAKE 1 TABLET (0.5 MG TOTAL) BY MOUTH TWICE A WEEK. (Patient not taking: Reported on 4/18/2023) 24 tablet 3    SOMATULINE DEPOT 90 mg/0.3 mL Syrg Inject 0.3 mLs (90 mg total) into the skin every 30 days. (Patient not taking: Reported on 3/20/2023) 0.9 mL 3    testosterone cypionate (DEPOTESTOTERONE CYPIONATE) 200 mg/mL injection Inject 1.5 mLs (300 mg total) into the muscle every 14 (fourteen) days. 10 mL 3      Allergies: Patient has no known allergies.    Family History   Problem Relation Age of Onset    Diabetes Maternal Grandmother     Diabetes Maternal Grandfather     Dementia Paternal Grandmother     Cancer Paternal Grandfather     Prostate cancer Paternal Grandfather     Hyperlipidemia Father     Benign prostatic hyperplasia Father     Cancer Father     Prostate cancer  Paternal Aunt     Prostate cancer Paternal Uncle     No Known Problems Mother     No Known Problems Sister     No Known Problems Brother     No Known Problems Brother     Coronary artery disease Neg Hx      Social History     Tobacco Use    Smoking status: Never    Smokeless tobacco: Never   Substance Use Topics    Alcohol use: No    Drug use: No     Review of Systems   Reason unable to perform ROS: residual anesthesia.   Objective:     Vitals:    Temp: 98.7 °F (37.1 °C)  Pulse: 70  BP: 123/79  Resp: 18  SpO2: 98 %    Temp  Min: 98.7 °F (37.1 °C)  Max: 98.7 °F (37.1 °C)  Pulse  Min: 70  Max: 70  BP  Min: 123/79  Max: 123/79  Resp  Min: 18  Max: 18  SpO2  Min: 98 %  Max: 98 %    No intake/output data recorded.         Physical Exam  Vitals and nursing note reviewed.   Constitutional:       General: He is not in acute distress.     Appearance: He is not ill-appearing.   HENT:      Head: Normocephalic.      Nose:      Right Nostril: Foreign body and epistaxis present.      Left Nostril: Foreign body and epistaxis present.      Comments: Bilateral nasal packing with blood drainage  Eyes:      Extraocular Movements: Extraocular movements intact.      Pupils: Pupils are equal, round, and reactive to light.   Cardiovascular:      Rate and Rhythm: Normal rate and regular rhythm.   Pulmonary:      Effort: Pulmonary effort is normal. No respiratory distress.      Comments: NRB, arrived with OPA in place  Abdominal:      General: Abdomen is flat. There is no distension.   Musculoskeletal:         General: No swelling or tenderness. Normal range of motion.      Cervical back: Normal range of motion. No rigidity.   Neurological:      Mental Status: He is alert.      Comments:   Alert, follows basic commands in all extremities  PERRL, EOMi  Face symmetric  Bilateral nasal packing   Moves all extremities antigravity, spontaneously and to basic commands     Unable to test orientation, language, memory, judgment, insight, fund of  knowledge, hearing, shoulder shrug, tongue protrusion, coordination, gait due to level of consciousness.    Today I personally reviewed pertinent medications, lines/drains/airways, imaging, cardiology results, laboratory results, microbiology results, notably:    Pre operative MRI with interval increase in size of the presumed pituitary macro adenoma

## 2023-04-19 NOTE — HPI
Gallo Maria Jr. 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     On arrival, patient with residual anesthesia. Arrived with OPA in place that was removed as patient began to wake up. PERRL, EOMi. Moves all extremities spontaneously and to basic commands.

## 2023-04-19 NOTE — ASSESSMENT & PLAN NOTE
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     - Q1h neurochecks while in ICU  - Q1h vitals while in ICU  - HOB@30  - Strict nasal precautions   - CTH reveiwed  - SBP <140   - EKG reviewed  - Holding home ASA  - Daily CMP, Mag, Phos - replete electrolytes PRN  - Lipid panel, A1c, Coags (preoperative) reviewed  - Regular diet as appropriate   - Strict I/Os, monitor for evidence of increased UOP  - Q6 Na and Q6 UA  - Hydrocortisone taper per neurosurgery   - Daily CBC, transfuse PRN  - Start SubQ Heparin in when clinically indicated   - PT/OT/SLP as appropriate

## 2023-04-19 NOTE — TRANSFER OF CARE
"Anesthesia Transfer of Care Note    Patient: Gallo Maria Jr.    Procedure(s) Performed: Procedure(s) (LRB):  RESECTION, NEOPLASM, PITUITARY, TRANSSPHENOIDAL APPROACH (N/A)    Patient location: ICU    Anesthesia Type: general    Transport from OR: Transported from OR on 6-10 L/min O2 by face mask with adequate spontaneous ventilation. Continuous ECG monitoring in transport. Continuous SpO2 monitoring in transport. Continuos invasive BP monitoring in transport    Post pain: adequate analgesia    Post assessment: no apparent anesthetic complications and tolerated procedure well    Post vital signs: stable    Level of consciousness: sedated and responds to stimulation    Nausea/Vomiting: no nausea/vomiting    Complications: none    Transfer of care protocol was followed      Last vitals:   Visit Vitals  BP 95/55 (BP Location: Right arm, Patient Position: Lying)   Pulse 85   Temp 37.1 °C (98.7 °F) (Oral)   Resp 18   Ht 6' 3" (1.905 m)   Wt 113.9 kg (251 lb)   SpO2 95%   BMI 31.37 kg/m²     "

## 2023-04-19 NOTE — NURSING
9651 - Patient arrived to [facility] from OR     Report received from: CRNA, RN    Type of stroke/diagnosis:  pituitary adenoma / transsphenoidal resection    Current symptoms: recovering from anesthesia, moving extremeties x4, eyes opening spontaneously    Skin assessment done: Yes  Wounds noted: none    *If wounds noted, was Wound Care consulted? N/a    Elizondo Completed? pending    Patient Belongings on Admit: none    [facility] notified: MD Tresa at bedside on arrival to [facility]

## 2023-04-19 NOTE — ANESTHESIA POSTPROCEDURE EVALUATION
Anesthesia Post Evaluation    Patient: Gallo Maria JrAndre    Procedure(s) Performed: Procedure(s) (LRB):  RESECTION, NEOPLASM, PITUITARY, TRANSSPHENOIDAL APPROACH (N/A)    Final Anesthesia Type: general      Patient location during evaluation: ICU  Patient participation: No - Unable to Participate, Sedation  Level of consciousness: sedated  Post-procedure vital signs: reviewed and stable  Pain management: adequate  Airway patency: patent    PONV status at discharge: No PONV  Anesthetic complications: no      Cardiovascular status: stable  Respiratory status: unassisted and spontaneous ventilation  Hydration status: euvolemic  Follow-up not needed.          Vitals Value Taken Time   /60 04/19/23 1801     04/19/23 1807   Pulse 70 04/19/23 1807   Resp 14 04/19/23 1807   SpO2 96 % 04/19/23 1807   Vitals shown include unvalidated device data.      No case tracking events are documented in the log.      Pain/Francheska Score: No data recorded

## 2023-04-19 NOTE — H&P
[facility]  Neurocritical Care  History & Physical    Admit Date: 4/19/2023  Service Date: 04/19/2023  Length of Stay: 0    Subjective:     Chief Complaint: Pituitary macroadenoma    History of Present Illness: Gallo Maria Jr. 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     On arrival, patient with residual anesthesia. Arrived with OPA in place that was removed as patient began to wake up. PERRL, EOMi. Moves all extremities spontaneously and to basic commands.       Past Medical History:   Diagnosis Date    ADD (attention deficit disorder)     treated by outside psychiatrist.    Asthma     BPH with urinary obstruction 2/5/2015    Decreased libido 8/31/2015    Erectile dysfunction 10/27/2015    History of migraine headaches     Hypogonadism male 10/27/2015    Pituitary adenoma 8/9/2012     Past Surgical History:   Procedure Laterality Date    BIOPSY OF TONSILS Right 9/9/2019    Procedure: BIOPSY, TONSILS;  Surgeon: Bruno Umaña MD;  Location: [facility] OR [address] [place] [address];  Service: ENT;  Laterality: Right;    SURGICAL REMOVAL OF PITUITARY TUMOR BY TRANSSPHENOIDAL APPROACH  2015    Pituitary Adeonma via Dr. Blair 2015    WISDOM TOOTH EXTRACTION        No current facility-administered medications on file prior to encounter.     Current Outpatient Medications on File Prior to Encounter   Medication Sig Dispense Refill    vitamin D (VITAMIN D3) 1000 units Tab Take 1,000 Units by mouth once daily.      vitamin E, dl, acetate, 90 mg (200 unit) Cap TAKE 1 CAPSULE (200 UNITS TOTAL) BY MOUTH ONCE DAILY.      aspirin 81 MG Chew Take 1  tablet (81 mg total) by mouth once daily. 90 tablet 3    cabergoline (DOSTINEX) 0.5 mg tablet TAKE 1 TABLET (0.5 MG TOTAL) BY MOUTH TWICE A WEEK. (Patient not taking: Reported on 4/18/2023) 24 tablet 3    SOMATULINE DEPOT 90 mg/0.3 mL Syrg Inject 0.3 mLs (90 mg total) into the skin every 30 days. (Patient not taking: Reported on 3/20/2023) 0.9 mL 3    testosterone cypionate (DEPOTESTOTERONE CYPIONATE) 200 mg/mL injection Inject 1.5 mLs (300 mg total) into the muscle every 14 (fourteen) days. 10 mL 3      Allergies: Patient has no known allergies.    Family History   Problem Relation Age of Onset    Diabetes Maternal Grandmother     Diabetes Maternal Grandfather     Dementia Paternal Grandmother     Cancer Paternal Grandfather     Prostate cancer Paternal Grandfather     Hyperlipidemia Father     Benign prostatic hyperplasia Father     Cancer Father     Prostate cancer Paternal Aunt     Prostate cancer Paternal Uncle     No Known Problems Mother     No Known Problems Sister     No Known Problems Brother     No Known Problems Brother     Coronary artery disease Neg Hx      Social History     Tobacco Use    Smoking status: Never    Smokeless tobacco: Never   Substance Use Topics    Alcohol use: No    Drug use: No     Review of Systems   Reason unable to perform ROS: residual anesthesia.   Objective:     Vitals:    Temp: 98.7 °F (37.1 °C)  Pulse: 70  BP: 123/79  Resp: 18  SpO2: 98 %    Temp  Min: 98.7 °F (37.1 °C)  Max: 98.7 °F (37.1 °C)  Pulse  Min: 70  Max: 70  BP  Min: 123/79  Max: 123/79  Resp  Min: 18  Max: 18  SpO2  Min: 98 %  Max: 98 %    No intake/output data recorded.         Physical Exam  Vitals and nursing note reviewed.   Constitutional:       General: He is not in acute distress.     Appearance: He is not ill-appearing.   HENT:      Head: Normocephalic.      Nose:      Right Nostril: Foreign body and epistaxis present.      Left Nostril: Foreign body and epistaxis present.      Comments: Bilateral nasal  packing with blood drainage  Eyes:      Extraocular Movements: Extraocular movements intact.      Pupils: Pupils are equal, round, and reactive to light.   Cardiovascular:      Rate and Rhythm: Normal rate and regular rhythm.   Pulmonary:      Effort: Pulmonary effort is normal. No respiratory distress.      Comments: NRB, arrived with OPA in place  Abdominal:      General: Abdomen is flat. There is no distension.   Musculoskeletal:         General: No swelling or tenderness. Normal range of motion.      Cervical back: Normal range of motion. No rigidity.   Neurological:      Mental Status: He is alert.      Comments:   Alert, follows basic commands in all extremities  PERRL, EOMi  Face symmetric  Bilateral nasal packing   Moves all extremities antigravity, spontaneously and to basic commands     Unable to test orientation, language, memory, judgment, insight, fund of knowledge, hearing, shoulder shrug, tongue protrusion, coordination, gait due to level of consciousness.    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     - Admit to [facility]  - Q1h neurochecks while in ICU  - Q1h vitals while in ICU  -  HOB@30  - Strict nasal precautions   - CTH in AM  - SBP <140   - EKG reviewed  - Holding home ASA  - Daily CMP, Mag, Phos - replete electrolytes PRN  - Lipid panel, A1c, Coags (preoperative) reviewed  - Regular diet as appropriate   - Strict I/Os, monitor for evidence of increased UOP  - Q6 Na and Q6 UA  - Hydrocortisone taper per neurosurgery   - Daily CBC, transfuse PRN  - Start SubQ Heparin in when clinically indicated   - PT/OT/SLP as appropriate    Hyperprolactinemia  See Pituitary macroadenoma    Secondary male hypogonadism  See Pituitary macroadenoma    Elevated hemoglobin A1c - at risk for diabetes  A1c 5.7 3/22  Repeat pending    History of pituitary tumor  See Pituitary macroadenoma    Pituitary adenoma  See Pituitary macroadenoma          The patient is being Prophylaxed for:  Venous Thromboembolism with: Mechanical  Stress Ulcer with: H2B  Ventilator Pneumonia with: not applicable    Activity Orders            Elevate HOB starting at 04/19 1623          Full Code    Sheng Gtz MD  Neurocritical Care  [facility] - Neuro Critical [facility]

## 2023-04-19 NOTE — ASSESSMENT & PLAN NOTE
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     - Admit to [facility]  - Q1h neurochecks while in ICU  - Q1h vitals while in ICU  - HOB@30  - Strict nasal precautions   - CTH in AM  - SBP <140   - EKG reviewed  - Holding home ASA  - Daily CMP, Mag, Phos - replete electrolytes PRN  - Lipid panel, A1c, Coags (preoperative) reviewed  - Regular diet as appropriate   - Strict I/Os, monitor for evidence of increased UOP  - Q6 Na  - Hydrocortisone taper per neurosurgery   - Daily CBC, transfuse PRN  - Start SubQ Heparin in when clinically indicated   - PT/OT/SLP as appropriate

## 2023-04-19 NOTE — ANESTHESIA PROCEDURE NOTES
Intubation    Date/Time: 4/19/2023 11:07 AM  Performed by: Rey Montelongo MD  Authorized by: Gaurang Degroot MD     Intubation:     Induction:  Intravenous    Intubated:  Postinduction    Mask Ventilation:  Easy mask    Attempts:  1    Attempted By:  Resident anesthesiologist    Method of Intubation:  Direct    Blade:  Bonilla 2    Laryngeal View Grade: Grade IIb - only the arytenoids and epiglottis seen      Difficult Airway Encountered?: No      Complications:  Soft tissue trauma    Airway Device:  Oral endotracheal tube    Airway Device Size:  7.5    Style/Cuff Inflation:  Cuffed (inflated to minimal occlusive pressure)    Tube secured:  23    Secured at:  The lips    Placement Verified By:  Capnometry    Complicating Factors:  Anterior larynx and short neck    Findings Post-Intubation:  BS equal bilateral and atraumatic/condition of teeth unchanged

## 2023-04-19 NOTE — H&P
Gallo Maria is a very pleasant 41 yo male with a PMH of pituitary lesion demonstrating interval growth who presents for elective transphenoidal resection of lesion with ENT and Neurosurgery.     Review of Systems - General ROS: negative for - chills or fatigue  Psychological ROS: negative for - behavioral disorder or concentration difficulties  Allergy and Immunology ROS: negative for - nasal congestion or postnasal drip  Hematological and Lymphatic ROS: negative for - fatigue or jaundice  Endocrine ROS: negative for - hair pattern changes or hot flashes  Respiratory ROS: negative for - pleuritic pain or shortness of breath  Cardiovascular ROS: negative for - murmur or orthopnea  Gastrointestinal ROS: negative for - change in stools or constipation  Musculoskeletal ROS: negative for - joint swelling or muscle pain  Neurological ROS: negative for - confusion or dizziness      Exam:   GCS 15  Visual fields grossly intact to confrontation  FC x 4  SILT  CN intact    A/p: Transphenoidal resection of pituitary macroadenoma    --Patient evaluated prior to procedure  --All diagnostics and imaging reviewed  --Patient NPO since MN  --No anti-coag/plt medication in the last 72h  --Patient consented and all questions answered  --Further reccs to follow procedure    The following is the most recent H/P from neurosurgery clinic:   -------------------------------------------------------------------------------------------------    Neurosurgery  Established Patient     SUBJECTIVE:      History of Present Illness:  Mr. Maria is a 42-year-old male who is seeing me today in follow-up.  His last neurosurgery clinic appointment was on August 4, 2022 with Dr. Derek Blair.  He is seeing me as a 2nd opinion.  He initially was seen by Dr. Blair in 2015.  He had a known pituitary adenoma at that time.  Presented with increasing headaches and imaging at that time showed an increase in the size of the tumor with pressure on the optic  chiasm.  Given his symptoms and radiographic findings, he was taken to the operating room for a transnasal, transsphenoidal resection of pituitary adenoma.  There has been a question of whether this is a prolactinoma, but pathology from the 2015 resection showed a normal cell adenoma.  The patient had been doing well for several years.  However, more recently he complains of frontal headaches and feeling of pressure behind his eyes.  He states that his vision is stable and he denies any peripheral vision loss.  He is set to follow up with Ophthalmology in the near future.  The patient has been on Dostinex for a slightly elevated prolactin level.  He is seeing me today due to the fact that despite Dostinex there has been an increase in size of his pituitary adenoma on follow-up imaging studies.  Currently, he complains of some frontal pressure like headaches.  He denies visual changes.  Denies any galactorrhea.  He denies any signs or symptoms of CSF leak.     Review of patient's allergies indicates:  No Known Allergies     Current Medications          Current Outpatient Medications   Medication Sig Dispense Refill    aspirin 81 MG Chew Take 1 tablet (81 mg total) by mouth once daily. 90 tablet 3    cabergoline (DOSTINEX) 0.5 mg tablet TAKE 1 TABLET (0.5 MG TOTAL) BY MOUTH TWICE A WEEK. 24 tablet 3    pravastatin (PRAVACHOL) 20 MG tablet Take 1 tablet (20 mg total) by mouth once daily. (Patient not taking: Reported on 8/4/2022) 90 tablet 3    SANDOSTATIN LAR DEPOT 20 mg SSRR injection Inject 20 mg into the muscle every 30 days. for 90 doses 90 each 3    testosterone cypionate (DEPOTESTOTERONE CYPIONATE) 200 mg/mL injection INJECT 1 ML INTO MUSCLE EVERY 14 DAYS. 10 mL 3    vitamin D (VITAMIN D3) 1000 units Tab Take 1,000 Units by mouth once daily.        vitamin E, dl, acetate, 90 mg (200 unit) Cap TAKE 1 CAPSULE (200 UNITS TOTAL) BY MOUTH ONCE DAILY.                    Current Facility-Administered Medications    Medication Dose Route Frequency Provider Last Rate Last Admin    octreotide (SANDOSTATIN LAR) injection 20 mg  20 mg Intramuscular 1 time in Clinic/HOD Bhupendra Mckeon MD                     Past Medical History:   Diagnosis Date    ADD (attention deficit disorder)       treated by outside psychiatrist.    Asthma      BPH with urinary obstruction 2/5/2015    Decreased libido 8/31/2015    Erectile dysfunction 10/27/2015    History of migraine headaches      Hypogonadism male 10/27/2015    Pituitary adenoma 8/9/2012            Past Surgical History:   Procedure Laterality Date    BIOPSY OF TONSILS Right 9/9/2019     Procedure: BIOPSY, TONSILS;  Surgeon: Bruno Umaña MD;  Location: [facility] OR [address] [place] [address];  Service: ENT;  Laterality: Right;    SURGICAL REMOVAL OF PITUITARY TUMOR BY TRANSSPHENOIDAL APPROACH   2015     Pituitary Adeonma via Dr. Blair 2015    WISDOM TOOTH EXTRACTION          Family History         Problem Relation (Age of Onset)     Benign prostatic hyperplasia Father     Cancer Paternal Grandfather, Father     Dementia Paternal Grandmother     Diabetes Maternal Grandmother, Maternal Grandfather     Hyperlipidemia Father     No Known Problems Mother, Sister, Brother, Brother     Prostate cancer Paternal Grandfather, Paternal Aunt, Paternal Uncle             Social History            Socioeconomic History    Marital status:    Tobacco Use    Smoking status: Never    Smokeless tobacco: Never   Substance and Sexual Activity    Alcohol use: No    Drug use: No    Sexual activity: Yes       Partners: Female         Review of Systems     OBJECTIVE:      Vital Signs  There is no height or weight on file to calculate BMI.     Physical Exam:     Constitutional: He appears well-developed and well-nourished. No distress.      Eyes: Pupils are equal, round, and reactive to light. Conjunctivae and EOM are normal.      Cardiovascular: No edema.      Skin: Skin displays no rash on trunk and no rash on extremities.  Skin displays no lesions on trunk and no lesions on extremities.      Psych/Behavior: He is alert. He is oriented to person, place, and time. He has a normal mood and affect.      Musculoskeletal:        Neck: Range of motion is full. There is no tenderness. Tone is normal.        Back: Range of motion is full. There is no tenderness. Tone is normal.        Right Upper Extremities: Range of motion is full. There is no tenderness. Tone is normal.        Left Upper Extremities: Range of motion is full. There is no tenderness. Tone is normal.       Right Lower Extremities: Range of motion is full. There is no tenderness. Tone is normal.        Left Lower Extremities: Range of motion is full. There is no tenderness. Tone is normal.      Neurological:        Sensory: There is no sensory deficit in the trunk. There is no sensory deficit in the extremities.        Cranial nerves: Cranial nerve(s) II, III, IV, V, VI, VII, VIII, IX, X, XI and XII are intact.         Diagnostic Results:  He has an MRI with and without contrast of the brain available for review which I personally reviewed.  This shows a known pituitary adenoma.  When compared with his last MRI of the brain from May 2021, this has grown significantly in size.  It now measures 3.3 x 2.2 x 2.2 cm.  Previously it measured 2 x 2 x 2 cm.  There is now compression of the optic chiasm.     ASSESSMENT/PLAN:      Mr. Maria is a 42-year-old male who is seeing me as a 2nd opinion.  He had a pituitary adenoma resected in 2015.  Follow-up imaging studies have shown steady increase in the size of the pituitary adenoma.  From 2021 till now there has been a significant increase in the size of the recurrent pituitary adenoma and it is now having significant mass effect on the optic chiasm.  The patient is again having pressure-like headaches.  I counseled the patient that given the radiographic findings and significant compression of the optic chiasm, I would agree with   Johnnie that I would recommend resection.  The patient is still considering surgery at this time.  I counseled the patient that I would be involved with surgery with Dr. Blair as both Dr. Blair and I performed his 1st surgery.  The patient states that he will call back when he is ready to proceed with surgery.  He knows he can call with any further questions or concerns in the meantime.

## 2023-04-19 NOTE — PLAN OF CARE
[facility] Care Plan    POC reviewed with Gallo Maria Jr. and family at 1400. Pt verbalized understanding. Questions and concerns addressed. No acute events today. Pt progressing toward goals. Will continue to monitor. See below and flowsheets for full assessment and VS info.             Is this a stroke patient? no    Neuro:           24 hr Temp:  [98.7 °F (37.1 °C)]     CV:      BP goals:   SBP < 140  MAP > 65    Resp:           Plan: N/A    GI/:        Last Bowel Movement: 04/19/23       Intake/Output Summary (Last 24 hours) at 4/19/2023 1819  Last data filed at 4/19/2023 1700  Gross per 24 hour   Intake 3000 ml   Output 2455 ml   Net 545 ml          Labs/Accuchecks:  Recent Labs   Lab 04/19/23  0840   WBC 6.52   RBC 5.17   HGB 14.5   HCT 42.8         Recent Labs   Lab 04/19/23  0840 04/19/23  1741    137   K 4.0  --    CO2 28  --      --    BUN 12  --    CREATININE 0.9  --       Recent Labs   Lab 04/19/23  0840   INR 1.2   APTT 36.5*    No results for input(s): CPK, CPKMB, TROPONINI, MB in the last 168 hours.    Electrolytes: N/A - electrolytes WDL  Accuchecks: none    Gtts:   nicardipine         LDA/Wounds:  Lines/Drains/Airways       Drain  Duration                  Urethral Catheter 04/19/23 1109 Non-latex;Straight-tip;Silicone 16 Fr. <1 day              Arterial Line  Duration             Arterial Line 04/19/23 1140 Right Radial <1 day              Peripheral Intravenous Line  Duration                  Peripheral IV - Single Lumen 18 G Left Forearm -- days         Peripheral IV - Single Lumen 04/19/23 0840 18 G Posterior;Right Forearm <1 day                  Wounds: Yes  Wound care consulted: No

## 2023-04-19 NOTE — BRIEF OP NOTE
[facility] - Surgery ([facility])  Brief Operative Note  Neurosurgery    SUMMARY     Surgery Date: 4/19/2023     Surgeon(s) and Role:     * Vicky Blair MD - Primary     * Bruno Agrawal MD - Resident - Assisting     * Minh Alvarez MD - Resident - Assisting     * Trip Powell MD        Pre-op Diagnosis:  Pituitary adenoma [D35.2]    Post-op Diagnosis: Post-Op Diagnosis Codes:     * Pituitary adenoma [D35.2]    Procedure Performed:     Procedure(s) (LRB):  RESECTION, NEOPLASM, PITUITARY, TRANSSPHENOIDAL APPROACH (N/A)    Technical Procedures Used:   Transphenoidal microscopic Sublabial approach for pituitary mass resection    Operative Findings: see full op note    Estimated Blood Loss: 100cc         Specimens:   Specimen (24h ago, onward)       Start     Ordered    04/19/23 1439  Specimen to Pathology, Surgery Neurosurgery  Once        Comments: Pre-op Diagnosis: Pituitary adenoma [D35.2]Procedure(s):RESECTION, NEOPLASM, PITUITARY, TRANSSPHENOIDAL APPROACH Number of specimens: 2Name of specimens: 1) Sellar Tumor - Frozen                                  2) Sellar Tumor - Permanent     References:    Click here for ordering Quick Tip   Question Answer Comment   Procedure Type: Neurosurgery    Which provider would you like to cc? VICKY BLAIR    Release to patient Immediate        04/19/23 0201

## 2023-04-19 NOTE — ANESTHESIA PROCEDURE NOTES
Arterial    Diagnosis: pitutary adenoma    Patient location during procedure: done in OR  Procedure start time: 4/19/2023 11:40 AM  Timeout: 4/19/2023 11:40 AM  Procedure end time: 4/19/2023 11:50 AM    Staffing  Authorizing Provider: Gaurang Degroot MD  Performing Provider: Rey Montelongo MD    Anesthesiologist was present at the time of the procedure.    Preanesthetic Checklist  Completed: patient identified, IV checked, site marked, risks and benefits discussed, surgical consent, monitors and equipment checked, pre-op evaluation, timeout performed and anesthesia consent givenArterial  Skin Prep: chlorhexidine gluconate  Local Infiltration: none  Orientation: right  Location: radial    Catheter Size: 20 G  Catheter placement by Ultrasound guidance. Heme positive aspiration all ports.   Vessel Caliber: medium, patent  Needle advanced into vessel with real time Ultrasound guidance.Insertion Attempts: 2  Assessment  Dressing: secured with tape and tegaderm  Patient: Tolerated well

## 2023-04-20 LAB
ALBUMIN SERPL BCP-MCNC: 3.9 G/DL (ref 3.5–5.2)
ALP SERPL-CCNC: 41 U/L (ref 55–135)
ALT SERPL W/O P-5'-P-CCNC: 38 U/L (ref 10–44)
ANION GAP SERPL CALC-SCNC: 11 MMOL/L (ref 8–16)
AST SERPL-CCNC: 35 U/L (ref 10–40)
BACTERIA #/AREA URNS AUTO: ABNORMAL /HPF
BACTERIA #/AREA URNS AUTO: NORMAL /HPF
BASOPHILS # BLD AUTO: 0.03 K/UL (ref 0–0.2)
BASOPHILS NFR BLD: 0.2 % (ref 0–1.9)
BILIRUB SERPL-MCNC: 0.4 MG/DL (ref 0.1–1)
BILIRUB UR QL STRIP: NEGATIVE
BUN SERPL-MCNC: 8 MG/DL (ref 6–20)
CALCIUM SERPL-MCNC: 8 MG/DL (ref 8.7–10.5)
CHLORIDE SERPL-SCNC: 104 MMOL/L (ref 95–110)
CLARITY UR REFRACT.AUTO: CLEAR
CO2 SERPL-SCNC: 20 MMOL/L (ref 23–29)
COLOR UR AUTO: COLORLESS
COLOR UR AUTO: COLORLESS
COLOR UR AUTO: YELLOW
COLOR UR AUTO: YELLOW
CREAT SERPL-MCNC: 1 MG/DL (ref 0.5–1.4)
DIFFERENTIAL METHOD: ABNORMAL
EOSINOPHIL # BLD AUTO: 0 K/UL (ref 0–0.5)
EOSINOPHIL NFR BLD: 0 % (ref 0–8)
ERYTHROCYTE [DISTWIDTH] IN BLOOD BY AUTOMATED COUNT: 14.2 % (ref 11.5–14.5)
EST. GFR  (NO RACE VARIABLE): >60 ML/MIN/1.73 M^2
GLUCOSE SERPL-MCNC: 152 MG/DL (ref 70–110)
GLUCOSE UR QL STRIP: ABNORMAL
GLUCOSE UR QL STRIP: NEGATIVE
HCT VFR BLD AUTO: 43.6 % (ref 40–54)
HGB BLD-MCNC: 14.6 G/DL (ref 14–18)
HGB UR QL STRIP: ABNORMAL
HGB UR QL STRIP: ABNORMAL
HGB UR QL STRIP: NEGATIVE
HGB UR QL STRIP: NEGATIVE
IMM GRANULOCYTES # BLD AUTO: 0.03 K/UL (ref 0–0.04)
IMM GRANULOCYTES NFR BLD AUTO: 0.2 % (ref 0–0.5)
KETONES UR QL STRIP: ABNORMAL
KETONES UR QL STRIP: NEGATIVE
LEUKOCYTE ESTERASE UR QL STRIP: ABNORMAL
LEUKOCYTE ESTERASE UR QL STRIP: NEGATIVE
LYMPHOCYTES # BLD AUTO: 0.9 K/UL (ref 1–4.8)
LYMPHOCYTES NFR BLD: 5.7 % (ref 18–48)
MAGNESIUM SERPL-MCNC: 1.7 MG/DL (ref 1.6–2.6)
MCH RBC QN AUTO: 28.1 PG (ref 27–31)
MCHC RBC AUTO-ENTMCNC: 33.5 G/DL (ref 32–36)
MCV RBC AUTO: 84 FL (ref 82–98)
MICROSCOPIC COMMENT: ABNORMAL
MICROSCOPIC COMMENT: NORMAL
MONOCYTES # BLD AUTO: 0.7 K/UL (ref 0.3–1)
MONOCYTES NFR BLD: 4.6 % (ref 4–15)
NEUTROPHILS # BLD AUTO: 14.4 K/UL (ref 1.8–7.7)
NEUTROPHILS NFR BLD: 89.3 % (ref 38–73)
NITRITE UR QL STRIP: NEGATIVE
NRBC BLD-RTO: 0 /100 WBC
PH UR STRIP: 6 [PH] (ref 5–8)
PH UR STRIP: 7 [PH] (ref 5–8)
PHOSPHATE SERPL-MCNC: 2.8 MG/DL (ref 2.7–4.5)
PLATELET # BLD AUTO: 290 K/UL (ref 150–450)
PMV BLD AUTO: 9.8 FL (ref 9.2–12.9)
POTASSIUM SERPL-SCNC: 4.2 MMOL/L (ref 3.5–5.1)
PROT SERPL-MCNC: 7 G/DL (ref 6–8.4)
PROT UR QL STRIP: NEGATIVE
RBC # BLD AUTO: 5.19 M/UL (ref 4.6–6.2)
RBC #/AREA URNS AUTO: 3 /HPF (ref 0–4)
RBC #/AREA URNS AUTO: 4 /HPF (ref 0–4)
SODIUM SERPL-SCNC: 135 MMOL/L (ref 136–145)
SODIUM SERPL-SCNC: 138 MMOL/L (ref 136–145)
SODIUM SERPL-SCNC: 139 MMOL/L (ref 136–145)
SODIUM SERPL-SCNC: 140 MMOL/L (ref 136–145)
SP GR UR STRIP: 1 (ref 1–1.03)
SP GR UR STRIP: 1.01 (ref 1–1.03)
SP GR UR STRIP: 1.01 (ref 1–1.03)
SP GR UR STRIP: 1.02 (ref 1–1.03)
URN SPEC COLLECT METH UR: ABNORMAL
WBC # BLD AUTO: 16.09 K/UL (ref 3.9–12.7)
WBC #/AREA URNS AUTO: 0 /HPF (ref 0–5)
WBC #/AREA URNS AUTO: 13 /HPF (ref 0–5)
YEAST UR QL AUTO: NORMAL

## 2023-04-20 PROCEDURE — 84295 ASSAY OF SERUM SODIUM: CPT | Performed by: STUDENT IN AN ORGANIZED HEALTH CARE EDUCATION/TRAINING PROGRAM

## 2023-04-20 PROCEDURE — 63600175 PHARM REV CODE 636 W HCPCS: Performed by: STUDENT IN AN ORGANIZED HEALTH CARE EDUCATION/TRAINING PROGRAM

## 2023-04-20 PROCEDURE — 25000003 PHARM REV CODE 250: Performed by: STUDENT IN AN ORGANIZED HEALTH CARE EDUCATION/TRAINING PROGRAM

## 2023-04-20 PROCEDURE — 99900035 HC TECH TIME PER 15 MIN (STAT)

## 2023-04-20 PROCEDURE — 94761 N-INVAS EAR/PLS OXIMETRY MLT: CPT

## 2023-04-20 PROCEDURE — 84100 ASSAY OF PHOSPHORUS: CPT | Performed by: STUDENT IN AN ORGANIZED HEALTH CARE EDUCATION/TRAINING PROGRAM

## 2023-04-20 PROCEDURE — 99291 CRITICAL CARE FIRST HOUR: CPT | Mod: ,,, | Performed by: INTERNAL MEDICINE

## 2023-04-20 PROCEDURE — 81001 URINALYSIS AUTO W/SCOPE: CPT | Performed by: STUDENT IN AN ORGANIZED HEALTH CARE EDUCATION/TRAINING PROGRAM

## 2023-04-20 PROCEDURE — 85025 COMPLETE CBC W/AUTO DIFF WBC: CPT | Performed by: STUDENT IN AN ORGANIZED HEALTH CARE EDUCATION/TRAINING PROGRAM

## 2023-04-20 PROCEDURE — 99291 PR CRITICAL CARE, E/M 30-74 MINUTES: ICD-10-PCS | Mod: ,,, | Performed by: INTERNAL MEDICINE

## 2023-04-20 PROCEDURE — 20000000 HC ICU ROOM

## 2023-04-20 PROCEDURE — 83735 ASSAY OF MAGNESIUM: CPT | Performed by: STUDENT IN AN ORGANIZED HEALTH CARE EDUCATION/TRAINING PROGRAM

## 2023-04-20 PROCEDURE — 81003 URINALYSIS AUTO W/O SCOPE: CPT | Mod: 91 | Performed by: STUDENT IN AN ORGANIZED HEALTH CARE EDUCATION/TRAINING PROGRAM

## 2023-04-20 PROCEDURE — 80053 COMPREHEN METABOLIC PANEL: CPT | Performed by: STUDENT IN AN ORGANIZED HEALTH CARE EDUCATION/TRAINING PROGRAM

## 2023-04-20 RX ADMIN — HYDROCORTISONE SODIUM SUCCINATE 100 MG: 100 INJECTION, POWDER, FOR SOLUTION INTRAMUSCULAR; INTRAVENOUS at 08:04

## 2023-04-20 RX ADMIN — FAMOTIDINE 20 MG: 20 TABLET ORAL at 08:04

## 2023-04-20 RX ADMIN — CEFAZOLIN 1 G: 1 INJECTION, POWDER, FOR SOLUTION INTRAMUSCULAR; INTRAVENOUS at 01:04

## 2023-04-20 RX ADMIN — MORPHINE SULFATE 1 MG: 2 INJECTION, SOLUTION INTRAMUSCULAR; INTRAVENOUS at 02:04

## 2023-04-20 RX ADMIN — OXYCODONE 5 MG: 5 TABLET ORAL at 10:04

## 2023-04-20 RX ADMIN — MAGNESIUM SULFATE 2 G: 2 INJECTION INTRAVENOUS at 05:04

## 2023-04-20 NOTE — SUBJECTIVE & OBJECTIVE
Interval History: No acute changes in vision this morning. Mild nasal pain. Packs pulled.    Medications:  Continuous Infusions:   nicardipine       Scheduled Meds:   famotidine  20 mg Oral BID    hydrocortisone sodium succinate  100 mg Intravenous BID    [START ON 4/21/2023] hydrocortisone sodium succinate  50 mg Intravenous BID     PRN Meds:calcium gluconate IVPB, calcium gluconate IVPB, calcium gluconate IVPB, magnesium sulfate IVPB, magnesium sulfate IVPB, morphine, ondansetron, oxyCODONE, potassium chloride **AND** potassium chloride **AND** potassium chloride, sodium chloride 0.9%, sodium phosphate IVPB, sodium phosphate IVPB, sodium phosphate IVPB     Review of patient's allergies indicates:  No Known Allergies  Objective:     Vital Signs (24h Range):  Temp:  [97.5 °F (36.4 °C)-98.7 °F (37.1 °C)] 97.9 °F (36.6 °C)  Pulse:  [65-89] 71  Resp:  [12-19] 19  SpO2:  [96 %-100 %] 100 %  BP: (119-143)/(60-80) 142/68  Arterial Line BP: ()/(63-75) 133/71       Lines/Drains/Airways       Drain  Duration                  Urethral Catheter 04/19/23 1109 Non-latex;Straight-tip;Silicone 16 Fr. <1 day              Arterial Line  Duration             Arterial Line 04/19/23 1140 Right Radial <1 day              Peripheral Intravenous Line  Duration                  Peripheral IV - Single Lumen 18 G Left Forearm -- days         Peripheral IV - Single Lumen 04/19/23 0840 18 G Posterior;Right Forearm <1 day                    Physical Exam  NAD  Nasal packs pulled this morning  Normal work of breathing    Significant Labs:  CBC:   Recent Labs   Lab 04/20/23  0006   WBC 16.09*   RBC 5.19   HGB 14.6   HCT 43.6      MCV 84   MCH 28.1   MCHC 33.5     CMP:   Recent Labs   Lab 04/20/23  0006 04/20/23  0601   *  --    CALCIUM 8.0*  --    ALBUMIN 3.9  --    PROT 7.0  --    * 140   K 4.2  --    CO2 20*  --      --    BUN 8  --    CREATININE 1.0  --    ALKPHOS 41*  --    ALT 38  --    AST 35  --    BILITOT  0.4  --

## 2023-04-20 NOTE — PROGRESS NOTES
[facility] - Neuro Critical Care  Neurosurgery  Progress Note    Subjective:     History of Present Illness: 4/20: POD 1 s/p transphenoidal resection of pituitary mass. I/O appropriate, . No evidence of DI. CT head with some hemorrhage in resection cavity, patient doing well with improving vision, packing removed by ENT.       Post-Op Info:  Procedure(s) (LRB):  RESECTION, NEOPLASM, PITUITARY, TRANSSPHENOIDAL APPROACH (N/A)   1 Day Post-Op     Interval History: 4/20: POD 1 s/p transphenoidal resection of pituitary mass. I/O appropriate, . No evidence of DI. CT head with some hemorrhage in resection cavity, patient doing well with improving vision, packing removed by ENT.     Medications:  Continuous Infusions:   nicardipine       Scheduled Meds:   famotidine  20 mg Oral BID    hydrocortisone sodium succinate  100 mg Intravenous BID    [START ON 4/21/2023] hydrocortisone sodium succinate  50 mg Intravenous BID     PRN Meds:calcium gluconate IVPB, calcium gluconate IVPB, calcium gluconate IVPB, magnesium sulfate IVPB, magnesium sulfate IVPB, morphine, ondansetron, oxyCODONE, potassium chloride **AND** potassium chloride **AND** potassium chloride, sodium chloride 0.9%, sodium phosphate IVPB, sodium phosphate IVPB, sodium phosphate IVPB     Review of Systems  Objective:     Weight: 113.9 kg (251 lb)  Body mass index is 31.37 kg/m².  Vital Signs (Most Recent):  Temp: 97.9 °F (36.6 °C) (04/20/23 0305)  Pulse: 71 (04/20/23 0605)  Resp: 19 (04/20/23 0605)  BP: (!) 142/68 (04/20/23 0305)  SpO2: 100 % (04/20/23 0605)   Vital Signs (24h Range):  Temp:  [97.5 °F (36.4 °C)-98.7 °F (37.1 °C)] 97.9 °F (36.6 °C)  Pulse:  [65-89] 71  Resp:  [12-19] 19  SpO2:  [96 %-100 %] 100 %  BP: (119-143)/(60-80) 142/68  Arterial Line BP: ()/(63-75) 133/71                              Urethral Catheter 04/19/23 1109 Non-latex;Straight-tip;Silicone 16 Fr. (Active)   Site Assessment Clean;Intact 04/20/23 3983   Collection  "Container Urimeter 04/20/23 0305   Securement Method secured to top of thigh w/ adhesive device 04/20/23 0305   Catheter Care Performed yes 04/20/23 0305   Reason for Continuing Urinary Catheterization Critically ill in ICU and requiring hourly monitoring of intake/output 04/20/23 0305   CAUTI Prevention Bundle Securement Device in place with 1" slack;Intact seal between catheter & drainage tubing;Drainage bag/urimeter off the floor;Sheeting clip in use;No dependent loops or kinks;Drainage bag/urimeter not overfilled (<2/3 full);Drainage bag/urimeter below bladder 04/19/23 1905   Output (mL) 137 mL 04/20/23 0605       Physical Exam    Neurosurgery Physical Exam  General: well developed, well nourished, no distress.   Head: normocephalic, atraumatic  Neurologic:   GCS: Eyes: 4/ Verbal: 5/ Motor: 6  Mental Status: Awake, Alert, Oriented x 3  Nasal dressing in place, packing removed.   Visual fields present and full to confrontation.   Cranial nerves: PERRL, EOMI, face symmetric, tongue midline, shoulder shrug equal.  No pronator drift, no dysmetria.  Sensory: intact to light touch throughout  Motor Strength:Moves all extremities antigravity  DTR: 2+ symmetrically throughout.    Pulmonary: normal respirations, no signs of respiratory distress  Abdomen: soft, non-distended, not tender to palpation                         Significant Labs:  Recent Labs   Lab 04/18/23  1434 04/19/23  0840 04/19/23  1741 04/20/23  0006 04/20/23  0601   GLU 88 85  --  152*  --     139 137 135* 140   K 3.8 4.0  --  4.2  --     102  --  104  --    CO2 26 28  --  20*  --    BUN 12 12  --  8  --    CREATININE 1.0 0.9  --  1.0  --    CALCIUM 9.0 9.3  --  8.0*  --    MG  --   --   --  1.7  --      Recent Labs   Lab 04/18/23  1434 04/19/23  0840 04/20/23  0006   WBC 7.81 6.52 16.09*   HGB 15.3 14.5 14.6   HCT 46.2 42.8 43.6    196 290     Recent Labs   Lab 04/19/23  0840   INR 1.2   APTT 36.5*     Microbiology Results (last 7 " days)       ** No results found for the last 168 hours. **          All pertinent labs from the last 24 hours have been reviewed.    Significant Diagnostics:  I have reviewed all pertinent imaging results/findings within the past 24 hours.    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 Now s/p translabial transphenoidal pituitary mass resection (4/19).     POD 1.     --Admitted to neuro ICU  --Q1 hour neurochecks focusing on vision  --SBP < 140  --Repeat CT head 4/21 AM  --Hydrocortisone taper (ordered)  --pepcid for steroid ppx  --DI watch, drink to thirst  --PT/OT  --ADAT  --Nasal precautions    D/w staff, Dr. Johnnie Agrawal MD  Neurosurgery  [facility] - Neuro Critical Care

## 2023-04-20 NOTE — ASSESSMENT & PLAN NOTE
42 year old male s/p pituitary tumor resection via sublabial transphenoid approach.    - Packs pulled this morning  - Nasal saline PRN  - Call ENT with questions

## 2023-04-20 NOTE — ASSESSMENT & PLAN NOTE
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 Now s/p translabial transphenoidal pituitary mass resection (4/19).     POD 1.     --Admitted to neuro ICU  --Q1 hour neurochecks focusing on vision  --SBP < 140  --Repeat CT head 4/21 AM  --Hydrocortisone taper (ordered)  --pepcid for steroid ppx  --DI watch, drink to thirst  --PT/OT  --ADAT  --Nasal precautions    D/w staff, Dr. Blair

## 2023-04-20 NOTE — HOSPITAL COURSE
04/20/2023 NAEON. CTH with blood noted in the resection bed. Nasal packing removed. UOP and Na stable.   04/21/2023 NAEON. CTH stable. Discuss step down with Neurosurgery.   04/22/2023 NAEON. UOP, Na, and UA not suggestive of DI. Removed porter. Added senna and miralax. Q4h neuro checks. Step down to nsgy.   04/23/2023 NAEON. Discharge home.

## 2023-04-20 NOTE — SUBJECTIVE & OBJECTIVE
Subjective:     Interval History: As above.     Review of Systems   Constitutional:  Positive for fatigue. Negative for chills and fever.   HENT:  Positive for nosebleeds and postnasal drip.    Eyes:  Negative for photophobia and visual disturbance.   Respiratory:  Negative for chest tightness and shortness of breath.    Cardiovascular:  Negative for chest pain and palpitations.   Gastrointestinal:  Negative for diarrhea and nausea.   Musculoskeletal:  Negative for neck pain and neck stiffness.   Neurological:  Positive for headaches. Negative for dizziness, tremors, facial asymmetry, speech difficulty and weakness.   Psychiatric/Behavioral:  Negative for agitation and confusion.    All other systems reviewed and are negative.    Objective:     Vitals:    Temp: 97.9 °F (36.6 °C)  Pulse: 71  Rhythm: normal sinus rhythm  BP: (!) 142/68  MAP (mmHg): 95  Resp: 19  SpO2: 100 %    Temp  Min: 97.5 °F (36.4 °C)  Max: 98.7 °F (37.1 °C)  Pulse  Min: 65  Max: 89  BP  Min: 119/62  Max: 143/67  MAP (mmHg)  Min: 85  Max: 100  Resp  Min: 12  Max: 19  SpO2  Min: 96 %  Max: 100 %    04/19 0701 - 04/20 0700  In: 3207.4 [I.V.:108.7]  Out: 3809 [Urine:3809]         Physical Exam  Vitals and nursing note reviewed.   Constitutional:       General: He is not in acute distress.     Appearance: He is not ill-appearing.   HENT:      Head: Normocephalic.      Nose:      Right Nostril: Foreign body and epistaxis present.      Left Nostril: Foreign body and epistaxis present.   Eyes:      Extraocular Movements: Extraocular movements intact.      Pupils: Pupils are equal, round, and reactive to light.   Cardiovascular:      Rate and Rhythm: Normal rate and regular rhythm.   Pulmonary:      Effort: Pulmonary effort is normal. No respiratory distress.   Abdominal:      General: Abdomen is flat. There is no distension.   Musculoskeletal:         General: No swelling or tenderness. Normal range of motion.      Cervical back: Normal range of  motion. No rigidity.   Neurological:      General: No focal deficit present.      Mental Status: He is alert and oriented to person, place, and time. Mental status is at baseline.      Comments:   Alert, oriented to person, place and time  Follows commands in all extremities  PERRL, EOMi  Face symmetric  Moves all extremities antigravity, spontaneously and to command  Strength full x4  Sensation intact     Today I personally reviewed pertinent medications, lines/drains/airways, imaging, cardiology results, laboratory results, microbiology results, notably:    UOP and Na stable  Post op CTH stable noting blood products at resection site  Pre operative MRI with interval increase in size of the presumed pituitary macro adenoma

## 2023-04-20 NOTE — PLAN OF CARE
[facility] Care Plan    POC reviewed with Gallo Maria Jr. and family at 1400. Pt verbalized understanding. Questions and concerns addressed. No acute events today. Pt progressing toward goals. Will continue to monitor. See below and flowsheets for full assessment and VS info.             Is this a stroke patient? no    Neuro:  [place] Coma Scale  Best Eye Response: 4-->(E4) spontaneous  Best Motor Response: 6-->(M6) obeys commands  Best Verbal Response: 5-->(V5) oriented  [place] Coma Scale Score: 15  Pupil PERRLA: yes     24 hr Temp:  [97.5 °F (36.4 °C)-98.9 °F (37.2 °C)]     CV:   Rhythm: normal sinus rhythm  BP goals:   SBP < 140  MAP > 65    Resp:           Plan: N/A    GI/:     Diet/Nutrition Received: regular  Last Bowel Movement: 04/19/23  Voiding Characteristics: urethral catheter (bladder)    Intake/Output Summary (Last 24 hours) at 4/20/2023 1624  Last data filed at 4/20/2023 1501  Gross per 24 hour   Intake 1227.36 ml   Output 3104 ml   Net -1876.64 ml          Labs/Accuchecks:  Recent Labs   Lab 04/20/23  0006   WBC 16.09*   RBC 5.19   HGB 14.6   HCT 43.6         Recent Labs   Lab 04/20/23  0006 04/20/23  0601 04/20/23  1118   *   < > 138   K 4.2  --   --    CO2 20*  --   --      --   --    BUN 8  --   --    CREATININE 1.0  --   --    ALKPHOS 41*  --   --    ALT 38  --   --    AST 35  --   --    BILITOT 0.4  --   --     < > = values in this interval not displayed.      Recent Labs   Lab 04/19/23  0840   INR 1.2   APTT 36.5*    No results for input(s): CPK, CPKMB, TROPONINI, MB in the last 168 hours.    Electrolytes: N/A - electrolytes WDL  Accuchecks: none    Gtts:      LDA/Wounds:  Lines/Drains/Airways       Drain  Duration                  Urethral Catheter 04/19/23 1109 Non-latex;Straight-tip;Silicone 16 Fr. 1 day              Arterial Line  Duration             Arterial Line 04/19/23 1140 Right Radial 1 day              Peripheral Intravenous Line  Duration                   Peripheral IV - Single Lumen 18 G Left Forearm -- days         Peripheral IV - Single Lumen 04/19/23 0840 18 G Posterior;Right Forearm 1 day                  Wounds: No  Wound care consulted: No   Problem: Adult Inpatient Plan of Care  Goal: Plan of Care Review  Outcome: Ongoing, Progressing  Goal: Patient-Specific Goal (Individualized)  Outcome: Ongoing, Progressing  Goal: Absence of Hospital-Acquired Illness or Injury  Outcome: Ongoing, Progressing  Goal: Optimal Comfort and Wellbeing  Outcome: Ongoing, Progressing  Goal: Readiness for Transition of Care  Outcome: Ongoing, Progressing     Problem: Infection  Goal: Absence of Infection Signs and Symptoms  Outcome: Ongoing, Progressing     Problem: Skin Injury Risk Increased  Goal: Skin Health and Integrity  Outcome: Ongoing, Progressing

## 2023-04-20 NOTE — NURSING
[facility] Care Plan    POC reviewed with Gallo Maria Jr. At 0300. Pt verbalized understanding. Questions and concerns addressed. No acute events overnight. Pt progressing toward goals. Will continue to monitor. See below and flowsheets for full assessment and VS info.     -quinn attempt failed  -CTH done  -morphine PRN given  -still with bleeding at nostril, applied gauze as reinforcement done  --nasal packing removed by ENT  -magnesium replaced        Is this a stroke patient? no    Neuro:  [place] Coma Scale  Best Eye Response: 3-->(E3) to speech  Best Motor Response: 6-->(M6) obeys commands  Best Verbal Response: 5-->(V5) oriented  Wilder Coma Scale Score: 14  Pupil PERRLA: yes     24hr Temp:  [97.5 °F (36.4 °C)-98.7 °F (37.1 °C)]     CV:   Rhythm: normal sinus rhythm  BP goals:   SBP < 140  MAP > 65    Resp:           Plan: N/A    GI/:     Diet/Nutrition Received: NPO  Last Bowel Movement: 04/19/23  Voiding Characteristics: urethral catheter (bladder)    Intake/Output Summary (Last 24 hours) at 4/20/2023 0459  Last data filed at 4/20/2023 0405  Gross per 24 hour   Intake 3207.36 ml   Output 3535 ml   Net -327.64 ml          Labs/Accuchecks:  Recent Labs   Lab 04/20/23  0006   WBC 16.09*   RBC 5.19   HGB 14.6   HCT 43.6         Recent Labs   Lab 04/20/23  0006   *   K 4.2   CO2 20*      BUN 8   CREATININE 1.0   ALKPHOS 41*   ALT 38   AST 35   BILITOT 0.4      Recent Labs   Lab 04/19/23  0840   INR 1.2   APTT 36.5*    No results for input(s): CPK, CPKMB, TROPONINI, MB in the last 168 hours.    Electrolytes: Electrolytes replaced  Accuchecks: none    Gtts:   nicardipine         LDA/Wounds:  Lines/Drains/Airways       Drain  Duration                  Urethral Catheter 04/19/23 1109 Non-latex;Straight-tip;Silicone 16 Fr. <1 day              Arterial Line  Duration             Arterial Line 04/19/23 1140 Right Radial <1 day              Peripheral Intravenous Line  Duration                   Peripheral IV - Single Lumen 18 G Left Forearm -- days         Peripheral IV - Single Lumen 04/19/23 0840 18 G Posterior;Right Forearm <1 day                  Wounds: No  Wound care consulted: No

## 2023-04-20 NOTE — HPI
4/20: POD 1 s/p transphenoidal resection of pituitary mass. I/O appropriate, . No evidence of DI. CT head with some hemorrhage in resection cavity, patient doing well with improving vision, packing removed by ENT.

## 2023-04-20 NOTE — PROGRESS NOTES
[facility]  Otorhinolaryngology-Head & Neck Surgery  Progress Note    Subjective:     Post-Op Info:  Procedure(s) (LRB):  RESECTION, NEOPLASM, PITUITARY, TRANSSPHENOIDAL APPROACH (N/A)   1 Day Post-Op  Hospital Day: 2     Interval History: No acute changes in vision this morning. Mild nasal pain. Packs pulled.    Medications:  Continuous Infusions:   nicardipine       Scheduled Meds:   famotidine  20 mg Oral BID    hydrocortisone sodium succinate  100 mg Intravenous BID    [START ON 4/21/2023] hydrocortisone sodium succinate  50 mg Intravenous BID     PRN Meds:calcium gluconate IVPB, calcium gluconate IVPB, calcium gluconate IVPB, magnesium sulfate IVPB, magnesium sulfate IVPB, morphine, ondansetron, oxyCODONE, potassium chloride **AND** potassium chloride **AND** potassium chloride, sodium chloride 0.9%, sodium phosphate IVPB, sodium phosphate IVPB, sodium phosphate IVPB     Review of patient's allergies indicates:  No Known Allergies  Objective:     Vital Signs (24h Range):  Temp:  [97.5 °F (36.4 °C)-98.7 °F (37.1 °C)] 97.9 °F (36.6 °C)  Pulse:  [65-89] 71  Resp:  [12-19] 19  SpO2:  [96 %-100 %] 100 %  BP: (119-143)/(60-80) 142/68  Arterial Line BP: ()/(63-75) 133/71       Lines/Drains/Airways       Drain  Duration                  Urethral Catheter 04/19/23 1109 Non-latex;Straight-tip;Silicone 16 Fr. <1 day              Arterial Line  Duration             Arterial Line 04/19/23 1140 Right Radial <1 day              Peripheral Intravenous Line  Duration                  Peripheral IV - Single Lumen 18 G Left Forearm -- days         Peripheral IV - Single Lumen 04/19/23 0840 18 G Posterior;Right Forearm <1 day                    Physical Exam  NAD  Nasal packs pulled this morning  Normal work of breathing    Significant Labs:  CBC:   Recent Labs   Lab 04/20/23  0006   WBC 16.09*   RBC 5.19   HGB 14.6   HCT 43.6      MCV 84   MCH 28.1   MCHC 33.5     CMP:   Recent Labs   Lab  04/20/23  0006 04/20/23  0601   *  --    CALCIUM 8.0*  --    ALBUMIN 3.9  --    PROT 7.0  --    * 140   K 4.2  --    CO2 20*  --      --    BUN 8  --    CREATININE 1.0  --    ALKPHOS 41*  --    ALT 38  --    AST 35  --    BILITOT 0.4  --            Assessment/Plan:     History of pituitary tumor  42 year old male s/p pituitary tumor resection via sublabial transphenoid approach.    - Packs pulled this morning  - Nasal saline PRN  - Call ENT with questions        Minh Alvarez MD  Otorhinolaryngology-Head & Neck Surgery  [facility]

## 2023-04-20 NOTE — PROGRESS NOTES
[facility]  Neurocritical Care  Progress Note    Admit Date: 4/19/2023  Service Date: 04/20/2023  Length of Stay: 1    Subjective:     Chief Complaint: Pituitary macroadenoma    History of Present Illness: Gallo Maria Jr. 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 for post operative monitoring following repeat transphenoidal resection.     On arrival, patient with residual anesthesia. Arrived with OPA in place that was removed as patient began to wake up. PERRL, EOMi. Moves all extremities spontaneously and to basic commands.       Hospital Course: 04/20/2023 NAEON. CTH with blood noted in the resection bed. Nasal packing removed. UOP and Na stable.         Subjective:     Interval History: As above.     Review of Systems   Constitutional:  Positive for fatigue. Negative for chills and fever.   HENT:  Positive for nosebleeds and postnasal drip.    Eyes:  Negative for photophobia and visual disturbance.   Respiratory:  Negative for chest tightness and shortness of breath.    Cardiovascular:  Negative for chest pain and palpitations.   Gastrointestinal:  Negative for diarrhea and nausea.   Musculoskeletal:  Negative for neck pain and neck stiffness.   Neurological:  Positive for headaches. Negative for dizziness, tremors, facial asymmetry, speech difficulty and weakness.   Psychiatric/Behavioral:  Negative for agitation and confusion.    All other systems reviewed and are negative.    Objective:     Vitals:    Temp: 97.9 °F (36.6 °C)  Pulse: 71  Rhythm: normal sinus rhythm  BP: (!) 142/68  MAP (mmHg): 95  Resp: 19  SpO2: 100 %    Temp  Min: 97.5 °F (36.4 °C)  Max: 98.7 °F (37.1  °C)  Pulse  Min: 65  Max: 89  BP  Min: 119/62  Max: 143/67  MAP (mmHg)  Min: 85  Max: 100  Resp  Min: 12  Max: 19  SpO2  Min: 96 %  Max: 100 %    04/19 0701 - 04/20 0700  In: 3207.4 [I.V.:108.7]  Out: 3809 [Urine:3809]         Physical Exam  Vitals and nursing note reviewed.   Constitutional:       General: He is not in acute distress.     Appearance: He is not ill-appearing.   HENT:      Head: Normocephalic.      Nose:      Right Nostril: Foreign body and epistaxis present.      Left Nostril: Foreign body and epistaxis present.   Eyes:      Extraocular Movements: Extraocular movements intact.      Pupils: Pupils are equal, round, and reactive to light.   Cardiovascular:      Rate and Rhythm: Normal rate and regular rhythm.   Pulmonary:      Effort: Pulmonary effort is normal. No respiratory distress.   Abdominal:      General: Abdomen is flat. There is no distension.   Musculoskeletal:         General: No swelling or tenderness. Normal range of motion.      Cervical back: Normal range of motion. No rigidity.   Neurological:      General: No focal deficit present.      Mental Status: He is alert and oriented to person, place, and time. Mental status is at baseline.      Comments:   Alert, oriented to person, place and time  Follows commands in all extremities  PERRL, EOMi  Face symmetric  Moves all extremities antigravity, spontaneously and to command  Strength full x4  Sensation intact     Today I personally reviewed pertinent medications, lines/drains/airways, imaging, cardiology results, laboratory results, microbiology results, notably:    UOP and Na stable  Post op CTH stable noting blood products at resection site  Pre operative MRI with interval increase in size of the presumed pituitary macro adenoma    Assessment/Plan:     Cardiac/Vascular  Hypercholesterolemia  Continue home pravastatin     Mixed hyperlipidemia  Continue home pravastatin     High serum low-density lipoprotein (LDL)  Continue home  pravastatin     Endocrine  * Pituitary macroadenoma  42M with history of 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     - Q1h neurochecks while in ICU  - Q1h vitals while in ICU  - HOB@30  - Strict nasal precautions   - CTH reveiwed  - SBP <140   - EKG reviewed  - Holding home ASA  - Daily CMP, Mag, Phos - replete electrolytes PRN  - Lipid panel, A1c, Coags (preoperative) reviewed  - Regular diet as appropriate   - Strict I/Os, monitor for evidence of increased UOP  - Q6 Na and Q6 UA  - Hydrocortisone taper per neurosurgery   - Daily CBC, transfuse PRN  - Start SubQ Heparin in when clinically indicated   - PT/OT/SLP as appropriate    Hyperprolactinemia  See Pituitary macroadenoma    Secondary male hypogonadism  See Pituitary macroadenoma    Elevated hemoglobin A1c - at risk for diabetes  A1c 5.7 3/22    History of pituitary tumor  See Pituitary macroadenoma    Pituitary adenoma  See Pituitary macroadenoma          The patient is being Prophylaxed for:  Venous Thromboembolism with: Mechanical  Stress Ulcer with: H2B  Ventilator Pneumonia with: not applicable    Activity Orders          Progressive Mobility Protocol (mobilize patient to their highest level of functioning at least twice daily) starting at 04/19 2000    Diet NPO: NPO starting at 04/19 1703    Elevate HOB starting at 04/19 1623        Full Code    Sheng Gtz MD  Neurocritical Care  [facility]

## 2023-04-20 NOTE — HOSPITAL COURSE
4/20: POD 1 s/p transphenoidal resection of pituitary mass. I/O appropriate, . No evidence of DI. CT head with some hemorrhage in resection cavity, patient doing well with improving vision, packing removed by ENT.   4/21: POD2. Exam and vision stable. Na 138, spec grav 1.015. CTH with post op hemorrhage stable. OK for TTF today   4/23: NAEON, vision and exam stable. Ambulating, normal urinary frequency, d/c home today, f/u in 3 weeks with CTH in NSGY clinic

## 2023-04-20 NOTE — SUBJECTIVE & OBJECTIVE
"Interval History: 4/20: POD 1 s/p transphenoidal resection of pituitary mass. I/O appropriate, . No evidence of DI. CT head with some hemorrhage in resection cavity, patient doing well with improving vision, packing removed by ENT.     Medications:  Continuous Infusions:   nicardipine       Scheduled Meds:   famotidine  20 mg Oral BID    hydrocortisone sodium succinate  100 mg Intravenous BID    [START ON 4/21/2023] hydrocortisone sodium succinate  50 mg Intravenous BID     PRN Meds:calcium gluconate IVPB, calcium gluconate IVPB, calcium gluconate IVPB, magnesium sulfate IVPB, magnesium sulfate IVPB, morphine, ondansetron, oxyCODONE, potassium chloride **AND** potassium chloride **AND** potassium chloride, sodium chloride 0.9%, sodium phosphate IVPB, sodium phosphate IVPB, sodium phosphate IVPB     Review of Systems  Objective:     Weight: 113.9 kg (251 lb)  Body mass index is 31.37 kg/m².  Vital Signs (Most Recent):  Temp: 97.9 °F (36.6 °C) (04/20/23 0305)  Pulse: 71 (04/20/23 0605)  Resp: 19 (04/20/23 0605)  BP: (!) 142/68 (04/20/23 0305)  SpO2: 100 % (04/20/23 0605)   Vital Signs (24h Range):  Temp:  [97.5 °F (36.4 °C)-98.7 °F (37.1 °C)] 97.9 °F (36.6 °C)  Pulse:  [65-89] 71  Resp:  [12-19] 19  SpO2:  [96 %-100 %] 100 %  BP: (119-143)/(60-80) 142/68  Arterial Line BP: ()/(63-75) 133/71                              Urethral Catheter 04/19/23 1109 Non-latex;Straight-tip;Silicone 16 Fr. (Active)   Site Assessment Clean;Intact 04/20/23 0305   Collection Container Urimeter 04/20/23 0305   Securement Method secured to top of thigh w/ adhesive device 04/20/23 0305   Catheter Care Performed yes 04/20/23 0305   Reason for Continuing Urinary Catheterization Critically ill in ICU and requiring hourly monitoring of intake/output 04/20/23 0305   CAUTI Prevention Bundle Securement Device in place with 1" slack;Intact seal between catheter & drainage tubing;Drainage bag/urimeter off the floor;Sheeting clip in use;No " dependent loops or kinks;Drainage bag/urimeter not overfilled (<2/3 full);Drainage bag/urimeter below bladder 04/19/23 1905   Output (mL) 137 mL 04/20/23 0605       Physical Exam    Neurosurgery Physical Exam  General: well developed, well nourished, no distress.   Head: normocephalic, atraumatic  Neurologic:   GCS: Eyes: 4/ Verbal: 5/ Motor: 6  Mental Status: Awake, Alert, Oriented x 3  Nasal dressing in place, packing removed.   Visual fields present and full to confrontation.   Cranial nerves: PERRL, EOMI, face symmetric, tongue midline, shoulder shrug equal.  No pronator drift, no dysmetria.  Sensory: intact to light touch throughout  Motor Strength:Moves all extremities antigravity  DTR: 2+ symmetrically throughout.    Pulmonary: normal respirations, no signs of respiratory distress  Abdomen: soft, non-distended, not tender to palpation                         Significant Labs:  Recent Labs   Lab 04/18/23  1434 04/19/23  0840 04/19/23  1741 04/20/23  0006 04/20/23  0601   GLU 88 85  --  152*  --     139 137 135* 140   K 3.8 4.0  --  4.2  --     102  --  104  --    CO2 26 28  --  20*  --    BUN 12 12  --  8  --    CREATININE 1.0 0.9  --  1.0  --    CALCIUM 9.0 9.3  --  8.0*  --    MG  --   --   --  1.7  --      Recent Labs   Lab 04/18/23  1434 04/19/23  0840 04/20/23  0006   WBC 7.81 6.52 16.09*   HGB 15.3 14.5 14.6   HCT 46.2 42.8 43.6    196 290     Recent Labs   Lab 04/19/23  0840   INR 1.2   APTT 36.5*     Microbiology Results (last 7 days)       ** No results found for the last 168 hours. **          All pertinent labs from the last 24 hours have been reviewed.    Significant Diagnostics:  I have reviewed all pertinent imaging results/findings within the past 24 hours.

## 2023-04-20 NOTE — PLAN OF CARE
Problem: Adult Inpatient Plan of Care  Goal: Plan of Care Review  Outcome: Ongoing, Progressing  Goal: Patient-Specific Goal (Individualized)  Outcome: Ongoing, Progressing  Goal: Absence of Hospital-Acquired Illness or Injury  Outcome: Ongoing, Progressing  Intervention: Identify and Manage Fall Risk  Flowsheets (Taken 4/20/2023 0259)  Safety Promotion/Fall Prevention:   bed alarm set   assistive device/personal item within reach  Intervention: Prevent Skin Injury  Flowsheets (Taken 4/20/2023 0259)  Body Position:   30 degrees   position changed independently  Skin Protection: adhesive use limited  Intervention: Prevent and Manage VTE (Venous Thromboembolism) Risk  Flowsheets (Taken 4/20/2023 0259)  Activity Management:   Arm raise - L1   Leg kicks - L2  VTE Prevention/Management: remove, assess skin, and reapply sequential compression device  Range of Motion: active ROM (range of motion) encouraged  Intervention: Prevent Infection  Flowsheets (Taken 4/20/2023 0259)  Infection Prevention: environmental surveillance performed  Goal: Optimal Comfort and Wellbeing  Outcome: Ongoing, Progressing  Intervention: Monitor Pain and Promote Comfort  Flowsheets (Taken 4/20/2023 0259)  Pain Management Interventions:   care clustered   medication offered  Intervention: Provide Person-Centered Care  Flowsheets (Taken 4/20/2023 0259)  Trust Relationship/Rapport:   care explained   choices provided   thoughts/feelings acknowledged   reassurance provided  Goal: Readiness for Transition of Care  Outcome: Ongoing, Progressing

## 2023-04-20 NOTE — NURSING
Pt arrived back to room following CT        Pt was escorted by RN on cardiac monitoring, O2, and ambu bag.        Patient placed back on bedside monitor with alarms audible, bed in low position with bed alarm on, call light within reach. LEE.

## 2023-04-20 NOTE — PLAN OF CARE
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]    Admission Date: 4/19/2023  Expected Discharge Date: 4/22/2023    Discharge Barriers Identified: None    Payor: AETNA / Plan: AETNA OPEN ACCESS / Product Type: HMO /     Extended Emergency Contact Information  Primary Emergency Contact: Shanel Maria  Address: [place] [address]           [place] [address] [facility]  Home Phone: [phone]  Work Phone: [phone]  Mobile Phone: [phone]  Relation: Spouse    Discharge Plan A: Home  Discharge Plan B: Home      [id number] - [place] - [address] [facility]  [address] [place] [address]  Phone: [phone] Fax: [phone]      Transferred from:  home    Past Medical History:   Diagnosis Date    ADD (attention deficit disorder)     treated by outside psychiatrist.    Asthma     BPH with urinary obstruction 2/5/2015    Decreased libido 8/31/2015    Erectile dysfunction 10/27/2015    History of migraine headaches     Hypogonadism male 10/27/2015    Pituitary adenoma 8/9/2012         CM met with patient in room for Discharge Planning Assessment.  Patient is able to answer questions.  Per patient, he lives with Shanel Maria (wife) [phone] in a 2 story house with 1 step(s) to enter and the bed and bath on the first floor.   Per patient, he was independent with ADLS and used no dme for ambulation.  Patient will have assistance from his wife upon discharge.   Discharge Planning Booklet given to patient/family and discussed.  All questions addressed.  CM will follow for needs.    Initial Assessment (most recent)       Adult Discharge Assessment - 04/20/23 1550          Discharge Assessment    Assessment Type Discharge Planning Assessment     Confirmed/corrected address, phone number and insurance Yes     Confirmed Demographics Correct  on Facesheet     Source of Information patient     Communicated PORTER with patient/caregiver Yes     Reason For Admission Pituitary macroadenoma     People in Home spouse     Facility Arrived From: home     Do you expect to return to your current living situation? Yes     Do you have help at home or someone to help you manage your care at home? Yes     Who are your caregiver(s) and their phone number(s)? Shanel Maria (wife) [phone]     Prior to hospitilization cognitive status: Alert/Oriented     Current cognitive status: Alert/Oriented     Walking or Climbing Stairs --   Independent    Dressing/Bathing --   Independent    Home Accessibility stairs to enter home     Number of Stairs, Main Entrance one     Home Layout Able to live on 1st floor     Equipment Currently Used at Home none     Readmission within 30 days? No     Patient currently being followed by outpatient case management? No     Do you currently have service(s) that help you manage your care at home? No     Do you take prescription medications? Yes     Do you have prescription coverage? Yes     Coverage Aetna     Do you have any problems affording any of your prescribed medications? No     Is the patient taking medications as prescribed? yes     Who is going to help you get home at discharge? Shanel Maria (wife) [phone]     How do you get to doctors appointments? family or friend will provide     Are you on dialysis? No     Do you take coumadin? No     Discharge Plan A Home     Discharge Plan B Home     DME Needed Upon Discharge  none     Discharge Plan discussed with: Patient     Discharge Barriers Identified None        Physical Activity    On average, how many days per week do you engage in moderate to strenuous exercise (like a brisk walk)? 3 days     On average, how many minutes do you engage in exercise at this level? 70 min        Financial Resource Strain    How hard is it for you to pay for the very basics like food, housing,  medical care, and heating? Not hard at all        Housing Stability    In the last 12 months, was there a time when you were not able to pay the mortgage or rent on time? No     In the last 12 months, how many places have you lived? 1     In the last 12 months, was there a time when you did not have a steady place to sleep or slept in a shelter (including now)? No        Transportation Needs    In the past 12 months, has lack of transportation kept you from medical appointments or from getting medications? No     In the past 12 months, has lack of transportation kept you from meetings, work, or from getting things needed for daily living? No        Food Insecurity    Within the past 12 months, you worried that your food would run out before you got the money to buy more. Never true     Within the past 12 months, the food you bought just didn't last and you didn't have money to get more. Never true        Stress    Do you feel stress - tense, restless, nervous, or anxious, or unable to sleep at night because your mind is troubled all the time - these days? Not at all        Social Connections    In a typical week, how many times do you talk on the phone with family, friends, or neighbors? More than three times a week   every 3 months    How often do you attend Christian or Latter day services? More than 4 times per year     Do you belong to any clubs or organizations such as Christian groups, unions, fraternal or athletic groups, or school groups? No     How often do you attend meetings of the clubs or organizations you belong to? Never     Are you , , , , never , or living with a partner?         Alcohol Use    Q1: How often do you have a drink containing alcohol? Never     Q2: How many drinks containing alcohol do you have on a typical day when you are drinking? Patient does not drink     Q3: How often do you have six or more drinks on one occasion? Never        OTHER     Name(s) of People in Home Shanel Maria (wife) [phone]                     Radha Herring RN, CCRN-K, [facility]  Neuro-Critical Care   [phone]

## 2023-04-21 LAB
ALBUMIN SERPL BCP-MCNC: 3.8 G/DL (ref 3.5–5.2)
ALP SERPL-CCNC: 39 U/L (ref 55–135)
ALT SERPL W/O P-5'-P-CCNC: 29 U/L (ref 10–44)
ANION GAP SERPL CALC-SCNC: 9 MMOL/L (ref 8–16)
AST SERPL-CCNC: 22 U/L (ref 10–40)
BACTERIA #/AREA URNS AUTO: ABNORMAL /HPF
BASOPHILS # BLD AUTO: 0.02 K/UL (ref 0–0.2)
BASOPHILS NFR BLD: 0.1 % (ref 0–1.9)
BILIRUB SERPL-MCNC: 0.6 MG/DL (ref 0.1–1)
BILIRUB UR QL STRIP: NEGATIVE
BUN SERPL-MCNC: 12 MG/DL (ref 6–20)
CALCIUM SERPL-MCNC: 8.7 MG/DL (ref 8.7–10.5)
CHLORIDE SERPL-SCNC: 106 MMOL/L (ref 95–110)
CLARITY UR REFRACT.AUTO: CLEAR
CO2 SERPL-SCNC: 23 MMOL/L (ref 23–29)
COLOR UR AUTO: COLORLESS
COLOR UR AUTO: YELLOW
CREAT SERPL-MCNC: 0.9 MG/DL (ref 0.5–1.4)
DIFFERENTIAL METHOD: ABNORMAL
EOSINOPHIL # BLD AUTO: 0 K/UL (ref 0–0.5)
EOSINOPHIL NFR BLD: 0.1 % (ref 0–8)
ERYTHROCYTE [DISTWIDTH] IN BLOOD BY AUTOMATED COUNT: 14.6 % (ref 11.5–14.5)
EST. GFR  (NO RACE VARIABLE): >60 ML/MIN/1.73 M^2
GLUCOSE SERPL-MCNC: 129 MG/DL (ref 70–110)
GLUCOSE UR QL STRIP: NEGATIVE
HCT VFR BLD AUTO: 42.3 % (ref 40–54)
HGB BLD-MCNC: 13.9 G/DL (ref 14–18)
HGB UR QL STRIP: ABNORMAL
IMM GRANULOCYTES # BLD AUTO: 0.04 K/UL (ref 0–0.04)
IMM GRANULOCYTES NFR BLD AUTO: 0.3 % (ref 0–0.5)
KETONES UR QL STRIP: NEGATIVE
LEUKOCYTE ESTERASE UR QL STRIP: ABNORMAL
LEUKOCYTE ESTERASE UR QL STRIP: ABNORMAL
LEUKOCYTE ESTERASE UR QL STRIP: NEGATIVE
LYMPHOCYTES # BLD AUTO: 1.6 K/UL (ref 1–4.8)
LYMPHOCYTES NFR BLD: 11.6 % (ref 18–48)
MAGNESIUM SERPL-MCNC: 2.4 MG/DL (ref 1.6–2.6)
MCH RBC QN AUTO: 28.4 PG (ref 27–31)
MCHC RBC AUTO-ENTMCNC: 32.9 G/DL (ref 32–36)
MCV RBC AUTO: 86 FL (ref 82–98)
MICROSCOPIC COMMENT: ABNORMAL
MONOCYTES # BLD AUTO: 0.7 K/UL (ref 0.3–1)
MONOCYTES NFR BLD: 5 % (ref 4–15)
NEUTROPHILS # BLD AUTO: 11.3 K/UL (ref 1.8–7.7)
NEUTROPHILS NFR BLD: 82.9 % (ref 38–73)
NITRITE UR QL STRIP: NEGATIVE
NRBC BLD-RTO: 0 /100 WBC
PH UR STRIP: 7 [PH] (ref 5–8)
PHOSPHATE SERPL-MCNC: 1.8 MG/DL (ref 2.7–4.5)
PHOSPHATE SERPL-MCNC: 1.8 MG/DL (ref 2.7–4.5)
PLATELET # BLD AUTO: 261 K/UL (ref 150–450)
PMV BLD AUTO: 9.8 FL (ref 9.2–12.9)
POTASSIUM SERPL-SCNC: 4.1 MMOL/L (ref 3.5–5.1)
PROT SERPL-MCNC: 6.9 G/DL (ref 6–8.4)
PROT UR QL STRIP: NEGATIVE
RBC # BLD AUTO: 4.9 M/UL (ref 4.6–6.2)
RBC #/AREA URNS AUTO: 17 /HPF (ref 0–4)
RBC #/AREA URNS AUTO: 26 /HPF (ref 0–4)
RBC #/AREA URNS AUTO: 60 /HPF (ref 0–4)
SODIUM SERPL-SCNC: 138 MMOL/L (ref 136–145)
SODIUM SERPL-SCNC: 138 MMOL/L (ref 136–145)
SODIUM SERPL-SCNC: 139 MMOL/L (ref 136–145)
SODIUM SERPL-SCNC: 139 MMOL/L (ref 136–145)
SP GR UR STRIP: 1.01 (ref 1–1.03)
URN SPEC COLLECT METH UR: ABNORMAL
WBC # BLD AUTO: 13.69 K/UL (ref 3.9–12.7)
WBC #/AREA URNS AUTO: 0 /HPF (ref 0–5)
WBC #/AREA URNS AUTO: 12 /HPF (ref 0–5)
WBC #/AREA URNS AUTO: 2 /HPF (ref 0–5)

## 2023-04-21 PROCEDURE — 63600175 PHARM REV CODE 636 W HCPCS: Performed by: STUDENT IN AN ORGANIZED HEALTH CARE EDUCATION/TRAINING PROGRAM

## 2023-04-21 PROCEDURE — 84100 ASSAY OF PHOSPHORUS: CPT | Mod: 91 | Performed by: PHYSICIAN ASSISTANT

## 2023-04-21 PROCEDURE — 61548 PR EXCIS PITUITARY,TRANSNASAL/SEPTAL: ICD-10-PCS | Mod: 62,,, | Performed by: OTOLARYNGOLOGY

## 2023-04-21 PROCEDURE — 94761 N-INVAS EAR/PLS OXIMETRY MLT: CPT

## 2023-04-21 PROCEDURE — 97535 SELF CARE MNGMENT TRAINING: CPT

## 2023-04-21 PROCEDURE — 97116 GAIT TRAINING THERAPY: CPT

## 2023-04-21 PROCEDURE — 85025 COMPLETE CBC W/AUTO DIFF WBC: CPT | Performed by: STUDENT IN AN ORGANIZED HEALTH CARE EDUCATION/TRAINING PROGRAM

## 2023-04-21 PROCEDURE — 81003 URINALYSIS AUTO W/O SCOPE: CPT | Performed by: STUDENT IN AN ORGANIZED HEALTH CARE EDUCATION/TRAINING PROGRAM

## 2023-04-21 PROCEDURE — 80053 COMPREHEN METABOLIC PANEL: CPT | Performed by: STUDENT IN AN ORGANIZED HEALTH CARE EDUCATION/TRAINING PROGRAM

## 2023-04-21 PROCEDURE — 84100 ASSAY OF PHOSPHORUS: CPT | Performed by: STUDENT IN AN ORGANIZED HEALTH CARE EDUCATION/TRAINING PROGRAM

## 2023-04-21 PROCEDURE — 97165 OT EVAL LOW COMPLEX 30 MIN: CPT

## 2023-04-21 PROCEDURE — 97161 PT EVAL LOW COMPLEX 20 MIN: CPT

## 2023-04-21 PROCEDURE — 84295 ASSAY OF SERUM SODIUM: CPT | Performed by: STUDENT IN AN ORGANIZED HEALTH CARE EDUCATION/TRAINING PROGRAM

## 2023-04-21 PROCEDURE — 99233 SBSQ HOSP IP/OBS HIGH 50: CPT | Mod: ,,, | Performed by: PSYCHIATRY & NEUROLOGY

## 2023-04-21 PROCEDURE — 81001 URINALYSIS AUTO W/SCOPE: CPT | Performed by: STUDENT IN AN ORGANIZED HEALTH CARE EDUCATION/TRAINING PROGRAM

## 2023-04-21 PROCEDURE — 25000003 PHARM REV CODE 250: Performed by: STUDENT IN AN ORGANIZED HEALTH CARE EDUCATION/TRAINING PROGRAM

## 2023-04-21 PROCEDURE — 81001 URINALYSIS AUTO W/SCOPE: CPT | Mod: 91 | Performed by: STUDENT IN AN ORGANIZED HEALTH CARE EDUCATION/TRAINING PROGRAM

## 2023-04-21 PROCEDURE — 84295 ASSAY OF SERUM SODIUM: CPT | Mod: 91 | Performed by: STUDENT IN AN ORGANIZED HEALTH CARE EDUCATION/TRAINING PROGRAM

## 2023-04-21 PROCEDURE — 20600001 HC STEP DOWN PRIVATE ROOM

## 2023-04-21 PROCEDURE — 83735 ASSAY OF MAGNESIUM: CPT | Performed by: STUDENT IN AN ORGANIZED HEALTH CARE EDUCATION/TRAINING PROGRAM

## 2023-04-21 PROCEDURE — 99233 PR SUBSEQUENT HOSPITAL CARE,LEVL III: ICD-10-PCS | Mod: ,,, | Performed by: PSYCHIATRY & NEUROLOGY

## 2023-04-21 PROCEDURE — 61548 REMOVAL OF PITUITARY GLAND: CPT | Mod: 62,,, | Performed by: OTOLARYNGOLOGY

## 2023-04-21 RX ADMIN — OXYCODONE 5 MG: 5 TABLET ORAL at 03:04

## 2023-04-21 RX ADMIN — HYDROCORTISONE SODIUM SUCCINATE 100 MG: 100 INJECTION, POWDER, FOR SOLUTION INTRAMUSCULAR; INTRAVENOUS at 08:04

## 2023-04-21 RX ADMIN — HYDROCORTISONE SODIUM SUCCINATE 50 MG: 100 INJECTION, POWDER, FOR SOLUTION INTRAMUSCULAR; INTRAVENOUS at 06:04

## 2023-04-21 RX ADMIN — SODIUM PHOSPHATE, MONOBASIC, MONOHYDRATE AND SODIUM PHOSPHATE, DIBASIC, ANHYDROUS 20.01 MMOL: 142; 276 INJECTION, SOLUTION INTRAVENOUS at 03:04

## 2023-04-21 RX ADMIN — FAMOTIDINE 20 MG: 20 TABLET ORAL at 10:04

## 2023-04-21 RX ADMIN — FAMOTIDINE 20 MG: 20 TABLET ORAL at 08:04

## 2023-04-21 RX ADMIN — OXYCODONE 5 MG: 5 TABLET ORAL at 10:04

## 2023-04-21 NOTE — PT/OT/SLP EVAL
"Physical Therapy Evaluation and Discharge Note with OT    Patient Name:  Gallo Maria Jr.   MRN:  [id number]    Patient required co-tx with OT secondary to need for multiple set of skilled hands to provide safest therapy and best outcomes.      Recommendations:     Discharge Recommendations: home  Discharge Equipment Recommendations: none   Barriers to discharge: None    Assessment:     Gallo Maria Jr. is a 42 y.o. male admitted with a medical diagnosis of Pituitary macroadenoma. At this time, patient is functioning at their prior level of function and does not require further acute PT services.     Recent Surgery: Procedure(s) (LRB):  RESECTION, NEOPLASM, PITUITARY, TRANSSPHENOIDAL APPROACH (N/A) 2 Days Post-Op    Plan:     During this hospitalization, patient does not require further acute PT services.  Please re-consult if situation changes.      Subjective     Chief Complaint: Pituitary macroadenoma   Patient/Family Comments/goals: "Thanks for coming"  Pain/Comfort:  Pain Rating 1: 0/10  Pain Rating Post-Intervention 1: 0/10    Patients cultural, spiritual, Evangelical conflicts given the current situation: no    Living Environment:  Pt lives with spouse and children in 2SH w/ 1 ANJALI. Pt's bedroom and bathroom are downstairs.  Prior to admission, patients level of function was (I) with functional mobility and ADLs using no DME.  Equipment used at home: none.  DME owned (not currently used):  crutches  .  Upon discharge, patient will have assistance from family.    Objective:     Communicated with RN prior to session.  Patient found HOB elevated with bed alarm, blood pressure cuff, telemetry, pulse ox (continuous), peripheral IV, arterial line upon PT entry to room.    General Precautions: Standard, fall    Orthopedic Precautions:N/A   Braces: N/A  Respiratory Status: Room air    Exams:  Cognitive Exam:  Patient is oriented to Person, Place, Time, and Situation  Postural Exam:  Patient presented with the " following abnormalities:    -       Rounded shoulders  Sensation:    -       Intact  light/touch B LEs  RLE ROM: WNL  RLE Strength: WFL  LLE ROM: WNL  LLE Strength: WFL    Functional Mobility:  Bed Mobility:     Supine to Sit: independence  Transfers:     Sit to Stand:  supervision with no AD  Gait: Pt received gait training in hallway ~110' with supervision and no AD. Pt with no overt LOB during gait training and demos a steady, reciprocal gait. Portable monitor utilized.     AM-PAC 6 CLICK MOBILITY  Total Score:24       Treatment and Education:  PT assisted with functional mobility as noted above.  Discussed d/c from acute PT services, pt agreeable.  PT instructed pt to ambulate with staff at least 3x daily to prevent functional decline during hospital stay.      AM-PAC 6 CLICK MOBILITY  Total Score:24     Patient left up in chair with all lines intact, call button in reach, and RN notified.    GOALS:   Multidisciplinary Problems       Physical Therapy Goals       Not on file              Multidisciplinary Problems (Resolved)          Problem: Physical Therapy    Goal Priority Disciplines Outcome Goal Variances Interventions   Physical Therapy Goal   (Resolved)     PT, PT/OT Met     Description: No goals established at this time, see dustin 4/21                       History:     Past Medical History:   Diagnosis Date    ADD (attention deficit disorder)     treated by outside psychiatrist.    Asthma     BPH with urinary obstruction 2/5/2015    Decreased libido 8/31/2015    Erectile dysfunction 10/27/2015    History of migraine headaches     Hypogonadism male 10/27/2015    Pituitary adenoma 8/9/2012       Past Surgical History:   Procedure Laterality Date    BIOPSY OF TONSILS Right 9/9/2019    Procedure: BIOPSY, TONSILS;  Surgeon: Bruno Umaña MD;  Location: [facility] OR [address] [place] [address];  Service: ENT;  Laterality: Right;    SURGICAL REMOVAL OF PITUITARY TUMOR BY TRANSSPHENOIDAL APPROACH  2015    Pituitary Adeonma via   Johnnie 2015    SURGICAL REMOVAL OF PITUITARY TUMOR BY TRANSSPHENOIDAL APPROACH N/A 4/19/2023    Procedure: RESECTION, NEOPLASM, PITUITARY, TRANSSPHENOIDAL APPROACH;  Surgeon: Derek Blair MD;  Location: [facility] OR [address] [place] [address];  Service: Neurosurgery;  Laterality: N/A;  TRANSPHENOIDAL RESECTION PITUITARY ADENOMA/ 2 UNITS RBC, TEDS & SCD, FLOURO, TRANSPHENOIDAL SET, MICROSCOPE, MIRTHA CO SURGEON.    WISDOM TOOTH EXTRACTION         Time Tracking:     PT Received On: 04/21/23  PT Start Time: 0841     PT Stop Time: 0906  PT Total Time (min): 25 min     Billable Minutes: Evaluation 10 and Gait Training 15      04/21/2023

## 2023-04-21 NOTE — ASSESSMENT & PLAN NOTE
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 Now s/p translabial transphenoidal pituitary mass resection (4/19).     POD 2:     --Admitted to neuro ICU, OK for TT today  --Q4 hour neurochecks focusing on vision  --SBP < 160  --Repeat CT head 4/21 AM: stable blood products in resection cavity  --Hydrocortisone taper (ordered)  --pepcid for steroid ppx  --DI watch, drink to thirst, decrease Na, spec grav checks to q12. OK to d/c [place]  --PT/OT  --ADAT  --Nasal precautions    D/w staff, Dr. Blair

## 2023-04-21 NOTE — NURSING
Pt arrived back to room following CT        Pt was escorted by RN on cardiac monitoring and ambu bag.        Patient placed back on bedside monitor with alarms audible, bed in low position with bed alarm on, call light within reach. LEE.

## 2023-04-21 NOTE — PLAN OF CARE
Problem: Adult Inpatient Plan of Care  Goal: Plan of Care Review  Outcome: Ongoing, Progressing  Goal: Patient-Specific Goal (Individualized)  Outcome: Ongoing, Progressing  Goal: Absence of Hospital-Acquired Illness or Injury  Outcome: Ongoing, Progressing  Intervention: Identify and Manage Fall Risk  Flowsheets (Taken 4/21/2023 0339)  Safety Promotion/Fall Prevention:   bed alarm set   assistive device/personal item within reach  Intervention: Prevent Skin Injury  Flowsheets (Taken 4/21/2023 0339)  Body Position: position changed independently  Skin Protection: adhesive use limited  Intervention: Prevent and Manage VTE (Venous Thromboembolism) Risk  Flowsheets (Taken 4/21/2023 0339)  Activity Management:   Rolling - L1   Leg kicks - L2   Arm raise - L1  Intervention: Prevent Infection  Flowsheets (Taken 4/21/2023 0339)  Infection Prevention: environmental surveillance performed  Goal: Optimal Comfort and Wellbeing  Outcome: Ongoing, Progressing  Intervention: Monitor Pain and Promote Comfort  Flowsheets (Taken 4/21/2023 0339)  Pain Management Interventions: care clustered  Intervention: Provide Person-Centered Care  Flowsheets (Taken 4/21/2023 0339)  Trust Relationship/Rapport:   care explained   choices provided   thoughts/feelings acknowledged   reassurance provided  Goal: Readiness for Transition of Care  Outcome: Ongoing, Progressing

## 2023-04-21 NOTE — ASSESSMENT & PLAN NOTE
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     - Q1h neurochecks while in ICU  - Q1h vitals while in ICU  - HOB@30  - Strict nasal precautions   - CTH reveiwed  - SBP <140   - EKG reviewed  - Holding home ASA  - Daily CMP, Mag, Phos - replete electrolytes PRN  - Lipid panel, A1c, Coags (preoperative) reviewed  - Regular diet as appropriate   - Strict I/Os, monitor for evidence of increased UOP  - Q6 Na and Q6 UA  - Hydrocortisone taper per neurosurgery   - Daily CBC, transfuse PRN  - Start SubQ Heparin in when clinically indicated   - PT/OT/SLP as appropriate

## 2023-04-21 NOTE — PLAN OF CARE
Psychiatric Care Plan    POC reviewed with Gallo Maria Jr. and family at 1400. Pt verbalized understanding. Questions and concerns addressed. No acute events today. Pt progressing toward goals. Will continue to monitor. See below and flowsheets for full assessment and VS info.             Is this a stroke patient? no    Neuro:  [place] Coma Scale  Best Eye Response: 4-->(E4) spontaneous  Best Motor Response: 6-->(M6) obeys commands  Best Verbal Response: 5-->(V5) oriented  Wilder Coma Scale Score: 15  Pupil PERRLA: yes     24 hr Temp:  [97.5 °F (36.4 °C)-98.2 °F (36.8 °C)]     CV:   Rhythm: normal sinus rhythm  BP goals:   SBP < 140  MAP > 65    Resp:           Plan: N/A    GI/:     Diet/Nutrition Received: regular  Last Bowel Movement: 04/19/23  Voiding Characteristics: urethral catheter (bladder)    Intake/Output Summary (Last 24 hours) at 4/21/2023 1739  Last data filed at 4/21/2023 1501  Gross per 24 hour   Intake 2345.01 ml   Output 2230 ml   Net 115.01 ml          Labs/Accuchecks:  Recent Labs   Lab 04/21/23  0021   WBC 13.69*   RBC 4.90   HGB 13.9*   HCT 42.3         Recent Labs   Lab 04/21/23  0021 04/21/23  0554 04/21/23  1213      < > 138   K 4.1  --   --    CO2 23  --   --      --   --    BUN 12  --   --    CREATININE 0.9  --   --    ALKPHOS 39*  --   --    ALT 29  --   --    AST 22  --   --    BILITOT 0.6  --   --     < > = values in this interval not displayed.      Recent Labs   Lab 04/19/23  0840   INR 1.2   APTT 36.5*    No results for input(s): CPK, CPKMB, TROPONINI, MB in the last 168 hours.    Electrolytes: replaced  Accuchecks: none    Gtts:      LDA/Wounds:  Lines/Drains/Airways       Drain  Duration                  Urethral Catheter 04/19/23 1109 Non-latex;Straight-tip;Silicone 16 Fr. 2 days              Arterial Line  Duration             Arterial Line 04/19/23 1140 Right Radial 2 days              Peripheral Intravenous Line  Duration                  Peripheral IV -  Single Lumen 18 G Left Forearm -- days         Peripheral IV - Single Lumen 04/19/23 0840 18 G Posterior;Right Forearm 2 days                  Wounds: No  Wound care consulted: No   Problem: Adult Inpatient Plan of Care  Goal: Plan of Care Review  Outcome: Ongoing, Progressing  Goal: Patient-Specific Goal (Individualized)  Outcome: Ongoing, Progressing  Goal: Absence of Hospital-Acquired Illness or Injury  Outcome: Ongoing, Progressing  Goal: Optimal Comfort and Wellbeing  Outcome: Ongoing, Progressing  Goal: Readiness for Transition of Care  Outcome: Ongoing, Progressing     Problem: Infection  Goal: Absence of Infection Signs and Symptoms  Outcome: Ongoing, Progressing     Problem: Skin Injury Risk Increased  Goal: Skin Health and Integrity  Outcome: Ongoing, Progressing

## 2023-04-21 NOTE — PROGRESS NOTES
[facility] Neuro Critical [facility]  Neurosurgery  Progress Note    Subjective:     History of Present Illness: 4/20: POD 1 s/p transphenoidal resection of pituitary mass. I/O appropriate, . No evidence of DI. CT head with some hemorrhage in resection cavity, patient doing well with improving vision, packing removed by ENT.       Post-Op Info:  Procedure(s) (LRB):  RESECTION, NEOPLASM, PITUITARY, TRANSSPHENOIDAL APPROACH (N/A)   2 Days Post-Op     Interval History: 4/21: POD2. Exam and vision stable. Na 138, spec grav 1.015. CTH with post op hemorrhage stable. OK for TTF today     Medications:  Continuous Infusions:      Scheduled Meds:   famotidine  20 mg Oral BID    hydrocortisone sodium succinate  50 mg Intravenous BID     PRN Meds:calcium gluconate IVPB, calcium gluconate IVPB, calcium gluconate IVPB, magnesium sulfate IVPB, magnesium sulfate IVPB, morphine, ondansetron, oxyCODONE, potassium chloride **AND** potassium chloride **AND** potassium chloride, sodium chloride 0.9%, sodium phosphate IVPB, sodium phosphate IVPB, sodium phosphate IVPB     Review of Systems  Objective:     Weight: 113.9 kg (251 lb)  Body mass index is 31.37 kg/m².  Vital Signs (Most Recent):  Temp: 97.7 °F (36.5 °C) (04/21/23 0701)  Pulse: 76 (04/21/23 0901)  Resp: 19 (04/21/23 0901)  BP: 125/77 (04/21/23 0901)  SpO2: 99 % (04/21/23 0901)   Vital Signs (24h Range):  Temp:  [97.5 °F (36.4 °C)-98.9 °F (37.2 °C)] 97.7 °F (36.5 °C)  Pulse:  [56-98] 76  Resp:  [12-22] 19  SpO2:  [93 %-100 %] 99 %  BP: (100-140)/(60-81) 125/77  Arterial Line BP: (100-137)/(66-79) 134/76     Date 04/21/23 0700 - 04/22/23 0659   Shift 5797-9389 3030-9490 7268-2542 24 Hour Total   INTAKE   P.O. 354   354   Shift Total(mL/kg) 354(3.1)   354(3.1)   OUTPUT   Urine(mL/kg/hr) 300   300   Shift Total(mL/kg) 300(2.6)   300(2.6)   Weight (kg) 113.9 113.9 113.9 113.9                            Urethral Catheter 04/19/23 1109 Non-latex;Straight-tip;Silicone 16 Fr.  "(Active)   Site Assessment Clean;Intact 04/20/23 0305   Collection Container Urimeter 04/20/23 0305   Securement Method secured to top of thigh w/ adhesive device 04/20/23 0305   Catheter Care Performed yes 04/20/23 0305   Reason for Continuing Urinary Catheterization Critically ill in ICU and requiring hourly monitoring of intake/output 04/20/23 0305   CAUTI Prevention Bundle Securement Device in place with 1" slack;Intact seal between catheter & drainage tubing;Drainage bag/urimeter off the floor;Sheeting clip in use;No dependent loops or kinks;Drainage bag/urimeter not overfilled (<2/3 full);Drainage bag/urimeter below bladder 04/19/23 1905   Output (mL) 137 mL 04/20/23 0605       Physical Exam    Neurosurgery Physical Exam  General: well developed, well nourished, no distress.   Head: normocephalic, atraumatic  Neurologic:   GCS: Eyes: 4/ Verbal: 5/ Motor: 6  Mental Status: Awake, Alert, Oriented x 3  Nasal packing removed.   Visual fields present and full to confrontation.   Cranial nerves: PERRL, EOMI, face symmetric, tongue midline, shoulder shrug equal.  No pronator drift, no dysmetria.  Sensory: intact to light touch throughout  Motor Strength:Moves all extremities antigravity  DTR: 2+ symmetrically throughout.    Pulmonary: normal respirations, no signs of respiratory distress  Abdomen: soft, non-distended, not tender to palpation                         Significant Labs:  Recent Labs   Lab 04/20/23  0006 04/20/23  0601 04/20/23  1811 04/21/23  0021 04/21/23  0554   *  --   --  129*  --    *   < > 139 138 139   K 4.2  --   --  4.1  --      --   --  106  --    CO2 20*  --   --  23  --    BUN 8  --   --  12  --    CREATININE 1.0  --   --  0.9  --    CALCIUM 8.0*  --   --  8.7  --    MG 1.7  --   --  2.4  --     < > = values in this interval not displayed.       Recent Labs   Lab 04/20/23  0006 04/21/23  0021   WBC 16.09* 13.69*   HGB 14.6 13.9*   HCT 43.6 42.3    261       No " results for input(s): LABPT, INR, APTT in the last 48 hours.    Microbiology Results (last 7 days)       ** No results found for the last 168 hours. **          All pertinent labs from the last 24 hours have been reviewed.    Significant Diagnostics:  I have reviewed all pertinent imaging results/findings within the past 24 hours.    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 Now s/p translabial transphenoidal pituitary mass resection (4/19).     POD 2:     --Admitted to neuro ICU, OK for TT today  --Q4 hour neurochecks focusing on vision  --SBP < 160  --Repeat CT head 4/21 AM: stable blood products in resection cavity  --Hydrocortisone taper (ordered)  --pepcid for steroid ppx  --DI watch, drink to thirst, decrease Na, spec grav checks to q12. OK to d/c [place]  --PT/OT  --ADAT  --Nasal precautions    D/w staff, Dr. Johnnie Amaya MD  Neurosurgery  [facility] - Neuro Critical Care

## 2023-04-21 NOTE — OP NOTE
DATE OF PROCEDURE: 4/19/2023     PREOPERATIVE DIAGNOSES:   Pituitary adenoma [D35.2]    POSTOPERATIVE DIAGNOSES:   Pituitary adenoma [D35.2]    SURGEON:  Surgeon(s) and Role:     * Derek Blair MD - Primary     * Bruno Agrawal MD - Resident - Assisting     * Minh Alvarez MD - Resident - Assisting     * Trip Powell MD      PROCEDURES PERFORMED:       ANESTHESIA: General      INDICATIONS FOR PROCEDURE:   Gallo Maria Jr. is a 42 y.o. man recently evaluated by a colleague in Neurosurgery, Dr. Blair.  He was deemed an appropriate candidate for revision resection of a pituitary adenoma.  My services were enlisted to assist with the approach to the skull base.    He was apprised of the risks, benefits and alternatives to surgery.  In spite of the risk inherent to surgery,he provided informed consent for the aforementioned procedures.     PROCEDURE IN DETAIL:  The patient was taken to the operating room and placed on the operating table in the supine position.  General endotracheal anesthesia was induced by the anesthesia team.     The patient was positioned by the Neurosurgery team.  The nasal septum was injected bilaterally with several cc of 1% lidocaine with epinephrine as was the submandibular region.  The nose was then packed with Afrin-soaked cottonoids.      The patient was then prepped and draped in standard sterile fashion.      To begin, a left-sided umm-transfixion incision was carried out.  Subperichondrial dissection then proceeded along the left nasal septum.  Dissection proceeded into the floor of the nose.  The septal cartilage was then incised taking care to leave an adequate dorsal and ventral strut to support the nose.  The central septal cartilage was then removed with a swivel knife after a right-sided mucoperichondrial flap then elevated.  It was passed off and preserved for later use by the Neurosurgery team.  The bony septum had been previously taken down during a prior  operation.  As such, a large posterior perforation was created.      Next, attention was turned to the submandibular region.  The upper lip mucosa was then incised with the Bovie.  Dissection then proceeded down to the pre maxilla.  Subperiosteal dissection then proceeded from the pre maxilla to the piriform aperture into the nasal cavity.  The nasal septum was mobilized from the nasal spine and lateralized to the right.  The Tamayo speculum was then placed.  Appropriate exposure was confirmed by Neurosurgery.  They then performed their portion of the procedure.      Once the neurosurgical portion of the procedure was complete, I returned to the room.  The nasal septum was reapproximated to the nasal spine utilizing 3-0 Vicryl suture.  The anterior septum and flaps were then coapted together utilizing interrupted chromic suture.  There again, there was a large posterior perforation which resulted from flap elevation in the affects of prior surgery.  At the conclusion the procedure, he was noted to have excellent tip support.  The sub labial incision was then closed with Vicryl suture.  The nasal cavity was packed with Telfa rolls which were secured together with nylon suture.      He was then handed back to anesthesia.  He was awakened, extubated transferred to recovery in satisfactory condition.    There were no intraoperative complications.  I was present for and participated in the entire procedure as dictated above.       ESTIMATED BLOOD LOSS: 10 mL    SPECIMENS:   Specimen (24h ago, onward)      None

## 2023-04-21 NOTE — SUBJECTIVE & OBJECTIVE
"Interval History: 4/21: POD2. Exam and vision stable. Na 138, spec grav 1.015. CTH with post op hemorrhage stable. OK for TTF today     Medications:  Continuous Infusions:      Scheduled Meds:   famotidine  20 mg Oral BID    hydrocortisone sodium succinate  50 mg Intravenous BID     PRN Meds:calcium gluconate IVPB, calcium gluconate IVPB, calcium gluconate IVPB, magnesium sulfate IVPB, magnesium sulfate IVPB, morphine, ondansetron, oxyCODONE, potassium chloride **AND** potassium chloride **AND** potassium chloride, sodium chloride 0.9%, sodium phosphate IVPB, sodium phosphate IVPB, sodium phosphate IVPB     Review of Systems  Objective:     Weight: 113.9 kg (251 lb)  Body mass index is 31.37 kg/m².  Vital Signs (Most Recent):  Temp: 97.7 °F (36.5 °C) (04/21/23 0701)  Pulse: 76 (04/21/23 0901)  Resp: 19 (04/21/23 0901)  BP: 125/77 (04/21/23 0901)  SpO2: 99 % (04/21/23 0901)   Vital Signs (24h Range):  Temp:  [97.5 °F (36.4 °C)-98.9 °F (37.2 °C)] 97.7 °F (36.5 °C)  Pulse:  [56-98] 76  Resp:  [12-22] 19  SpO2:  [93 %-100 %] 99 %  BP: (100-140)/(60-81) 125/77  Arterial Line BP: (100-137)/(66-79) 134/76     Date 04/21/23 0700 - 04/22/23 0659   Shift 1232-3574 9729-1906 3538-7769 24 Hour Total   INTAKE   P.O. 354   354   Shift Total(mL/kg) 354(3.1)   354(3.1)   OUTPUT   Urine(mL/kg/hr) 300   300   Shift Total(mL/kg) 300(2.6)   300(2.6)   Weight (kg) 113.9 113.9 113.9 113.9                            Urethral Catheter 04/19/23 1109 Non-latex;Straight-tip;Silicone 16 Fr. (Active)   Site Assessment Clean;Intact 04/20/23 0305   Collection Container Urimeter 04/20/23 0305   Securement Method secured to top of thigh w/ adhesive device 04/20/23 0305   Catheter Care Performed yes 04/20/23 0305   Reason for Continuing Urinary Catheterization Critically ill in ICU and requiring hourly monitoring of intake/output 04/20/23 0305   CAUTI Prevention Bundle Securement Device in place with 1" slack;Intact seal between catheter & drainage " tubing;Drainage bag/urimeter off the floor;Sheeting clip in use;No dependent loops or kinks;Drainage bag/urimeter not overfilled (<2/3 full);Drainage bag/urimeter below bladder 04/19/23 1905   Output (mL) 137 mL 04/20/23 0605       Physical Exam    Neurosurgery Physical Exam  General: well developed, well nourished, no distress.   Head: normocephalic, atraumatic  Neurologic:   GCS: Eyes: 4/ Verbal: 5/ Motor: 6  Mental Status: Awake, Alert, Oriented x 3  Nasal packing removed.   Visual fields present and full to confrontation.   Cranial nerves: PERRL, EOMI, face symmetric, tongue midline, shoulder shrug equal.  No pronator drift, no dysmetria.  Sensory: intact to light touch throughout  Motor Strength:Moves all extremities antigravity  DTR: 2+ symmetrically throughout.    Pulmonary: normal respirations, no signs of respiratory distress  Abdomen: soft, non-distended, not tender to palpation                         Significant Labs:  Recent Labs   Lab 04/20/23  0006 04/20/23  0601 04/20/23  1811 04/21/23  0021 04/21/23  0554   *  --   --  129*  --    *   < > 139 138 139   K 4.2  --   --  4.1  --      --   --  106  --    CO2 20*  --   --  23  --    BUN 8  --   --  12  --    CREATININE 1.0  --   --  0.9  --    CALCIUM 8.0*  --   --  8.7  --    MG 1.7  --   --  2.4  --     < > = values in this interval not displayed.       Recent Labs   Lab 04/20/23  0006 04/21/23  0021   WBC 16.09* 13.69*   HGB 14.6 13.9*   HCT 43.6 42.3    261       No results for input(s): LABPT, INR, APTT in the last 48 hours.    Microbiology Results (last 7 days)       ** No results found for the last 168 hours. **          All pertinent labs from the last 24 hours have been reviewed.    Significant Diagnostics:  I have reviewed all pertinent imaging results/findings within the past 24 hours.

## 2023-04-21 NOTE — PROGRESS NOTES
[facility]  Neurocritical Care  Progress Note    Admit Date: 4/19/2023  Service Date: 04/21/2023  Length of Stay: 2    Subjective:     Chief Complaint: Pituitary macroadenoma    History of Present Illness: Gallo Maria Jr. 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     On arrival, patient with residual anesthesia. Arrived with OPA in place that was removed as patient began to wake up. PERRL, EOMi. Moves all extremities spontaneously and to basic commands.       Hospital Course: 04/20/2023 NAEON. CTH with blood noted in the resection bed. Nasal packing removed. UOP and Na stable.   04/21/2023 NAEON. CTH stable. Discuss step down with Neurosurgery.         Subjective:     Interval History: As above.     Review of Systems   Constitutional:  Positive for fatigue. Negative for chills and fever.   HENT:  Positive for nosebleeds and postnasal drip.    Eyes:  Negative for photophobia and visual disturbance.   Respiratory:  Negative for chest tightness and shortness of breath.    Cardiovascular:  Negative for chest pain and palpitations.   Gastrointestinal:  Negative for diarrhea and nausea.   Musculoskeletal:  Negative for neck pain and neck stiffness.   Neurological:  Positive for headaches. Negative for dizziness, tremors, facial asymmetry, speech difficulty and weakness.   Psychiatric/Behavioral:  Negative for agitation and confusion.    All other systems reviewed and are negative.    Objective:     Vitals:    Temp: 97.8 °F (36.6 °C)  Pulse: (!) 59  Rhythm: normal sinus rhythm  BP: 135/76  MAP (mmHg): 87  Resp:  14  SpO2: 99 %    Temp  Min: 97.5 °F (36.4 °C)  Max: 98.9 °F (37.2 °C)  Pulse  Min: 56  Max: 98  BP  Min: 100/63  Max: 140/81  MAP (mmHg)  Min: 77  Max: 104  Resp  Min: 12  Max: 22  SpO2  Min: 93 %  Max: 100 %    04/20 0701 - 04/21 0700  In: 2011 [P.O.:1840]  Out: 3155 [Urine:3155]         Physical Exam  Vitals and nursing note reviewed.   Constitutional:       General: He is not in acute distress.     Appearance: He is not ill-appearing.   HENT:      Head: Normocephalic.   Eyes:      Extraocular Movements: Extraocular movements intact.      Pupils: Pupils are equal, round, and reactive to light.   Cardiovascular:      Rate and Rhythm: Normal rate and regular rhythm.   Pulmonary:      Effort: Pulmonary effort is normal. No respiratory distress.   Abdominal:      General: Abdomen is flat. There is no distension.   Musculoskeletal:         General: No swelling or tenderness. Normal range of motion.      Cervical back: Normal range of motion. No rigidity.   Neurological:      General: No focal deficit present.      Mental Status: He is alert and oriented to person, place, and time. Mental status is at baseline.      Comments:   Alert, oriented to person, place and time  Follows commands in all extremities  PERRL, EOMi  Face symmetric  Moves all extremities antigravity, spontaneously and to command  Strength full x4  Sensation intact     Today I personally reviewed pertinent medications, lines/drains/airways, imaging, cardiology results, laboratory results, microbiology results, notably:    UOP and Na stable  Post op CTH stable noting blood products at resection site  Pre operative MRI with interval increase in size of the presumed pituitary macro adenoma  Repeat CTH stable    Assessment/Plan:     Cardiac/Vascular  Hypercholesterolemia  Continue home pravastatin     Mixed hyperlipidemia  Continue home pravastatin     High serum low-density lipoprotein (LDL)  Continue home pravastatin     Endocrine  * Pituitary  macroadenoma  42M with history of 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     - Q1h neurochecks while in ICU  - Q1h vitals while in ICU  - HOB@30  - Strict nasal precautions   - CTH reveiwed  - SBP <140   - EKG reviewed  - Holding home ASA  - Daily CMP, Mag, Phos - replete electrolytes PRN  - Lipid panel, A1c, Coags (preoperative) reviewed  - Regular diet as appropriate   - Strict I/Os, monitor for evidence of increased UOP  - Q6 Na and Q6 UA  - Hydrocortisone taper per neurosurgery   - Daily CBC, transfuse PRN  - Start SubQ Heparin in when clinically indicated   - PT/OT/SLP as appropriate    Hyperprolactinemia  See Pituitary macroadenoma    Secondary male hypogonadism  See Pituitary macroadenoma    Elevated hemoglobin A1c - at risk for diabetes  A1c 5.7 3/22    History of pituitary tumor  See Pituitary macroadenoma    Pituitary adenoma  See Pituitary macroadenoma          The patient is being Prophylaxed for:  Venous Thromboembolism with: Chemical  Stress Ulcer with: Not Applicable   Ventilator Pneumonia with: not applicable    Activity Orders          Diet Adult Regular (IDDSI Level 7): Regular starting at 04/20 1506    Progressive Mobility Protocol (mobilize patient to their highest level of functioning at least twice daily) starting at 04/19 2000    Elevate HOB starting at 04/19 1623        Full Code    Sheng Gtz MD  Neurocritical Care  [facility] - Neuro [facility]

## 2023-04-21 NOTE — NURSING
[facility] Care Plan    POC reviewed with Gallo Maria Jr.  at 0300. Pt verbalized understanding. Questions and concerns addressed. No acute events overnight. Pt progressing toward goals. Will continue to monitor. See below and flowsheets for full assessment and VS info.     - complaints of headache, oxycodone PRN given  -CTH done  -phosphorus replaced.       Is this a stroke patient? no    Neuro:  Wilder Coma Scale  Best Eye Response: 4-->(E4) spontaneous  Best Motor Response: 6-->(M6) obeys commands  Best Verbal Response: 5-->(V5) oriented  Ruby Coma Scale Score: 15  Pupil PERRLA: yes     24hr Temp:  [97.5 °F (36.4 °C)-98.9 °F (37.2 °C)]     CV:   Rhythm: normal sinus rhythm  BP goals:   SBP < 140  MAP > 65    Resp:           Plan: N/A    GI/:     Diet/Nutrition Received: regular  Last Bowel Movement: 04/19/23  Voiding Characteristics: urethral catheter (bladder)    Intake/Output Summary (Last 24 hours) at 4/21/2023 0348  Last data filed at 4/21/2023 0305  Gross per 24 hour   Intake 1720 ml   Output 3304 ml   Net -1584 ml          Labs/Accuchecks:  Recent Labs   Lab 04/21/23  0021   WBC 13.69*   RBC 4.90   HGB 13.9*   HCT 42.3         Recent Labs   Lab 04/21/23  0021      K 4.1   CO2 23      BUN 12   CREATININE 0.9   ALKPHOS 39*   ALT 29   AST 22   BILITOT 0.6      Recent Labs   Lab 04/19/23  0840   INR 1.2   APTT 36.5*    No results for input(s): CPK, CPKMB, TROPONINI, MB in the last 168 hours.    Electrolytes: Electrolytes replaced  Accuchecks: none    Gtts:      LDA/Wounds:  Lines/Drains/Airways       Drain  Duration                  Urethral Catheter 04/19/23 1109 Non-latex;Straight-tip;Silicone 16 Fr. 1 day              Arterial Line  Duration             Arterial Line 04/19/23 1140 Right Radial 1 day              Peripheral Intravenous Line  Duration                  Peripheral IV - Single Lumen 18 G Left Forearm -- days         Peripheral IV - Single Lumen 04/19/23 0840 18 G  Posterior;Right Forearm 1 day                  Wounds: No  Wound care consulted: No

## 2023-04-21 NOTE — OP NOTE
Date of Operation:  4/19/2023.    Preoperative Diagnosis:  Pituitary Adenoma.    Postoperative Diagnosis: Pituitary Adenoma.    Procedure:  Transsphenoidal resection of pituitary adenoma with neuro navigation and microsurgery.    Surgeons:  Derek Blair MD (Neurosurgery) and Trip Powell MD (ENT)    Assistants:  Bruno Agrawal MD (Resident in Neurosurgery) and Minh Alvarez MD (Resident in ENT).    Anesthesia:  General Endotracheal.    Estimated Blood Loss:  200 ml.    Condition at End of Procedure:  Satisfactory.    Brief History:  This 42-year-old man was found to have a large pituitary adenoma and underwent transnasal transsphenoidal resection of the tumor on 09/25/2015.  Postoperative MRI showed good resection of the tumor.  He has required no pituitary hormonal replacement.  However an MRI done in 2019 began to show a little recurrence of the tumor.  Radiation therapy was considered but we decided to follow the tumor.  This has been followed over the last 4 years with subsequent MRI studies and the tumor now has fairly significant suprasellar extension slightly elevating the optic chiasm.  The patient feels he has some difficulty focusing vision.  He is now brought to the operating for re-resection of the tumor.  He has a poorly developed left side sphenoid sinus and it was decided to approach this translabial transsphenoidal rather than transnasal.    Procedure in Detail:  The patient was brought to the operating room and in the supine position on the operating table under premedication intubated and induced with general anesthesia.  A cannula was placed in the left radial artery for continuous blood pressure monitoring, a Montez catheter inserted, sequential compression devices applied to the legs, and various intravenous line started.  The head of the bed was somewhat elevated and the head turned slightly to the left and immobilized with the Polo 3 point skeletal fixation device.  The C-arm  fluoroscope was brought into give a cross table lateral view of the sella.  Navigation MRI had been performed.  The navigation arc was attached to the headrest and the head registered to the system with surface recognition.  The direction of the tumor was confirmed with the probe.  Dr. Powell then came in and performed a tranlabial transseptal approach to the sphenoid sinus and he will dictate this portion of the procedure.  The Tamayo retractor was inserted up against the wall of the sphenoid sinus.  The mucosa was somewhat thickened scarred here and some of this had to be coagulated removed to give good exposure of the sphenoid sinus.  The inferior portion of the bone was very thick and needed to be drilled to be opened into the sphenoid sinus and then this was widened with the drill and Kerrison rongeur up to the previous sphenoidotomy.  As noted the left side of the sinus is very poorly aerated and the medial wall of this had to be drilled and removed with the Kerrison rongeur to expose the sella.  There is a fairly prominent tongue of tumor that dips down into the sphenoid sinus.  With the sphenoidotomy done as best as possible incision was made into the tumor and removal started with pituitary curettes and pituitary rongeurs.  Removal was then continued cephalad through the original floor of the sella and continued bilaterally and superiorly until the diaphragma sellae was encountered.  There were some openings in the dura but the arachnoid remained intact and could be seen bulging through.  The walls of the sella were then cleaned with the curettes and pituitary rongeur there was until no definite tumor could be seen.  With the tumor removed bleeding points were controlled with bipolar coagulation.  However there was rather persistent bleeding from the dura probably the circular sinus that responded poorly to coagulation.  Surgicel was placed into the sella and then over this portion of dura and thrombin and  Surgiflo used.  Eventually bleeding seemed controlled and the wound was irrigated until clear.  Adherus glue was then placed over the opening, a piece of DuraGen placed over this and additional Adherus used to hold this in place.  Pieces of Gelfoam were placed in the sphenoid sinus.  Dr. Powell then returned to close the nasal labial incisions and he will dictate this portion of the procedure.  With the nasal packing in place the patient's head was released the 3 point skeletal fixation device, was returned to the full supine position, allowed to awaken from anesthesia, extubated, transferred to his gurney and brought to the neuro intensive care unit in stable condition.  He received 2 g of Rocephin and 100 mg of hydrocortisone at the beginning of the procedure and Rocephin containing antibiotic irrigating solutions were used throughout.

## 2023-04-21 NOTE — SUBJECTIVE & OBJECTIVE
Subjective:     Interval History: As above.     Review of Systems   Constitutional:  Positive for fatigue. Negative for chills and fever.   HENT:  Positive for nosebleeds and postnasal drip.    Eyes:  Negative for photophobia and visual disturbance.   Respiratory:  Negative for chest tightness and shortness of breath.    Cardiovascular:  Negative for chest pain and palpitations.   Gastrointestinal:  Negative for diarrhea and nausea.   Musculoskeletal:  Negative for neck pain and neck stiffness.   Neurological:  Positive for headaches. Negative for dizziness, tremors, facial asymmetry, speech difficulty and weakness.   Psychiatric/Behavioral:  Negative for agitation and confusion.    All other systems reviewed and are negative.    Objective:     Vitals:    Temp: 97.8 °F (36.6 °C)  Pulse: (!) 59  Rhythm: normal sinus rhythm  BP: 135/76  MAP (mmHg): 87  Resp: 14  SpO2: 99 %    Temp  Min: 97.5 °F (36.4 °C)  Max: 98.9 °F (37.2 °C)  Pulse  Min: 56  Max: 98  BP  Min: 100/63  Max: 140/81  MAP (mmHg)  Min: 77  Max: 104  Resp  Min: 12  Max: 22  SpO2  Min: 93 %  Max: 100 %    04/20 0701 - 04/21 0700  In: 2011 [P.O.:1840]  Out: 3155 [Urine:3155]         Physical Exam  Vitals and nursing note reviewed.   Constitutional:       General: He is not in acute distress.     Appearance: He is not ill-appearing.   HENT:      Head: Normocephalic.   Eyes:      Extraocular Movements: Extraocular movements intact.      Pupils: Pupils are equal, round, and reactive to light.   Cardiovascular:      Rate and Rhythm: Normal rate and regular rhythm.   Pulmonary:      Effort: Pulmonary effort is normal. No respiratory distress.   Abdominal:      General: Abdomen is flat. There is no distension.   Musculoskeletal:         General: No swelling or tenderness. Normal range of motion.      Cervical back: Normal range of motion. No rigidity.   Neurological:      General: No focal deficit present.      Mental Status: He is alert and oriented to person,  place, and time. Mental status is at baseline.      Comments:   Alert, oriented to person, place and time  Follows commands in all extremities  PERRL, EOMi  Face symmetric  Moves all extremities antigravity, spontaneously and to command  Strength full x4  Sensation intact     Today I personally reviewed pertinent medications, lines/drains/airways, imaging, cardiology results, laboratory results, microbiology results, notably:    UOP and Na stable  Post op CTH stable noting blood products at resection site  Pre operative MRI with interval increase in size of the presumed pituitary macro adenoma  Repeat CTH stable

## 2023-04-21 NOTE — PT/OT/SLP EVAL
Occupational Therapy   Co-Evaluation, Co-Treatment and Discharge Note  Co-treatment with PT for maximal pt participation, safety, and activity tolerance     Name: Gallo Maria Jr.  MRN: [id number]  Admitting Diagnosis: Pituitary macroadenoma  Recent Surgery: Procedure(s) (LRB):  RESECTION, NEOPLASM, PITUITARY, TRANSSPHENOIDAL APPROACH (N/A) 2 Days Post-Op    Recommendations:     Discharge Recommendations: home (Simultaneous filing. User may not have seen previous data.)  Discharge Equipment Recommendations: none (Simultaneous filing. User may not have seen previous data.)  Barriers to discharge:  None    Assessment:     Gallo Maria Jr. is a 42 y.o. male with a medical diagnosis of Pituitary macroadenoma. At this time, patient is functioning at their prior level of function and does not require further acute OT services.     Plan:     During this hospitalization, patient does not require further acute OT services.  Please re-consult if situation changes.    Plan of Care Reviewed with: patient, spouse (Simultaneous filing. User may not have seen previous data.)    Subjective     Chief Complaint: 2/10 dull headache  Patient/Family Comments/goals: return home     Occupational Profile:  Living Environment: Pt lives with wife and children (18,24,16, and 9) in a two story home with bed/bath on first floor.   Previous level of function: I with ADLs and mobility   Roles and Routines: Pt drives; works for [employer], building [employer]. Father.  Equipment Used at home:  (owns crutches  Simultaneous filing. User may not have seen previous data.)  Assistance upon Discharge: family    Pain/Comfort:  Pain Rating 1: 0/10 (Simultaneous filing. User may not have seen previous data.)  Pain Rating Post-Intervention 1: 0/10 (Simultaneous filing. User may not have seen previous data.)  Pain Rating Post-Intervention 2: 0/10    Patients cultural, spiritual, Yazdanism conflicts given the current situation: no (Simultaneous filing. User  may not have seen previous data.)    Objective:     Communicated with: RN prior to session.  Patient found HOB elevated with arterial line, telemetry, peripheral IV, porter catheter, blood pressure cuff, pulse ox (continuous), SCD (Simultaneous filing. User may not have seen previous data.) upon OT entry to room.    General Precautions: Standard, fall (Simultaneous filing. User may not have seen previous data.)  Orthopedic Precautions: N/A (Simultaneous filing. User may not have seen previous data.)  Braces: N/A (Simultaneous filing. User may not have seen previous data.)  Respiratory Status: Room air     Occupational Performance:    Bed Mobility:    Patient completed Rolling/Turning to Right with independence  Patient completed Scooting/Bridging with independence  Patient completed Supine to Sit with independence    Functional Mobility/Transfers:  Patient completed Sit <> Stand Transfer with stand by assistance  with  no assistive device   Patient completed Bed <> Chair Transfer using Step Transfer technique with stand by assistance with no assistive device  Functional Mobility: Pt demonstrated functional mobility training to simulate household and community environment gait training during session. Pt tolerated ~8 minutes total using no AD with no LOB and good visual search and navigation strategies.       Activities of Daily Living:  Grooming: stand by assistance washing face at EOB   Upper Body Dressing: minimum assistance donning gown    Cognitive/Visual Perceptual:  Cognitive/Psychosocial Skills:     -       Oriented to: Person, Place, Time, and Situation   -       Follows Commands/attention:Follows multistep  commands  -       Communication: clear/fluent  -       Memory: No Deficits noted  -       Safety awareness/insight to disability: intact   -       Mood/Affect/Coping skills/emotional control: Pleasant    Physical Exam:  Balance:    -       demo good sitting and standing balance   Upper Extremity Range of  Motion:     -       Right Upper Extremity: WFL  -       Left Upper Extremity: WFL  Upper Extremity Strength:    -       Right Upper Extremity: WFL  -       Left Upper Extremity: WFL   Strength:    -       Right Upper Extremity: WFL  -       Left Upper Extremity: WFL    AMPAC 6 Click ADL:  AMPAC Total Score: 24    Treatment & Education:  Pt educated on role of occupational therapy, POC, and safety during ADLs and functional mobility. Pt and OT discussed importance of safe, continued mobility to optimize daily living skills. Pt verbalized understanding.     White board updated during session. Pt given instruction to call for medical staff/nurse for assistance.       Patient left up in chair with all lines intact, call button in reach, and RN notified    GOALS:   Multidisciplinary Problems       Occupational Therapy Goals       Not on file                    History:     Past Medical History:   Diagnosis Date    ADD (attention deficit disorder)     treated by outside psychiatrist.    Asthma     BPH with urinary obstruction 2/5/2015    Decreased libido 8/31/2015    Erectile dysfunction 10/27/2015    History of migraine headaches     Hypogonadism male 10/27/2015    Pituitary adenoma 8/9/2012         Past Surgical History:   Procedure Laterality Date    BIOPSY OF TONSILS Right 9/9/2019    Procedure: BIOPSY, TONSILS;  Surgeon: Bruno Umaña MD;  Location: [facility] OR [address] [place] [address];  Service: ENT;  Laterality: Right;    SURGICAL REMOVAL OF PITUITARY TUMOR BY TRANSSPHENOIDAL APPROACH  2015    Pituitary Adeonma via Dr. Blair 2015    SURGICAL REMOVAL OF PITUITARY TUMOR BY TRANSSPHENOIDAL APPROACH N/A 4/19/2023    Procedure: RESECTION, NEOPLASM, PITUITARY, TRANSSPHENOIDAL APPROACH;  Surgeon: Derek Blair MD;  Location: [facility] OR [address] [place] [address];  Service: Neurosurgery;  Laterality: N/A;  TRANSPHENOIDAL RESECTION PITUITARY ADENOMA/ 2 UNITS RBC, TEDS & SCD, FLOURO, TRANSPHENOIDAL SET, MICROSCOPE, [facility] SURGEON.    WISDOM TOOTH  EXTRACTION         Time Tracking:     OT Date of Treatment: 04/21/23  OT Start Time: 0844  OT Stop Time: 0906  OT Total Time (min): 22 min    Billable Minutes:Evaluation 11 min  Self Care/Home Management 11 min    4/21/2023

## 2023-04-21 NOTE — PLAN OF CARE
Eval complete 4/21    Problem: Physical Therapy  Goal: Physical Therapy Goal  Description: No goals established at this time, see eval 4/21  Outcome: Met

## 2023-04-22 LAB
ALBUMIN SERPL BCP-MCNC: 3.8 G/DL (ref 3.5–5.2)
ALP SERPL-CCNC: 41 U/L (ref 55–135)
ALT SERPL W/O P-5'-P-CCNC: 24 U/L (ref 10–44)
ANION GAP SERPL CALC-SCNC: 8 MMOL/L (ref 8–16)
AST SERPL-CCNC: 17 U/L (ref 10–40)
BACTERIA #/AREA URNS AUTO: ABNORMAL /HPF
BASOPHILS # BLD AUTO: 0.02 K/UL (ref 0–0.2)
BASOPHILS NFR BLD: 0.2 % (ref 0–1.9)
BILIRUB SERPL-MCNC: 0.5 MG/DL (ref 0.1–1)
BILIRUB UR QL STRIP: NEGATIVE
BUN SERPL-MCNC: 11 MG/DL (ref 6–20)
CALCIUM SERPL-MCNC: 8.6 MG/DL (ref 8.7–10.5)
CHLORIDE SERPL-SCNC: 106 MMOL/L (ref 95–110)
CLARITY UR REFRACT.AUTO: CLEAR
CO2 SERPL-SCNC: 24 MMOL/L (ref 23–29)
COLOR UR AUTO: ABNORMAL
COLOR UR AUTO: YELLOW
CREAT SERPL-MCNC: 0.8 MG/DL (ref 0.5–1.4)
DIFFERENTIAL METHOD: ABNORMAL
EOSINOPHIL # BLD AUTO: 0 K/UL (ref 0–0.5)
EOSINOPHIL NFR BLD: 0.2 % (ref 0–8)
ERYTHROCYTE [DISTWIDTH] IN BLOOD BY AUTOMATED COUNT: 14.2 % (ref 11.5–14.5)
EST. GFR  (NO RACE VARIABLE): >60 ML/MIN/1.73 M^2
GLUCOSE SERPL-MCNC: 114 MG/DL (ref 70–110)
GLUCOSE UR QL STRIP: ABNORMAL
GLUCOSE UR QL STRIP: NEGATIVE
HCT VFR BLD AUTO: 41.6 % (ref 40–54)
HGB BLD-MCNC: 13.6 G/DL (ref 14–18)
HGB UR QL STRIP: ABNORMAL
HGB UR QL STRIP: ABNORMAL
HGB UR QL STRIP: NEGATIVE
HGB UR QL STRIP: NEGATIVE
IMM GRANULOCYTES # BLD AUTO: 0.05 K/UL (ref 0–0.04)
IMM GRANULOCYTES NFR BLD AUTO: 0.4 % (ref 0–0.5)
KETONES UR QL STRIP: NEGATIVE
LEUKOCYTE ESTERASE UR QL STRIP: ABNORMAL
LEUKOCYTE ESTERASE UR QL STRIP: NEGATIVE
LYMPHOCYTES # BLD AUTO: 3 K/UL (ref 1–4.8)
LYMPHOCYTES NFR BLD: 23.4 % (ref 18–48)
MAGNESIUM SERPL-MCNC: 2.1 MG/DL (ref 1.6–2.6)
MCH RBC QN AUTO: 27.9 PG (ref 27–31)
MCHC RBC AUTO-ENTMCNC: 32.7 G/DL (ref 32–36)
MCV RBC AUTO: 85 FL (ref 82–98)
MICROSCOPIC COMMENT: ABNORMAL
MICROSCOPIC COMMENT: ABNORMAL
MONOCYTES # BLD AUTO: 0.7 K/UL (ref 0.3–1)
MONOCYTES NFR BLD: 5.8 % (ref 4–15)
NEUTROPHILS # BLD AUTO: 8.8 K/UL (ref 1.8–7.7)
NEUTROPHILS NFR BLD: 70 % (ref 38–73)
NITRITE UR QL STRIP: NEGATIVE
NRBC BLD-RTO: 0 /100 WBC
PH UR STRIP: 6 [PH] (ref 5–8)
PH UR STRIP: 7 [PH] (ref 5–8)
PHOSPHATE SERPL-MCNC: 3.9 MG/DL (ref 2.7–4.5)
PLATELET # BLD AUTO: 279 K/UL (ref 150–450)
PMV BLD AUTO: 9.7 FL (ref 9.2–12.9)
POTASSIUM SERPL-SCNC: 3.8 MMOL/L (ref 3.5–5.1)
PROT SERPL-MCNC: 6.7 G/DL (ref 6–8.4)
PROT UR QL STRIP: NEGATIVE
RBC # BLD AUTO: 4.87 M/UL (ref 4.6–6.2)
RBC #/AREA URNS AUTO: 5 /HPF (ref 0–4)
RBC #/AREA URNS AUTO: 6 /HPF (ref 0–4)
SODIUM SERPL-SCNC: 137 MMOL/L (ref 136–145)
SODIUM SERPL-SCNC: 138 MMOL/L (ref 136–145)
SODIUM SERPL-SCNC: 138 MMOL/L (ref 136–145)
SODIUM SERPL-SCNC: 140 MMOL/L (ref 136–145)
SP GR UR STRIP: 1.01 (ref 1–1.03)
SP GR UR STRIP: 1.01 (ref 1–1.03)
SP GR UR STRIP: 1.02 (ref 1–1.03)
SP GR UR STRIP: 1.02 (ref 1–1.03)
SQUAMOUS #/AREA URNS AUTO: 0 /HPF
URN SPEC COLLECT METH UR: ABNORMAL
URN SPEC COLLECT METH UR: NORMAL
WBC # BLD AUTO: 12.58 K/UL (ref 3.9–12.7)
WBC #/AREA URNS AUTO: 4 /HPF (ref 0–5)
WBC #/AREA URNS AUTO: 4 /HPF (ref 0–5)

## 2023-04-22 PROCEDURE — 20600001 HC STEP DOWN PRIVATE ROOM

## 2023-04-22 PROCEDURE — 84295 ASSAY OF SERUM SODIUM: CPT | Mod: 91 | Performed by: STUDENT IN AN ORGANIZED HEALTH CARE EDUCATION/TRAINING PROGRAM

## 2023-04-22 PROCEDURE — 83735 ASSAY OF MAGNESIUM: CPT | Performed by: STUDENT IN AN ORGANIZED HEALTH CARE EDUCATION/TRAINING PROGRAM

## 2023-04-22 PROCEDURE — 81001 URINALYSIS AUTO W/SCOPE: CPT | Performed by: STUDENT IN AN ORGANIZED HEALTH CARE EDUCATION/TRAINING PROGRAM

## 2023-04-22 PROCEDURE — 85025 COMPLETE CBC W/AUTO DIFF WBC: CPT | Performed by: STUDENT IN AN ORGANIZED HEALTH CARE EDUCATION/TRAINING PROGRAM

## 2023-04-22 PROCEDURE — 25000003 PHARM REV CODE 250: Performed by: STUDENT IN AN ORGANIZED HEALTH CARE EDUCATION/TRAINING PROGRAM

## 2023-04-22 PROCEDURE — 81003 URINALYSIS AUTO W/O SCOPE: CPT | Performed by: STUDENT IN AN ORGANIZED HEALTH CARE EDUCATION/TRAINING PROGRAM

## 2023-04-22 PROCEDURE — 63600175 PHARM REV CODE 636 W HCPCS: Performed by: STUDENT IN AN ORGANIZED HEALTH CARE EDUCATION/TRAINING PROGRAM

## 2023-04-22 PROCEDURE — 99233 SBSQ HOSP IP/OBS HIGH 50: CPT | Mod: ,,, | Performed by: PSYCHIATRY & NEUROLOGY

## 2023-04-22 PROCEDURE — 20000000 HC ICU ROOM

## 2023-04-22 PROCEDURE — 94761 N-INVAS EAR/PLS OXIMETRY MLT: CPT

## 2023-04-22 PROCEDURE — 84100 ASSAY OF PHOSPHORUS: CPT | Performed by: STUDENT IN AN ORGANIZED HEALTH CARE EDUCATION/TRAINING PROGRAM

## 2023-04-22 PROCEDURE — 80053 COMPREHEN METABOLIC PANEL: CPT | Performed by: STUDENT IN AN ORGANIZED HEALTH CARE EDUCATION/TRAINING PROGRAM

## 2023-04-22 PROCEDURE — 99233 PR SUBSEQUENT HOSPITAL CARE,LEVL III: ICD-10-PCS | Mod: ,,, | Performed by: PSYCHIATRY & NEUROLOGY

## 2023-04-22 RX ORDER — POLYETHYLENE GLYCOL 3350 17 G/17G
17 POWDER, FOR SOLUTION ORAL DAILY
Status: DISCONTINUED | OUTPATIENT
Start: 2023-04-22 | End: 2023-04-23 | Stop reason: HOSPADM

## 2023-04-22 RX ORDER — AMOXICILLIN 250 MG
1 CAPSULE ORAL DAILY PRN
Status: DISCONTINUED | OUTPATIENT
Start: 2023-04-22 | End: 2023-04-22

## 2023-04-22 RX ORDER — AMOXICILLIN 250 MG
1 CAPSULE ORAL DAILY
Status: DISCONTINUED | OUTPATIENT
Start: 2023-04-22 | End: 2023-04-23 | Stop reason: HOSPADM

## 2023-04-22 RX ADMIN — HYDROCORTISONE SODIUM SUCCINATE 50 MG: 100 INJECTION, POWDER, FOR SOLUTION INTRAMUSCULAR; INTRAVENOUS at 09:04

## 2023-04-22 RX ADMIN — FAMOTIDINE 20 MG: 20 TABLET ORAL at 09:04

## 2023-04-22 NOTE — PLAN OF CARE
[facility] Care Plan    POC reviewed with Gallo Maria Jr. and family at 1400. Pt verbalized understanding. Questions and concerns addressed. No acute events today. Pt progressing toward goals. Will continue to monitor. See below and flowsheets for full assessment and VS info.             Is this a stroke patient? no    Neuro:  Macon Coma Scale  Best Eye Response: 4-->(E4) spontaneous  Best Motor Response: 6-->(M6) obeys commands  Best Verbal Response: 5-->(V5) oriented  Wilder Coma Scale Score: 15  Pupil PERRLA: yes     24 hr Temp:  [97.7 °F (36.5 °C)-98.6 °F (37 °C)]     CV:   Rhythm: normal sinus rhythm  BP goals:   SBP < 140  MAP > 65    Resp:           Plan: N/A    GI/:     Diet/Nutrition Received: regular  Last Bowel Movement: 04/22/23  Voiding Characteristics: urethral catheter (bladder)    Intake/Output Summary (Last 24 hours) at 4/22/2023 1634  Last data filed at 4/22/2023 1101  Gross per 24 hour   Intake 1047.9 ml   Output 1380 ml   Net -332.1 ml          Labs/Accuchecks:  Recent Labs   Lab 04/22/23  0113   WBC 12.58   RBC 4.87   HGB 13.6*   HCT 41.6         Recent Labs   Lab 04/22/23  0113 04/22/23  0612 04/22/23  1139      < > 137   K 3.8  --   --    CO2 24  --   --      --   --    BUN 11  --   --    CREATININE 0.8  --   --    ALKPHOS 41*  --   --    ALT 24  --   --    AST 17  --   --    BILITOT 0.5  --   --     < > = values in this interval not displayed.      Recent Labs   Lab 04/19/23  0840   INR 1.2   APTT 36.5*    No results for input(s): CPK, CPKMB, TROPONINI, MB in the last 168 hours.    Electrolytes: N/A - electrolytes WDL  Accuchecks: none    Gtts:      LDA/Wounds:  Lines/Drains/Airways       Peripheral Intravenous Line  Duration                  Peripheral IV - Single Lumen 18 G Left Forearm -- days         Peripheral IV - Single Lumen 04/19/23 0840 18 G Posterior;Right Forearm 3 days                  Wounds: No  Wound care consulted: No   Problem: Adult Inpatient Plan  of Care  Goal: Plan of Care Review  Outcome: Ongoing, Progressing  Goal: Patient-Specific Goal (Individualized)  Outcome: Ongoing, Progressing  Goal: Absence of Hospital-Acquired Illness or Injury  Outcome: Ongoing, Progressing  Goal: Optimal Comfort and Wellbeing  Outcome: Ongoing, Progressing  Goal: Readiness for Transition of Care  Outcome: Ongoing, Progressing

## 2023-04-22 NOTE — PROGRESS NOTES
[facility]  Neurosurgery  Progress Note    Subjective:     History of Present Illness: 4/20: POD 1 s/p transphenoidal resection of pituitary mass. I/O appropriate, . No evidence of DI. CT head with some hemorrhage in resection cavity, patient doing well with improving vision, packing removed by ENT.       Post-Op Info:  Procedure(s) (LRB):  RESECTION, NEOPLASM, PITUITARY, TRANSSPHENOIDAL APPROACH (N/A)   3 Days Post-Op     Interval History:     No acute events overnight.  Doing well pending floor step-down.  Last CT head stable.  No DI.  Sodium stable    Medications:  Continuous Infusions:      Scheduled Meds:   famotidine  20 mg Oral BID    hydrocortisone sodium succinate  50 mg Intravenous BID     PRN Meds:calcium gluconate IVPB, calcium gluconate IVPB, calcium gluconate IVPB, magnesium sulfate IVPB, magnesium sulfate IVPB, morphine, ondansetron, oxyCODONE, potassium chloride **AND** potassium chloride **AND** potassium chloride, sodium chloride 0.9%, sodium phosphate IVPB, sodium phosphate IVPB, sodium phosphate IVPB     Review of Systems  Objective:     Weight: 113.9 kg (251 lb)  Body mass index is 31.37 kg/m².  Vital Signs (Most Recent):  Temp: 97.7 °F (36.5 °C) (04/21/23 0701)  Pulse: 76 (04/21/23 0901)  Resp: 19 (04/21/23 0901)  BP: 125/77 (04/21/23 0901)  SpO2: 99 % (04/21/23 0901)   Vital Signs (24h Range):  Temp:  [97.5 °F (36.4 °C)-98.9 °F (37.2 °C)] 97.7 °F (36.5 °C)  Pulse:  [56-98] 76  Resp:  [12-22] 19  SpO2:  [93 %-100 %] 99 %  BP: (100-140)/(60-81) 125/77  Arterial Line BP: (100-137)/(66-79) 134/76     Date 04/21/23 0700 - 04/22/23 0659   Shift 6921-6828 7437-1841 0649-9366 24 Hour Total   INTAKE   P.O. 354   354   Shift Total(mL/kg) 354(3.1)   354(3.1)   OUTPUT   Urine(mL/kg/hr) 300   300   Shift Total(mL/kg) 300(2.6)   300(2.6)   Weight (kg) 113.9 113.9 113.9 113.9                            Urethral Catheter 04/19/23 1109 Non-latex;Straight-tip;Silicone 16 Fr. (Active)  "  Site Assessment Clean;Intact 04/20/23 0305   Collection Container Urimeter 04/20/23 0305   Securement Method secured to top of thigh w/ adhesive device 04/20/23 0305   Catheter Care Performed yes 04/20/23 0305   Reason for Continuing Urinary Catheterization Critically ill in ICU and requiring hourly monitoring of intake/output 04/20/23 0305   CAUTI Prevention Bundle Securement Device in place with 1" slack;Intact seal between catheter & drainage tubing;Drainage bag/urimeter off the floor;Sheeting clip in use;No dependent loops or kinks;Drainage bag/urimeter not overfilled (<2/3 full);Drainage bag/urimeter below bladder 04/19/23 1905   Output (mL) 137 mL 04/20/23 0605       Physical Exam    Neurosurgery Physical Exam  General: well developed, well nourished, no distress.   Head: normocephalic, atraumatic  Neurologic:   GCS: Eyes: 4/ Verbal: 5/ Motor: 6  Mental Status: Awake, Alert, Oriented x 3  Nasal packing removed.   Visual fields present and full to confrontation.   Cranial nerves: PERRL, EOMI, face symmetric, tongue midline, shoulder shrug equal.  No pronator drift, no dysmetria.  Sensory: intact to light touch throughout  Motor Strength:Moves all extremities antigravity  DTR: 2+ symmetrically throughout.    Pulmonary: normal respirations, no signs of respiratory distress  Abdomen: soft, non-distended, not tender to palpation                         Significant Labs:  Recent Labs   Lab 04/20/23  0006 04/20/23  0601 04/20/23  1811 04/21/23  0021 04/21/23  0554   *  --   --  129*  --    *   < > 139 138 139   K 4.2  --   --  4.1  --      --   --  106  --    CO2 20*  --   --  23  --    BUN 8  --   --  12  --    CREATININE 1.0  --   --  0.9  --    CALCIUM 8.0*  --   --  8.7  --    MG 1.7  --   --  2.4  --     < > = values in this interval not displayed.       Recent Labs   Lab 04/20/23  0006 04/21/23  0021   WBC 16.09* 13.69*   HGB 14.6 13.9*   HCT 43.6 42.3    261       No results for " input(s): LABPT, INR, APTT in the last 48 hours.    Microbiology Results (last 7 days)       ** No results found for the last 168 hours. **          All pertinent labs from the last 24 hours have been reviewed.    Significant Diagnostics:  I have reviewed all pertinent imaging results/findings within the past 24 hours.    Assessment/Plan:     Pituitary adenoma  Operative 43 yo male with a known hx of pituitary non-functional macroadenoma s/p transphenoidal resection presents with increasing vision changes and enlarging size of the tumor. Now s/p translabial transphenoidal pituitary mass resection (4/19).     POD 3     No acute events overnight.  Pending transfer to floor  Continue neuro checks per protocol  CT scan stable last 1 with expected postop changes and hematoma at the surgical bed   Vision is improving   Hydrocortisone taper (ordered)  No sign for DI and sodium stable  Sinus percussion and no NG tube  Satting well on room air   He blood pressure below 150.  Discontinue gene  Advance diet as tolerated   Discontinue Montez catheter  Continue PTOT   Continue mechanical DVT prophylaxis encourage mobility and out of bed      Dispo; home tomorrow pending hydrocortisone taper        Kenny Dior MD  Neurosurgery  [facility]

## 2023-04-22 NOTE — SUBJECTIVE & OBJECTIVE
"Interval History:     No acute events overnight.  Doing well pending floor step-down.  Last CT head stable.  No DI.  Sodium stable    Medications:  Continuous Infusions:      Scheduled Meds:   famotidine  20 mg Oral BID    hydrocortisone sodium succinate  50 mg Intravenous BID     PRN Meds:calcium gluconate IVPB, calcium gluconate IVPB, calcium gluconate IVPB, magnesium sulfate IVPB, magnesium sulfate IVPB, morphine, ondansetron, oxyCODONE, potassium chloride **AND** potassium chloride **AND** potassium chloride, sodium chloride 0.9%, sodium phosphate IVPB, sodium phosphate IVPB, sodium phosphate IVPB     Review of Systems  Objective:     Weight: 113.9 kg (251 lb)  Body mass index is 31.37 kg/m².  Vital Signs (Most Recent):  Temp: 97.7 °F (36.5 °C) (04/21/23 0701)  Pulse: 76 (04/21/23 0901)  Resp: 19 (04/21/23 0901)  BP: 125/77 (04/21/23 0901)  SpO2: 99 % (04/21/23 0901)   Vital Signs (24h Range):  Temp:  [97.5 °F (36.4 °C)-98.9 °F (37.2 °C)] 97.7 °F (36.5 °C)  Pulse:  [56-98] 76  Resp:  [12-22] 19  SpO2:  [93 %-100 %] 99 %  BP: (100-140)/(60-81) 125/77  Arterial Line BP: (100-137)/(66-79) 134/76     Date 04/21/23 0700 - 04/22/23 0659   Shift 0496-5552 4529-9562 5082-3864 24 Hour Total   INTAKE   P.O. 354   354   Shift Total(mL/kg) 354(3.1)   354(3.1)   OUTPUT   Urine(mL/kg/hr) 300   300   Shift Total(mL/kg) 300(2.6)   300(2.6)   Weight (kg) 113.9 113.9 113.9 113.9                            Urethral Catheter 04/19/23 1109 Non-latex;Straight-tip;Silicone 16 Fr. (Active)   Site Assessment Clean;Intact 04/20/23 0305   Collection Container Urimeter 04/20/23 0305   Securement Method secured to top of thigh w/ adhesive device 04/20/23 0305   Catheter Care Performed yes 04/20/23 0305   Reason for Continuing Urinary Catheterization Critically ill in ICU and requiring hourly monitoring of intake/output 04/20/23 0305   CAUTI Prevention Bundle Securement Device in place with 1" slack;Intact seal between catheter & drainage " tubing;Drainage bag/urimeter off the floor;Sheeting clip in use;No dependent loops or kinks;Drainage bag/urimeter not overfilled (<2/3 full);Drainage bag/urimeter below bladder 04/19/23 1905   Output (mL) 137 mL 04/20/23 0605       Physical Exam    Neurosurgery Physical Exam  General: well developed, well nourished, no distress.   Head: normocephalic, atraumatic  Neurologic:   GCS: Eyes: 4/ Verbal: 5/ Motor: 6  Mental Status: Awake, Alert, Oriented x 3  Nasal packing removed.   Visual fields present and full to confrontation.   Cranial nerves: PERRL, EOMI, face symmetric, tongue midline, shoulder shrug equal.  No pronator drift, no dysmetria.  Sensory: intact to light touch throughout  Motor Strength:Moves all extremities antigravity  DTR: 2+ symmetrically throughout.    Pulmonary: normal respirations, no signs of respiratory distress  Abdomen: soft, non-distended, not tender to palpation                         Significant Labs:  Recent Labs   Lab 04/20/23  0006 04/20/23  0601 04/20/23  1811 04/21/23  0021 04/21/23  0554   *  --   --  129*  --    *   < > 139 138 139   K 4.2  --   --  4.1  --      --   --  106  --    CO2 20*  --   --  23  --    BUN 8  --   --  12  --    CREATININE 1.0  --   --  0.9  --    CALCIUM 8.0*  --   --  8.7  --    MG 1.7  --   --  2.4  --     < > = values in this interval not displayed.       Recent Labs   Lab 04/20/23  0006 04/21/23  0021   WBC 16.09* 13.69*   HGB 14.6 13.9*   HCT 43.6 42.3    261       No results for input(s): LABPT, INR, APTT in the last 48 hours.    Microbiology Results (last 7 days)       ** No results found for the last 168 hours. **          All pertinent labs from the last 24 hours have been reviewed.    Significant Diagnostics:  I have reviewed all pertinent imaging results/findings within the past 24 hours.

## 2023-04-22 NOTE — PROGRESS NOTES
[facility]  Neurocritical Care  Progress Note    Admit Date: 4/19/2023  Service Date: 04/22/2023  Length of Stay: 3    Subjective:     Chief Complaint: Pituitary macroadenoma    History of Present Illness: Gallo Maria Jr. 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     On arrival, patient with residual anesthesia. Arrived with OPA in place that was removed as patient began to wake up. PERRL, EOMi. Moves all extremities spontaneously and to basic commands.       Hospital Course: 04/20/2023 NAEON. CTH with blood noted in the resection bed. Nasal packing removed. UOP and Na stable.   04/21/2023 NAEON. CTH stable. Discuss step down with Neurosurgery.   04/22/2023 NAEON. UOP, Na, and UA not suggestive of DI. Removed porter. Added senna and miralax. Q4h neuro checks. Step down to nsgy.         Subjective:     Interval History: As above.     Review of Systems   Constitutional:  Negative for chills, fatigue and fever.   HENT:  Positive for postnasal drip. Negative for nosebleeds.    Eyes:  Negative for photophobia and visual disturbance.   Respiratory:  Negative for chest tightness and shortness of breath.    Cardiovascular:  Negative for chest pain and palpitations.   Gastrointestinal:  Negative for diarrhea and nausea.   Musculoskeletal:  Negative for neck pain and neck stiffness.   Neurological:  Positive for headaches. Negative for dizziness, tremors, facial asymmetry, speech difficulty and weakness.   Psychiatric/Behavioral:  Negative for agitation and confusion.    All other systems reviewed and are  negative.    Objective:     Vitals:    Temp: 97.7 °F (36.5 °C)  Pulse: 64  Rhythm: normal sinus rhythm  BP: 132/81  MAP (mmHg): 101  Resp: 15  SpO2: 99 %    Temp  Min: 97.7 °F (36.5 °C)  Max: 98.6 °F (37 °C)  Pulse  Min: 48  Max: 81  BP  Min: 116/61  Max: 145/94  MAP (mmHg)  Min: 82  Max: 114  Resp  Min: 11  Max: 23  SpO2  Min: 95 %  Max: 100 %    04/21 0701 - 04/22 0700  In: 927.9 [P.O.:800; I.V.:49.8]  Out: 1850 [Urine:1850]         Physical Exam  Vitals and nursing note reviewed.   Constitutional:       General: He is not in acute distress.     Appearance: He is not ill-appearing.   HENT:      Head: Normocephalic.   Eyes:      Extraocular Movements: Extraocular movements intact.      Pupils: Pupils are equal, round, and reactive to light.   Cardiovascular:      Rate and Rhythm: Normal rate and regular rhythm.   Pulmonary:      Effort: Pulmonary effort is normal. No respiratory distress.   Abdominal:      General: Abdomen is flat. There is no distension.   Musculoskeletal:         General: No swelling or tenderness. Normal range of motion.      Cervical back: Normal range of motion. No rigidity.   Neurological:      General: No focal deficit present.      Mental Status: He is alert and oriented to person, place, and time. Mental status is at baseline.      Comments:   Alert, oriented to person, place and time  Follows commands in all extremities  PERRL, EOMi  Face symmetric  Moves all extremities antigravity, spontaneously and to command  Strength full x4  Sensation intact     Today I personally reviewed pertinent medications, lines/drains/airways, imaging, cardiology results, laboratory results, microbiology results, notably:    UOP and Na stable  Post op CTH stable noting blood products at resection site  Pre operative MRI with interval increase in size of the presumed pituitary macro adenoma  Repeat CTH stable    Assessment/Plan:     Cardiac/Vascular  Hypercholesterolemia  Continue home pravastatin     Mixed  hyperlipidemia  Continue home pravastatin     High serum low-density lipoprotein (LDL)  Continue home pravastatin     Endocrine  * Pituitary macroadenoma  42M with history of 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     - Q4h neurochecks   - Q1h vitals while in ICU  - HOB@30  - Strict nasal precautions   - CTH reveiwed  - SBP <140   - EKG reviewed  - Holding home ASA  - Daily CMP, Mag, Phos - replete electrolytes PRN  - Lipid panel, A1c, Coags (preoperative) reviewed  - Regular diet  - No evidence of increased UOP, removed porter   - Q6 Na and Q6 UA stable   - Hydrocortisone taper per neurosurgery   - Daily CBC, transfuse PRN  - Pt ambulating well, no need for chemical VTE ppx at this time    - PT/OT/SLP as appropriate    Hyperprolactinemia  See Pituitary macroadenoma    Secondary male hypogonadism  See Pituitary macroadenoma    Elevated hemoglobin A1c - at risk for diabetes  A1c 5.7 3/22    History of pituitary tumor  See Pituitary macroadenoma    Pituitary adenoma  See Pituitary macroadenoma        The patient is being Prophylaxed for:  Venous Thromboembolism with: Mechanical  Stress Ulcer with: H2B  Ventilator Pneumonia with: not applicable    Activity Orders          Diet Adult Regular (IDDSI Level 7): Regular starting at 04/20 1506    Progressive Mobility Protocol (mobilize patient to their highest level of functioning at least twice daily) starting at 04/19 2000    Elevate HOB starting at 04/19 1623        Full Code     Level III    Aurelio Patino PA-C  Neurocritical Care  [facility]

## 2023-04-22 NOTE — SUBJECTIVE & OBJECTIVE
Subjective:     Interval History: As above.     Review of Systems   Constitutional:  Negative for chills, fatigue and fever.   HENT:  Positive for postnasal drip. Negative for nosebleeds.    Eyes:  Negative for photophobia and visual disturbance.   Respiratory:  Negative for chest tightness and shortness of breath.    Cardiovascular:  Negative for chest pain and palpitations.   Gastrointestinal:  Negative for diarrhea and nausea.   Musculoskeletal:  Negative for neck pain and neck stiffness.   Neurological:  Positive for headaches. Negative for dizziness, tremors, facial asymmetry, speech difficulty and weakness.   Psychiatric/Behavioral:  Negative for agitation and confusion.    All other systems reviewed and are negative.    Objective:     Vitals:    Temp: 97.7 °F (36.5 °C)  Pulse: 64  Rhythm: normal sinus rhythm  BP: 132/81  MAP (mmHg): 101  Resp: 15  SpO2: 99 %    Temp  Min: 97.7 °F (36.5 °C)  Max: 98.6 °F (37 °C)  Pulse  Min: 48  Max: 81  BP  Min: 116/61  Max: 145/94  MAP (mmHg)  Min: 82  Max: 114  Resp  Min: 11  Max: 23  SpO2  Min: 95 %  Max: 100 %    04/21 0701 - 04/22 0700  In: 927.9 [P.O.:800; I.V.:49.8]  Out: 1850 [Urine:1850]         Physical Exam  Vitals and nursing note reviewed.   Constitutional:       General: He is not in acute distress.     Appearance: He is not ill-appearing.   HENT:      Head: Normocephalic.   Eyes:      Extraocular Movements: Extraocular movements intact.      Pupils: Pupils are equal, round, and reactive to light.   Cardiovascular:      Rate and Rhythm: Normal rate and regular rhythm.   Pulmonary:      Effort: Pulmonary effort is normal. No respiratory distress.   Abdominal:      General: Abdomen is flat. There is no distension.   Musculoskeletal:         General: No swelling or tenderness. Normal range of motion.      Cervical back: Normal range of motion. No rigidity.   Neurological:      General: No focal deficit present.      Mental Status: He is alert and oriented to  person, place, and time. Mental status is at baseline.      Comments:   Alert, oriented to person, place and time  Follows commands in all extremities  PERRL, EOMi  Face symmetric  Moves all extremities antigravity, spontaneously and to command  Strength full x4  Sensation intact     Today I personally reviewed pertinent medications, lines/drains/airways, imaging, cardiology results, laboratory results, microbiology results, notably:    UOP and Na stable  Post op CTH stable noting blood products at resection site  Pre operative MRI with interval increase in size of the presumed pituitary macro adenoma  Repeat CTH stable

## 2023-04-22 NOTE — ASSESSMENT & PLAN NOTE
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 Now s/p translabial transphenoidal pituitary mass resection (4/19).     POD 3     No acute events overnight.  Pending transfer to floor  Continue neuro checks per protocol  CT scan stable last 1 with expected postop changes and hematoma at the surgical bed   Vision is improving   Hydrocortisone taper (ordered)  No sign for DI and sodium stable  Sinus percussion and no NG tube  Satting well on room air   He blood pressure below 150   Advance diet as tolerated   Continue PTOT     Dispo; home tomorrow pending hydrocortisone taper

## 2023-04-22 NOTE — ASSESSMENT & PLAN NOTE
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     - Q4h neurochecks   - Q1h vitals while in ICU  - HOB@30  - Strict nasal precautions   - CTH reveiwed  - SBP <140   - EKG reviewed  - Holding home ASA  - Daily CMP, Mag, Phos - replete electrolytes PRN  - Lipid panel, A1c, Coags (preoperative) reviewed  - Regular diet  - No evidence of increased UOP, removed porter   - Q6 Na and Q6 UA stable   - Hydrocortisone taper per neurosurgery   - Daily CBC, transfuse PRN  - Pt ambulating well, no need for chemical VTE ppx at this time    - PT/OT/SLP as appropriate

## 2023-04-22 NOTE — PLAN OF CARE
[facility] Care Plan    POC reviewed with Gallo Maria Jr. and family at 0300. Pt verbalized understanding. Questions and concerns addressed. No acute events overnight. Pt progressing toward goals. Will continue to monitor. See below and flowsheets for full assessment and VS info.     -R radial a-line in place  -Montez in place; U.O. 625mL  -PEN oxycodone given x1      Is this a stroke patient? no    Neuro:  Wan[place] Coma Scale  Best Eye Response: 4-->(E4) spontaneous  Best Motor Response: 6-->(M6) obeys commands  Best Verbal Response: 5-->(V5) oriented  Wanette Coma Scale Score: 15  Pupil PERRLA: yes     24hr Temp:  [97.7 °F (36.5 °C)-98.6 °F (37 °C)]     CV:   Rhythm: normal sinus rhythm  BP goals:   SBP < 140  MAP > 65    Resp:           Plan: N/A    GI/:     Diet/Nutrition Received: regular  Last Bowel Movement: 04/19/23  Voiding Characteristics: urethral catheter (bladder)    Intake/Output Summary (Last 24 hours) at 4/22/2023 0332  Last data filed at 4/22/2023 0301  Gross per 24 hour   Intake 1452.91 ml   Output 2050 ml   Net -597.09 ml          Labs/Accuchecks:  Recent Labs   Lab 04/22/23  0113   WBC 12.58   RBC 4.87   HGB 13.6*   HCT 41.6         Recent Labs   Lab 04/22/23  0113      K 3.8   CO2 24      BUN 11   CREATININE 0.8   ALKPHOS 41*   ALT 24   AST 17   BILITOT 0.5      Recent Labs   Lab 04/19/23  0840   INR 1.2   APTT 36.5*    No results for input(s): CPK, CPKMB, TROPONINI, MB in the last 168 hours.    Electrolytes: N/A - electrolytes WDL  Accuchecks: none    Gtts:      LDA/Wounds:  Lines/Drains/Airways       Drain  Duration                  Urethral Catheter 04/19/23 1109 Non-latex;Straight-tip;Silicone 16 Fr. 2 days              Arterial Line  Duration             Arterial Line 04/19/23 1140 Right Radial 2 days              Peripheral Intravenous Line  Duration                  Peripheral IV - Single Lumen 18 G Left Forearm -- days         Peripheral IV - Single Lumen 04/19/23  0840 18 G Posterior;Right Forearm 2 days                  Wounds: No  Wound care consulted: No

## 2023-04-23 VITALS
TEMPERATURE: 98 F | RESPIRATION RATE: 16 BRPM | HEIGHT: 75 IN | SYSTOLIC BLOOD PRESSURE: 119 MMHG | BODY MASS INDEX: 31.21 KG/M2 | OXYGEN SATURATION: 99 % | HEART RATE: 63 BPM | DIASTOLIC BLOOD PRESSURE: 67 MMHG | WEIGHT: 251 LBS

## 2023-04-23 LAB
ALBUMIN SERPL BCP-MCNC: 3.9 G/DL (ref 3.5–5.2)
ALP SERPL-CCNC: 41 U/L (ref 55–135)
ALT SERPL W/O P-5'-P-CCNC: 26 U/L (ref 10–44)
ANION GAP SERPL CALC-SCNC: 11 MMOL/L (ref 8–16)
AST SERPL-CCNC: 19 U/L (ref 10–40)
BASOPHILS # BLD AUTO: 0.03 K/UL (ref 0–0.2)
BASOPHILS NFR BLD: 0.2 % (ref 0–1.9)
BILIRUB SERPL-MCNC: 0.5 MG/DL (ref 0.1–1)
BLD PROD TYP BPU: NORMAL
BLD PROD TYP BPU: NORMAL
BLOOD UNIT EXPIRATION DATE: NORMAL
BLOOD UNIT EXPIRATION DATE: NORMAL
BLOOD UNIT TYPE CODE: 5100
BLOOD UNIT TYPE CODE: 5100
BLOOD UNIT TYPE: NORMAL
BLOOD UNIT TYPE: NORMAL
BUN SERPL-MCNC: 13 MG/DL (ref 6–20)
CALCIUM SERPL-MCNC: 9.3 MG/DL (ref 8.7–10.5)
CHLORIDE SERPL-SCNC: 104 MMOL/L (ref 95–110)
CO2 SERPL-SCNC: 22 MMOL/L (ref 23–29)
CODING SYSTEM: NORMAL
CODING SYSTEM: NORMAL
CREAT SERPL-MCNC: 0.9 MG/DL (ref 0.5–1.4)
CROSSMATCH INTERPRETATION: NORMAL
CROSSMATCH INTERPRETATION: NORMAL
DIFFERENTIAL METHOD: ABNORMAL
DISPENSE STATUS: NORMAL
DISPENSE STATUS: NORMAL
EOSINOPHIL # BLD AUTO: 0.2 K/UL (ref 0–0.5)
EOSINOPHIL NFR BLD: 1.6 % (ref 0–8)
ERYTHROCYTE [DISTWIDTH] IN BLOOD BY AUTOMATED COUNT: 13.7 % (ref 11.5–14.5)
EST. GFR  (NO RACE VARIABLE): >60 ML/MIN/1.73 M^2
GLUCOSE SERPL-MCNC: 106 MG/DL (ref 70–110)
HCT VFR BLD AUTO: 42.3 % (ref 40–54)
HGB BLD-MCNC: 13.9 G/DL (ref 14–18)
IMM GRANULOCYTES # BLD AUTO: 0.04 K/UL (ref 0–0.04)
IMM GRANULOCYTES NFR BLD AUTO: 0.3 % (ref 0–0.5)
LYMPHOCYTES # BLD AUTO: 3.3 K/UL (ref 1–4.8)
LYMPHOCYTES NFR BLD: 24.8 % (ref 18–48)
MAGNESIUM SERPL-MCNC: 2 MG/DL (ref 1.6–2.6)
MCH RBC QN AUTO: 28.4 PG (ref 27–31)
MCHC RBC AUTO-ENTMCNC: 32.9 G/DL (ref 32–36)
MCV RBC AUTO: 86 FL (ref 82–98)
MONOCYTES # BLD AUTO: 0.9 K/UL (ref 0.3–1)
MONOCYTES NFR BLD: 6.8 % (ref 4–15)
NEUTROPHILS # BLD AUTO: 8.8 K/UL (ref 1.8–7.7)
NEUTROPHILS NFR BLD: 66.3 % (ref 38–73)
NRBC BLD-RTO: 0 /100 WBC
NUM UNITS TRANS PACKED RBC: NORMAL
NUM UNITS TRANS PACKED RBC: NORMAL
PHOSPHATE SERPL-MCNC: 3.8 MG/DL (ref 2.7–4.5)
PLATELET # BLD AUTO: 280 K/UL (ref 150–450)
PMV BLD AUTO: 9.8 FL (ref 9.2–12.9)
POTASSIUM SERPL-SCNC: 3.9 MMOL/L (ref 3.5–5.1)
PROT SERPL-MCNC: 7.1 G/DL (ref 6–8.4)
RBC # BLD AUTO: 4.9 M/UL (ref 4.6–6.2)
SODIUM SERPL-SCNC: 136 MMOL/L (ref 136–145)
SODIUM SERPL-SCNC: 137 MMOL/L (ref 136–145)
WBC # BLD AUTO: 13.35 K/UL (ref 3.9–12.7)

## 2023-04-23 PROCEDURE — 25000003 PHARM REV CODE 250

## 2023-04-23 PROCEDURE — 63600175 PHARM REV CODE 636 W HCPCS: Performed by: STUDENT IN AN ORGANIZED HEALTH CARE EDUCATION/TRAINING PROGRAM

## 2023-04-23 PROCEDURE — 80053 COMPREHEN METABOLIC PANEL: CPT | Performed by: STUDENT IN AN ORGANIZED HEALTH CARE EDUCATION/TRAINING PROGRAM

## 2023-04-23 PROCEDURE — 84295 ASSAY OF SERUM SODIUM: CPT | Performed by: STUDENT IN AN ORGANIZED HEALTH CARE EDUCATION/TRAINING PROGRAM

## 2023-04-23 PROCEDURE — 25000003 PHARM REV CODE 250: Performed by: STUDENT IN AN ORGANIZED HEALTH CARE EDUCATION/TRAINING PROGRAM

## 2023-04-23 PROCEDURE — 84100 ASSAY OF PHOSPHORUS: CPT | Performed by: STUDENT IN AN ORGANIZED HEALTH CARE EDUCATION/TRAINING PROGRAM

## 2023-04-23 PROCEDURE — 99238 HOSP IP/OBS DSCHRG MGMT 30/<: CPT | Mod: ,,,

## 2023-04-23 PROCEDURE — 99238 PR HOSPITAL DISCHARGE DAY,<30 MIN: ICD-10-PCS | Mod: ,,,

## 2023-04-23 PROCEDURE — 83735 ASSAY OF MAGNESIUM: CPT | Performed by: STUDENT IN AN ORGANIZED HEALTH CARE EDUCATION/TRAINING PROGRAM

## 2023-04-23 PROCEDURE — 94761 N-INVAS EAR/PLS OXIMETRY MLT: CPT

## 2023-04-23 PROCEDURE — 85025 COMPLETE CBC W/AUTO DIFF WBC: CPT | Performed by: STUDENT IN AN ORGANIZED HEALTH CARE EDUCATION/TRAINING PROGRAM

## 2023-04-23 RX ORDER — HYDROCORTISONE 10 MG/1
10 TABLET ORAL NIGHTLY
Qty: 10 TABLET | Refills: 0 | Status: SHIPPED | OUTPATIENT
Start: 2023-04-23 | End: 2023-05-03

## 2023-04-23 RX ORDER — HYDROCORTISONE 20 MG/1
20 TABLET ORAL DAILY
Qty: 10 TABLET | Refills: 0 | Status: SHIPPED | OUTPATIENT
Start: 2023-04-24 | End: 2023-05-04

## 2023-04-23 RX ORDER — HYDROCORTISONE 10 MG/1
10 TABLET ORAL NIGHTLY
Status: DISCONTINUED | OUTPATIENT
Start: 2023-04-23 | End: 2023-04-23 | Stop reason: HOSPADM

## 2023-04-23 RX ORDER — HYDROCORTISONE 10 MG/1
20 TABLET ORAL DAILY
Status: DISCONTINUED | OUTPATIENT
Start: 2023-04-24 | End: 2023-04-23 | Stop reason: HOSPADM

## 2023-04-23 RX ADMIN — HYDROCORTISONE SODIUM SUCCINATE 50 MG: 100 INJECTION, POWDER, FOR SOLUTION INTRAMUSCULAR; INTRAVENOUS at 08:04

## 2023-04-23 RX ADMIN — POLYETHYLENE GLYCOL 3350 17 G: 17 POWDER, FOR SOLUTION ORAL at 08:04

## 2023-04-23 RX ADMIN — FAMOTIDINE 20 MG: 20 TABLET ORAL at 08:04

## 2023-04-23 RX ADMIN — SENNOSIDES AND DOCUSATE SODIUM 1 TABLET: 50; 8.6 TABLET ORAL at 08:04

## 2023-04-23 NOTE — ASSESSMENT & PLAN NOTE
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 for post operative monitoring following repeat transphenoidal resection.     - Q4h neurochecks   - Q1h vitals while in ICU  - HOB@30  - Strict nasal precautions   - CTH reveiwed  - SBP <140   - EKG reviewed  - Holding home ASA  - Daily CMP, Mag, Phos - replete electrolytes PRN  - Lipid panel, A1c, Coags (preoperative) reviewed  - Regular diet  - No evidence of increased UOP, removed porter   - Q6 Na and Q6 UA stable   - Hydrocortisone taper per neurosurgery   - Daily CBC, transfuse PRN  - Pt ambulating well, no need for chemical VTE ppx at this time    - PT/OT/SLP as appropriate  - Discharge home on hydrocortisone 20 AM and 10 PM

## 2023-04-23 NOTE — PLAN OF CARE
Patient cleared for discharge from case management standpoint.       04/23/23 1124   Final Note   Assessment Type Final Discharge Note   Anticipated Discharge Disposition Home   What phone number can be called within the next 1-3 days to see how you are doing after discharge? [phone]   Hospital Resources/Appts/Education Provided Provided patient/caregiver with written discharge plan information;Appointments scheduled and added to AVS

## 2023-04-23 NOTE — DISCHARGE SUMMARY
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] 01/30/2020 Yes    Hypercholesterolemia [E78.00] 10/29/2019 Yes    Mixed hyperlipidemia [E78.2] 05/28/2019 Yes    Elevated hemoglobin A1c - at risk for diabetes [R73.09] 05/09/2019 Yes     Chronic    High serum low-density lipoprotein (LDL) [R79.89] 05/09/2019 Yes     Chronic    History of pituitary tumor [Z87.898] 09/25/2015 Yes     Chronic    Pituitary adenoma [D35.2] 08/09/2012 Yes      Problems Resolved During this Admission:      History of Present Illness: Gallo Maria Andre is a 42 year old male with a medical history significant for 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     On arrival, patient with residual anesthesia. Arrived with OPA in place that was removed as patient began to wake up. PERRL, EOMi. Moves all extremities spontaneously and to basic commands.       Hospital Course by Event: 04/20/2023 NAEON. CTH with blood noted in the resection bed. Nasal packing removed. UOP and Na stable.   04/21/2023 NAEON. CTH stable. Discuss step down with Neurosurgery.   04/22/2023 NAEON. UOP, Na, and UA not suggestive of DI. Removed porter. Added senna and miralax. Q4h neuro checks. Step down to nsgy.   04/23/2023 NAEON. Discharge home.      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     - Q4h neurochecks   - Q1h vitals while in ICU  - HOB@30  - Strict nasal precautions   - CTH reveiwed  - SBP <140   - EKG reviewed  - Holding home ASA  - Daily CMP, Mag, Phos - replete electrolytes PRN  - Lipid panel, A1c, Coags (preoperative) reviewed  - Regular diet  - No evidence of increased UOP, removed porter   - Q6 Na and Q6 UA stable   - Hydrocortisone taper per neurosurgery   - Daily CBC, transfuse PRN  - Pt ambulating well, no need for chemical VTE ppx at this time    - PT/OT/SLP as appropriate  - Discharge home on hydrocortisone 20 AM and 10 PM    Hyperprolactinemia  See Pituitary macroadenoma    Secondary male hypogonadism  See Pituitary macroadenoma    Hypercholesterolemia  Continue home pravastatin     Mixed hyperlipidemia  Continue home pravastatin     Elevated hemoglobin A1c - at risk for diabetes  A1c 5.7 3/22    High serum low-density lipoprotein (LDL)  Continue home pravastatin     History of pituitary tumor  See Pituitary macroadenoma    Pituitary adenoma  See Pituitary macroadenoma      Goals of Care Treatment Preferences:  Code Status: Full Code      Significant Results:  Imaging:  CTH   MRI brain     Cardiology:  Echo    Microbiology:  n/a    Laboratory:  Lab Results   Component Value Date    HGBA1C 5.7 (H) 04/14/2022    CHOL 255 (H) 10/24/2022    HDL 47 10/24/2022    LDLCALC 190.8 (H) 10/24/2022    TRIG 86 10/24/2022    TSH 1.305 11/14/2022       Pending Results: n/a    Consultations:  None  n/a    Procedures:   Procedure(s) (LRB):  RESECTION, NEOPLASM, PITUITARY, TRANSSPHENOIDAL APPROACH (N/A) by Derek Blair MD.      Medications:       Medication List      START taking these medications    * hydrocortisone 10 MG Tab  Commonly known as: CORTEF  Take 1 tablet (10 mg total) by mouth every evening. for 10 days     * hydrocortisone 20 MG Tab  Commonly known as: CORTEF  Take 1 tablet (20 mg total) by mouth once daily. for 10 days  Start taking on: April 24, 2023         * This list has 2 medication(s) that are the same as other medications prescribed for you. Read the directions carefully, and ask your doctor or other care provider to review them with you.            CONTINUE taking these medications    aspirin 81 MG Chew  Take 1 tablet (81 mg total) by mouth once daily.     testosterone cypionate 200 mg/mL injection  Commonly known as: DEPOTESTOTERONE CYPIONATE  Inject 1.5 mLs (300 mg total) into the muscle every 14 (fourteen) days.     vitamin D 1000 units Tab  Commonly known as: VITAMIN D3     vitamin E (dl, acetate) 90 mg (200 unit) Cap        STOP taking these medications    cabergoline 0.5 mg tablet  Commonly known as: DOSTINEX     pravastatin 20 MG tablet  Commonly known as: PRAVACHOL     SOMATULINE DEPOT 90 mg/0.3 mL Syrg  Generic drug: lanreotide           Where to Get Your Medications      These medications were sent to [facility]  [address] [place] [id number]    Hours: Mon-Fri 7a-7p, Sat-Sun 10a-4p Phone: [phone]   · hydrocortisone 10 MG Tab  · hydrocortisone 20 MG Tab       Diet: As tolerated     Activity: As tolerated.    Disposition: Discharged to home/self in good condition.    Follow Up Plan:  Neurosurgery 1 week     This discharge took less than 30 minutes to complete.    Aurelio Patino PA-C  Neurocritical Care  [facility]

## 2023-04-23 NOTE — SUBJECTIVE & OBJECTIVE
Subjective:     Interval History: As above.     Review of Systems   Constitutional:  Negative for chills, fatigue and fever.   HENT:  Negative for nosebleeds and postnasal drip.    Eyes:  Negative for photophobia and visual disturbance.   Respiratory:  Negative for chest tightness and shortness of breath.    Cardiovascular:  Negative for chest pain and palpitations.   Gastrointestinal:  Negative for diarrhea and nausea.   Musculoskeletal:  Negative for neck pain and neck stiffness.   Neurological:  Positive for headaches. Negative for dizziness, tremors, facial asymmetry, speech difficulty and weakness.   Psychiatric/Behavioral:  Negative for agitation and confusion.    All other systems reviewed and are negative.    Objective:     Vitals:    Temp: 97.7 °F (36.5 °C)  Pulse: (!) 59  Rhythm: normal sinus rhythm, sinus bradycardia  BP: 111/80  MAP (mmHg): 92  Resp: 17  SpO2: 99 %    Temp  Min: 97.7 °F (36.5 °C)  Max: 98.6 °F (37 °C)  Pulse  Min: 50  Max: 73  BP  Min: 107/72  Max: 139/78  MAP (mmHg)  Min: 76  Max: 105  Resp  Min: 13  Max: 25  SpO2  Min: 94 %  Max: 99 %    04/22 0701 - 04/23 0700  In: 1550 [P.O.:1550]  Out: 1530 [Urine:1530]   Unmeasured Output  Urine Occurrence: 1  Stool Occurrence: 1     Physical Exam  Vitals and nursing note reviewed.   Constitutional:       General: He is not in acute distress.     Appearance: He is not ill-appearing.   HENT:      Head: Normocephalic.   Eyes:      Extraocular Movements: Extraocular movements intact.      Pupils: Pupils are equal, round, and reactive to light.   Cardiovascular:      Rate and Rhythm: Normal rate and regular rhythm.   Pulmonary:      Effort: Pulmonary effort is normal. No respiratory distress.   Abdominal:      General: Abdomen is flat. There is no distension.   Musculoskeletal:         General: No swelling or tenderness. Normal range of motion.      Cervical back: Normal range of motion. No rigidity.   Neurological:      General: No focal deficit  present.      Mental Status: He is alert and oriented to person, place, and time. Mental status is at baseline.      Comments:   Alert, oriented to person, place and time  Follows commands in all extremities  PERRL, EOMi  Face symmetric  Moves all extremities antigravity, spontaneously and to command  Strength full x4  Sensation intact     Today I personally reviewed pertinent medications, lines/drains/airways, imaging, cardiology results, laboratory results, microbiology results, notably:    UOP and Na stable  Post op CTH stable noting blood products at resection site  Pre operative MRI with interval increase in size of the presumed pituitary macro adenoma  Repeat CTH stable

## 2023-04-23 NOTE — PROGRESS NOTES
[facility]  Neurocritical Care  Progress Note    Admit Date: 4/19/2023  Service Date: 04/23/2023  Length of Stay: 4    Subjective:     Chief Complaint: Pituitary macroadenoma    History of Present Illness: Gallo Maria Jr. 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     On arrival, patient with residual anesthesia. Arrived with OPA in place that was removed as patient began to wake up. PERRL, EOMi. Moves all extremities spontaneously and to basic commands.       Hospital Course: 04/20/2023 NAEON. CTH with blood noted in the resection bed. Nasal packing removed. UOP and Na stable.   04/21/2023 NAEON. CTH stable. Discuss step down with Neurosurgery.   04/22/2023 NAEON. UOP, Na, and UA not suggestive of DI. Removed porter. Added senna and miralax. Q4h neuro checks. Step down to nsgy.   04/23/2023 NAEON. Discharge home.        Subjective:     Interval History: As above.     Review of Systems   Constitutional:  Negative for chills, fatigue and fever.   HENT:  Negative for nosebleeds and postnasal drip.    Eyes:  Negative for photophobia and visual disturbance.   Respiratory:  Negative for chest tightness and shortness of breath.    Cardiovascular:  Negative for chest pain and palpitations.   Gastrointestinal:  Negative for diarrhea and nausea.   Musculoskeletal:  Negative for neck pain and neck stiffness.   Neurological:  Positive for headaches. Negative for dizziness, tremors, facial asymmetry, speech difficulty and weakness.   Psychiatric/Behavioral:  Negative for agitation and confusion.    All other systems reviewed  and are negative.    Objective:     Vitals:    Temp: 97.7 °F (36.5 °C)  Pulse: (!) 59  Rhythm: normal sinus rhythm, sinus bradycardia  BP: 111/80  MAP (mmHg): 92  Resp: 17  SpO2: 99 %    Temp  Min: 97.7 °F (36.5 °C)  Max: 98.6 °F (37 °C)  Pulse  Min: 50  Max: 73  BP  Min: 107/72  Max: 139/78  MAP (mmHg)  Min: 76  Max: 105  Resp  Min: 13  Max: 25  SpO2  Min: 94 %  Max: 99 %    04/22 0701 - 04/23 0700  In: 1550 [P.O.:1550]  Out: 1530 [Urine:1530]   Unmeasured Output  Urine Occurrence: 1  Stool Occurrence: 1     Physical Exam  Vitals and nursing note reviewed.   Constitutional:       General: He is not in acute distress.     Appearance: He is not ill-appearing.   HENT:      Head: Normocephalic.   Eyes:      Extraocular Movements: Extraocular movements intact.      Pupils: Pupils are equal, round, and reactive to light.   Cardiovascular:      Rate and Rhythm: Normal rate and regular rhythm.   Pulmonary:      Effort: Pulmonary effort is normal. No respiratory distress.   Abdominal:      General: Abdomen is flat. There is no distension.   Musculoskeletal:         General: No swelling or tenderness. Normal range of motion.      Cervical back: Normal range of motion. No rigidity.   Neurological:      General: No focal deficit present.      Mental Status: He is alert and oriented to person, place, and time. Mental status is at baseline.      Comments:   Alert, oriented to person, place and time  Follows commands in all extremities  PERRL, EOMi  Face symmetric  Moves all extremities antigravity, spontaneously and to command  Strength full x4  Sensation intact     Today I personally reviewed pertinent medications, lines/drains/airways, imaging, cardiology results, laboratory results, microbiology results, notably:    UOP and Na stable  Post op CTH stable noting blood products at resection site  Pre operative MRI with interval increase in size of the presumed pituitary macro adenoma  Repeat CTH stable    Assessment/Plan:      Cardiac/Vascular  Hypercholesterolemia  Continue home pravastatin     Mixed hyperlipidemia  Continue home pravastatin     High serum low-density lipoprotein (LDL)  Continue home pravastatin     Endocrine  * Pituitary macroadenoma  42M with 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     - Q4h neurochecks   - Q1h vitals while in ICU  - HOB@30  - Strict nasal precautions   - CTH reveiwed  - SBP <140   - EKG reviewed  - Holding home ASA  - Daily CMP, Mag, Phos - replete electrolytes PRN  - Lipid panel, A1c, Coags (preoperative) reviewed  - Regular diet  - No evidence of increased UOP, removed porter   - Q6 Na and Q6 UA stable   - Hydrocortisone taper per neurosurgery   - Daily CBC, transfuse PRN  - Pt ambulating well, no need for chemical VTE ppx at this time    - PT/OT/SLP as appropriate  - Discharge home on hydrocortisone 20 AM and 10 PM    Hyperprolactinemia  See Pituitary macroadenoma    Secondary male hypogonadism  See Pituitary macroadenoma    Elevated hemoglobin A1c - at risk for diabetes  A1c 5.7 3/22    History of pituitary tumor  See Pituitary macroadenoma    Pituitary adenoma  See Pituitary macroadenoma        The patient is being Prophylaxed for:  Venous Thromboembolism with: Mechanical  Stress Ulcer with: H2B  Ventilator Pneumonia with: not applicable    Activity Orders          Diet Adult Regular (IDDSI Level 7): Regular starting at 04/20 1506    Progressive Mobility Protocol (mobilize patient to their highest level of functioning at least twice daily) starting at 04/19 2000    Elevate HOB starting at 04/19 1623        Full Code     Level III    Aurelio Patino PA-C  Neurocritical  Care  [facility]

## 2023-04-23 NOTE — PLAN OF CARE
[facility] Care Plan    POC reviewed with Gallo Maria Jr. and spouse at 0300. Pt verbalized understanding. Questions and concerns addressed. No acute events overnight. Pt progressing toward goals. Will continue to monitor. See below and flowsheets for full assessment and VS info.     Is this a stroke patient? no    Neuro:  Wilder Coma Scale  Best Eye Response: 4-->(E4) spontaneous  Best Motor Response: 6-->(M6) obeys commands  Best Verbal Response: 5-->(V5) oriented  Luverne Coma Scale Score: 15  Pupil PERRLA: yes     24hr Temp:  [97.7 °F (36.5 °C)-98.6 °F (37 °C)]     CV:   Rhythm: normal sinus rhythm  BP goals:   SBP < 140  MAP > 65    Resp:           Plan: N/A    GI/:     Diet/Nutrition Received: regular  Last Bowel Movement: 04/22/23  Voiding Characteristics: voids spontaneously without difficulty    Intake/Output Summary (Last 24 hours) at 4/23/2023 0411  Last data filed at 4/22/2023 2201  Gross per 24 hour   Intake 1550 ml   Output 1530 ml   Net 20 ml          Labs/Accuchecks:  Recent Labs   Lab 04/23/23  0034   WBC 13.35*   RBC 4.90   HGB 13.9*   HCT 42.3         Recent Labs   Lab 04/23/23  0034      K 3.9   CO2 22*      BUN 13   CREATININE 0.9   ALKPHOS 41*   ALT 26   AST 19   BILITOT 0.5      Recent Labs   Lab 04/19/23  0840   INR 1.2   APTT 36.5*    No results for input(s): CPK, CPKMB, TROPONINI, MB in the last 168 hours.    Electrolytes: N/A - electrolytes WDL  Accuchecks: none    Gtts:      LDA/Wounds:  Lines/Drains/Airways       Peripheral Intravenous Line  Duration                  Peripheral IV - Single Lumen 18 G Left Forearm -- days         Peripheral IV - Single Lumen 04/19/23 0840 18 G Posterior;Right Forearm 3 days                  Wounds: No  Wound care consulted: No      Problem: Adult Inpatient Plan of Care  Goal: Plan of Care Review  Outcome: Ongoing, Progressing  Goal: Patient-Specific Goal (Individualized)  Outcome: Ongoing, Progressing  Goal: Absence of  Hospital-Acquired Illness or Injury  Outcome: Ongoing, Progressing  Goal: Optimal Comfort and Wellbeing  Outcome: Ongoing, Progressing     Problem: Infection  Goal: Absence of Infection Signs and Symptoms  Outcome: Ongoing, Progressing

## 2023-04-23 NOTE — NURSING
Nursing Discharge     AVS reviewed and all questions and concerns address. Pt carried all belongings. Transported pt via wheelchair to private vehicle.

## 2023-04-23 NOTE — ASSESSMENT & PLAN NOTE
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 Now s/p translabial transphenoidal pituitary mass resection (4/19).     POD 4    No acute events overnight.    Continue neuro checks per protocol  CT scan stable last 1 with expected postop changes and hematoma at the surgical bed   Vision is improving   Hydrocortisone taper (ordered), DC with 20 qam, 10 qpm  No sign for DI and sodium stable  Sinus percussion and no NG tube  Satting well on room air   He blood pressure below 150   Advance diet as tolerated   Continue PTOT     Dc home today, will set up follow up in 3 weeks with [facility]

## 2023-04-23 NOTE — PROGRESS NOTES
[facility] - Neuro Critical Care  Neurosurgery  Progress Note    Subjective:     History of Present Illness: 4/20: POD 1 s/p transphenoidal resection of pituitary mass. I/O appropriate, . No evidence of DI. CT head with some hemorrhage in resection cavity, patient doing well with improving vision, packing removed by ENT.       Post-Op Info:  Procedure(s) (LRB):  RESECTION, NEOPLASM, PITUITARY, TRANSSPHENOIDAL APPROACH (N/A)   4 Days Post-Op     Interval History:   4/23: NAEON, vision and exam stable. Ambulating, normal urinary frequency, d/c home today, f/u in 3 weeks with CTH in NSGY clinic    Medications:  Continuous Infusions:      Scheduled Meds:   famotidine  20 mg Oral BID    hydrocortisone sodium succinate  50 mg Intravenous BID     PRN Meds:calcium gluconate IVPB, calcium gluconate IVPB, calcium gluconate IVPB, magnesium sulfate IVPB, magnesium sulfate IVPB, morphine, ondansetron, oxyCODONE, potassium chloride **AND** potassium chloride **AND** potassium chloride, sodium chloride 0.9%, sodium phosphate IVPB, sodium phosphate IVPB, sodium phosphate IVPB     Review of Systems  Objective:     Weight: 113.9 kg (251 lb)  Body mass index is 31.37 kg/m².  Vital Signs (Most Recent):  Temp: 97.7 °F (36.5 °C) (04/21/23 0701)  Pulse: 76 (04/21/23 0901)  Resp: 19 (04/21/23 0901)  BP: 125/77 (04/21/23 0901)  SpO2: 99 % (04/21/23 0901)   Vital Signs (24h Range):  Temp:  [97.5 °F (36.4 °C)-98.9 °F (37.2 °C)] 97.7 °F (36.5 °C)  Pulse:  [56-98] 76  Resp:  [12-22] 19  SpO2:  [93 %-100 %] 99 %  BP: (100-140)/(60-81) 125/77  Arterial Line BP: (100-137)/(66-79) 134/76     Date 04/21/23 0700 - 04/22/23 0659   Shift 8744-0569 3864-9716 3466-3419 24 Hour Total   INTAKE   P.O. 354   354   Shift Total(mL/kg) 354(3.1)   354(3.1)   OUTPUT   Urine(mL/kg/hr) 300   300   Shift Total(mL/kg) 300(2.6)   300(2.6)   Weight (kg) 113.9 113.9 113.9 113.9                            Urethral Catheter 04/19/23 1109  "Non-latex;Straight-tip;Silicone 16 Fr. (Active)   Site Assessment Clean;Intact 04/20/23 0305   Collection Container Urimeter 04/20/23 0305   Securement Method secured to top of thigh w/ adhesive device 04/20/23 0305   Catheter Care Performed yes 04/20/23 0305   Reason for Continuing Urinary Catheterization Critically ill in ICU and requiring hourly monitoring of intake/output 04/20/23 0305   CAUTI Prevention Bundle Securement Device in place with 1" slack;Intact seal between catheter & drainage tubing;Drainage bag/urimeter off the floor;Sheeting clip in use;No dependent loops or kinks;Drainage bag/urimeter not overfilled (<2/3 full);Drainage bag/urimeter below bladder 04/19/23 1905   Output (mL) 137 mL 04/20/23 0605       Physical Exam    Neurosurgery Physical Exam  General: well developed, well nourished, no distress.   Head: normocephalic, atraumatic  Neurologic:   GCS: Eyes: 4/ Verbal: 5/ Motor: 6  Mental Status: Awake, Alert, Oriented x 3  Nasal packing removed.   Visual fields present and full to confrontation.   Cranial nerves: PERRL, EOMI, face symmetric, tongue midline, shoulder shrug equal.  No pronator drift, no dysmetria.  Sensory: intact to light touch throughout  Motor Strength:Moves all extremities antigravity  DTR: 2+ symmetrically throughout.    Pulmonary: normal respirations, no signs of respiratory distress  Abdomen: soft, non-distended, not tender to palpation                         Significant Labs:  Recent Labs   Lab 04/20/23  0006 04/20/23  0601 04/20/23  1811 04/21/23  0021 04/21/23  0554   *  --   --  129*  --    *   < > 139 138 139   K 4.2  --   --  4.1  --      --   --  106  --    CO2 20*  --   --  23  --    BUN 8  --   --  12  --    CREATININE 1.0  --   --  0.9  --    CALCIUM 8.0*  --   --  8.7  --    MG 1.7  --   --  2.4  --     < > = values in this interval not displayed.       Recent Labs   Lab 04/20/23  0006 04/21/23  0021   WBC 16.09* 13.69*   HGB 14.6 13.9*   HCT " 43.6 42.3    261       No results for input(s): LABPT, INR, APTT in the last 48 hours.    Microbiology Results (last 7 days)       ** No results found for the last 168 hours. **          All pertinent labs from the last 24 hours have been reviewed.    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 Now s/p translabial transphenoidal pituitary mass resection (4/19).     POD 4    No acute events overnight.    Continue neuro checks per protocol  CT scan stable last 1 with expected postop changes and hematoma at the surgical bed   Vision is improving   Hydrocortisone taper (ordered), DC with 20 qam, 10 qpm  No sign for DI and sodium stable  Sinus percussion and no NG tube  Satting well on room air   He blood pressure below 150   Advance diet as tolerated   Continue PTOT     Dc home today, will set up follow up in 3 weeks with JOEY Amaya MD  Neurosurgery  [facility] - Neuro Critical Care

## 2023-04-23 NOTE — ASSESSMENT & PLAN NOTE
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     - Q4h neurochecks   - Q1h vitals while in ICU  - HOB@30  - Strict nasal precautions   - CTH reveiwed  - SBP <140   - EKG reviewed  - Holding home ASA  - Daily CMP, Mag, Phos - replete electrolytes PRN  - Lipid panel, A1c, Coags (preoperative) reviewed  - Regular diet  - No evidence of increased UOP, removed porter   - Q6 Na and Q6 UA stable   - Hydrocortisone taper per neurosurgery   - Daily CBC, transfuse PRN  - Pt ambulating well, no need for chemical VTE ppx at this time    - PT/OT/SLP as appropriate  - Discharge home on hydrocortisone 20 AM and 10 PM

## 2023-04-23 NOTE — SUBJECTIVE & OBJECTIVE
"Interval History:   4/23: NAEON, vision and exam stable. Ambulating, normal urinary frequency, d/c home today, f/u in 3 weeks with CTH in NSGY clinic    Medications:  Continuous Infusions:      Scheduled Meds:   famotidine  20 mg Oral BID    hydrocortisone sodium succinate  50 mg Intravenous BID     PRN Meds:calcium gluconate IVPB, calcium gluconate IVPB, calcium gluconate IVPB, magnesium sulfate IVPB, magnesium sulfate IVPB, morphine, ondansetron, oxyCODONE, potassium chloride **AND** potassium chloride **AND** potassium chloride, sodium chloride 0.9%, sodium phosphate IVPB, sodium phosphate IVPB, sodium phosphate IVPB     Review of Systems  Objective:     Weight: 113.9 kg (251 lb)  Body mass index is 31.37 kg/m².  Vital Signs (Most Recent):  Temp: 97.7 °F (36.5 °C) (04/21/23 0701)  Pulse: 76 (04/21/23 0901)  Resp: 19 (04/21/23 0901)  BP: 125/77 (04/21/23 0901)  SpO2: 99 % (04/21/23 0901)   Vital Signs (24h Range):  Temp:  [97.5 °F (36.4 °C)-98.9 °F (37.2 °C)] 97.7 °F (36.5 °C)  Pulse:  [56-98] 76  Resp:  [12-22] 19  SpO2:  [93 %-100 %] 99 %  BP: (100-140)/(60-81) 125/77  Arterial Line BP: (100-137)/(66-79) 134/76     Date 04/21/23 0700 - 04/22/23 0659   Shift 3736-5453 1314-0804 0708-7307 24 Hour Total   INTAKE   P.O. 354   354   Shift Total(mL/kg) 354(3.1)   354(3.1)   OUTPUT   Urine(mL/kg/hr) 300   300   Shift Total(mL/kg) 300(2.6)   300(2.6)   Weight (kg) 113.9 113.9 113.9 113.9                            Urethral Catheter 04/19/23 1109 Non-latex;Straight-tip;Silicone 16 Fr. (Active)   Site Assessment Clean;Intact 04/20/23 0305   Collection Container Urimeter 04/20/23 0305   Securement Method secured to top of thigh w/ adhesive device 04/20/23 0305   Catheter Care Performed yes 04/20/23 0305   Reason for Continuing Urinary Catheterization Critically ill in ICU and requiring hourly monitoring of intake/output 04/20/23 0305   CAUTI Prevention Bundle Securement Device in place with 1" slack;Intact seal between " catheter & drainage tubing;Drainage bag/urimeter off the floor;Sheeting clip in use;No dependent loops or kinks;Drainage bag/urimeter not overfilled (<2/3 full);Drainage bag/urimeter below bladder 04/19/23 1905   Output (mL) 137 mL 04/20/23 0605       Physical Exam    Neurosurgery Physical Exam  General: well developed, well nourished, no distress.   Head: normocephalic, atraumatic  Neurologic:   GCS: Eyes: 4/ Verbal: 5/ Motor: 6  Mental Status: Awake, Alert, Oriented x 3  Nasal packing removed.   Visual fields present and full to confrontation.   Cranial nerves: PERRL, EOMI, face symmetric, tongue midline, shoulder shrug equal.  No pronator drift, no dysmetria.  Sensory: intact to light touch throughout  Motor Strength:Moves all extremities antigravity  DTR: 2+ symmetrically throughout.    Pulmonary: normal respirations, no signs of respiratory distress  Abdomen: soft, non-distended, not tender to palpation                         Significant Labs:  Recent Labs   Lab 04/20/23  0006 04/20/23  0601 04/20/23  1811 04/21/23  0021 04/21/23  0554   *  --   --  129*  --    *   < > 139 138 139   K 4.2  --   --  4.1  --      --   --  106  --    CO2 20*  --   --  23  --    BUN 8  --   --  12  --    CREATININE 1.0  --   --  0.9  --    CALCIUM 8.0*  --   --  8.7  --    MG 1.7  --   --  2.4  --     < > = values in this interval not displayed.       Recent Labs   Lab 04/20/23  0006 04/21/23  0021   WBC 16.09* 13.69*   HGB 14.6 13.9*   HCT 43.6 42.3    261       No results for input(s): LABPT, INR, APTT in the last 48 hours.    Microbiology Results (last 7 days)       ** No results found for the last 168 hours. **          All pertinent labs from the last 24 hours have been reviewed.    Significant Diagnostics:  I have reviewed all pertinent imaging results/findings within the past 24 hours.I

## 2023-04-23 NOTE — PLAN OF CARE
[facility] Care Plan    POC reviewed with Gallo Maria Jr. at 1000. Pt verbalized understanding. Questions and concerns addressed. No acute events today. Pt progressing toward goals. Pt discharged home. See below and flowsheets for full assessment and VS info.             Is this a stroke patient? no    Neuro:  Wilder Coma Scale  Best Eye Response: 4-->(E4) spontaneous  Best Motor Response: 6-->(M6) obeys commands  Best Verbal Response: 5-->(V5) oriented  Wilder Coma Scale Score: 15  Assessment Qualifiers: patient not sedated/intubated  Pupil PERRLA: yes     24 hr Temp:  [97.7 °F (36.5 °C)-98.6 °F (37 °C)]     CV:   Rhythm: normal sinus rhythm, sinus bradycardia  BP goals:   SBP < 140  MAP > 65    Resp:           Plan: N/A    GI/:     Diet/Nutrition Received: regular  Last Bowel Movement: 04/23/23  Voiding Characteristics: voids spontaneously without difficulty    Intake/Output Summary (Last 24 hours) at 4/23/2023 1152  Last data filed at 4/23/2023 0801  Gross per 24 hour   Intake 990 ml   Output 1630 ml   Net -640 ml     Unmeasured Output  Urine Occurrence: 0  Stool Occurrence: 0    Labs/Accuchecks:  Recent Labs   Lab 04/23/23  0034   WBC 13.35*   RBC 4.90   HGB 13.9*   HCT 42.3         Recent Labs   Lab 04/23/23  0034 04/23/23  0537    136   K 3.9  --    CO2 22*  --      --    BUN 13  --    CREATININE 0.9  --    ALKPHOS 41*  --    ALT 26  --    AST 19  --    BILITOT 0.5  --       Recent Labs   Lab 04/19/23  0840   INR 1.2   APTT 36.5*    No results for input(s): CPK, CPKMB, TROPONINI, MB in the last 168 hours.    Electrolytes: N/A - electrolytes WDL  Accuchecks: none    Gtts:      LDA/Wounds:  Lines/Drains/Airways       Peripheral Intravenous Line  Duration                  Peripheral IV - Single Lumen 18 G Left Forearm -- days         Peripheral IV - Single Lumen 04/19/23 0840 18 G Posterior;Right Forearm 4 days                  Wounds: Yes; surgery incision  Wound care consulted: No

## 2023-04-25 ENCOUNTER — PATIENT OUTREACH (OUTPATIENT)
Dept: ADMINISTRATIVE | Facility: CLINIC | Age: 43
End: 2023-04-25
Payer: COMMERCIAL

## 2023-04-25 NOTE — PROGRESS NOTES
C3 nurse spoke with Gallo Maria Jr. for a TCC post hospital discharge follow up call. The patient has a scheduled HOSFU appointment with Dr Russ on 5/2/23  @ 10am.

## 2023-04-26 ENCOUNTER — TELEPHONE (OUTPATIENT)
Dept: NEUROSURGERY | Facility: CLINIC | Age: 43
End: 2023-04-26
Payer: COMMERCIAL

## 2023-04-26 DIAGNOSIS — D35.2 PITUITARY ADENOMA: Primary | ICD-10-CM

## 2023-05-02 ENCOUNTER — OFFICE VISIT (OUTPATIENT)
Dept: INTERNAL MEDICINE | Facility: CLINIC | Age: 43
End: 2023-05-02
Payer: COMMERCIAL

## 2023-05-02 VITALS
DIASTOLIC BLOOD PRESSURE: 78 MMHG | WEIGHT: 247.81 LBS | SYSTOLIC BLOOD PRESSURE: 116 MMHG | HEART RATE: 68 BPM | HEIGHT: 75 IN | OXYGEN SATURATION: 99 % | BODY MASS INDEX: 30.81 KG/M2

## 2023-05-02 DIAGNOSIS — Z87.898 HISTORY OF PITUITARY TUMOR: Primary | Chronic | ICD-10-CM

## 2023-05-02 PROCEDURE — 99214 OFFICE O/P EST MOD 30 MIN: CPT | Mod: S$GLB,,, | Performed by: INTERNAL MEDICINE

## 2023-05-02 PROCEDURE — 99214 PR OFFICE/OUTPT VISIT, EST, LEVL IV, 30-39 MIN: ICD-10-PCS | Mod: S$GLB,,, | Performed by: INTERNAL MEDICINE

## 2023-05-02 PROCEDURE — 99999 PR PBB SHADOW E&M-EST. PATIENT-LVL IV: ICD-10-PCS | Mod: PBBFAC,,, | Performed by: INTERNAL MEDICINE

## 2023-05-02 PROCEDURE — 99999 PR PBB SHADOW E&M-EST. PATIENT-LVL IV: CPT | Mod: PBBFAC,,, | Performed by: INTERNAL MEDICINE

## 2023-05-02 NOTE — PROGRESS NOTES
CC:  Hospital follow-up     HPI:  The patient is a 42-year-old male with asthma, ADD, BPH, hypogonadism and pituitary adenoma status post resection in 2015 who presents today as a hospital follow-up for a pituitary adenoma status post transsphenoidal resection.  The patient reports no problems with the surgery.  He was discharged on hydrocortisone 20 mg once a day for 10 days to be followed by hydrocortisone 10 mg once a day for 10 days.  He does have a follow-up CT on May 8th as well as follow-up with Neurosurgery.  He also has a follow-up with his endocrinologist on May 11th.      ROS:  Patient reports no fever chills.  No trouble swallowing.  No chest.  No shortness of breath.  No nausea vomiting.  No abdominal pain.  He does report some right wrist pain where he an arterial line placed.    Physical exam:   General appearance:  No acute distress   HEENT:  Conjunctiva is clear.  Pupils equal.  TMs are clear.  Nasal septum is midline without discharge.  Oropharynx is without erythema.  Trachea is midline without JVD or thyromegaly.    Pulmonary:  Good inspiratory, expiratory breath sounds are heard.  Lungs clear auscultation.    Cardiovascular:  S1-S2, rhythm is regular.  Extremities without edema.    GI: Abdomen was nontender, nondistended without hepatosplenomegaly    Assessment:  1.  Status post pituitary tumor resection    Plan:  1. Will schedule a CMP today  2.  The patient to keep his appointment with endocrinology and Neurosurgery.

## 2023-05-03 LAB
FINAL PATHOLOGIC DIAGNOSIS: NORMAL
FROZEN SECTION DIAGNOSIS: NORMAL
GROSS: NORMAL
Lab: NORMAL
SUPPLEMENTAL DIAGNOSIS: NORMAL

## 2023-05-04 ENCOUNTER — PATIENT MESSAGE (OUTPATIENT)
Dept: ENDOCRINOLOGY | Facility: CLINIC | Age: 43
End: 2023-05-04
Payer: COMMERCIAL

## 2023-05-05 ENCOUNTER — PATIENT MESSAGE (OUTPATIENT)
Dept: NEUROSURGERY | Facility: CLINIC | Age: 43
End: 2023-05-05
Payer: COMMERCIAL

## 2023-05-08 ENCOUNTER — HOSPITAL ENCOUNTER (OUTPATIENT)
Dept: RADIOLOGY | Facility: HOSPITAL | Age: 43
Discharge: HOME | End: 2023-05-08
Attending: NEUROLOGICAL SURGERY
Payer: COMMERCIAL

## 2023-05-08 ENCOUNTER — DOCUMENTATION ONLY (OUTPATIENT)
Dept: ENDOCRINOLOGY | Facility: CLINIC | Age: 43
End: 2023-05-08
Payer: COMMERCIAL

## 2023-05-08 ENCOUNTER — OFFICE VISIT (OUTPATIENT)
Dept: NEUROSURGERY | Facility: CLINIC | Age: 43
End: 2023-05-08
Payer: COMMERCIAL

## 2023-05-08 ENCOUNTER — PATIENT MESSAGE (OUTPATIENT)
Dept: NEUROSURGERY | Facility: CLINIC | Age: 43
End: 2023-05-08

## 2023-05-08 ENCOUNTER — TELEPHONE (OUTPATIENT)
Dept: ENDOCRINOLOGY | Facility: CLINIC | Age: 43
End: 2023-05-08
Payer: COMMERCIAL

## 2023-05-08 VITALS
HEIGHT: 75 IN | HEART RATE: 71 BPM | SYSTOLIC BLOOD PRESSURE: 125 MMHG | BODY MASS INDEX: 30.71 KG/M2 | WEIGHT: 247 LBS | DIASTOLIC BLOOD PRESSURE: 84 MMHG

## 2023-05-08 DIAGNOSIS — D35.2 PITUITARY ADENOMA: Primary | ICD-10-CM

## 2023-05-08 DIAGNOSIS — D35.2 PITUITARY ADENOMA: ICD-10-CM

## 2023-05-08 PROCEDURE — 70450 CT HEAD WITHOUT CONTRAST: ICD-10-PCS | Mod: 26,,, | Performed by: RADIOLOGY

## 2023-05-08 PROCEDURE — 70450 CT HEAD/BRAIN W/O DYE: CPT | Mod: 26,,, | Performed by: RADIOLOGY

## 2023-05-08 PROCEDURE — 99999 PR PBB SHADOW E&M-EST. PATIENT-LVL IV: ICD-10-PCS | Mod: PBBFAC,,, | Performed by: NEUROLOGICAL SURGERY

## 2023-05-08 PROCEDURE — 99024 PR POST-OP FOLLOW-UP VISIT: ICD-10-PCS | Mod: S$GLB,,, | Performed by: NEUROLOGICAL SURGERY

## 2023-05-08 PROCEDURE — 70450 CT HEAD/BRAIN W/O DYE: CPT | Mod: TC

## 2023-05-08 PROCEDURE — 99024 POSTOP FOLLOW-UP VISIT: CPT | Mod: S$GLB,,, | Performed by: NEUROLOGICAL SURGERY

## 2023-05-08 PROCEDURE — 99999 PR PBB SHADOW E&M-EST. PATIENT-LVL IV: CPT | Mod: PBBFAC,,, | Performed by: NEUROLOGICAL SURGERY

## 2023-05-08 RX ORDER — ERGOCALCIFEROL 1.25 MG/1
50000 CAPSULE ORAL
COMMUNITY
Start: 2023-05-04

## 2023-05-08 NOTE — PROGRESS NOTES
Glalo Maria was seen in neurosurgical follow-up at the office today.  He was admitted to [facility] and underwent transsphenoidal resection of the recurrent pituitary adenoma on 04/19/2023.  It seemed possible to get a reasonable resection of this tumor.  There was some oozing of blood off of the dura that required Gelfoam and Surgicel packing of the sella and sphenoid sinus.  He did well in the postoperative period and was able to be discharged to home.  He initially had some headache and increased urination and thirst but in the last couple of days some of the old blood has come out of his nose and he is having less headache and also less frequent urination.    On brief examination today he is alert and comfortable.  Extraocular movements are good and pupils equal.  He sees fingers moving in both visual fields.  Speech is clear.  He shows no focal neurological deficit.  His nose seems to be healing well.    CT scan of the head was repeated [facility] this morning.  The blood in the sella and sphenoid sinus is dissolving and the optic chiasm does not appear compressed.    He is to follow-up with his endocrinologist later this week.  I will have him return 3 months postop with MRI of the brain and pituitary.  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

## 2023-05-08 NOTE — TELEPHONE ENCOUNTER
PA for testosterone cypionate (DEPOTESTOTERONE CYPIONATE) 200 mg/mL injection was denied.  Appeal documentation submitted to Dr. Mckeon for review.0

## 2023-05-08 NOTE — PROGRESS NOTES
Bhupendra Mckeon MD, FACP, FTOS, FACE                                                                                       Section of Endocrinology, Diabetes, Metabolism and Weight Management,                                                                                                                                                                       [facility],                                                                                                                                                                         [address],                                                                                                                                                                                 [place], [place], [address].                                                                                                                                                  Ph; [phone] Fax; [phone]                                                                                                                                                                 [email or web address]  To whom it may concern  [employer];    Re;  Gallo Maria ; 1980     Dear / ;     Re; Denial of Prescription of Testosterone Rx for repletion therapy.    I am writing this letter of vehement appeal of the recent decision to deny coverage of the prescription for testosterone cypionate androgen repletion therapy to the above named patient of mine.    It is obvious and apparent to me as his attending endocrinologist that whoever authored the denial letter is either uninformed about this patients clinical case  scenario or has no basic understanding of the care of patients with pituitary disease.    To be clear I have been involved in the clinical care of the patient as his lead endocrinologist for over 4 years now and have > 30 years of clinical experience in the care of patients with complex endocrine problems such as the patient.  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   Not only this, but the severity and clinically progressive and aggressive course of the patients status has required him to have trans sphenoidal surgery on this account 2 separate times now, the latest on 04/19/23.  His biochemical testing results provide incontrovertible proof and documentation of his diagnosis which is an absolute  FDA approved indication for testosterone repletion therapy. These are the indisputable facts.    The denial letter sent includes a littany of medical jargon that has no relevance nor bearing to the patients case and fails to acknowledge the facts of the patients case. I would ask that this flawed and erroneous decision be immediately rescinded and approval for this medically necessary therapy be granted immediately to the patient. I would also request that  future adjudication of his case be  handed over to a physician with the requisite experience and knowledge of these sorts of conditions rather than someone simply cutting and pasting text from a source document.  I am willing and ready to discuss in full and in depth the merits and basis of the decision to prescribe androgen repletion in this patient with the covering MD on his insurance plan.  Do not hesitate to reach out to our office with any further questions in this regard and to avoid any confusion or lack of adequate knowledge and details of the patients case we have also included with this  letter details of his recent clinical notes during his visits with me as well as relevant lab testing results.  Thanks for your time.    Sincerely,         Bhupendra Mckeon MD,

## 2023-05-14 ENCOUNTER — PATIENT MESSAGE (OUTPATIENT)
Dept: OTOLARYNGOLOGY | Facility: CLINIC | Age: 43
End: 2023-05-14
Payer: COMMERCIAL

## 2023-05-15 ENCOUNTER — PATIENT MESSAGE (OUTPATIENT)
Dept: NEUROSURGERY | Facility: CLINIC | Age: 43
End: 2023-05-15
Payer: OTHER GOVERNMENT

## 2023-05-16 ENCOUNTER — OFFICE VISIT (OUTPATIENT)
Dept: ENDOCRINOLOGY | Facility: CLINIC | Age: 43
End: 2023-05-16
Payer: COMMERCIAL

## 2023-05-16 ENCOUNTER — LAB VISIT (OUTPATIENT)
Dept: LAB | Facility: HOSPITAL | Age: 43
End: 2023-05-16
Attending: INTERNAL MEDICINE
Payer: OTHER GOVERNMENT

## 2023-05-16 VITALS
BODY MASS INDEX: 31.55 KG/M2 | HEIGHT: 75 IN | TEMPERATURE: 98 F | HEART RATE: 77 BPM | DIASTOLIC BLOOD PRESSURE: 84 MMHG | SYSTOLIC BLOOD PRESSURE: 110 MMHG | OXYGEN SATURATION: 100 % | WEIGHT: 253.75 LBS

## 2023-05-16 DIAGNOSIS — E79.0 HYPERURICEMIA: ICD-10-CM

## 2023-05-16 DIAGNOSIS — E78.00 HYPERCHOLESTEROLEMIA: ICD-10-CM

## 2023-05-16 DIAGNOSIS — R73.09 DYSGLYCEMIA: ICD-10-CM

## 2023-05-16 DIAGNOSIS — Z86.69 HISTORY OF MIGRAINE HEADACHES: Chronic | ICD-10-CM

## 2023-05-16 DIAGNOSIS — E55.9 HYPOVITAMINOSIS D: ICD-10-CM

## 2023-05-16 DIAGNOSIS — N52.01 ERECTILE DYSFUNCTION DUE TO ARTERIAL INSUFFICIENCY: Chronic | ICD-10-CM

## 2023-05-16 DIAGNOSIS — E22.1 HYPERPROLACTINEMIA: Primary | ICD-10-CM

## 2023-05-16 DIAGNOSIS — E23.6 PITUITARY MASS: ICD-10-CM

## 2023-05-16 DIAGNOSIS — E88.810 DYSMETABOLIC SYNDROME: ICD-10-CM

## 2023-05-16 DIAGNOSIS — E29.1 HYPOGONADISM MALE: Chronic | ICD-10-CM

## 2023-05-16 DIAGNOSIS — R73.03 PREDIABETES: ICD-10-CM

## 2023-05-16 DIAGNOSIS — E78.2 MIXED HYPERLIPIDEMIA: ICD-10-CM

## 2023-05-16 DIAGNOSIS — D35.2 PITUITARY MACROADENOMA: ICD-10-CM

## 2023-05-16 DIAGNOSIS — Z87.898 HISTORY OF PITUITARY TUMOR: Chronic | ICD-10-CM

## 2023-05-16 DIAGNOSIS — Z98.890 HISTORY OF PITUITARY SURGERY: ICD-10-CM

## 2023-05-16 DIAGNOSIS — Z80.42 FAMILY HISTORY OF PROSTATE CANCER: Chronic | ICD-10-CM

## 2023-05-16 DIAGNOSIS — N40.0 BENIGN PROSTATIC HYPERPLASIA, UNSPECIFIED WHETHER LOWER URINARY TRACT SYMPTOMS PRESENT: ICD-10-CM

## 2023-05-16 DIAGNOSIS — E29.1 SECONDARY MALE HYPOGONADISM: ICD-10-CM

## 2023-05-16 DIAGNOSIS — K76.0 FATTY LIVER: ICD-10-CM

## 2023-05-16 DIAGNOSIS — D35.2 PROLACTINOMA: ICD-10-CM

## 2023-05-16 LAB
25(OH)D3+25(OH)D2 SERPL-MCNC: 18 NG/ML (ref 30–96)
ALBUMIN SERPL BCP-MCNC: 4.2 G/DL (ref 3.5–5.2)
ALP SERPL-CCNC: 54 U/L (ref 55–135)
ALT SERPL W/O P-5'-P-CCNC: 47 U/L (ref 10–44)
ANION GAP SERPL CALC-SCNC: 10 MMOL/L (ref 8–16)
AST SERPL-CCNC: 34 U/L (ref 10–40)
BILIRUB SERPL-MCNC: 0.3 MG/DL (ref 0.1–1)
BUN SERPL-MCNC: 11 MG/DL (ref 6–20)
CA-I BLDV-SCNC: 1.27 MMOL/L (ref 1.06–1.42)
CALCIUM SERPL-MCNC: 9.7 MG/DL (ref 8.7–10.5)
CHLORIDE SERPL-SCNC: 102 MMOL/L (ref 95–110)
CHOLEST SERPL-MCNC: 215 MG/DL (ref 120–199)
CHOLEST/HDLC SERPL: 3.7 {RATIO} (ref 2–5)
CO2 SERPL-SCNC: 28 MMOL/L (ref 23–29)
CREAT SERPL-MCNC: 0.9 MG/DL (ref 0.5–1.4)
EST. GFR  (NO RACE VARIABLE): >60 ML/MIN/1.73 M^2
ESTIMATED AVG GLUCOSE: 108 MG/DL (ref 68–131)
GLUCOSE SERPL-MCNC: 97 MG/DL (ref 70–110)
HBA1C MFR BLD: 5.4 % (ref 4–5.6)
HDLC SERPL-MCNC: 58 MG/DL (ref 40–75)
HDLC SERPL: 27 % (ref 20–50)
LDLC SERPL CALC-MCNC: 142.2 MG/DL (ref 63–159)
NONHDLC SERPL-MCNC: 157 MG/DL
POTASSIUM SERPL-SCNC: 4.3 MMOL/L (ref 3.5–5.1)
PROT SERPL-MCNC: 8 G/DL (ref 6–8.4)
PTH-INTACT SERPL-MCNC: 58.6 PG/ML (ref 9–77)
SODIUM SERPL-SCNC: 140 MMOL/L (ref 136–145)
TRIGL SERPL-MCNC: 74 MG/DL (ref 30–150)
URATE SERPL-MCNC: 6.4 MG/DL (ref 3.4–7)

## 2023-05-16 PROCEDURE — 99214 OFFICE O/P EST MOD 30 MIN: CPT | Mod: S$GLB,,, | Performed by: INTERNAL MEDICINE

## 2023-05-16 PROCEDURE — 36415 COLL VENOUS BLD VENIPUNCTURE: CPT | Mod: PO | Performed by: INTERNAL MEDICINE

## 2023-05-16 PROCEDURE — 99214 PR OFFICE/OUTPT VISIT, EST, LEVL IV, 30-39 MIN: ICD-10-PCS | Mod: S$GLB,,, | Performed by: INTERNAL MEDICINE

## 2023-05-16 PROCEDURE — 99999 PR PBB SHADOW E&M-EST. PATIENT-LVL III: ICD-10-PCS | Mod: PBBFAC,,, | Performed by: INTERNAL MEDICINE

## 2023-05-16 PROCEDURE — 82306 VITAMIN D 25 HYDROXY: CPT | Performed by: INTERNAL MEDICINE

## 2023-05-16 PROCEDURE — 99999 PR PBB SHADOW E&M-EST. PATIENT-LVL III: CPT | Mod: PBBFAC,,, | Performed by: INTERNAL MEDICINE

## 2023-05-16 PROCEDURE — 83970 ASSAY OF PARATHORMONE: CPT | Performed by: INTERNAL MEDICINE

## 2023-05-16 PROCEDURE — 84270 ASSAY OF SEX HORMONE GLOBUL: CPT | Performed by: INTERNAL MEDICINE

## 2023-05-16 PROCEDURE — 81401 MOPATH PROCEDURE LEVEL 2: CPT

## 2023-05-16 PROCEDURE — 80053 COMPREHEN METABOLIC PANEL: CPT | Performed by: INTERNAL MEDICINE

## 2023-05-16 PROCEDURE — 82024 ASSAY OF ACTH: CPT | Performed by: INTERNAL MEDICINE

## 2023-05-16 PROCEDURE — 84403 ASSAY OF TOTAL TESTOSTERONE: CPT | Performed by: INTERNAL MEDICINE

## 2023-05-16 PROCEDURE — 82330 ASSAY OF CALCIUM: CPT | Performed by: INTERNAL MEDICINE

## 2023-05-16 PROCEDURE — 83036 HEMOGLOBIN GLYCOSYLATED A1C: CPT | Performed by: INTERNAL MEDICINE

## 2023-05-16 PROCEDURE — 84550 ASSAY OF BLOOD/URIC ACID: CPT | Performed by: INTERNAL MEDICINE

## 2023-05-16 PROCEDURE — 80061 LIPID PANEL: CPT | Performed by: INTERNAL MEDICINE

## 2023-05-16 NOTE — PROGRESS NOTES
Subjective:      Patient ID: Gallo Maria Jr. is a 42 y.o. male.    Chief Complaint:      Patient is a 42 yr old gentleman seen in [facility] today on account of pituitary tumor and hyperprolactinemia.     History of Present Illness    The patient, Mr Maria is a 42 yr old gentleman seen in [facility]  today on account of pituitary tumor and associated hyperprolactinemia.      His recent pituitary MRI from 03/2020 shows macroadenoma (1.7 x 1.7 x 1.6 cm) with suprasellar extension which suggest subtle increase in lesion size compared to dimensions from 2019. He remains on dostinex 0.5mg 2 x weekly and testosterone repletion therapy on account of hypogonadism;  200 mg every 14 days.  His most recent brain MRI continues to show interval enlargement of the residual pituitary tumor (lesion now measured @ 2 x 2 x 2cm with associated suprasellar extension. There is reported associated leftward shift of the pituitary stalk and some protrusion into the right carvenous sinus.  He has previously been seen and ffed by Alexandra Mckay and Rosemary @ [facility]. He was last see there 12/15.       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 This was first found on account of headaches and visual field problems.  this was done by Dr Derek allen [facility].   States his sx ( HA and vision change) have significantly improved since the surgery.  Patient has not noted any nipple discharge. No gynecomastia noted. He has not any significant change in smeel sesnation  States he continues to have low libido and no morning erections since surgery.      Following the  transphenoidal surgery, pt was on HC for about a month. States he did not feel any different while on HC or off of HC.   His established background comorbidities are as detailed below;     1. Pituitary adenoma      2. History of migraine headaches      3. Mixed hyperlipidemia      4. Erectile dysfunction due to  arterial insufficiency      5. Decreased libido      6. BPH with urinary obstruction      7. Hypogonadism male      8. Dysmetabolic syndrome      9. Obesity (BMI 30-39.9)      10. Hypercholesterolemia      11. Prediabetes         Patients Luray score is 8.   Patient noticed progressive weight gain and fatigue. Presently he has no headaches. Presently has no visual problems; no blurring, tunnel vision nor peripheral visual deficits.  Patient had noticed no gynecomastia nor nipple discharge. No seizures.  There is no family history of pituitary tumors or other brain tumors.  Patients libido has declined.   Patients father had recurrent nephrolithiasis. There is no family history of severe dyspepsia ir ulcer disease.\  Patient does not smoke.  Patient does not drink any ETOH.  Patient works at the [employer].  Patient has not started androgen repletion yet.  Patient is presently on testosterone injections; 150 mg every 14 days.  He self administers the testosterone.   He has no current headaches. He has no nipple discharge nor increased breast growth. He has noticed no dysguesia nor paraosmias.     He currently had no headaches and no seizures and also has no visual dysfunction (blurring of vision, lateral visual field deficits etc)  Patients most recent pituitary MRI from 08/22 indicated evidence of pituitary macroadenoma and evidence of interval growth with some cystic degeneration.  Patients maternal uncles and father both have high cholesterol.  Patients father had an MI @ age 62.   He admits that since the last visit he has not been taking the pravastatin and asa but has been more careful with his diet and has been exercising regularly.  The patient is now s/p repeat TSS from 04/19/23 (second time now) with Dr Derek Blair @ [facility].  Presently continues to have significant migraines.  No seizures nor episodes of LOC. He continues to have ongoign nasal drainage.    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     Review of Systems   Constitutional:  Negative for fatigue and unexpected weight change.   HENT:  Negative for facial swelling, trouble swallowing and voice change.    Eyes:  Negative for photophobia and visual disturbance (no visual blurring nor visual field defects).   Respiratory:  Negative for shortness of breath.    Cardiovascular:  Negative for chest pain, palpitations and leg swelling.   Gastrointestinal:  Negative for constipation, diarrhea, nausea and vomiting.   Endocrine: Negative for polydipsia and polyuria.   Genitourinary:  Negative for dysuria and frequency.        +ED   Musculoskeletal:  Negative for back pain and myalgias.   Skin:  Negative for color change, pallor and rash.   Neurological:  Negative for seizures, syncope, weakness and headaches.   Hematological:  Does not bruise/bleed easily.   Psychiatric/Behavioral:  Negative for confusion and sleep disturbance.      Objective:      /84 (BP Location: Left arm, Patient Position: Sitting, BP Method: Large (Manual))   Pulse 77   Temp 97.9 °F (36.6 °C) (Oral)   Ht 6' 3" (1.905 m)   Wt 115.1 kg (253 lb 12 oz)   SpO2 100%   BMI 31.72 kg/m² Body surface area is 2.47 meters squared.                 Physical Exam  Vitals reviewed.   Constitutional:       General: He is not in acute distress.     Appearance: He is well-developed. He is not toxic-appearing or diaphoretic.      Comments: Pleasant young man. Clinically comfortable. Not pale, anicteric, afebrile. Well hydrated.    HENT:      Head: Normocephalic and atraumatic. No right periorbital erythema or left periorbital erythema. Hair is normal.      Mouth/Throat:      Pharynx: No oropharyngeal exudate.   Eyes:      General: Lids are normal. No scleral icterus.     Conjunctiva/sclera: Conjunctivae normal.      Pupils: Pupils are equal, round, and reactive to light.   Neck:      Thyroid: No thyroid mass or thyromegaly.      " Vascular: No carotid bruit or JVD.      Trachea: Trachea and phonation normal. No tracheal tenderness or tracheal deviation.   Cardiovascular:      Rate and Rhythm: Normal rate and regular rhythm.      Pulses: Normal pulses.      Heart sounds: Normal heart sounds. No murmur heard.    No gallop.   Pulmonary:      Effort: Pulmonary effort is normal. No respiratory distress.      Breath sounds: Normal breath sounds. No decreased breath sounds, wheezing or rales.   Abdominal:      General: Bowel sounds are normal. There is no distension.      Palpations: Abdomen is soft. There is no mass.      Tenderness: There is no abdominal tenderness.   Musculoskeletal:         General: No swelling or tenderness. Normal range of motion.      Cervical back: Full passive range of motion without pain, normal range of motion and neck supple.      Comments: No pedal edema nor calf swelling. No areas of tenderness.   Lymphadenopathy:      Cervical: No cervical adenopathy.   Skin:     General: Skin is warm and dry.      Coloration: Skin is not pale.      Findings: No bruising, ecchymosis, erythema, petechiae or rash.      Nails: There is no clubbing.   Neurological:      Mental Status: He is alert and oriented to person, place, and time.      Cranial Nerves: No cranial nerve deficit.      Sensory: No sensory deficit.      Motor: No tremor or abnormal muscle tone.      Gait: Gait normal.      Deep Tendon Reflexes: Reflexes are normal and symmetric. Reflexes normal.   Psychiatric:         Mood and Affect: Mood is not anxious or depressed.         Speech: Speech normal.         Behavior: Behavior normal.         Thought Content: Thought content normal.         Judgment: Judgment normal.       Lab Review:        Latest Reference Range & Units 04/21/23 18:06 04/21/23 18:23 04/21/23 22:08 04/22/23 01:13 04/22/23 06:11 04/22/23 06:12 04/22/23 11:09 04/22/23 11:39 04/22/23 17:27 04/22/23 17:48 04/22/23 22:15 04/23/23 00:34 04/23/23 05:37   WBC  3.90 - 12.70 K/uL    12.58        13.35 (H)    RBC 4.60 - 6.20 M/uL    4.87        4.90    Hemoglobin 14.0 - 18.0 g/dL    13.6 (L)        13.9 (L)    Hematocrit 40.0 - 54.0 %    41.6        42.3    MCV 82 - 98 fL    85        86    MCH 27.0 - 31.0 pg    27.9        28.4    MCHC 32.0 - 36.0 g/dL    32.7        32.9    RDW 11.5 - 14.5 %    14.2        13.7    Platelets 150 - 450 K/uL    279        280    MPV 9.2 - 12.9 fL    9.7        9.8    Gran % 38.0 - 73.0 %    70.0        66.3    Lymph % 18.0 - 48.0 %    23.4        24.8    Mono % 4.0 - 15.0 %    5.8        6.8    Eosinophil % 0.0 - 8.0 %    0.2        1.6    Basophil % 0.0 - 1.9 %    0.2        0.2    Immature Granulocytes 0.0 - 0.5 %    0.4        0.3    Gran # (ANC) 1.8 - 7.7 K/uL    8.8 (H)        8.8 (H)    Lymph # 1.0 - 4.8 K/uL    3.0        3.3    Mono # 0.3 - 1.0 K/uL    0.7        0.9    Eos # 0.0 - 0.5 K/uL    0.0        0.2    Baso # 0.00 - 0.20 K/uL    0.02        0.03    Immature Grans (Abs) 0.00 - 0.04 K/uL    0.05 (H)        0.04    nRBC 0 /100 WBC    0        0    Differential Method     Automated        Automated    Sodium 136 - 145 mmol/L  139  138  138  137  140  137 136   Potassium 3.5 - 5.1 mmol/L    3.8        3.9    Chloride 95 - 110 mmol/L    106        104    CO2 23 - 29 mmol/L    24        22 (L)    Anion Gap 8 - 16 mmol/L    8        11    BUN 6 - 20 mg/dL    11        13    Creatinine 0.5 - 1.4 mg/dL    0.8        0.9    eGFR >60 mL/min/1.73 m^2    >60.0        >60.0    Glucose 70 - 110 mg/dL    114 (H)        106    Calcium 8.7 - 10.5 mg/dL    8.6 (L)        9.3    Phosphorus 2.7 - 4.5 mg/dL    3.9        3.8    Magnesium 1.6 - 2.6 mg/dL    2.1        2.0    Alkaline Phosphatase 55 - 135 U/L    41 (L)        41 (L)    PROTEIN TOTAL 6.0 - 8.4 g/dL    6.7        7.1    Albumin 3.5 - 5.2 g/dL    3.8        3.9    BILIRUBIN TOTAL 0.1 - 1.0 mg/dL    0.5        0.5    AST 10 - 40 U/L    17        19    ALT 10 - 44 U/L    24        26     Specimen UA  Urine, Clean Catch  Urine, Catheterized  Urine, Catheterized  Urine, Clean Catch  Urine, Clean Catch  Urine, Clean Catch     Color, UA Yellow, Straw, Eva  Colorless !  Yellow  Yellow  Yellow  Straw  Yellow     Appearance, UA Clear  Clear  Clear  Clear  Clear  Clear  Clear     Specific Gravity, UA 1.005 - 1.030  1.010  1.015  1.025  1.020  1.010  1.015     pH, UA 5.0 - 8.0  7.0  7.0  6.0  7.0  7.0  7.0     Protein, UA Negative  Negative  Negative  Negative  Negative  Negative  Negative     Glucose, UA Negative  Negative  Negative  Negative  Negative  Trace !  Negative     Ketones, UA Negative  Negative  Negative  Negative  Negative  Negative  Negative     Occult Blood UA Negative  Trace !  Trace !  1+ !  1+ !  Negative  Negative     NITRITE UA Negative  Negative  Negative  Negative  Negative  Negative  Negative     Bilirubin (UA) Negative  Negative  Negative  Negative  Negative  Negative  Negative     Leukocytes, UA Negative  Negative  Negative  Trace !  Negative  Negative  Negative     RBC, UA 0 - 4 /hpf     5 (H)  6 (H)         WBC, UA 0 - 5 /hpf     4  4         Bacteria, UA None-Occ /hpf     Occasional           Squam Epithel, UA /hpf       0         Microscopic Comment      SEE COMMENT  SEE COMMENT         (H): Data is abnormally high  (L): Data is abnormally low  !: Data is abnormal    Assessment:     1. Hyperprolactinemia        2. Hypogonadism male        3. History of pituitary tumor        4. History of pituitary surgery  ACTH    Cortisol    Luteinizing Hormone    Osmolality, Serum    Osmolality, Urine      5. Pituitary macroadenoma        6. Pituitary mass  Alpha Sub Unit    Chromogranin A    Insulin-Like Growth Factor    Follicle Stimulating Hormone    Growth Hormone    IGF Binding Protein 3      7. Prolactinoma  Alpha Sub Unit    Chromogranin A    Insulin-Like Growth Factor    Follicle Stimulating Hormone    Growth Hormone    IGF Binding Protein 3    Prolactin    Macroprolactin, Serum     Testosterone Panel    Testosterone Panel      8. Prediabetes  Hemoglobin A1C      9. Secondary male hypogonadism  Luteinizing Hormone    Testosterone Panel    Prolactin    Macroprolactin, Serum    Estradiol    Estrogens, Total    Testosterone Panel    Testosterone Panel      10. Fatty liver        11. Hyperuricemia        12. Erectile dysfunction due to arterial insufficiency        13. Family history of prostate cancer        14. Hypercholesterolemia  Lipid Panel      15. Mixed hyperlipidemia  Lipid Panel      16. History of migraine headaches        17. Hypovitaminosis D  Calcium, Ionized    PTH, Intact    Vitamin D      18. Dysmetabolic syndrome  Uric Acid    DHEA    DHEA-Sulfate    Aldosterone    Renin      19. Dysglycemia  Hemoglobin A1C      20. 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  To obtain adeno and neurohyophyseal functional testing and depending on findings will replete hormone repletion as needed.  To refer to ophthalmology for visual fields.  Regarding dysmetabolic syndrome + prediabetes; to obtain full screening in view of patients history of prediabetes and history of diabetes in his father.  Regarding obesity; to continue efforts at calorie reduction and portion size control. Will discuss intervention strategies  To improves weight management.  Regarding possible hypogonadism; to continue androgen repletion therapy ~ 150mg every 14 days.. To obtain peak and trough levels of tesosterone  Regarding hypovitaminosis D; to obtain 25 OH vit d levels and replete as needed based on results.  Regarding hyperlipidemia; to recheck current lipid levels and for now to continue present statin therapy as before.   Regarding BPH; stable symptomatically ALF. To recheck PSA.  Regarding ED; continue management  for this as before.  Regarding chronic migraines; to consider addition of topiramate to the the treatment regimen. Presently clinically stable.      Plan:       FFup in ~ 1 yr.

## 2023-05-18 ENCOUNTER — OFFICE VISIT (OUTPATIENT)
Dept: OTOLARYNGOLOGY | Facility: CLINIC | Age: 43
End: 2023-05-18
Payer: COMMERCIAL

## 2023-05-18 VITALS
WEIGHT: 253.5 LBS | BODY MASS INDEX: 31.52 KG/M2 | HEART RATE: 74 BPM | DIASTOLIC BLOOD PRESSURE: 82 MMHG | HEIGHT: 75 IN | SYSTOLIC BLOOD PRESSURE: 122 MMHG

## 2023-05-18 DIAGNOSIS — Z87.898 HISTORY OF PITUITARY TUMOR: Primary | ICD-10-CM

## 2023-05-18 PROCEDURE — 99024 PR POST-OP FOLLOW-UP VISIT: ICD-10-PCS | Mod: S$GLB,,, | Performed by: OTOLARYNGOLOGY

## 2023-05-18 PROCEDURE — 99999 PR PBB SHADOW E&M-EST. PATIENT-LVL III: ICD-10-PCS | Mod: PBBFAC,,, | Performed by: OTOLARYNGOLOGY

## 2023-05-18 PROCEDURE — 99024 POSTOP FOLLOW-UP VISIT: CPT | Mod: S$GLB,,, | Performed by: OTOLARYNGOLOGY

## 2023-05-18 PROCEDURE — 99999 PR PBB SHADOW E&M-EST. PATIENT-LVL III: CPT | Mod: PBBFAC,,, | Performed by: OTOLARYNGOLOGY

## 2023-05-18 RX ORDER — DOXYCYCLINE 100 MG/1
100 CAPSULE ORAL 2 TIMES DAILY
Qty: 20 CAPSULE | Refills: 0 | Status: SHIPPED | OUTPATIENT
Start: 2023-05-18 | End: 2023-05-28

## 2023-05-18 NOTE — PROGRESS NOTES
Gallo Stilesoneyda Irby presents roughly 1 month status post submandibular/transseptal approach to the sella for resection of recurrent pituitary tumor.  He reports a foul odor from his nose for the last several days.  He has been rinsing his nose with nasal saline.  He reports that he blew out a bloody crust from the right side of his nose yesterday.      On exam, there is crusting of the bilateral nasal cavities.  An endoscopic exam, there is diffuse crusting particularly along the nasal floor.  There is a large posterior septal perforation.  \      Assessment plan:  Doing reasonably well.  Continue nasal saline.  Will add antibiotics.  Return if he fails to improve.

## 2023-05-19 ENCOUNTER — LAB VISIT (OUTPATIENT)
Dept: LAB | Facility: HOSPITAL | Age: 43
End: 2023-05-19
Attending: INTERNAL MEDICINE
Payer: COMMERCIAL

## 2023-05-19 DIAGNOSIS — D35.2 PROLACTINOMA: ICD-10-CM

## 2023-05-19 DIAGNOSIS — E29.1 SECONDARY MALE HYPOGONADISM: ICD-10-CM

## 2023-05-19 DIAGNOSIS — Z98.890 HISTORY OF PITUITARY SURGERY: ICD-10-CM

## 2023-05-19 DIAGNOSIS — E23.6 PITUITARY MASS: ICD-10-CM

## 2023-05-19 DIAGNOSIS — N40.0 BENIGN PROSTATIC HYPERPLASIA, UNSPECIFIED WHETHER LOWER URINARY TRACT SYMPTOMS PRESENT: ICD-10-CM

## 2023-05-19 DIAGNOSIS — E88.810 DYSMETABOLIC SYNDROME: ICD-10-CM

## 2023-05-19 LAB
ACTH PLAS-MCNC: 24 PG/ML (ref 0–46)
CORTIS SERPL-MCNC: 9.2 UG/DL
DHEA-S SERPL-MCNC: 143 UG/DL (ref 139.7–484.4)
ESTRADIOL SERPL-MCNC: 46 PG/ML (ref 11–44)
FSH SERPL-ACNC: 0.98 MIU/ML (ref 0.95–11.95)
LH SERPL-ACNC: 0.5 MIU/ML (ref 0.6–12.1)
OSMOLALITY SERPL: 306 MOSM/KG (ref 280–300)
PROLACTIN SERPL IA-MCNC: 13.1 NG/ML (ref 3.5–19.4)
PROSTATE SPECIFIC ANTIGEN, TOTAL: 2.9 NG/ML (ref 0–4)
PSA FREE MFR SERPL: 17.59 %
PSA FREE SERPL-MCNC: 0.51 NG/ML (ref 0–1.5)

## 2023-05-19 PROCEDURE — 82626 DEHYDROEPIANDROSTERONE: CPT | Performed by: INTERNAL MEDICINE

## 2023-05-19 PROCEDURE — 83001 ASSAY OF GONADOTROPIN (FSH): CPT | Performed by: INTERNAL MEDICINE

## 2023-05-19 PROCEDURE — 86316 IMMUNOASSAY TUMOR OTHER: CPT | Performed by: INTERNAL MEDICINE

## 2023-05-19 PROCEDURE — 82672 ASSAY OF ESTROGEN: CPT | Performed by: INTERNAL MEDICINE

## 2023-05-19 PROCEDURE — 84305 ASSAY OF SOMATOMEDIN: CPT | Performed by: INTERNAL MEDICINE

## 2023-05-19 PROCEDURE — 82533 TOTAL CORTISOL: CPT | Performed by: INTERNAL MEDICINE

## 2023-05-19 PROCEDURE — 84403 ASSAY OF TOTAL TESTOSTERONE: CPT | Performed by: INTERNAL MEDICINE

## 2023-05-19 PROCEDURE — 83520 IMMUNOASSAY QUANT NOS NONAB: CPT | Performed by: INTERNAL MEDICINE

## 2023-05-19 PROCEDURE — 83002 ASSAY OF GONADOTROPIN (LH): CPT | Performed by: INTERNAL MEDICINE

## 2023-05-19 PROCEDURE — 36415 COLL VENOUS BLD VENIPUNCTURE: CPT | Performed by: INTERNAL MEDICINE

## 2023-05-19 PROCEDURE — 82088 ASSAY OF ALDOSTERONE: CPT | Performed by: INTERNAL MEDICINE

## 2023-05-19 PROCEDURE — 82670 ASSAY OF TOTAL ESTRADIOL: CPT | Performed by: INTERNAL MEDICINE

## 2023-05-19 PROCEDURE — 84270 ASSAY OF SEX HORMONE GLOBUL: CPT | Performed by: INTERNAL MEDICINE

## 2023-05-19 PROCEDURE — 82397 CHEMILUMINESCENT ASSAY: CPT | Performed by: INTERNAL MEDICINE

## 2023-05-19 PROCEDURE — 84244 ASSAY OF RENIN: CPT | Performed by: INTERNAL MEDICINE

## 2023-05-19 PROCEDURE — 83930 ASSAY OF BLOOD OSMOLALITY: CPT | Performed by: INTERNAL MEDICINE

## 2023-05-19 PROCEDURE — 84153 ASSAY OF PSA TOTAL: CPT | Performed by: INTERNAL MEDICINE

## 2023-05-19 PROCEDURE — 84146 ASSAY OF PROLACTIN: CPT | Mod: 91 | Performed by: INTERNAL MEDICINE

## 2023-05-19 PROCEDURE — 84146 ASSAY OF PROLACTIN: CPT | Performed by: INTERNAL MEDICINE

## 2023-05-19 PROCEDURE — 82627 DEHYDROEPIANDROSTERONE: CPT | Performed by: INTERNAL MEDICINE

## 2023-05-19 PROCEDURE — 83003 ASSAY GROWTH HORMONE (HGH): CPT | Performed by: INTERNAL MEDICINE

## 2023-05-22 LAB
A-PGH SER-MCNC: 0.1 NG/ML
ALDOST SERPL-MCNC: 9.4 NG/DL
CGA SERPL-MCNC: 25 NG/ML
IGF BP3 SERPL-MCNC: 3.9 MCG/ML (ref 3.3–6.6)

## 2023-05-23 LAB
ANNOTATION COMMENT IMP: NORMAL
DHEA SERPL-MCNC: 3.31 NG/ML (ref 0.63–4.7)
ESTROGEN SERPL-MCNC: 180 PG/ML
GH SERPL-MCNC: <0.1 NG/ML (ref 0–3)
IGF-I SERPL-MCNC: 75 NG/ML (ref 44–275)
IGF-I Z-SCORE SERPL: -1.28 SD
MACROPROLACTIN SERPL-MCNC: 11.6 NG/ML (ref 2.7–13.1)
MACROPROLACTIN/PROLACTIN MFR SERPL: 18 %
PROLACTIN SERPL IA-MCNC: 14.1 NG/ML (ref 4–15.2)
RENIN PLAS-CCNC: <0.6 NG/ML/H

## 2023-05-24 DIAGNOSIS — E29.1 SECONDARY MALE HYPOGONADISM: ICD-10-CM

## 2023-05-24 LAB
ALBUMIN SERPL-MCNC: 4.3 G/DL (ref 3.6–5.1)
SHBG SERPL-SCNC: 12 NMOL/L (ref 10–50)
TESTOST FREE SERPL-MCNC: 23.1 PG/ML (ref 46–224)
TESTOST SERPL-MCNC: 97 NG/DL (ref 250–1100)
TESTOSTERONE.FREE+WB SERPL-MCNC: 45.6 NG/DL (ref 110–575)

## 2023-05-24 NOTE — TELEPHONE ENCOUNTER
Can you send the Rx. for testosterone cypionate (DEPOTESTOTERONE CYPIONATE) 200 mg/mL injection to [facility] & they will submit the prior authorization. Once approved the pharmacy will call the patient & give them the option to mail the medication to them or transfer it back to the pharmacy of their choice. If denied they will process the appeal if possible.

## 2023-05-25 RX ORDER — TESTOSTERONE CYPIONATE 200 MG/ML
300 INJECTION, SOLUTION INTRAMUSCULAR
Qty: 10 ML | Refills: 3 | Status: SHIPPED | OUTPATIENT
Start: 2023-05-25 | End: 2023-12-07

## 2023-05-26 LAB — MAYO MISCELLANEOUS RESULT (REF): NORMAL

## 2023-05-27 LAB
ALBUMIN SERPL-MCNC: 4.5 G/DL (ref 3.6–5.1)
ALBUMIN SERPL-MCNC: 4.5 G/DL (ref 3.6–5.1)
SHBG SERPL-SCNC: 14 NMOL/L (ref 10–50)
SHBG SERPL-SCNC: 14 NMOL/L (ref 10–50)
TESTOST FREE SERPL-MCNC: 185.3 PG/ML (ref 46–224)
TESTOST FREE SERPL-MCNC: 185.3 PG/ML (ref 46–224)
TESTOST SERPL-MCNC: 697 NG/DL (ref 250–1100)
TESTOST SERPL-MCNC: 697 NG/DL (ref 250–1100)
TESTOSTERONE.FREE+WB SERPL-MCNC: 381.1 NG/DL (ref 110–575)
TESTOSTERONE.FREE+WB SERPL-MCNC: 381.1 NG/DL (ref 110–575)

## 2023-06-09 ENCOUNTER — PATIENT MESSAGE (OUTPATIENT)
Dept: NEUROSURGERY | Facility: CLINIC | Age: 43
End: 2023-06-09
Payer: OTHER GOVERNMENT

## 2023-06-09 ENCOUNTER — PATIENT MESSAGE (OUTPATIENT)
Dept: ENDOCRINOLOGY | Facility: CLINIC | Age: 43
End: 2023-06-09
Payer: OTHER GOVERNMENT

## 2023-06-09 ENCOUNTER — PATIENT MESSAGE (OUTPATIENT)
Dept: OTOLARYNGOLOGY | Facility: CLINIC | Age: 43
End: 2023-06-09
Payer: OTHER GOVERNMENT

## 2023-06-13 DIAGNOSIS — E29.1 SECONDARY MALE HYPOGONADISM: ICD-10-CM

## 2023-06-13 RX ORDER — TESTOSTERONE CYPIONATE 200 MG/ML
INJECTION, SOLUTION INTRAMUSCULAR
Qty: 6 ML | OUTPATIENT
Start: 2023-06-13

## 2023-06-15 ENCOUNTER — OFFICE VISIT (OUTPATIENT)
Dept: OTOLARYNGOLOGY | Facility: CLINIC | Age: 43
End: 2023-06-15
Payer: COMMERCIAL

## 2023-06-15 VITALS
SYSTOLIC BLOOD PRESSURE: 128 MMHG | BODY MASS INDEX: 33.58 KG/M2 | DIASTOLIC BLOOD PRESSURE: 86 MMHG | HEIGHT: 75 IN | HEART RATE: 96 BPM | WEIGHT: 270.06 LBS

## 2023-06-15 DIAGNOSIS — D35.2 PITUITARY MACROADENOMA: Primary | ICD-10-CM

## 2023-06-15 PROCEDURE — 99024 POSTOP FOLLOW-UP VISIT: CPT | Mod: S$GLB,,, | Performed by: OTOLARYNGOLOGY

## 2023-06-15 PROCEDURE — 99999 PR PBB SHADOW E&M-EST. PATIENT-LVL III: CPT | Mod: PBBFAC,,, | Performed by: OTOLARYNGOLOGY

## 2023-06-15 PROCEDURE — 99999 PR PBB SHADOW E&M-EST. PATIENT-LVL III: ICD-10-PCS | Mod: PBBFAC,,, | Performed by: OTOLARYNGOLOGY

## 2023-06-15 PROCEDURE — 99024 PR POST-OP FOLLOW-UP VISIT: ICD-10-PCS | Mod: S$GLB,,, | Performed by: OTOLARYNGOLOGY

## 2023-06-15 NOTE — PROGRESS NOTES
Gallo Patel Candace Paige. presents status post submandibular/transseptal approach to the sella for resection of recurrent pituitary tumor.  He reports a persistentfoul odor from his nose.  He has been rinsing his nose with nasal saline.      On exam, there is crusting of the bilateral nasal cavities.  An endoscopic exam, there is diffuse crusting particularly along the nasal floor- debrided.  There is a large posterior septal perforation.        Assessment plan:  Doing reasonably well.  Continue nasal saline.  Reassured that he is healing appropriately.  He does report some hyposmia and I suspect that he may be experiencing he is experiencing cachosmia.  Overall, he is doing well and I expect that he will continue to heal.  He is to return p.r.n..

## 2023-06-19 ENCOUNTER — TELEPHONE (OUTPATIENT)
Dept: PHARMACY | Facility: CLINIC | Age: 43
End: 2023-06-19
Payer: OTHER GOVERNMENT

## 2023-07-03 ENCOUNTER — PATIENT MESSAGE (OUTPATIENT)
Dept: NEUROSURGERY | Facility: CLINIC | Age: 43
End: 2023-07-03
Payer: OTHER GOVERNMENT

## 2023-07-07 LAB
GENETIC COUNSELING?: NO
GENSO SPECIMEN TYPE: NORMAL
MISCELLANEOUS GENETIC TEST NAME: NORMAL
PARTENTAL OR SIBLING TESTING?: NO
REFERENCE LAB: NORMAL
TEST RESULT: NORMAL

## 2023-08-10 ENCOUNTER — HOSPITAL ENCOUNTER (OUTPATIENT)
Dept: RADIOLOGY | Facility: HOSPITAL | Age: 43
Discharge: HOME | End: 2023-08-10
Attending: NEUROLOGICAL SURGERY
Payer: COMMERCIAL

## 2023-08-10 ENCOUNTER — OFFICE VISIT (OUTPATIENT)
Dept: NEUROSURGERY | Facility: CLINIC | Age: 43
End: 2023-08-10
Payer: COMMERCIAL

## 2023-08-10 VITALS
SYSTOLIC BLOOD PRESSURE: 129 MMHG | BODY MASS INDEX: 33.57 KG/M2 | WEIGHT: 270 LBS | DIASTOLIC BLOOD PRESSURE: 85 MMHG | HEIGHT: 75 IN | HEART RATE: 66 BPM

## 2023-08-10 DIAGNOSIS — D35.2 PITUITARY ADENOMA: Primary | ICD-10-CM

## 2023-08-10 DIAGNOSIS — D35.2 PITUITARY ADENOMA: ICD-10-CM

## 2023-08-10 PROCEDURE — 99999 PR PBB SHADOW E&M-EST. PATIENT-LVL IV: CPT | Mod: PBBFAC,,, | Performed by: NEUROLOGICAL SURGERY

## 2023-08-10 PROCEDURE — 70553 MRI BRAIN STEM W/O & W/DYE: CPT | Mod: 26,,, | Performed by: RADIOLOGY

## 2023-08-10 PROCEDURE — 99213 PR OFFICE/OUTPT VISIT, EST, LEVL III, 20-29 MIN: ICD-10-PCS | Mod: S$GLB,,, | Performed by: NEUROLOGICAL SURGERY

## 2023-08-10 PROCEDURE — A9585 GADOBUTROL INJECTION: HCPCS | Performed by: NEUROLOGICAL SURGERY

## 2023-08-10 PROCEDURE — 70553 MRI BRAIN W WO CONTRAST: ICD-10-PCS | Mod: 26,,, | Performed by: RADIOLOGY

## 2023-08-10 PROCEDURE — 99999 PR PBB SHADOW E&M-EST. PATIENT-LVL IV: ICD-10-PCS | Mod: PBBFAC,,, | Performed by: NEUROLOGICAL SURGERY

## 2023-08-10 PROCEDURE — 70553 MRI BRAIN STEM W/O & W/DYE: CPT | Mod: TC

## 2023-08-10 PROCEDURE — 25500020 PHARM REV CODE 255: Performed by: NEUROLOGICAL SURGERY

## 2023-08-10 PROCEDURE — 99213 OFFICE O/P EST LOW 20 MIN: CPT | Mod: S$GLB,,, | Performed by: NEUROLOGICAL SURGERY

## 2023-08-10 RX ORDER — GADOBUTROL 604.72 MG/ML
5 INJECTION INTRAVENOUS
Status: COMPLETED | OUTPATIENT
Start: 2023-08-10 | End: 2023-08-10

## 2023-08-10 RX ADMIN — GADOBUTROL 5 ML: 604.72 INJECTION INTRAVENOUS at 11:08

## 2023-08-10 NOTE — PROGRESS NOTES
Gallo Maria was seen in neurosurgical follow-up at the office today.  He has been getting along reasonably well following transsphenoidal resection of the pituitary adenoma on 04/19/2023.  He feels his vision is stable.  His appetite has been normal and he has had no weight gain.  He is having no excessive urination and is not requiring DDAVP.  He did go back to work for a short time but climbing ladders he felt lightheaded at it sounds like he is not quite ready to resume full activity at this point.    On brief examination today he is alert and speaking clearly.  Extraocular movements are good and pupils equal.  He can count fingers in the peripheral visual fields.  He shows no focal neurological deficit.    MRI of the brain was repeated at [facility] today.  There is some residual tumor and packing in the sella.  The chiasm seems well decompressed, the pituitary stalk is pushed slightly to the left.  This will serve as a baseline for us.    I will have his hormonal studies repeated today and plan to see him back in 3 months with follow-up MRI to continue to monitor resection.  He will send us whatever papers he needs for his work.

## 2023-08-11 ENCOUNTER — PATIENT MESSAGE (OUTPATIENT)
Dept: NEUROSURGERY | Facility: CLINIC | Age: 43
End: 2023-08-11
Payer: COMMERCIAL

## 2023-09-28 ENCOUNTER — OFFICE VISIT (OUTPATIENT)
Dept: OTOLARYNGOLOGY | Facility: CLINIC | Age: 43
End: 2023-09-28
Payer: COMMERCIAL

## 2023-09-28 VITALS
DIASTOLIC BLOOD PRESSURE: 87 MMHG | BODY MASS INDEX: 33.48 KG/M2 | SYSTOLIC BLOOD PRESSURE: 129 MMHG | WEIGHT: 267.88 LBS | HEART RATE: 64 BPM

## 2023-09-28 DIAGNOSIS — J31.0 CHRONIC RHINITIS: Primary | ICD-10-CM

## 2023-09-28 PROCEDURE — 31231 PR NASAL ENDOSCOPY, DX: ICD-10-PCS | Mod: S$GLB,,, | Performed by: OTOLARYNGOLOGY

## 2023-09-28 PROCEDURE — 31231 NASAL ENDOSCOPY DX: CPT | Mod: S$GLB,,, | Performed by: OTOLARYNGOLOGY

## 2023-09-28 PROCEDURE — 99213 PR OFFICE/OUTPT VISIT, EST, LEVL III, 20-29 MIN: ICD-10-PCS | Mod: 25,S$GLB,, | Performed by: OTOLARYNGOLOGY

## 2023-09-28 PROCEDURE — 99999 PR PBB SHADOW E&M-EST. PATIENT-LVL III: ICD-10-PCS | Mod: PBBFAC,,, | Performed by: OTOLARYNGOLOGY

## 2023-09-28 PROCEDURE — 99999 PR PBB SHADOW E&M-EST. PATIENT-LVL III: CPT | Mod: PBBFAC,,, | Performed by: OTOLARYNGOLOGY

## 2023-09-28 PROCEDURE — 99213 OFFICE O/P EST LOW 20 MIN: CPT | Mod: 25,S$GLB,, | Performed by: OTOLARYNGOLOGY

## 2023-09-28 NOTE — PROGRESS NOTES
Chief Complaint   Patient presents with    Follow up Ongoing Sinus Infection       HPI   43 y.o. male presents approximate 5 months status post submandibular/transseptal resection of a pituitary adenoma with Neurosurgery.  He reports persistent crusting in his nose and thickened mucus from his mouth most notable in the morning.  He has been using saline irrigations with some improvement but not complete resolution.  No other complaints.    Review of Systems   Constitutional: Negative for fatigue and unexpected weight change.   HENT: Per HPI.  Eyes: Negative for visual disturbance.   Respiratory: Negative for shortness of breath, hemoptysis   Cardiovascular: Negative for chest pain and palpitations.   Musculoskeletal: Negative for decreased ROM, back pain.   Skin: Negative for rash, sunburn, itching.   Neurological: Negative for dizziness and seizures.   Hematological: Negative for adenopathy. Does not bruise/bleed easily.   Endocrine: Negative for rapid weight loss/weight gain, heat/cold intolerance.     Past Medical History   Patient Active Problem List   Diagnosis    Pituitary adenoma    Family history of prostate cancer    History of pituitary tumor    High serum low-density lipoprotein (LDL)    Elevated hemoglobin A1c - at risk for diabetes    Chest tightness    SOB (shortness of breath)    Mixed hyperlipidemia    Obesity (BMI 30-39.9)    Tonsillar mass    Dysmetabolic syndrome    Hypercholesterolemia    Prediabetes    History of pituitary surgery    Hypovitaminosis D    Secondary male hypogonadism    Hyperprolactinemia    Hyperuricemia    Prolactinoma    Fatty liver    Pituitary macroadenoma    Pituitary mass    Chronic rhinitis           Past Surgical History   Past Surgical History:   Procedure Laterality Date    BIOPSY OF TONSILS Right 9/9/2019    Procedure: BIOPSY, TONSILS;  Surgeon: Bruno Umaña MD;  Location: [facility] OR [address] [place] [address];  Service: ENT;  Laterality: Right;    SURGICAL REMOVAL OF PITUITARY TUMOR BY  TRANSSPHENOIDAL APPROACH  2015    Pituitary Adeonma via Dr. Blair 2015    SURGICAL REMOVAL OF PITUITARY TUMOR BY TRANSSPHENOIDAL APPROACH N/A 4/19/2023    Procedure: RESECTION, NEOPLASM, PITUITARY, TRANSSPHENOIDAL APPROACH;  Surgeon: Derek Blair MD;  Location: [facility] OR [address] [place] [address];  Service: Neurosurgery;  Laterality: N/A;  TRANSPHENOIDAL RESECTION PITUITARY ADENOMA/ 2 UNITS RBC, TEDS & SCD, FLOURO, TRANSPHENOIDAL SET, MICROSCOPE, MIRTHA CO SURGEON.    WISDOM TOOTH EXTRACTION           Family History   Family History   Problem Relation Age of Onset    Diabetes Maternal Grandmother     Diabetes Maternal Grandfather     Dementia Paternal Grandmother     Cancer Paternal Grandfather     Prostate cancer Paternal Grandfather     Hyperlipidemia Father     Benign prostatic hyperplasia Father     Cancer Father     Prostate cancer Paternal Aunt     Prostate cancer Paternal Uncle     No Known Problems Mother     No Known Problems Sister     No Known Problems Brother     No Known Problems Brother     Coronary artery disease Neg Hx            Social History   .  Social History     Socioeconomic History    Marital status:    Tobacco Use    Smoking status: Never    Smokeless tobacco: Never   Substance and Sexual Activity    Alcohol use: No    Drug use: No    Sexual activity: Yes     Partners: Female     Social Determinants of Health     Financial Resource Strain: Low Risk  (4/20/2023)    Overall Financial Resource Strain (CARDIA)     Difficulty of Paying Living Expenses: Not hard at all   Food Insecurity: No Food Insecurity (4/20/2023)    Hunger Vital Sign     Worried About Running Out of Food in the Last Year: Never true     Ran Out of Food in the Last Year: Never true   Transportation Needs: No Transportation Needs (4/20/2023)    PRAPARE - Transportation     Lack of Transportation (Medical): No     Lack of Transportation (Non-Medical): No   Physical Activity: Sufficiently Active (4/20/2023)    Exercise Vital Sign     Days  of Exercise per Week: 3 days     Minutes of Exercise per Session: 70 min   Stress: No Stress Concern Present (4/20/2023)    [employer] [place] [employer] - Occupational Stress Questionnaire     Feeling of Stress : Not at all   Social Connections: Moderately Integrated (4/20/2023)    Social Connection and Isolation Panel [NHANES]     Frequency of Communication with Friends and Family: More than three times a week     Attends Buddhist Services: More than 4 times per year     Active Member of Clubs or Organizations: No     Attends Club or Organization Meetings: Never     Marital Status:    Housing Stability: Low Risk  (4/20/2023)    Housing Stability Vital Sign     Unable to Pay for Housing in the Last Year: No     Number of Places Lived in the Last Year: 1     Unstable Housing in the Last Year: No         Allergies   Review of patient's allergies indicates:  No Known Allergies        Physical Exam     Vitals:    09/28/23 1134   BP: 129/87   Pulse: 64         Body mass index is 33.48 kg/m².      General: AOx3, NAD   Respiratory:  Symmetric chest rise, normal effort  Nose: No gross nasal septal deviation. Inferior Turbinates WNL bilaterally. No septal perforation. No masses/lesions.   Neck: No scars.  No cervical lymphadenopathy, thyromegaly or thyroid nodules.  Normal range of motion.    Face: House Brackmann I bilaterally.     Nasal endoscopy:  Flexible laryngoscope utilized to visualize the bilateral nasal cavities.  There is a large posterior septal perforation with mild crusting posteriorly and inferiorly.  There was no other evidence of infection.    Assessment/Plan  Problem List Items Addressed This Visit          ENT    Chronic rhinitis - Primary     Status post sub labial/transseptal approach to the sphenoid pituitary adenoma resection.  He has improved clinically.  Will treat with mupirocin irrigations.

## 2023-09-28 NOTE — ASSESSMENT & PLAN NOTE
Status post sub labial/transseptal approach to the sphenoid pituitary adenoma resection.  He has improved clinically.  Will treat with mupirocin irrigations.

## 2023-09-29 ENCOUNTER — PATIENT MESSAGE (OUTPATIENT)
Dept: OTOLARYNGOLOGY | Facility: CLINIC | Age: 43
End: 2023-09-29
Payer: COMMERCIAL

## 2023-11-16 ENCOUNTER — OFFICE VISIT (OUTPATIENT)
Dept: NEUROSURGERY | Facility: CLINIC | Age: 43
End: 2023-11-16
Payer: OTHER GOVERNMENT

## 2023-11-16 ENCOUNTER — HOSPITAL ENCOUNTER (OUTPATIENT)
Dept: RADIOLOGY | Facility: HOSPITAL | Age: 43
Discharge: HOME | End: 2023-11-16
Attending: NEUROLOGICAL SURGERY
Payer: OTHER GOVERNMENT

## 2023-11-16 VITALS
HEART RATE: 90 BPM | WEIGHT: 267 LBS | HEIGHT: 75 IN | DIASTOLIC BLOOD PRESSURE: 86 MMHG | SYSTOLIC BLOOD PRESSURE: 142 MMHG | BODY MASS INDEX: 33.2 KG/M2

## 2023-11-16 DIAGNOSIS — D35.2 PITUITARY ADENOMA: Primary | ICD-10-CM

## 2023-11-16 DIAGNOSIS — D35.2 PITUITARY ADENOMA: ICD-10-CM

## 2023-11-16 PROCEDURE — 99213 PR OFFICE/OUTPT VISIT, EST, LEVL III, 20-29 MIN: ICD-10-PCS | Mod: S$PBB,,, | Performed by: NEUROLOGICAL SURGERY

## 2023-11-16 PROCEDURE — 99999 PR PBB SHADOW E&M-EST. PATIENT-LVL IV: ICD-10-PCS | Mod: PBBFAC,,, | Performed by: NEUROLOGICAL SURGERY

## 2023-11-16 PROCEDURE — A9585 GADOBUTROL INJECTION: HCPCS | Performed by: NEUROLOGICAL SURGERY

## 2023-11-16 PROCEDURE — 70553 MRI BRAIN STEM W/O & W/DYE: CPT | Mod: 26,,, | Performed by: RADIOLOGY

## 2023-11-16 PROCEDURE — 25500020 PHARM REV CODE 255: Performed by: NEUROLOGICAL SURGERY

## 2023-11-16 PROCEDURE — 70553 MRI BRAIN W WO CONTRAST: ICD-10-PCS | Mod: 26,,, | Performed by: RADIOLOGY

## 2023-11-16 PROCEDURE — 70553 MRI BRAIN STEM W/O & W/DYE: CPT | Mod: TC

## 2023-11-16 PROCEDURE — 99999 PR PBB SHADOW E&M-EST. PATIENT-LVL IV: CPT | Mod: PBBFAC,,, | Performed by: NEUROLOGICAL SURGERY

## 2023-11-16 PROCEDURE — 99213 OFFICE O/P EST LOW 20 MIN: CPT | Mod: S$PBB,,, | Performed by: NEUROLOGICAL SURGERY

## 2023-11-16 RX ORDER — DOXEPIN 3 MG/1
TABLET, FILM COATED ORAL
COMMUNITY

## 2023-11-16 RX ORDER — FLUOXETINE HYDROCHLORIDE 20 MG/1
60 CAPSULE ORAL EVERY MORNING
COMMUNITY
Start: 2023-10-25

## 2023-11-16 RX ORDER — MECLIZINE HYDROCHLORIDE 25 MG/1
25 TABLET ORAL
COMMUNITY
Start: 2023-08-23

## 2023-11-16 RX ORDER — TOPIRAMATE 50 MG/1
25 TABLET, FILM COATED ORAL
COMMUNITY
Start: 2023-10-13

## 2023-11-16 RX ORDER — GADOBUTROL 604.72 MG/ML
5 INJECTION INTRAVENOUS
Status: COMPLETED | OUTPATIENT
Start: 2023-11-16 | End: 2023-11-16

## 2023-11-16 RX ADMIN — GADOBUTROL 5 ML: 604.72 INJECTION INTRAVENOUS at 11:11

## 2023-11-16 NOTE — PROGRESS NOTES
Gallo Maria returned in neurosurgical follow-up to the office today.  He is generally been getting along well.  He does have some frontal bitemporal headaches and has had some nasal congestion making it more difficult for him to sleep at night.  He is seen Dr. Powell in ENT follow-up for this.  He feels his eyesight is stable.  He has had no increased frequency in urination.  His pituitary lab work on his last visit 3 months ago was all normal.    On brief examination today he is alert and cooperative.  Extraocular movements are good and pupils equal.  He is speaking clearly.  He shows no specific neurological deficit.    MRI of the brain was repeated at [facility] this morning.  The optic chiasm remains well decompressed, the pituitary stalk is somewhat deviated to the left.  The residual pituitary tumor and gland in the sella is unchanged from his last scan.    We will plan to follow-up with MRI in about 6 months to continue to monitor the pituitary adenoma.

## 2023-11-17 ENCOUNTER — PATIENT MESSAGE (OUTPATIENT)
Dept: NEUROSURGERY | Facility: CLINIC | Age: 43
End: 2023-11-17
Payer: COMMERCIAL

## 2024-05-16 ENCOUNTER — OFFICE VISIT (OUTPATIENT)
Dept: ENDOCRINOLOGY | Facility: CLINIC | Age: 44
End: 2024-05-16
Payer: COMMERCIAL

## 2024-05-16 VITALS
BODY MASS INDEX: 33.69 KG/M2 | OXYGEN SATURATION: 98 % | HEIGHT: 75 IN | DIASTOLIC BLOOD PRESSURE: 80 MMHG | SYSTOLIC BLOOD PRESSURE: 120 MMHG | HEART RATE: 69 BPM | TEMPERATURE: 98 F | WEIGHT: 270.94 LBS

## 2024-05-16 DIAGNOSIS — E29.1 HYPOGONADISM MALE: Primary | ICD-10-CM

## 2024-05-16 DIAGNOSIS — E22.1 HYPERPROLACTINEMIA: ICD-10-CM

## 2024-05-16 DIAGNOSIS — Z87.898 HISTORY OF PITUITARY TUMOR: ICD-10-CM

## 2024-05-16 DIAGNOSIS — E66.9 OBESITY (BMI 30-39.9): ICD-10-CM

## 2024-05-16 DIAGNOSIS — R73.03 PREDIABETES: ICD-10-CM

## 2024-05-16 PROCEDURE — 99999 PR PBB SHADOW E&M-EST. PATIENT-LVL III: CPT | Mod: PBBFAC,,, | Performed by: PHYSICIAN ASSISTANT

## 2024-05-16 PROCEDURE — 99213 OFFICE O/P EST LOW 20 MIN: CPT | Mod: S$GLB,,, | Performed by: PHYSICIAN ASSISTANT

## 2024-05-16 NOTE — PROGRESS NOTES
Chief Complaint:   HPI: Gallo Maria Jr. is a 43 y.o. male who is here for a follow-up evaluation for pituitary adenoma.     Hx pituitary macroedenoma   - s/p surgery 9/25/2015 (1.7x0.8 cm in size), path with null cell tumor. Dx on imaging for headaches, visual field problems.     Last MRI 5/2019:   1.6x1.7x1.5 cm hypoenhancing lesion in the right aspect of the sella w/o suprasellar extension. Subtle mass effecct on neurovascular structures. Last labs were normal when checked by neurosurgery.     Today, pt reports feeling okay overall. Decreased libido. Denies ED. Decreased AM erections.   + headaches, fatigue.  No breast tenderness or galactorrhea. Had visual fields checked.4/24 was okay per patient. No blurry vision. No issues w/ bp. No muscle weakness, salt craving, abdominal pain, constipation/diarrhea, hair loss.     [facility]   Wt Readings from Last 10 Encounters:   05/16/24 122.9 kg (270 lb 15.1 oz)   11/16/23 121.1 kg (267 lb)   09/28/23 121.5 kg (267 lb 13.7 oz)   08/10/23 122.5 kg (270 lb)   06/15/23 122.5 kg (270 lb 1 oz)   05/18/23 115 kg (253 lb 8.5 oz)   05/16/23 115.1 kg (253 lb 12 oz)   05/08/23 112 kg (247 lb)   05/02/23 112.4 kg (247 lb 12.8 oz)   04/19/23 113.9 kg (251 lb)      Reviewed past medical, family, social history and updated as appropriate.    Review of Systems   Constitutional:  Negative for unexpected weight change.   HENT:  Negative for trouble swallowing.    Eyes:  Negative for visual disturbance.   Respiratory:  Negative for shortness of breath.    Cardiovascular:  Negative for palpitations.   Gastrointestinal:  Negative for constipation and diarrhea.   Endocrine: Negative for polydipsia and polyuria.   Genitourinary:  Negative for frequency.        +ED   Musculoskeletal:  Negative for back pain.   Neurological:  Negative for headaches.     Objective:     Vitals:    05/16/24 1128   BP: 120/80   Pulse: 69   Temp: 98 °F (36.7 °C)     BP Readings from  Last 5 Encounters:   05/16/24 120/80   11/16/23 (!) 142/86   09/28/23 129/87   08/10/23 129/85   06/15/23 128/86     Physical Exam    Wt Readings from Last 10 Encounters:   05/16/24 1128 122.9 kg (270 lb 15.1 oz)   11/16/23 1239 121.1 kg (267 lb)   09/28/23 1134 121.5 kg (267 lb 13.7 oz)   08/10/23 1150 122.5 kg (270 lb)   06/15/23 1451 122.5 kg (270 lb 1 oz)   05/18/23 1042 115 kg (253 lb 8.5 oz)   05/16/23 0821 115.1 kg (253 lb 12 oz)   05/08/23 1113 112 kg (247 lb)   05/02/23 0959 112.4 kg (247 lb 12.8 oz)   04/19/23 0907 113.9 kg (251 lb)       Lab Results   Component Value Date    HGBA1C 5.4 05/16/2023     Lab Results   Component Value Date    CHOL 215 (H) 05/16/2023    HDL 58 05/16/2023    LDLCALC 142.2 05/16/2023    TRIG 74 05/16/2023    CHOLHDL 27.0 05/16/2023     Lab Results   Component Value Date     05/16/2023    K 4.3 05/16/2023     05/16/2023    CO2 28 05/16/2023    GLU 97 05/16/2023    BUN 11 05/16/2023    CREATININE 0.9 05/16/2023    CALCIUM 9.7 05/16/2023    PROT 8.0 05/16/2023    ALBUMIN 4.5 05/19/2023    ALBUMIN 4.5 05/19/2023    BILITOT 0.3 05/16/2023    ALKPHOS 54 (L) 05/16/2023    AST 34 05/16/2023    ALT 47 (H) 05/16/2023    ANIONGAP 10 05/16/2023    ESTGFRAFRICA >60 04/14/2022    EGFRNONAA >60 04/14/2022    TSH 1.365 08/10/2023      Lab Results   Component Value Date    MICALBCREAT 7.8 04/14/2022       Assessment/Plan:     Pituitary adenoma  Pit macroadenoma   - last MRI 9/2019   - s/p surgery 2015, not hormonally active based on pathology at that time   - IGF, TSH, free T4 normal   - prolactin high   - testosterone low. LH, FSH inappropriately normal.   - Sodium normal, no DI symptoms   - has repeat MRI ordered, recommend pt f/u with neurosurgery for repeat MRI after about 6 months (around March 2020) to ensure stability   - already up to date on visual fields, normal per patient 3/24   - continue to monitor for new/concerning symptoms   -okay to f/u w/ neurosurgery    Secondary  male hypogonadism  Testosterone low   - LH, FSH normal   - suggestive of secondary hypongadism, 2/2 pituitary tumor   - recheck 8am fasting testosterone to confirm along with PSA, CBC to ensure safe to supplement   - pt not interested in additional kids. Discussed decreased spermatogenesis with testosterone supplementation   - [facility]      Hyperprolactinemia  Prolactin mildly elevated. Stable on last check   - wasn't elevated before initial surgery   - discussed potential for this now becoming a prolactinoma vs more likely stalk effect (tumor pressing on stalk releasing extra proalctin)   - recheck, macroprolactin. Consider cabergoline if still elevated    Prediabetes  Last A1C still in prediabetes range   - IGF normal, no evidence for cushings   - monitor for now    Obesity (BMI 30-39.9)  bmi 32.2   - with pre-diabetes   - encourage pt to exercise as tolerated. Maintain healthy eating habits   - monitor weight    Hx of Pituitary tumor  -see above      FOLLOWUP  Psa, test, cbc, cmp, igf-1, macroprolactin, A1c, tfts, acth, cortisol  F/u prn

## 2024-06-06 ENCOUNTER — OFFICE VISIT (OUTPATIENT)
Dept: GASTROENTEROLOGY | Facility: CLINIC | Age: 44
End: 2024-06-06
Payer: COMMERCIAL

## 2024-06-06 VITALS — WEIGHT: 272.69 LBS | BODY MASS INDEX: 35 KG/M2 | HEIGHT: 74 IN

## 2024-06-06 DIAGNOSIS — R10.30 LOWER ABDOMINAL PAIN: Primary | ICD-10-CM

## 2024-06-06 PROBLEM — E79.0 HYPERURICEMIA: Status: RESOLVED | Noted: 2021-09-15 | Resolved: 2024-06-06

## 2024-06-06 PROBLEM — R07.89 CHEST TIGHTNESS: Status: RESOLVED | Noted: 2019-05-28 | Resolved: 2024-06-06

## 2024-06-06 PROBLEM — J35.8 TONSILLAR MASS: Status: RESOLVED | Noted: 2019-09-09 | Resolved: 2024-06-06

## 2024-06-06 PROBLEM — R06.02 SOB (SHORTNESS OF BREATH): Status: RESOLVED | Noted: 2019-05-28 | Resolved: 2024-06-06

## 2024-06-06 PROCEDURE — 99204 OFFICE O/P NEW MOD 45 MIN: CPT | Mod: S$GLB,,, | Performed by: NURSE PRACTITIONER

## 2024-06-06 PROCEDURE — 99999 PR PBB SHADOW E&M-EST. PATIENT-LVL III: CPT | Mod: PBBFAC,,, | Performed by: NURSE PRACTITIONER

## 2024-06-06 RX ORDER — DICYCLOMINE HYDROCHLORIDE 20 MG/1
20 TABLET ORAL 3 TIMES DAILY PRN
Qty: 60 TABLET | Refills: 2 | Status: SHIPPED | OUTPATIENT
Start: 2024-06-06

## 2024-06-06 RX ORDER — MULTIVITAMIN
1 TABLET ORAL DAILY
COMMUNITY

## 2024-06-06 NOTE — PROGRESS NOTES
Subjective:       Patient ID: Gallo Maria Jr. is a 43 y.o. male.    Chief Complaint: Abdominal Pain    44 y/o male with hx of pituitary tumor s/p resection 2015 presents to clinic with c/o lower abdominal pain. Pain started 2 weeks ago after resuming exercise routine with strength training and lifting weights.     Abdominal Pain  This is a new problem. Episode onset: 2 weeks ago. The problem occurs intermittently. Pain location: lower abdomen. The pain is at a severity of 5/10. The quality of the pain is aching and cramping. The abdominal pain radiates to the pelvis. Pertinent negatives include no anorexia, arthralgias, belching, constipation, diarrhea, fever, flatus, headaches, melena, nausea, vomiting or weight loss. The pain is aggravated by certain positions and palpation. The pain is relieved by Nothing. He has tried acetaminophen for the symptoms. The treatment provided mild relief. There is no history of abdominal surgery or gallstones. Patient's medical history does not include kidney stones.       Past Medical History:   Diagnosis Date    ADD (attention deficit disorder)     treated by outside psychiatrist.    Asthma     BPH with urinary obstruction 2/5/2015    Decreased libido 8/31/2015    Erectile dysfunction 10/27/2015    History of migraine headaches     Hypogonadism male 10/27/2015    Pituitary adenoma 8/9/2012       Past Surgical History:   Procedure Laterality Date    BIOPSY OF TONSILS Right 9/9/2019    Procedure: BIOPSY, TONSILS;  Surgeon: Bruno Umaña MD;  Location: [facility] OR [address] [place] [address];  Service: ENT;  Laterality: Right;    SURGICAL REMOVAL OF PITUITARY TUMOR BY TRANSSPHENOIDAL APPROACH  2015    Pituitary Adeonma via Dr. Blair 2015    SURGICAL REMOVAL OF PITUITARY TUMOR BY TRANSSPHENOIDAL APPROACH N/A 4/19/2023    Procedure: RESECTION, NEOPLASM, PITUITARY, TRANSSPHENOIDAL APPROACH;  Surgeon: Derek Blair MD;  Location: [facility] OR [address] [place] [address];  Service: Neurosurgery;  Laterality: N/A;   TRANSPHENOIDAL RESECTION PITUITARY ADENOMA/ 2 UNITS RBC, TEDS & SCD, FLOURO, TRANSPHENOIDAL SET, MICROSCOPE, MIRTHA CO SURGEON.    WISDOM TOOTH EXTRACTION         Family History   Problem Relation Name Age of Onset    No Known Problems Mother      Hyperlipidemia Father      Benign prostatic hyperplasia Father      Cancer Father      No Known Problems Sister      No Known Problems Brother      No Known Problems Brother      Prostate cancer Paternal Uncle      Diabetes Maternal Grandmother      Diabetes Maternal Grandfather      Dementia Paternal Grandmother      Cancer Paternal Grandfather      Prostate cancer Paternal Grandfather      Coronary artery disease Neg Hx         Social History     Socioeconomic History    Marital status:    Tobacco Use    Smoking status: Never    Smokeless tobacco: Never   Substance and Sexual Activity    Alcohol use: No    Drug use: No    Sexual activity: Yes     Partners: Female     Social Determinants of Health     Financial Resource Strain: Low Risk  (4/20/2023)    Overall Financial Resource Strain (CARDIA)     Difficulty of Paying Living Expenses: Not hard at all   Food Insecurity: No Food Insecurity (4/20/2023)    Hunger Vital Sign     Worried About Running Out of Food in the Last Year: Never true     Ran Out of Food in the Last Year: Never true   Transportation Needs: No Transportation Needs (4/20/2023)    PRAPARE - Transportation     Lack of Transportation (Medical): No     Lack of Transportation (Non-Medical): No   Physical Activity: Sufficiently Active (4/20/2023)    Exercise Vital Sign     Days of Exercise per Week: 3 days     Minutes of Exercise per Session: 70 min   Stress: No Stress Concern Present (4/20/2023)    [employer] - Occupational Stress Questionnaire     Feeling of Stress : Not at all   Housing Stability: Low Risk  (4/20/2023)    Housing Stability Vital Sign     Unable to Pay for Housing in the Last Year: No     Number of Places  "Lived in the Last Year: 1     Unstable Housing in the Last Year: No       Review of Systems   Constitutional:  Negative for appetite change, fever, unexpected weight change and weight loss.   Respiratory:  Negative for shortness of breath.    Cardiovascular:  Negative for chest pain.   Gastrointestinal:  Positive for abdominal pain. Negative for anorexia, constipation, diarrhea, flatus, melena, nausea and vomiting.   Musculoskeletal:  Negative for arthralgias.   Neurological:  Negative for headaches.   Psychiatric/Behavioral:  Negative for dysphoric mood.          Objective:     Vitals:    06/06/24 0835   Weight: 123.7 kg (272 lb 11.3 oz)   Height: 6' 2" (1.88 m)          Physical Exam  Constitutional:       General: He is not in acute distress.     Appearance: Normal appearance.   HENT:      Head: Normocephalic.   Eyes:      Conjunctiva/sclera: Conjunctivae normal.   Pulmonary:      Effort: Pulmonary effort is normal. No respiratory distress.   Abdominal:      General: Bowel sounds are normal. There is no distension.      Palpations: Abdomen is soft.      Tenderness: There is abdominal tenderness in the right lower quadrant and left lower quadrant.   Musculoskeletal:         General: Normal range of motion.      Cervical back: Normal range of motion.   Skin:     General: Skin is warm and dry.   Neurological:      Mental Status: He is alert. He is disoriented.   Psychiatric:         Mood and Affect: Mood normal.         Behavior: Behavior normal.               Assessment:         ICD-10-CM ICD-9-CM   1. Lower abdominal pain  R10.30 789.09       Plan:       Lower abdominal pain  -     dicyclomine (BENTYL) 20 mg tablet; Take 1 tablet (20 mg total) by mouth 3 (three) times daily as needed (abdominal pain).  Dispense: 60 tablet; Refill: 2  -     X-Ray Abdomen Flat And Erect; Future; Expected date: 06/06/2024  -      Pain likely related to muscle strain.  Avoid strenuous activity, no heavy lifting. May use cold " compresses intermittently for pain. Tylenol or ibuprofen as needed for pain.        Follow up in about 2 weeks (around 6/20/2024) for If symptoms worsen or fail to improve.     Patient's Medications   New Prescriptions    DICYCLOMINE (BENTYL) 20 MG TABLET    Take 1 tablet (20 mg total) by mouth 3 (three) times daily as needed (abdominal pain).   Previous Medications    MULTIVITAMIN (ONE DAILY MULTIVITAMIN) PER TABLET    Take 1 tablet by mouth once daily.    PSYLLIUM HUSK ORAL    Take by mouth once daily.   Modified Medications    No medications on file   Discontinued Medications    DOXEPIN (SILENOR) 3 MG TAB    Take by mouth.    ERGOCALCIFEROL (ERGOCALCIFEROL) 50,000 UNIT CAP    Take 50,000 Units by mouth every 7 days.    FLUOXETINE 20 MG CAPSULE    Take 60 mg by mouth every morning.    MECLIZINE (ANTIVERT) 25 MG TABLET    25 mg.    TESTOSTERONE CYPIONATE (DEPOTESTOTERONE CYPIONATE) 200 MG/ML INJECTION    Inject 1.5 mLs (300 mg total) into the muscle every 14 (fourteen) days. Single use vials, discard remainder after each use.    TOPIRAMATE (TOPAMAX) 50 MG TABLET    25 mg.

## 2024-06-24 ENCOUNTER — LAB VISIT (OUTPATIENT)
Dept: LAB | Facility: HOSPITAL | Age: 44
End: 2024-06-24
Attending: UROLOGY
Payer: COMMERCIAL

## 2024-06-24 ENCOUNTER — OFFICE VISIT (OUTPATIENT)
Dept: UROLOGY | Facility: CLINIC | Age: 44
End: 2024-06-24
Payer: COMMERCIAL

## 2024-06-24 VITALS
HEART RATE: 65 BPM | WEIGHT: 271.19 LBS | SYSTOLIC BLOOD PRESSURE: 120 MMHG | BODY MASS INDEX: 34.8 KG/M2 | HEIGHT: 74 IN | DIASTOLIC BLOOD PRESSURE: 85 MMHG

## 2024-06-24 DIAGNOSIS — N40.1 BPH WITH URINARY OBSTRUCTION: ICD-10-CM

## 2024-06-24 DIAGNOSIS — R10.2 PELVIC PAIN: Primary | ICD-10-CM

## 2024-06-24 DIAGNOSIS — N13.8 BPH WITH URINARY OBSTRUCTION: ICD-10-CM

## 2024-06-24 LAB — COMPLEXED PSA SERPL-MCNC: 1.5 NG/ML (ref 0–4)

## 2024-06-24 PROCEDURE — 99999 PR PBB SHADOW E&M-EST. PATIENT-LVL III: CPT | Mod: PBBFAC,,, | Performed by: UROLOGY

## 2024-06-24 PROCEDURE — 36415 COLL VENOUS BLD VENIPUNCTURE: CPT | Performed by: UROLOGY

## 2024-06-24 PROCEDURE — 99204 OFFICE O/P NEW MOD 45 MIN: CPT | Mod: S$GLB,,, | Performed by: UROLOGY

## 2024-06-24 PROCEDURE — 84153 ASSAY OF PSA TOTAL: CPT | Performed by: UROLOGY

## 2024-06-24 RX ORDER — DOXEPIN 3 MG/1
3 TABLET, FILM COATED ORAL
COMMUNITY
Start: 2024-06-04

## 2024-06-24 RX ORDER — OXAPROZIN 600 MG/1
1200 TABLET, FILM COATED ORAL DAILY
Qty: 60 TABLET | Refills: 0 | Status: SHIPPED | OUTPATIENT
Start: 2024-06-24 | End: 2024-07-24

## 2024-06-24 RX ORDER — FLUOXETINE HYDROCHLORIDE 20 MG/1
40 CAPSULE ORAL
COMMUNITY
Start: 2024-06-04

## 2024-06-24 NOTE — PROGRESS NOTES
CHIEF COMPLAINT:    Mr. Maria is a 43 y.o. male presenting with pelvic pain.    PRESENTING ILLNESS:    Gallo Maria Jr. is a 43 y.o. male who c/o orchalgia.  Pain is intermittent. He has noticed pain since starting to exercise more frequently in the last month. Denies N/V, hematuria, dysuria. Denies history of kidney stones.     He has LUTS. Nocturia x 1. He denies feeling of incomplete emptying. Good FOS. He is currently pleased with how he voids.       REVIEW OF SYSTEMS:    Gallo Maria Jr. denies headache, blurred vision, fever, nausea, vomiting, chills, abdominal pain, chest pain, sore throat, bleeding per rectum, cough, SOB, recent loss of consciousness, recent mental status changes, seizures, dizziness, or upper or lower extremity weakness.    MILE  1. 3  2. 4  3. 5  4. 4  5. 5      PATIENT HISTORY:    Past Medical History:   Diagnosis Date    ADD (attention deficit disorder)     treated by outside psychiatrist.    Asthma     BPH with urinary obstruction 02/05/2015    Decreased libido 08/31/2015    Depression     Erectile dysfunction 10/27/2015    Family history of prostate cancer 07/31/2013    History of migraine headaches     History of pituitary tumor 09/25/2015    S/p removal 2016      Hypogonadism male 10/27/2015    Insomnia     Pituitary adenoma 08/09/2012       Past Surgical History:   Procedure Laterality Date    BIOPSY OF TONSILS Right 9/9/2019    Procedure: BIOPSY, TONSILS;  Surgeon: Bruno Umaña MD;  Location: [facility] OR [address] [place] [address];  Service: ENT;  Laterality: Right;    SURGICAL REMOVAL OF PITUITARY TUMOR BY TRANSSPHENOIDAL APPROACH  2015    Pituitary Adeonma via Dr. Blair 2015    SURGICAL REMOVAL OF PITUITARY TUMOR BY TRANSSPHENOIDAL APPROACH N/A 4/19/2023    Procedure: RESECTION, NEOPLASM, PITUITARY, TRANSSPHENOIDAL APPROACH;  Surgeon: Derek Blair MD;  Location: [facility] OR [address] [place] [address];  Service: Neurosurgery;  Laterality: N/A;  TRANSPHENOIDAL RESECTION PITUITARY ADENOMA/ 2 UNITS RBC, TEDS  & SCD, FLOURO, TRANSPHENOIDAL SET, MICROSCOPE, MIRTHA CO SURGEON.    WISDOM TOOTH EXTRACTION         Family History   Problem Relation Name Age of Onset    No Known Problems Mother      Hyperlipidemia Father      Benign prostatic hyperplasia Father      Cancer Father      No Known Problems Sister      No Known Problems Brother      No Known Problems Brother      Prostate cancer Paternal Uncle      Diabetes Maternal Grandmother      Diabetes Maternal Grandfather      Dementia Paternal Grandmother      Cancer Paternal Grandfather      Prostate cancer Paternal Grandfather      Coronary artery disease Neg Hx         Social History     Socioeconomic History    Marital status:    Tobacco Use    Smoking status: Never    Smokeless tobacco: Never   Substance and Sexual Activity    Alcohol use: No    Drug use: No    Sexual activity: Yes     Partners: Female     Social Determinants of Health     Financial Resource Strain: Low Risk  (4/20/2023)    Overall Financial Resource Strain (CARDIA)     Difficulty of Paying Living Expenses: Not hard at all   Food Insecurity: No Food Insecurity (4/20/2023)    Hunger Vital Sign     Worried About Running Out of Food in the Last Year: Never true     Ran Out of Food in the Last Year: Never true   Transportation Needs: No Transportation Needs (4/20/2023)    PRAPARE - Transportation     Lack of Transportation (Medical): No     Lack of Transportation (Non-Medical): No   Physical Activity: Sufficiently Active (4/20/2023)    Exercise Vital Sign     Days of Exercise per Week: 3 days     Minutes of Exercise per Session: 70 min   Stress: No Stress Concern Present (4/20/2023)    [employer] - Occupational Stress Questionnaire     Feeling of Stress : Not at all   Housing Stability: Low Risk  (4/20/2023)    Housing Stability Vital Sign     Unable to Pay for Housing in the Last Year: No     Number of Places Lived in the Last Year: 1     Unstable Housing in the Last  Year: No       Allergies:  Patient has no known allergies.    Medications:    Current Outpatient Medications:     doxepin (SILENOR) 3 mg Tab, Take 3 mg by mouth as needed., Disp: , Rfl:     FLUoxetine 20 MG capsule, Take 40 mg by mouth as needed., Disp: , Rfl:     multivitamin (ONE DAILY MULTIVITAMIN) per tablet, Take 1 tablet by mouth once daily., Disp: , Rfl:     PSYLLIUM HUSK ORAL, Take by mouth once daily., Disp: , Rfl:     dicyclomine (BENTYL) 20 mg tablet, Take 1 tablet (20 mg total) by mouth 3 (three) times daily as needed (abdominal pain)., Disp: 60 tablet, Rfl: 2    PHYSICAL EXAMINATION:    The patient generally appears in good health, is appropriately interactive, and is in no apparent distress.     Eyes: anicteric sclerae, moist conjunctivae; no lid-lag; PERRLA     HENT: Atraumatic; oropharynx clear with moist mucous membranes and no mucosal ulcerations;normal hard and soft palate.  No evidence of lymphadenopathy.    Neck: Trachea midline.  No thyromegaly.    Skin: No lesions.    Mental: Cooperative with normal affect.  Is oriented to time, place, and person.    Neuro: Grossly intact.    Chest: Normal inspiratory effort.   No accessory muscles.  No audible wheezes.  Respirations symmetric on inspiration and expiration.    Heart: Regular rhythm.      Abdomen:  Soft, non-tender. No masses or organomegaly. Bladder is not palpable. No evidence of flank discomfort. No evidence of inguinal hernia.    Genitourinary: The penis is not circumcised with no evidence of plaques or induration. The urethral meatus is normal. The testes, epididymides, and cord structures are normal in size and contour bilaterally. The scrotum is normal in size and contour.    Normal anal sphincter tone. No rectal mass.    The prostate is 50 g. Normal landmarks. Lateral sulci. Median furrow intact.  No nodularity or induration. Seminal vesicles are normal.    Extremities: No clubbing, cyanosis, or edema      LABS:    Lab Results    Component Value Date    PSA 1.1 02/12/2020    PSA 1.3 05/09/2019    PSA 1.43 08/01/2013    PSADIAG 1.5 02/05/2015    PSATOTAL 2.9 05/19/2023    PSATOTAL 1.9 04/14/2022    PSATOTAL 2.4 09/15/2021    PSAFREE 0.51 05/19/2023    PSAFREE 0.53 04/14/2022    PSAFREE 0.55 09/15/2021    PSAFREEPCT 17.59 05/19/2023    PSAFREEPCT 27.89 04/14/2022    PSAFREEPCT 22.92 09/15/2021       IMPRESSION:    Encounter Diagnoses   Name Primary?    Pelvic pain Yes    BPH with urinary obstruction          PLAN:    1. He's past due for a PSA.  Will draw one today.  Discussed that this should be done by his PCP as part of his annual physical.  2. Discussed the pathophysiology of pelvic floor muscle dysfunction.  Will try daypro for the pain. Side effects discussed.  A new Rx was given.  If this does not alleviate his pain, he may benefit from physical therapy.   3. RTC prn.    Copy to:

## 2024-08-05 ENCOUNTER — PATIENT MESSAGE (OUTPATIENT)
Dept: GASTROENTEROLOGY | Facility: CLINIC | Age: 44
End: 2024-08-05
Payer: COMMERCIAL

## 2024-08-05 DIAGNOSIS — Z12.11 SCREENING FOR MALIGNANT NEOPLASM OF COLON: Primary | ICD-10-CM

## 2024-08-05 RX ORDER — SODIUM PICOSULFATE, MAGNESIUM OXIDE, AND ANHYDROUS CITRIC ACID 12; 3.5; 1 G/175ML; G/175ML; MG/175ML
1 LIQUID ORAL ONCE
Qty: 350 ML | Refills: 0 | Status: SHIPPED | OUTPATIENT
Start: 2024-08-05 | End: 2024-08-07

## 2024-08-20 DIAGNOSIS — Z12.11 SCREENING FOR MALIGNANT NEOPLASM OF COLON: Primary | ICD-10-CM

## 2024-08-20 RX ORDER — SODIUM PICOSULFATE, MAGNESIUM OXIDE, AND ANHYDROUS CITRIC ACID 12; 3.5; 1 G/175ML; G/175ML; MG/175ML
1 LIQUID ORAL ONCE
Qty: 350 ML | Refills: 0 | Status: SHIPPED | OUTPATIENT
Start: 2024-08-20 | End: 2024-08-20

## 2024-08-23 ENCOUNTER — TELEPHONE (OUTPATIENT)
Dept: ENDOSCOPY | Facility: HOSPITAL | Age: 44
End: 2024-08-23
Payer: COMMERCIAL

## 2024-08-23 NOTE — TELEPHONE ENCOUNTER
Spoke to pt for pre-call to confirm scheduled Colonoscopy and patient verbalized understanding of the following:       Date of Procedure (s)  verified 9/3/24  Arrival Time 8:30 AM verified.  Location of Procedure(s) [address]  verified.  NPO status reinforced. Ok to continue clear liquids up until 4 hours prior to the Endoscopy procedure.   Denies blood thinners and GLP-1s.  Pt confirmed receipt of prep instructions and Rx prep (if applicable).  [facility]  Pt confirmed ride home after procedure if procedure requires anesthesia.   Pre-call screening questionnaire reviewed and completed with patient.   Appointment details are tentative, including check-in time.  If the patient begins taking any blood thinning medications, injectable weight loss/diabetes medications (other than insulin), or Adipex (phentermine) patient was instructed to contact the endoscopy scheduling department as soon as possible.  Patient was advised to call the endoscopy scheduling department if any questions or concerns arise.       [facility]oscopy Scheduling Department

## 2024-09-26 ENCOUNTER — OFFICE VISIT (OUTPATIENT)
Dept: NEUROSURGERY | Facility: CLINIC | Age: 44
End: 2024-09-26
Payer: COMMERCIAL

## 2024-09-26 ENCOUNTER — HOSPITAL ENCOUNTER (OUTPATIENT)
Dept: RADIOLOGY | Facility: HOSPITAL | Age: 44
Discharge: HOME | End: 2024-09-26
Attending: NEUROLOGICAL SURGERY
Payer: COMMERCIAL

## 2024-09-26 VITALS
DIASTOLIC BLOOD PRESSURE: 91 MMHG | HEIGHT: 74 IN | SYSTOLIC BLOOD PRESSURE: 131 MMHG | WEIGHT: 268.31 LBS | BODY MASS INDEX: 34.43 KG/M2 | HEART RATE: 94 BPM

## 2024-09-26 DIAGNOSIS — D35.2 PITUITARY ADENOMA: ICD-10-CM

## 2024-09-26 DIAGNOSIS — D35.2 PITUITARY ADENOMA: Primary | ICD-10-CM

## 2024-09-26 PROCEDURE — 25500020 PHARM REV CODE 255: Performed by: NEUROLOGICAL SURGERY

## 2024-09-26 PROCEDURE — A9585 GADOBUTROL INJECTION: HCPCS | Performed by: NEUROLOGICAL SURGERY

## 2024-09-26 PROCEDURE — 70553 MRI BRAIN STEM W/O & W/DYE: CPT | Mod: TC

## 2024-09-26 PROCEDURE — 99213 OFFICE O/P EST LOW 20 MIN: CPT | Mod: S$GLB,,, | Performed by: NEUROLOGICAL SURGERY

## 2024-09-26 PROCEDURE — 99999 PR PBB SHADOW E&M-EST. PATIENT-LVL III: CPT | Mod: PBBFAC,,, | Performed by: NEUROLOGICAL SURGERY

## 2024-09-26 PROCEDURE — 70553 MRI BRAIN STEM W/O & W/DYE: CPT | Mod: 26,,, | Performed by: RADIOLOGY

## 2024-09-26 RX ORDER — OMEPRAZOLE 40 MG/1
40 CAPSULE, DELAYED RELEASE ORAL
COMMUNITY
Start: 2024-04-23

## 2024-09-26 RX ORDER — GADOBUTROL 604.72 MG/ML
5 INJECTION INTRAVENOUS
Status: COMPLETED | OUTPATIENT
Start: 2024-09-26 | End: 2024-09-26

## 2024-09-26 RX ORDER — MECLIZINE HYDROCHLORIDE 25 MG/1
25 TABLET ORAL
COMMUNITY
Start: 2023-11-21

## 2024-09-26 RX ORDER — NAPROXEN SODIUM 500 MG/1
500 TABLET, FILM COATED, EXTENDED RELEASE ORAL
COMMUNITY
Start: 2023-10-13

## 2024-09-26 RX ADMIN — GADOBUTROL 5 ML: 604.72 INJECTION INTRAVENOUS at 01:09

## 2024-09-26 NOTE — PROGRESS NOTES
Gallo Maria was seen in neurosurgical follow-up at the office this afternoon.  He has complained of headaches but has otherwise been neurologically stable.  He feels that his vision is normal and he has had no focal weakness but has had some balance difficulties and has been taking some balance training.    On brief examination today he is alert and cooperative.  Extraocular movements are full and pupils equal.  He sees fingers moving in the peripheral fields bilaterally.  He shows no facial weakness.  He stood and walked without difficulty and seems otherwise neurologically intact.    MRI of the brain and pituitary were repeated at [facility] today and compared to his study of 11/16/2023 post transsphenoidal resection and 04/13/2023 preoperatively.  There was considerable reduction of the tumor following the transsphenoidal resection on 04/19/2023 and the optic nerves appeared free but there has been again some increase in tumor size on the scan done today.  Neuroradiology has not reviewed today's scan.    I believe he will need additional treatment.  I will refer him to tumor board for consideration of radiation therapy or perhaps an endoscopic transnasal resection although I believe it will be difficult to get a complete resection of this tumor which seems to have growth potential.

## 2024-10-08 ENCOUNTER — TUMOR BOARD CONFERENCE (OUTPATIENT)
Dept: NEUROSURGERY | Facility: CLINIC | Age: 44
End: 2024-10-08
Payer: COMMERCIAL

## 2024-10-08 DIAGNOSIS — D35.2 PITUITARY ADENOMA: Primary | ICD-10-CM

## 2024-10-08 NOTE — PROGRESS NOTES
[facility]   NEURO-ONCOLOGICAL MULTIDISCIPLINARY TUMOR BOARD  PATIENT REVIEW FORM  ____________________________________________________________  DIANA Chuy Mendesus, attest that this documentation has been prepared under the direction and in the presence of Summer Lind PA-C.     PATIENT ID: Gallo Maria Jr. is a 44 y.o. male  DATE: 10/08/2024    This patient's relevant clinical data was reviewed before the multidisciplinary tumor board in order to establish an optimum treatment plan in this patient. The patient's clinical data were presented at our Multi-Disciplinary Tumor Board which included representatives of Neurosurgery, Neuro-Radiology, Neuro-Pathology, Radiation Oncology, Medical Oncology, Palliative Medicine, and Endocrinology.     PRESENTER:   Dr. Derek Blair    PATIENT SUMMARY:   The patient is a 44 y.o. male with hx pituitary adenoma resection in 2015 and 2023 with Dr. Blair. MRI 9/26/24 shows some increase in size.  Presenting pt to discuss possible tx.     Additionally, we reviewed previous medical and familial history of present illness, and recent lab results along with all available histopathologic and imaging studies.    IMAGING:   MRI Brain W WO Contrast 9/26/24:  Remote operative change from transsphenoidal with suture adenoma resection.  There is interval increased sized sellar/suprasellar mass lesion most compatible with progression of residual pituitary adenoma.Continue though slightly increased mass effect on the suprasellar neurovascular structures.    PATHOLOGY:  04-19-23: 1. Sellar tumor (excision):  - Features of pituitary adenoma,  Pending outside consultation will be issued in a supplemental report    2. Sellar tumor (excision):  - Pending outside consultation will be issued in a supplemental report    BOARD RECOMMENDATIONS:   The tumor board considered available treatment options and made the following recommendations: This pt should be referred to Dr. PO Blair  (rad onc) to discuss radiation.     National site-specific guidelines were discussed with respect to the case.     Tumor board is a meeting of clinicians from various specialty areas who evaluate and discuss patients for whom a multidisciplinary approach is being considered. Final determinations in the plan of care are those of the provider(s). The responsibility for follow up recommendations given during tumor board is that of the provider.     CONSULT NEEDED:     [] Neurosurgery    [] Hem/Onc    [x] Rad/Onc         [] Endocrinology     [] Neuro-ophthalmology    [] Palliative Medicine      []  Other:       PRESENTATION AT CANCER CONFERENCE:         [] Prospective    [] Retrospective     [x] Follow-Up          [] Eligible for clinical trial/Clinical trial status:

## 2024-10-22 ENCOUNTER — OFFICE VISIT (OUTPATIENT)
Dept: RADIATION ONCOLOGY | Facility: CLINIC | Age: 44
End: 2024-10-22
Payer: COMMERCIAL

## 2024-10-22 VITALS
OXYGEN SATURATION: 95 % | DIASTOLIC BLOOD PRESSURE: 86 MMHG | SYSTOLIC BLOOD PRESSURE: 138 MMHG | HEART RATE: 88 BPM | BODY MASS INDEX: 36.63 KG/M2 | TEMPERATURE: 98 F | RESPIRATION RATE: 16 BRPM | WEIGHT: 285.25 LBS

## 2024-10-22 DIAGNOSIS — D35.2 PITUITARY ADENOMA: ICD-10-CM

## 2024-10-22 PROCEDURE — 99999 PR PBB SHADOW E&M-EST. PATIENT-LVL III: CPT | Mod: PBBFAC,,, | Performed by: RADIOLOGY

## 2024-10-22 PROCEDURE — 99205 OFFICE O/P NEW HI 60 MIN: CPT | Mod: S$GLB,,, | Performed by: RADIOLOGY

## 2024-10-22 NOTE — PROGRESS NOTES
10/22/2024    Radiation Oncology Consultation    Assessment   This is a 44 y.o. male with gonadotroph adenoma s/p subtotal resection by Dr. Derek Blair in 9/2015 and 4/2023. He has been followed with serial imaging since his last surgery. MRI Brain 9/26/24 demonstrated interval increase in size of residual sellar/suprasellar disease to 2.6 cm up from 1.7 cm in 11/2023. He is referred for consideration of radiation therapy.     I discussed the natural history and treatment options available for non-secretory pituitary adenomas. Recurrent tumor displaces and effaces the optic chiasm; not amenable to treatment with SRS/SRT due to this. I recommend treating gross disease to 50.4 Gy in 28 fractions with fractionated stereotactic setup to limit dose to adjacent uninvolved optics and hippocampi. Potential short- and long-term side effects of radiation to the brain including fatigue, HA caused by inflammation, hypopituitarism, and low but non-zero risk of impaired short-term memory of worsening vision were reviewed. At the end of our discussion, he wished to discuss this with his wife and will reach out when he is ready to proceed.        Plan   1) He will reach out when he would like to proceed with radiation. At that time I will obtain MRI Brain stealth w/ contrast and CT Simulation for radiation treatment planning.          CHIEF COMPLAINT: Recurrent pituitary adenoma    HPI/Focused ROS: Mr. Maria is a 45 y/o male with h/o gonadotroph adenoma s/p subtotal resection by Dr. Derek Blair in 9/2015 and 4/2023. He has been followed with serial imaging since his last surgery. MRI Brain 9/26/24 demonstrated interval increase in size of residual sellar/suprasellar disease to 2.6 cm up from 1.7 cm in 11/2023. He is referred for consideration of radiation therapy.     Around the time of his original surgery he was having severe HA; those improved following. He denies visual field deficits or double vision.       Is the patient  female between ages 15-55:  no    Does the patient have a CIED:  no    Prior Radiation History: None      Past Medical History:   Diagnosis Date    ADD (attention deficit disorder)     treated by outside psychiatrist.    Asthma     BPH with urinary obstruction 02/05/2015    Decreased libido 08/31/2015    Depression     Erectile dysfunction 10/27/2015    Family history of prostate cancer 07/31/2013    History of migraine headaches     History of pituitary tumor 09/25/2015    S/p removal 2016      Hypogonadism male 10/27/2015    Insomnia     Pituitary adenoma 08/09/2012       Past Surgical History:   Procedure Laterality Date    BIOPSY OF TONSILS Right 9/9/2019    Procedure: BIOPSY, TONSILS;  Surgeon: Bruno Umaña MD;  Location: [facility] OR [address] [place] [address];  Service: ENT;  Laterality: Right;    COLONOSCOPY N/A 9/3/2024    Procedure: COLONOSCOPY;  Surgeon: Ander Wolfe MD;  Location: [facility] ENDO;  Service: Endoscopy;  Laterality: N/A;    SURGICAL REMOVAL OF PITUITARY TUMOR BY TRANSSPHENOIDAL APPROACH  2015    Pituitary Adeonma via Dr. Blair 2015    SURGICAL REMOVAL OF PITUITARY TUMOR BY TRANSSPHENOIDAL APPROACH N/A 4/19/2023    Procedure: RESECTION, NEOPLASM, PITUITARY, TRANSSPHENOIDAL APPROACH;  Surgeon: Derek Blair MD;  Location: [facility] OR [address] [place] [address];  Service: Neurosurgery;  Laterality: N/A;  TRANSPHENOIDAL RESECTION PITUITARY ADENOMA/ 2 UNITS RBC, TEDS & SCD, FLOURO, TRANSPHENOIDAL SET, MICROSCOPE, [facility] CO SURGEON.    WISDOM TOOTH EXTRACTION         Social History     Tobacco Use    Smoking status: Never    Smokeless tobacco: Never   Substance Use Topics    Alcohol use: No    Drug use: No       Cancer-related family history includes Cancer in his father and paternal grandfather; Prostate cancer in his paternal grandfather and paternal uncle.    Current Outpatient Medications on File Prior to Visit   Medication Sig Dispense Refill    dicyclomine (BENTYL) 20 mg tablet Take 1 tablet (20 mg total) by mouth 3 (three)  times daily as needed (abdominal pain). (Patient not taking: Reported on 9/26/2024) 60 tablet 2    doxepin (SILENOR) 3 mg Tab Take 3 mg by mouth as needed. (Patient not taking: Reported on 9/26/2024)      FLUoxetine 20 MG capsule Take 40 mg by mouth as needed. (Patient not taking: Reported on 9/26/2024)      meclizine (ANTIVERT) 25 mg tablet Take 25 mg by mouth. (Patient not taking: Reported on 9/26/2024)      multivitamin (ONE DAILY MULTIVITAMIN) per tablet Take 1 tablet by mouth once daily.      naproxen sodium 500 mg TM24 Take 500 mg by mouth. (Patient not taking: Reported on 9/26/2024)      omeprazole (PRILOSEC) 40 MG capsule Take 40 mg by mouth. (Patient not taking: Reported on 9/26/2024)      PSYLLIUM HUSK ORAL Take by mouth once daily.      ubrogepant (UBRELVY) 100 mg tablet Take 100 mg by mouth.       No current facility-administered medications on file prior to visit.       Review of patient's allergies indicates:  No Known Allergies      Vital Signs: /86   Pulse 88   Temp 97.6 °F (36.4 °C) (Oral)   Resp 16   Wt 129.4 kg (285 lb 4.4 oz)   SpO2 95%   BMI 36.63 kg/m²     ECOG Performance Status: (0) Fully active, able to carry on all predisease performance without restriction    Physical Exam  Constitutional:       Appearance: Normal appearance.   HENT:      Head: Normocephalic and atraumatic.   Eyes:      General: No scleral icterus.     Extraocular Movements: Extraocular movements intact.   Pulmonary:      Effort: No accessory muscle usage or respiratory distress.   Musculoskeletal:      Cervical back: Normal range of motion.   Neurological:      Mental Status: He is alert and oriented to person, place, and time.   Psychiatric:         Mood and Affect: Mood and affect normal.         Judgment: Judgment normal.          Labs:    Imaging: I have personally reviewed the patient's available images and reports and summarized pertinent findings above in HPI.     Pathology: I have personally reviewed  the patient's available pathology and summarized pertinent findings above in HPI.       - Thank you for allowing me to participate in the care of your patient.    Trev Blair MD, PhD

## 2025-05-08 ENCOUNTER — LAB VISIT (OUTPATIENT)
Dept: LAB | Facility: HOSPITAL | Age: 45
End: 2025-05-08
Attending: NEUROLOGICAL SURGERY
Payer: COMMERCIAL

## 2025-05-08 DIAGNOSIS — D35.3 BENIGN NEOPLASM OF PITUITARY GLAND AND CRANIOPHARYNGEAL DUCT (POUCH): ICD-10-CM

## 2025-05-08 DIAGNOSIS — D35.2 BENIGN NEOPLASM OF PITUITARY GLAND AND CRANIOPHARYNGEAL DUCT (POUCH): ICD-10-CM

## 2025-05-08 LAB
ANION GAP (OHS): 12 MMOL/L (ref 8–16)
BUN SERPL-MCNC: 17 MG/DL (ref 6–20)
CALCIUM SERPL-MCNC: 9.8 MG/DL (ref 8.7–10.5)
CHLORIDE SERPL-SCNC: 109 MMOL/L (ref 95–110)
CO2 SERPL-SCNC: 15 MMOL/L (ref 23–29)
CREAT SERPL-MCNC: 1.1 MG/DL (ref 0.5–1.4)
GFR SERPLBLD CREATININE-BSD FMLA CKD-EPI: >60 ML/MIN/1.73/M2
GLUCOSE SERPL-MCNC: 101 MG/DL (ref 70–110)
POTASSIUM SERPL-SCNC: 3.4 MMOL/L (ref 3.5–5.1)
SODIUM SERPL-SCNC: 136 MMOL/L (ref 136–145)

## 2025-05-08 PROCEDURE — 82310 ASSAY OF CALCIUM: CPT

## 2025-05-08 PROCEDURE — 36415 COLL VENOUS BLD VENIPUNCTURE: CPT

## 2025-05-22 ENCOUNTER — HOSPITAL ENCOUNTER (INPATIENT)
Facility: HOSPITAL | Age: 45
LOS: 12 days | Discharge: HOME | DRG: 477 | End: 2025-06-04
Attending: STUDENT IN AN ORGANIZED HEALTH CARE EDUCATION/TRAINING PROGRAM | Admitting: INTERNAL MEDICINE
Payer: COMMERCIAL

## 2025-05-22 DIAGNOSIS — R07.9 CHEST PAIN: ICD-10-CM

## 2025-05-22 DIAGNOSIS — R52 PAIN: ICD-10-CM

## 2025-05-22 DIAGNOSIS — M79.675 TOE PAIN, LEFT: ICD-10-CM

## 2025-05-22 DIAGNOSIS — R06.02 SHORTNESS OF BREATH: ICD-10-CM

## 2025-05-22 DIAGNOSIS — C41.2: ICD-10-CM

## 2025-05-22 DIAGNOSIS — R19.00 PELVIC MASS: Primary | ICD-10-CM

## 2025-05-22 PROBLEM — E87.1 HYPONATREMIA: Status: ACTIVE | Noted: 2025-05-22

## 2025-05-22 PROBLEM — Z95.828 PRESENCE OF IVC FILTER: Status: ACTIVE | Noted: 2025-05-22

## 2025-05-22 PROBLEM — G96.00 POSTOPERATIVE CSF LEAK: Status: ACTIVE | Noted: 2025-05-22

## 2025-05-22 PROBLEM — G97.82 POSTOPERATIVE CSF LEAK: Status: ACTIVE | Noted: 2025-05-22

## 2025-05-22 PROBLEM — R18.8 PELVIC FLUID COLLECTION: Status: ACTIVE | Noted: 2025-05-22

## 2025-05-22 PROBLEM — D64.9 ANEMIA: Status: ACTIVE | Noted: 2025-05-22

## 2025-05-22 PROBLEM — I82.402 ACUTE DEEP VEIN THROMBOSIS (DVT) OF LEFT LOWER EXTREMITY: Status: ACTIVE | Noted: 2025-05-22

## 2025-05-22 LAB
ABSOLUTE EOSINOPHIL (OHS): 0.01 K/UL
ABSOLUTE EOSINOPHIL (OHS): 0.03 K/UL
ABSOLUTE MONOCYTE (OHS): 0.95 K/UL (ref 0.3–1)
ABSOLUTE MONOCYTE (OHS): 1.03 K/UL (ref 0.3–1)
ABSOLUTE NEUTROPHIL COUNT (OHS): 10 K/UL (ref 1.8–7.7)
ABSOLUTE NEUTROPHIL COUNT (OHS): 12.36 K/UL (ref 1.8–7.7)
ANION GAP (OHS): 11 MMOL/L (ref 8–16)
BASOPHILS # BLD AUTO: 0.03 K/UL
BASOPHILS # BLD AUTO: 0.04 K/UL
BASOPHILS NFR BLD AUTO: 0.2 %
BASOPHILS NFR BLD AUTO: 0.3 %
BIPAP: 0
BNP SERPL-MCNC: <10 PG/ML (ref 0–99)
BUN SERPL-MCNC: 14 MG/DL (ref 6–20)
BUN SERPL-MCNC: 14 MG/DL (ref 6–30)
CALCIUM SERPL-MCNC: 8.8 MG/DL (ref 8.7–10.5)
CHLORIDE SERPL-SCNC: 105 MMOL/L (ref 95–110)
CHLORIDE SERPL-SCNC: 108 MMOL/L (ref 95–110)
CO2 SERPL-SCNC: 15 MMOL/L (ref 23–29)
CREAT SERPL-MCNC: 0.9 MG/DL (ref 0.5–1.4)
CREAT SERPL-MCNC: 1.1 MG/DL (ref 0.5–1.4)
CRP SERPL-MCNC: 75.2 MG/L
ERYTHROCYTE [DISTWIDTH] IN BLOOD BY AUTOMATED COUNT: 14 % (ref 11.5–14.5)
ERYTHROCYTE [DISTWIDTH] IN BLOOD BY AUTOMATED COUNT: 14.2 % (ref 11.5–14.5)
FIO2: 21 %
GFR SERPLBLD CREATININE-BSD FMLA CKD-EPI: >60 ML/MIN/1.73/M2
GLUCOSE SERPL-MCNC: 101 MG/DL (ref 70–110)
GLUCOSE SERPL-MCNC: 107 MG/DL (ref 70–110)
HCT VFR BLD AUTO: 33.6 % (ref 40–54)
HCT VFR BLD AUTO: 35.3 % (ref 40–54)
HCT VFR BLD CALC: 34 %PCV (ref 36–54)
HGB BLD-MCNC: 11.3 GM/DL (ref 14–18)
HGB BLD-MCNC: 11.9 GM/DL (ref 14–18)
IMM GRANULOCYTES # BLD AUTO: 0.05 K/UL (ref 0–0.04)
IMM GRANULOCYTES # BLD AUTO: 0.07 K/UL (ref 0–0.04)
IMM GRANULOCYTES NFR BLD AUTO: 0.4 % (ref 0–0.5)
IMM GRANULOCYTES NFR BLD AUTO: 0.4 % (ref 0–0.5)
LDH SERPL L TO P-CCNC: 1.7 MMOL/L (ref 0.5–2.2)
LYMPHOCYTES # BLD AUTO: 1.28 K/UL (ref 1–4.8)
LYMPHOCYTES # BLD AUTO: 2.15 K/UL (ref 1–4.8)
MAGNESIUM SERPL-MCNC: 1.9 MG/DL (ref 1.6–2.6)
MCH RBC QN AUTO: 26.7 PG (ref 27–31)
MCH RBC QN AUTO: 27 PG (ref 27–31)
MCHC RBC AUTO-ENTMCNC: 33.6 G/DL (ref 32–36)
MCHC RBC AUTO-ENTMCNC: 33.7 G/DL (ref 32–36)
MCV RBC AUTO: 79 FL (ref 82–98)
MCV RBC AUTO: 80 FL (ref 82–98)
NUCLEATED RBC (/100WBC) (OHS): 0 /100 WBC
NUCLEATED RBC (/100WBC) (OHS): 0 /100 WBC
OHS QRS DURATION: 98 MS
OHS QTC CALCULATION: 448 MS
PLATELET # BLD AUTO: 346 K/UL (ref 150–450)
PLATELET # BLD AUTO: 359 K/UL (ref 150–450)
PMV BLD AUTO: 9.2 FL (ref 9.2–12.9)
PMV BLD AUTO: 9.3 FL (ref 9.2–12.9)
POC IONIZED CALCIUM: 1.26 MMOL/L (ref 1.06–1.42)
POC PERFORMED BY: NORMAL
POC TCO2 (MEASURED): 18 MMOL/L (ref 23–29)
POC TEMPERATURE: 37 C
POCT GLUCOSE: 110 MG/DL (ref 70–110)
POTASSIUM BLD-SCNC: 3.5 MMOL/L (ref 3.5–5.1)
POTASSIUM SERPL-SCNC: 3.5 MMOL/L (ref 3.5–5.1)
RBC # BLD AUTO: 4.18 M/UL (ref 4.6–6.2)
RBC # BLD AUTO: 4.45 M/UL (ref 4.6–6.2)
RELATIVE EOSINOPHIL (OHS): 0.1 %
RELATIVE EOSINOPHIL (OHS): 0.2 %
RELATIVE LYMPHOCYTE (OHS): 10.4 % (ref 18–48)
RELATIVE LYMPHOCYTE (OHS): 13.7 % (ref 18–48)
RELATIVE MONOCYTE (OHS): 6.6 % (ref 4–15)
RELATIVE MONOCYTE (OHS): 7.7 % (ref 4–15)
RELATIVE NEUTROPHIL (OHS): 78.9 % (ref 38–73)
RELATIVE NEUTROPHIL (OHS): 81.1 % (ref 38–73)
SAMPLE: ABNORMAL
SODIUM BLD-SCNC: 138 MMOL/L (ref 136–145)
SODIUM SERPL-SCNC: 134 MMOL/L (ref 136–145)
SPECIMEN SOURCE: NORMAL
TROPONIN I SERPL HS-MCNC: <3 NG/L
WBC # BLD AUTO: 12.34 K/UL (ref 3.9–12.7)
WBC # BLD AUTO: 15.66 K/UL (ref 3.9–12.7)

## 2025-05-22 PROCEDURE — 86140 C-REACTIVE PROTEIN: CPT | Performed by: STUDENT IN AN ORGANIZED HEALTH CARE EDUCATION/TRAINING PROGRAM

## 2025-05-22 PROCEDURE — 80048 BASIC METABOLIC PNL TOTAL CA: CPT | Performed by: STUDENT IN AN ORGANIZED HEALTH CARE EDUCATION/TRAINING PROGRAM

## 2025-05-22 PROCEDURE — 96376 TX/PRO/DX INJ SAME DRUG ADON: CPT

## 2025-05-22 PROCEDURE — 99285 EMERGENCY DEPT VISIT HI MDM: CPT | Mod: 25

## 2025-05-22 PROCEDURE — 63600175 PHARM REV CODE 636 W HCPCS

## 2025-05-22 PROCEDURE — 25000003 PHARM REV CODE 250

## 2025-05-22 PROCEDURE — 87040 BLOOD CULTURE FOR BACTERIA: CPT | Mod: 59

## 2025-05-22 PROCEDURE — 83735 ASSAY OF MAGNESIUM: CPT

## 2025-05-22 PROCEDURE — 93005 ELECTROCARDIOGRAM TRACING: CPT

## 2025-05-22 PROCEDURE — 36415 COLL VENOUS BLD VENIPUNCTURE: CPT

## 2025-05-22 PROCEDURE — 85025 COMPLETE CBC W/AUTO DIFF WBC: CPT | Mod: 91

## 2025-05-22 PROCEDURE — G0378 HOSPITAL OBSERVATION PER HR: HCPCS

## 2025-05-22 PROCEDURE — 96375 TX/PRO/DX INJ NEW DRUG ADDON: CPT

## 2025-05-22 PROCEDURE — 25500020 PHARM REV CODE 255: Performed by: STUDENT IN AN ORGANIZED HEALTH CARE EDUCATION/TRAINING PROGRAM

## 2025-05-22 PROCEDURE — 80048 BASIC METABOLIC PNL TOTAL CA: CPT

## 2025-05-22 PROCEDURE — 96365 THER/PROPH/DIAG IV INF INIT: CPT

## 2025-05-22 PROCEDURE — 99900035 HC TECH TIME PER 15 MIN (STAT)

## 2025-05-22 PROCEDURE — 96367 TX/PROPH/DG ADDL SEQ IV INF: CPT

## 2025-05-22 PROCEDURE — 82962 GLUCOSE BLOOD TEST: CPT

## 2025-05-22 PROCEDURE — 85025 COMPLETE CBC W/AUTO DIFF WBC: CPT

## 2025-05-22 PROCEDURE — 83880 ASSAY OF NATRIURETIC PEPTIDE: CPT

## 2025-05-22 PROCEDURE — 63600175 PHARM REV CODE 636 W HCPCS: Performed by: STUDENT IN AN ORGANIZED HEALTH CARE EDUCATION/TRAINING PROGRAM

## 2025-05-22 PROCEDURE — 96366 THER/PROPH/DIAG IV INF ADDON: CPT

## 2025-05-22 PROCEDURE — 83605 ASSAY OF LACTIC ACID: CPT

## 2025-05-22 PROCEDURE — 93010 ELECTROCARDIOGRAM REPORT: CPT | Mod: ,,, | Performed by: INTERNAL MEDICINE

## 2025-05-22 PROCEDURE — 84484 ASSAY OF TROPONIN QUANT: CPT

## 2025-05-22 RX ORDER — OXYCODONE HYDROCHLORIDE 5 MG/1
5 TABLET ORAL EVERY 6 HOURS PRN
Refills: 0 | Status: DISCONTINUED | OUTPATIENT
Start: 2025-05-22 | End: 2025-05-24

## 2025-05-22 RX ORDER — GLUCAGON 1 MG
1 KIT INJECTION
Status: DISCONTINUED | OUTPATIENT
Start: 2025-05-22 | End: 2025-06-04 | Stop reason: HOSPADM

## 2025-05-22 RX ORDER — IBUPROFEN 200 MG
24 TABLET ORAL
Status: DISCONTINUED | OUTPATIENT
Start: 2025-05-22 | End: 2025-06-04 | Stop reason: HOSPADM

## 2025-05-22 RX ORDER — NALOXONE HCL 0.4 MG/ML
0.02 VIAL (ML) INJECTION
Status: DISCONTINUED | OUTPATIENT
Start: 2025-05-22 | End: 2025-06-04 | Stop reason: HOSPADM

## 2025-05-22 RX ORDER — GADOBUTROL 604.72 MG/ML
10 INJECTION INTRAVENOUS
Status: COMPLETED | OUTPATIENT
Start: 2025-05-22 | End: 2025-05-23

## 2025-05-22 RX ORDER — IBUPROFEN 200 MG
16 TABLET ORAL
Status: DISCONTINUED | OUTPATIENT
Start: 2025-05-22 | End: 2025-06-04 | Stop reason: HOSPADM

## 2025-05-22 RX ORDER — VANCOMYCIN 2 GRAM/500 ML IN 0.9 % SODIUM CHLORIDE INTRAVENOUS
2000
Status: COMPLETED | OUTPATIENT
Start: 2025-05-22 | End: 2025-05-22

## 2025-05-22 RX ORDER — ACETAZOLAMIDE 250 MG/1
250 TABLET ORAL 3 TIMES DAILY
Status: ON HOLD | COMMUNITY
Start: 2025-05-16 | End: 2025-06-04 | Stop reason: HOSPADM

## 2025-05-22 RX ORDER — VANCOMYCIN 1.75 GRAM/500 ML IN 0.9 % SODIUM CHLORIDE INTRAVENOUS
1750
Status: DISCONTINUED | OUTPATIENT
Start: 2025-05-23 | End: 2025-05-22

## 2025-05-22 RX ORDER — MORPHINE SULFATE 4 MG/ML
4 INJECTION, SOLUTION INTRAMUSCULAR; INTRAVENOUS
Refills: 0 | Status: COMPLETED | OUTPATIENT
Start: 2025-05-22 | End: 2025-05-22

## 2025-05-22 RX ORDER — SODIUM CHLORIDE 0.9 % (FLUSH) 0.9 %
10 SYRINGE (ML) INJECTION EVERY 12 HOURS PRN
Status: DISCONTINUED | OUTPATIENT
Start: 2025-05-22 | End: 2025-06-04 | Stop reason: HOSPADM

## 2025-05-22 RX ORDER — ATORVASTATIN CALCIUM 20 MG/1
20 TABLET, FILM COATED ORAL DAILY
Status: DISCONTINUED | OUTPATIENT
Start: 2025-05-22 | End: 2025-05-26

## 2025-05-22 RX ORDER — POTASSIUM CHLORIDE 20 MEQ/1
40 TABLET, EXTENDED RELEASE ORAL ONCE
Status: COMPLETED | OUTPATIENT
Start: 2025-05-22 | End: 2025-05-22

## 2025-05-22 RX ORDER — MORPHINE SULFATE 4 MG/ML
4 INJECTION, SOLUTION INTRAMUSCULAR; INTRAVENOUS EVERY 4 HOURS PRN
Status: DISCONTINUED | OUTPATIENT
Start: 2025-05-22 | End: 2025-05-24

## 2025-05-22 RX ORDER — INSULIN ASPART 100 [IU]/ML
0-5 INJECTION, SOLUTION INTRAVENOUS; SUBCUTANEOUS
Status: DISCONTINUED | OUTPATIENT
Start: 2025-05-22 | End: 2025-05-28

## 2025-05-22 RX ORDER — GABAPENTIN 300 MG/1
300 CAPSULE ORAL
Status: COMPLETED | OUTPATIENT
Start: 2025-05-22 | End: 2025-05-22

## 2025-05-22 RX ADMIN — PIPERACILLIN SODIUM AND TAZOBACTAM SODIUM 4.5 G: 4; .5 INJECTION, POWDER, FOR SOLUTION INTRAVENOUS at 08:05

## 2025-05-22 RX ADMIN — MORPHINE SULFATE 4 MG: 4 INJECTION INTRAVENOUS at 11:05

## 2025-05-22 RX ADMIN — PIPERACILLIN SODIUM AND TAZOBACTAM SODIUM 4.5 G: 4; .5 INJECTION, POWDER, FOR SOLUTION INTRAVENOUS at 11:05

## 2025-05-22 RX ADMIN — POTASSIUM CHLORIDE 40 MEQ: 1500 TABLET, EXTENDED RELEASE ORAL at 01:05

## 2025-05-22 RX ADMIN — VANCOMYCIN HYDROCHLORIDE 2000 MG: 10 INJECTION, POWDER, LYOPHILIZED, FOR SOLUTION INTRAVENOUS at 12:05

## 2025-05-22 RX ADMIN — GABAPENTIN 300 MG: 300 CAPSULE ORAL at 07:05

## 2025-05-22 RX ADMIN — IOHEXOL 100 ML: 350 INJECTION, SOLUTION INTRAVENOUS at 08:05

## 2025-05-22 RX ADMIN — MORPHINE SULFATE 4 MG: 4 INJECTION INTRAVENOUS at 04:05

## 2025-05-22 RX ADMIN — ATORVASTATIN CALCIUM 20 MG: 20 TABLET, FILM COATED ORAL at 01:05

## 2025-05-22 RX ADMIN — MORPHINE SULFATE 4 MG: 4 INJECTION INTRAVENOUS at 08:05

## 2025-05-22 NOTE — ASSESSMENT & PLAN NOTE
Sudden onset pelvic pain morning of 5/22. CTA revealing large, fairly well-circumscribed heterogenous lesions in the right hemipelvis extending towards the gluteal folds. Leftward shift of pelvic organs. Recent IVC filter placed for DVTs. Recent lumbar csf drain removed on 5/15. Differential includes abscess, hematoma, seroma, mass    - NSGY following, recommend MRI  - f/u MRI lumbar spine and MRI pelvis  - on vanc/zosyn  - prn pain contol

## 2025-05-22 NOTE — HPI
Gallo Maria Jr. Is a 43yo male with a hx of BPH with urinary obstruction and pituitary adenoma s/p resection presenting for acute onset lower back pain that has radiating down his right leg, pelvic pain, and shortness of breath. The pain was sudden onset when he awoke from sleep this morning located in his lower back radiating down his right leg to mid thigh. The pelvic pain expands into his right groin. His shortness of breath is mainly with activity and has improved with rest. He also notes severe flushing and pre-syncopal symptoms while attempting to get to his car requiring him to take frequent breaks. His pain is still present when re-adjusting in bed but otherwise symptoms have largely improved with rest. He re[prts frequent headaches most recently yesterday unrelieved by tylenol #3. Otherwise he denies any vision changes, nasal leakage, congestion, cough, abdominal pain, N/V/D, constipation, urinary incontinence, or perianal numbness. Ambulation is limited by pain, not weakness.     He was recently admitted to [facility] on 5/1-5/5 for resection of a large pituitary adenoma and again from 5/8-5/16 for CSF leak requiring repair with lumbar drain and fat fascia bello grafts. Drain pulled 5/15. His hospital course also included two DVTs in the LLE. IVC filter placed on 5/15. He was advised to follow up outpatient to discuss utility of eliquis.     In the ED VSS, afebrile. CT chest abdomen pelvis revealing a large, fairly well-circumscribed heterogenous lesions in the right hemipelvis extending towards the gluteal folds. No PE. Labs notable for hgb 11.3, no leukocytosis, bmp only notable for Na 134, BNP and troponin wnl, CRP elevated at 75.2.

## 2025-05-22 NOTE — PHARMACY MED REC
"Admission Medication History     The home medication history was taken by [facility].    You may go to "Admission" then "Reconcile Home Medications" tabs to review and/or act upon these items.     The home medication list has been updated by the Pharmacy department.   Please read ALL comments highlighted in yellow.   Please address this information as you see fit.    Feel free to contact us if you have any questions or require assistance.      The medications listed below were removed from the home medication list. Please reorder if appropriate:  Patient reports no longer taking the following medication(s):  FLEXERIL 10 MG TABLET  BENTYL 20 MG TABLET  SILENOR 3MG TBALET  FLUOXETINE 20 MG CAPSULE  ANTIVERT 25 MG TABLET  MULTIVITAMIN PER TABLET  NAPROXEN SODIUM 500 MG TM24  PRILOSEC 40 MG CAPSULE  UBRELVY 100 MG TABLET      Medications listed below were obtained from: Patient/family and Analytic software- [employer]    Current Outpatient Medications on File Prior to Encounter   Medication Sig    acetaZOLAMIDE (DIAMOX) 250 MG tablet Take 250 mg by mouth 3 (three) times daily.        PSYLLIUM HUSK ORAL Take by mouth once daily.             [facility]  [phone] [id number]                 .          "

## 2025-05-22 NOTE — SUBJECTIVE & OBJECTIVE
Past Medical History:   Diagnosis Date    ADD (attention deficit disorder)     treated by outside psychiatrist.    Asthma     BPH with urinary obstruction 02/05/2015    Decreased libido 08/31/2015    Depression     Erectile dysfunction 10/27/2015    Family history of prostate cancer 07/31/2013    History of migraine headaches     History of pituitary tumor 09/25/2015    S/p removal 2016      Hypogonadism male 10/27/2015    Insomnia     Pituitary adenoma 08/09/2012       Past Surgical History:   Procedure Laterality Date    BIOPSY OF TONSILS Right 9/9/2019    Procedure: BIOPSY, TONSILS;  Surgeon: Bruno Umaña MD;  Location: [facility] OR [address] [place] [address];  Service: ENT;  Laterality: Right;    COLONOSCOPY N/A 9/3/2024    Procedure: COLONOSCOPY;  Surgeon: Ander Wolfe MD;  Location: [facility] ENDO;  Service: Endoscopy;  Laterality: N/A;    SURGICAL REMOVAL OF PITUITARY TUMOR BY TRANSSPHENOIDAL APPROACH  2015    Pituitary Adeonma via Dr. Blair 2015    SURGICAL REMOVAL OF PITUITARY TUMOR BY TRANSSPHENOIDAL APPROACH N/A 4/19/2023    Procedure: RESECTION, NEOPLASM, PITUITARY, TRANSSPHENOIDAL APPROACH;  Surgeon: Derek Blair MD;  Location: [facility] OR [address] [place] [address];  Service: Neurosurgery;  Laterality: N/A;  TRANSPHENOIDAL RESECTION PITUITARY ADENOMA/ 2 UNITS RBC, TEDS & SCD, FLOURO, TRANSPHENOIDAL SET, MICROSCOPE, [facility] CO SURGEON.    WISDOM TOOTH EXTRACTION         Review of patient's allergies indicates:  No Known Allergies    No current facility-administered medications on file prior to encounter.     Current Outpatient Medications on File Prior to Encounter   Medication Sig    cyclobenzaprine (FLEXERIL) 10 MG tablet Take 1 tablet (oral) 3 times per day (AS NEEDED - Muscle Spasm)    dicyclomine (BENTYL) 20 mg tablet Take 1 tablet (20 mg total) by mouth 3 (three) times daily as needed (abdominal pain). (Patient not taking: Reported on 9/26/2024)    doxepin (SILENOR) 3 mg Tab Take 3 mg by mouth as needed. (Patient not taking:  Reported on 9/26/2024)    FLUoxetine 20 MG capsule Take 40 mg by mouth as needed. (Patient not taking: Reported on 9/26/2024)    meclizine (ANTIVERT) 25 mg tablet Take 25 mg by mouth. (Patient not taking: Reported on 9/26/2024)    multivitamin (ONE DAILY MULTIVITAMIN) per tablet Take 1 tablet by mouth once daily.    naproxen sodium 500 mg TM24 Take 500 mg by mouth. (Patient not taking: Reported on 9/26/2024)    omeprazole (PRILOSEC) 40 MG capsule Take 40 mg by mouth. (Patient not taking: Reported on 9/26/2024)    predniSONE (DELTASONE) 20 MG tablet Take 1 tablet (oral) 2 times per day for 5 days    PSYLLIUM HUSK ORAL Take by mouth once daily.    ubrogepant (UBRELVY) 100 mg tablet Take 100 mg by mouth.     Family History       Problem Relation (Age of Onset)    Benign prostatic hyperplasia Father    Cancer Father, Paternal Grandfather    Dementia Paternal Grandmother    Diabetes Maternal Grandmother, Maternal Grandfather    Hyperlipidemia Father    No Known Problems Mother, Sister, Brother, Brother    Prostate cancer Paternal Uncle, Paternal Grandfather          Tobacco Use    Smoking status: Never    Smokeless tobacco: Never   Substance and Sexual Activity    Alcohol use: No    Drug use: No    Sexual activity: Yes     Partners: Female     Review of Systems   Constitutional:  Negative for chills and fever.   Eyes:  Positive for pain. Negative for visual disturbance.   Respiratory:  Positive for shortness of breath. Negative for cough and chest tightness.    Cardiovascular:  Negative for chest pain and leg swelling.   Gastrointestinal:  Negative for abdominal distention, abdominal pain, constipation, diarrhea, nausea and vomiting.   Genitourinary:  Negative for difficulty urinating, dysuria, frequency and urgency.   Musculoskeletal:  Positive for back pain. Negative for arthralgias.        Pelvic pain  Radiating pain down R leg   Skin:  Positive for wound (well healing surgical wounds on low back and right leg).    Neurological:  Positive for headaches. Negative for weakness and numbness.        Pre-syncope     Objective:     Vital Signs (Most Recent):  Temp: 97.9 °F (36.6 °C) (05/22/25 0700)  Pulse: 74 (05/22/25 1100)  Resp: 17 (05/22/25 1000)  BP: 107/71 (05/22/25 1100)  SpO2: 98 % (05/22/25 1100) Vital Signs (24h Range):  Temp:  [96.8 °F (36 °C)-97.9 °F (36.6 °C)] 97.9 °F (36.6 °C)  Pulse:  [64-77] 74  Resp:  [16-20] 17  SpO2:  [97 %-100 %] 98 %  BP: (103-127)/(58-76) 107/71     Weight: 129.3 kg (285 lb)  Body mass index is 36.59 kg/m².     Physical Exam  Constitutional:       General: He is not in acute distress.     Appearance: Normal appearance. He is not ill-appearing.   HENT:      Head: Normocephalic and atraumatic.   Eyes:      Extraocular Movements: Extraocular movements intact.      Conjunctiva/sclera: Conjunctivae normal.      Comments: Abnormal R pupil   Cardiovascular:      Rate and Rhythm: Normal rate and regular rhythm.      Pulses: Normal pulses.      Heart sounds: Normal heart sounds.   Pulmonary:      Effort: Pulmonary effort is normal. No respiratory distress.      Breath sounds: Normal breath sounds.   Abdominal:      General: There is no distension.      Palpations: Abdomen is soft.   Genitourinary:     Comments: Pelvic tenderness  Pelvic fullness on palpation R>L  Musculoskeletal:         General: No swelling or tenderness.      Right lower leg: No edema.      Left lower leg: No edema.   Skin:     General: Skin is warm.      Capillary Refill: Capillary refill takes less than 2 seconds.      Comments: Well healing surgical scars (low back, R lateral thigh)   Neurological:      General: No focal deficit present.      Mental Status: He is alert and oriented to person, place, and time.      Cranial Nerves: No cranial nerve deficit.      Sensory: No sensory deficit.      Motor: No weakness.   Psychiatric:         Mood and Affect: Mood normal.         Thought Content: Thought content normal.                 Significant Labs: All pertinent labs within the past 24 hours have been reviewed.  CBC:   Recent Labs   Lab 05/22/25  0859 05/22/25  0904   WBC 12.34  --    HGB 11.3*  --    HCT 33.6* 34*     --      CMP:   Recent Labs   Lab 05/22/25  0859   *   K 3.5      CO2 15*      BUN 14   CREATININE 0.9   CALCIUM 8.8   ANIONGAP 11       Significant Imaging: I have reviewed all pertinent imaging results/findings within the past 24 hours.  CT chest abdomen pelvis - No evidence for acute pathology about the visualized chest, abdomen, or pelvis.     Large, fairly well-circumscribed heterogeneous appearing lesion centered about the right hemipelvis extending towards the level of the gluteal folds.  Lesion causes leftward displacement of the prostate and rectum, with a focal area of internal heterogeneous enhancement superiorly lesion abuts the right pelvic sidewall and surrounding vasculature.  Findings remain nonspecific, with considerations including a complex seroma or evolving hematoma (noting that patient recently had IVC filter placed), with additional etiologies such as an abscess or complex mass (both benign and malignant considerations) cannot be excluded.  Correlation is advised.     Unremarkable appearance of the aorta.     Hepatomegaly with hepatic steatosis.  No focal hepatic lesions.     Interval placement of IVC filter.

## 2025-05-22 NOTE — ASSESSMENT & PLAN NOTE
Body mass index is 36.59 kg/m². Morbid obesity complicates all aspects of disease management from diagnostic modalities to treatment. Weight loss encouraged and health benefits explained to patient.

## 2025-05-22 NOTE — PROGRESS NOTES
"Pharmacokinetic Initial Assessment: IV Vancomycin    Assessment/Plan:    Initiate intravenous vancomycin with loading dose of 2000 mg once followed by a maintenance dose of vancomycin 1500mg IV every 12 hours  Desired empiric serum trough concentration is 10 to 20 mcg/mL  Draw vancomycin trough level 60 min prior to third dose on 5/23 at approximately 1130  Pharmacy will continue to follow and monitor vancomycin.      Please contact pharmacy at extension [phone] with any questions regarding this assessment.     Thank you for the consult,   Alex Edwards       Patient brief summary:  Gallo Maria Jr. is a 44 y.o. male initiated on antimicrobial therapy with IV Vancomycin for treatment of suspected intra-abdominal infection    Drug Allergies:   Review of patient's allergies indicates:  No Known Allergies    Actual Body Weight:   129.3 kg    Renal Function:   Estimated Creatinine Clearance: 148.1 mL/min (based on SCr of 0.9 mg/dL).,     Dialysis Method (if applicable):  N/A    CBC (last 72 hours):  Recent Labs   Lab Result Units 05/22/25  0859 05/22/25  1654   WBC K/uL 12.34 15.66*   HGB gm/dL 11.3* 11.9*   HCT % 33.6* 35.3*   Platelet Count K/uL 346 359   Lymph % % 10.4* 13.7*   Mono % % 7.7 6.6   Eos % % 0.2 0.1   Basophil % % 0.2 0.3       Metabolic Panel (last 72 hours):  Recent Labs   Lab Result Units 05/22/25  0859   Sodium mmol/L 134*   Potassium mmol/L 3.5   Chloride mmol/L 108   CO2 mmol/L 15*   Glucose mg/dL 101   BUN mg/dL 14   Creatinine mg/dL 0.9   Magnesium  mg/dL 1.9       Drug levels (last 3 results):  No results for input(s): "VANCOMYCINRA", "VANCORANDOM", "VANCOMYCINPE", "VANCOPEAK", "VANCOMYCINTR", "VANCOTROUGH" in the last 72 hours.    Microbiologic Results:  Microbiology Results (last 7 days)       Procedure Component Value Units Date/Time    Blood culture #1 **CANNOT BE ORDERED STAT** [[id number]] Collected: 05/22/25 1130    Order Status: Resulted Specimen: Blood from Peripheral, Hand, Left " Updated: 05/22/25 1214    Blood culture #2 **CANNOT BE ORDERED STAT** [[id number]] Collected: 05/22/25 1135    Order Status: Resulted Specimen: Blood from Peripheral, Hand, Right Updated: 05/22/25 1214

## 2025-05-22 NOTE — H&P
[facility] - Emergency Dept  [facility]  History & Physical    Patient Name: Gallo Maria Jr.  MRN: [id number]  Patient Class: OP- Observation  Admission Date: 5/22/2025  Attending Physician: Zoie Grier MD   Primary Care Provider: Jerome Mederos MD         Patient information was obtained from patient, spouse/SO, and ER records.     Subjective:     Principal Problem:Pelvic fluid collection    Chief Complaint:   Chief Complaint   Patient presents with    Multiple Complaints     Patient reports back pain, leg pain and SOB. Started tonight. Patient reports that he had a clot filter placed last week.         HPI: Gallo Maria Jr. Is a 43yo male with a hx of BPH with urinary obstruction and pituitary adenoma s/p resection presenting for acute onset lower back pain that has radiating down his right leg, pelvic pain, and shortness of breath. The pain was sudden onset when he awoke from sleep this morning located in his lower back radiating down his right leg to mid thigh. The pelvic pain expands into his right groin. His shortness of breath is mainly with activity and has improved with rest. He also notes severe flushing and pre-syncopal symptoms while attempting to get to his car requiring him to take frequent breaks. His pain is still present when re-adjusting in bed but otherwise symptoms have largely improved with rest. He reports frequent headaches most recently yesterday unrelieved by tylenol #3. Otherwise he denies any vision changes, nasal leakage, congestion, cough, abdominal pain, N/V/D, constipation, urinary incontinence, or perianal numbness. Ambulation is limited by pain, not weakness.     He was recently admitted [facility] on [facility]5/5 for resection of a large pituitary adenoma and again from 5/8-5/16 for CSF leak requiring repair with lumbar drain and fat fascia bello grafts. Drain pulled 5/15. His hospital course also included two DVTs in the LLE. IVC filter placed on  5/15. He was advised to follow up outpatient to discuss utility of eliquis.     In the ED VSS, afebrile. CT chest abdomen pelvis revealing a large, fairly well-circumscribed heterogenous lesions in the right hemipelvis extending towards the gluteal folds. No PE. Labs notable for hgb 11.3, no leukocytosis, bmp only notable for Na 134, BNP and troponin wnl, CRP elevated at 75.2.    Past Medical History:   Diagnosis Date    ADD (attention deficit disorder)     treated by outside psychiatrist.    Asthma     BPH with urinary obstruction 02/05/2015    Decreased libido 08/31/2015    Depression     Erectile dysfunction 10/27/2015    Family history of prostate cancer 07/31/2013    History of migraine headaches     History of pituitary tumor 09/25/2015    S/p removal 2016      Hypogonadism male 10/27/2015    Insomnia     Pituitary adenoma 08/09/2012       Past Surgical History:   Procedure Laterality Date    BIOPSY OF TONSILS Right 9/9/2019    Procedure: BIOPSY, TONSILS;  Surgeon: Bruno Umaña MD;  Location: [facility] OR [address] [place] [address];  Service: ENT;  Laterality: Right;    COLONOSCOPY N/A 9/3/2024    Procedure: COLONOSCOPY;  Surgeon: Ander Wolfe MD;  Location: [facility];  Service: Endoscopy;  Laterality: N/A;    SURGICAL REMOVAL OF PITUITARY TUMOR BY TRANSSPHENOIDAL APPROACH  2015    Pituitary [place] via Dr. Blair 2015    SURGICAL REMOVAL OF PITUITARY TUMOR BY TRANSSPHENOIDAL APPROACH N/A 4/19/2023    Procedure: RESECTION, NEOPLASM, PITUITARY, TRANSSPHENOIDAL APPROACH;  Surgeon: Derek Blair MD;  Location: [address];  Service: Neurosurgery;  Laterality: N/A;  TRANSPHENOIDAL RESECTION PITUITARY ADENOMA/ 2 UNITS RBC, TEDS & SCD, FLOURO, TRANSPHENOIDAL SET, MICROSCOPE, [facility] SURGEON.    WISDOM TOOTH EXTRACTION         Review of patient's allergies indicates:  No Known Allergies    No current facility-administered medications on file prior to encounter.     Current Outpatient Medications on File Prior to  Encounter   Medication Sig    cyclobenzaprine (FLEXERIL) 10 MG tablet Take 1 tablet (oral) 3 times per day (AS NEEDED - Muscle Spasm)    dicyclomine (BENTYL) 20 mg tablet Take 1 tablet (20 mg total) by mouth 3 (three) times daily as needed (abdominal pain). (Patient not taking: Reported on 9/26/2024)    doxepin (SILENOR) 3 mg Tab Take 3 mg by mouth as needed. (Patient not taking: Reported on 9/26/2024)    FLUoxetine 20 MG capsule Take 40 mg by mouth as needed. (Patient not taking: Reported on 9/26/2024)    meclizine (ANTIVERT) 25 mg tablet Take 25 mg by mouth. (Patient not taking: Reported on 9/26/2024)    multivitamin (ONE DAILY MULTIVITAMIN) per tablet Take 1 tablet by mouth once daily.    naproxen sodium 500 mg TM24 Take 500 mg by mouth. (Patient not taking: Reported on 9/26/2024)    omeprazole (PRILOSEC) 40 MG capsule Take 40 mg by mouth. (Patient not taking: Reported on 9/26/2024)    predniSONE (DELTASONE) 20 MG tablet Take 1 tablet (oral) 2 times per day for 5 days    PSYLLIUM HUSK ORAL Take by mouth once daily.    ubrogepant (UBRELVY) 100 mg tablet Take 100 mg by mouth.     Family History       Problem Relation (Age of Onset)    Benign prostatic hyperplasia Father    Cancer Father, Paternal Grandfather    Dementia Paternal Grandmother    Diabetes Maternal Grandmother, Maternal Grandfather    Hyperlipidemia Father    No Known Problems Mother, Sister, Brother, Brother    Prostate cancer Paternal Uncle, Paternal Grandfather          Tobacco Use    Smoking status: Never    Smokeless tobacco: Never   Substance and Sexual Activity    Alcohol use: No    Drug use: No    Sexual activity: Yes     Partners: Female     Review of Systems   Constitutional:  Negative for chills and fever.   Eyes:  Positive for pain. Negative for visual disturbance.   Respiratory:  Positive for shortness of breath. Negative for cough and chest tightness.    Cardiovascular:  Negative for chest pain and leg swelling.   Gastrointestinal:   Negative for abdominal distention, abdominal pain, constipation, diarrhea, nausea and vomiting.   Genitourinary:  Negative for difficulty urinating, dysuria, frequency and urgency.   Musculoskeletal:  Positive for back pain. Negative for arthralgias.        Pelvic pain  Radiating pain down R leg   Skin:  Positive for wound (well healing surgical wounds on low back and right leg).   Neurological:  Positive for headaches. Negative for weakness and numbness.        Pre-syncope     Objective:     Vital Signs (Most Recent):  Temp: 97.9 °F (36.6 °C) (05/22/25 0700)  Pulse: 74 (05/22/25 1100)  Resp: 17 (05/22/25 1000)  BP: 107/71 (05/22/25 1100)  SpO2: 98 % (05/22/25 1100) Vital Signs (24h Range):  Temp:  [96.8 °F (36 °C)-97.9 °F (36.6 °C)] 97.9 °F (36.6 °C)  Pulse:  [64-77] 74  Resp:  [16-20] 17  SpO2:  [97 %-100 %] 98 %  BP: (103-127)/(58-76) 107/71     Weight: 129.3 kg (285 lb)  Body mass index is 36.59 kg/m².     Physical Exam  Constitutional:       General: He is not in acute distress.     Appearance: Normal appearance. He is not ill-appearing.   HENT:      Head: Normocephalic and atraumatic.   Eyes:      Extraocular Movements: Extraocular movements intact.      Conjunctiva/sclera: Conjunctivae normal.      Comments: Abnormal R pupil   Cardiovascular:      Rate and Rhythm: Normal rate and regular rhythm.      Pulses: Normal pulses.      Heart sounds: Normal heart sounds.   Pulmonary:      Effort: Pulmonary effort is normal. No respiratory distress.      Breath sounds: Normal breath sounds.   Abdominal:      General: There is no distension.      Palpations: Abdomen is soft.   Genitourinary:     Comments: Pelvic tenderness  Pelvic fullness on palpation R>L  Musculoskeletal:         General: No swelling or tenderness.      Right lower leg: No edema.      Left lower leg: No edema.   Skin:     General: Skin is warm.      Capillary Refill: Capillary refill takes less than 2 seconds.      Comments: Well healing surgical  scars (low back, R lateral thigh)   Neurological:      General: No focal deficit present.      Mental Status: He is alert and oriented to person, place, and time.      Cranial Nerves: No cranial nerve deficit.      Sensory: No sensory deficit.      Motor: No weakness.   Psychiatric:         Mood and Affect: Mood normal.         Thought Content: Thought content normal.                Significant Labs: All pertinent labs within the past 24 hours have been reviewed.  CBC:   Recent Labs   Lab 05/22/25  0859 05/22/25  0904   WBC 12.34  --    HGB 11.3*  --    HCT 33.6* 34*     --      CMP:   Recent Labs   Lab 05/22/25  0859   *   K 3.5      CO2 15*      BUN 14   CREATININE 0.9   CALCIUM 8.8   ANIONGAP 11       Significant Imaging: I have reviewed all pertinent imaging results/findings within the past 24 hours.  CT chest abdomen pelvis - No evidence for acute pathology about the visualized chest, abdomen, or pelvis.     Large, fairly well-circumscribed heterogeneous appearing lesion centered about the right hemipelvis extending towards the level of the gluteal folds.  Lesion causes leftward displacement of the prostate and rectum, with a focal area of internal heterogeneous enhancement superiorly lesion abuts the right pelvic sidewall and surrounding vasculature.  Findings remain nonspecific, with considerations including a complex seroma or evolving hematoma (noting that patient recently had IVC filter placed), with additional etiologies such as an abscess or complex mass (both benign and malignant considerations) cannot be excluded.  Correlation is advised.     Unremarkable appearance of the aorta.     Hepatomegaly with hepatic steatosis.  No focal hepatic lesions.     Interval placement of IVC filter.    Assessment/Plan:     Assessment & Plan  Pelvic fluid collection  Sudden onset pelvic pain morning of 5/22. CTA revealing large, fairly well-circumscribed heterogenous lesions in the right  hemipelvis extending towards the gluteal folds. Leftward shift of pelvic organs. Recent IVC filter placed for DVTs. Recent lumbar csf drain removed on 5/15. Differential includes abscess, hematoma, seroma, mass    - NSGY following, recommend MRI  - f/u MRI lumbar spine and MRI pelvis  - on vanc/zosyn  - prn pain contol  History of pituitary surgery  Postoperative CSF leak  Pituitary adenoma  Recent pituitary resection at [facility] on 5/1. Follow up admission 5/8-5/16 for CSF leak. Lumbar drain pulled on 5/15. No indication of recurrence of CSF leak. Only noted mass effect symptoms include asymmetric pupils.     - monitor for CSF rhinorrhea or vision changes  BPH with urinary obstruction  Noted, continue to monitor for urinary retention    Mixed hyperlipidemia  Continue home atorvastatin    Obesity (BMI 30-39.9)  Body mass index is 36.59 kg/m². Morbid obesity complicates all aspects of disease management from diagnostic modalities to treatment. Weight loss encouraged and health benefits explained to patient.     Hyponatremia  Hyponatremia is likely due to unclear etiology. The patient's most recent sodium results are listed below.  Recent Labs     05/22/25  0859   *     Plan  - Obtain the following studies: Urine sodium, urine osmolality, serum osmolality or TSH, T4.  - Monitor sodium Daily.   - Patient hyponatremia is being monitored  Anemia  Anemia is likely due to unclear etiology. Most recent hemoglobin and hematocrit are listed below.  Recent Labs     05/22/25  0859 05/22/25  0904   HGB 11.3*  --    HCT 33.6* 34*     Plan  - Monitor serial CBC: Daily  - Transfuse PRBC if patient becomes hemodynamically unstable, symptomatic or H/H drops below 7/21.  - Patient has not received any PRBC transfusions to date  - Patient's anemia is currently being monitored  Acute deep vein thrombosis (DVT) of left lower extremity  Presence of IVC filter  B/L LE US 5/13/25 at [facility] with occlusive thrombus within the  posterior tibial vein as well as nearly occlusive thrombus within the peroneal vein. Positive LLE DVT study. IVC filter in place    Plan:  - holding AC and VTE ppx pending additional workup and possible intervention       VTE Risk Mitigation (From admission, onward)           Ordered     Reason for No Pharmacological VTE Prophylaxis  Once        Question:  Reasons:  Answer:  Physician Provided (leave comment)  Comment:  Possible active bleed, pending possible intervention    05/22/25 1140     IP VTE HIGH RISK PATIENT  Once         05/22/25 1140     Place sequential compression device  Until discontinued         05/22/25 1140                       Karen Holt MD  Department of Hospital Medicine  [facility] - Emergency Dept

## 2025-05-22 NOTE — ASSESSMENT & PLAN NOTE
Recent pituitary resection at [facility] on 5/1. Follow up admission 5/8-5/16 for CSF leak. Lumbar drain pulled on 5/15. No indication of recurrence of CSF leak. Only noted mass effect symptoms include asymmetric pupils.     - monitor for CSF rhinorrhea or vision changes

## 2025-05-22 NOTE — ASSESSMENT & PLAN NOTE
Hyponatremia is likely due to unclear etiology. The patient's most recent sodium results are listed below.  Recent Labs     05/22/25  0859   *     Plan  - Obtain the following studies: Urine sodium, urine osmolality, serum osmolality or TSH, T4.  - Monitor sodium Daily.   - Patient hyponatremia is being monitored

## 2025-05-22 NOTE — ASSESSMENT & PLAN NOTE
Recent pituitary resection at [facility] on 5/1. Follow up admission 5/8-5/16 for CSF leak. Lumbar drain pulled on 5/15. No indication of recurrence of CSF leak. Only noted mass effect symptoms include asymmetric pupils.     - monitor for CSF rhinorrhea or vision changes

## 2025-05-22 NOTE — ASSESSMENT & PLAN NOTE
B/L LE US 5/13/25 at [facility] with occlusive thrombus within the posterior tibial vein as well as nearly occlusive thrombus within the peroneal vein. Positive LLE DVT study. IVC filter in place    Plan:  - holding AC and VTE ppx pending additional workup and possible intervention

## 2025-05-22 NOTE — ED NOTES
I-STAT Chem-8+ Results:   Value Reference Range   Sodium 138 136-145 mmol/L   Potassium  3.5 3.5-5.1 mmol/L   Chloride 105  mmol/L   Ionized Calcium 1.26 1.06-1.42 mmol/L   CO2 (measured) 18 23-29 mmol/L   Glucose 107  mg/dL   BUN 14 6-30 mg/dL   Creatinine 1.1 0.5-1.4 mg/dL   Hematocrit 34 36-54%      MD notified

## 2025-05-22 NOTE — ED PROVIDER NOTES
Encounter Date: 5/22/2025       History     Chief Complaint   Patient presents with    Multiple Complaints     Patient reports back pain, leg pain and SOB. Started tonight. Patient reports that he had a clot filter placed last week.      The history is provided by the patient.     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 Patient states that he was doing well and was recently discharged after having completed a graft placement for this CSF leak until yesterday evening when he suddenly awoke secondary to this pain.  He notes that he has had sciatica in the past but it has been a long time/this pain is worse/different than it previously was when he had sciatica. He noted that when he got up this morning and was dealing with the pain, he also felt short of breath.  He states that the shortness of breath that he initially felt when he woke up this morning has decreased, however the pain in his legs is consistent.  He notes that while he was admitted during this previous pituitary adenoma resection, he developed a left lower extremity blood clot and had an IVC filter placed.  Denies fever, chills, nausea, vomiting, diarrhea or any change with bowel movements or urination.    Review of patient's allergies indicates:  No Known Allergies  Past Medical History:   Diagnosis Date    ADD (attention deficit disorder)     treated by outside psychiatrist.    Asthma     BPH with urinary obstruction 02/05/2015    Decreased libido 08/31/2015    Depression     Erectile dysfunction 10/27/2015    Family history of prostate cancer 07/31/2013    History of migraine headaches     History of pituitary tumor 09/25/2015    S/p removal 2016       Hypogonadism male 10/27/2015    Insomnia     Pituitary adenoma 08/09/2012     Past Surgical History:   Procedure Laterality Date    BIOPSY OF TONSILS Right 9/9/2019    Procedure: BIOPSY, TONSILS;  Surgeon: Bruno Umaña MD;  Location: [facility] OR [address] [place] [address];  Service: ENT;  Laterality: Right;    COLONOSCOPY N/A 9/3/2024    Procedure: COLONOSCOPY;  Surgeon: Ander Wolfe MD;  Location: [facility];  Service: Endoscopy;  Laterality: N/A;    SURGICAL REMOVAL OF PITUITARY TUMOR BY TRANSSPHENOIDAL APPROACH  2015    Pituitary Adeonma via Dr. Blair 2015    SURGICAL REMOVAL OF PITUITARY TUMOR BY TRANSSPHENOIDAL APPROACH N/A 4/19/2023    Procedure: RESECTION, NEOPLASM, PITUITARY, TRANSSPHENOIDAL APPROACH;  Surgeon: Derek Blair MD;  Location: [facility] OR [address] [place] [address];  Service: Neurosurgery;  Laterality: N/A;  TRANSPHENOIDAL RESECTION PITUITARY ADENOMA/ 2 UNITS RBC, TEDS & SCD, FLOURO, TRANSPHENOIDAL SET, MICROSCOPE, MIRTHA CO SURGEON.    WISDOM TOOTH EXTRACTION       Family History   Problem Relation Name Age of Onset    No Known Problems Mother      Hyperlipidemia Father      Benign prostatic hyperplasia Father      Cancer Father      No Known Problems Sister      No Known Problems Brother      No Known Problems Brother      Prostate cancer Paternal Uncle      Diabetes Maternal Grandmother      Diabetes Maternal Grandfather      Dementia Paternal Grandmother      Cancer Paternal Grandfather      Prostate cancer Paternal Grandfather      Coronary artery disease Neg Hx       Social History[1]      Physical Exam     Initial Vitals [05/22/25 0539]   BP Pulse Resp Temp SpO2   (!) 103/58 77 18 96.8 °F (36 °C) 100 %      MAP       --         Physical Exam    Nursing note and vitals reviewed.  Constitutional: He appears well-developed. No distress.   Patient is somewhat uncomfortable appearing.  He is lying flat in his back, an any motions turning left or right cause him pain.  He is fully able to communicate and answering  questions   HENT:   Head: Normocephalic and atraumatic. Mouth/Throat: Oropharynx is clear and moist and mucous membranes are normal.   Eyes: EOM are normal. Pupils are equal, round, and reactive to light.   Neck:   Normal range of motion.  Cardiovascular:  Normal rate, regular rhythm and normal heart sounds.           Palpable where he will pulses bilaterally are present and palpable distal pedal pulses are present bilaterally   Pulmonary/Chest: Breath sounds normal. No respiratory distress. He has no wheezes. He has no rales.   Abdominal: Abdomen is soft. He exhibits no distension. There is no abdominal tenderness. There is no rebound and no guarding.   Musculoskeletal:      Cervical back: Normal range of motion.     Neurological: He is alert and oriented to person, place, and time.   Cranial nerves intact, moving all extremities however limited in the lower extremity secondary to pain.  Sensation maintained throughout the entirety of the lower extremities other than some subjective numbness in his right foot. No other obvious focal neurologic deficits present   Skin: Skin is warm.   Psychiatric:   A anxious mood, normal thought content/behavior otherwise         ED Course   Procedures  Labs Reviewed   CBC WITH DIFFERENTIAL - Abnormal       Result Value    WBC 12.34      RBC 4.18 (*)     HGB 11.3 (*)     HCT 33.6 (*)     MCV 80 (*)     MCH 27.0      MCHC 33.6      RDW 14.2      Platelet Count 346      MPV 9.3      Nucleated RBC 0      Neut % 81.1 (*)     Lymph % 10.4 (*)     Mono % 7.7      Eos % 0.2      Basophil % 0.2      Imm Grans % 0.4      Neut # 10.00 (*)     Lymph # 1.28      Mono # 0.95      Eos # 0.03      Baso # 0.03      Imm Grans # 0.05 (*)    BASIC METABOLIC PANEL - Abnormal    Sodium 134 (*)     Potassium 3.5      Chloride 108      CO2 15 (*)     Glucose 101      BUN 14      Creatinine 0.9      Calcium 8.8      Anion Gap 11      eGFR >60     C-REACTIVE PROTEIN - Abnormal    CRP 75.2 (*)    ISTAT  PROCEDURE - Abnormal    POC Glucose 107      POC BUN 14      POC Creatinine 1.1      POC Sodium 138      POC Potassium 3.5      POC Chloride 105      POC TCO2 (MEASURED) 18 (*)     POC Ionized Calcium 1.26      POC Hematocrit 34 (*)     Sample NEETU     TROPONIN I HIGH SENSITIVITY - Normal    Troponin High Sensitive <3     B-TYPE NATRIURETIC PEPTIDE - Normal    BNP <10     MAGNESIUM - Normal    Magnesium  1.9     CBC W/ AUTO DIFFERENTIAL    Narrative:     The following orders were created for panel order CBC auto differential.  Procedure                               Abnormality         Status                     ---------                               -----------         ------                     CBC with Differential[[id number]]       Abnormal            Final result                 Please view results for these tests on the individual orders.   POCT GLUCOSE    POCT Glucose 110     POCT GLUCOSE MONITORING CONTINUOUS     EKG Readings: (Independently Interpreted)   Independently interpreted by MD:  Rate 82, NSR, no stemi, no ectopy, no hypertrophy         ECG Results              EKG 12-lead (Final result)        Collection Time Result Time QRS Duration OHS QTC Calculation    05/22/25 05:54:53 05/22/25 07:35:11 98 448                     Final result by Interface, Lab In [facility] (05/22/25 07:35:14)                   Narrative:    Test Reason : R52,    Vent. Rate :  82 BPM     Atrial Rate :  82 BPM     P-R Int : 178 ms          QRS Dur :  98 ms      QT Int : 384 ms       P-R-T Axes :  59  85  22 degrees    QTcB Int : 448 ms    Normal sinus rhythm  Nonspecific ST and/or T wave abnormalities  Baseline Artifact  Abnormal ECG  When compared with ECG of 18-Apr-2023 14:48,  No significant change was found  Confirmed by Efraín Garza ([id number]) on 5/22/2025 7:35:08 AM    Referred By: AAAREFERRAL SELF           Confirmed By: Efraín Garza                                  Imaging Results              MRI Lumbar Spine W WO Cont  (In process)                      MRI Pelvis W WO Contrast (In process)  Result time 05/23/25 07:36:08                     CTA Chest Abdomen Pelvis (XPD) (Edited Result - FINAL)  Result time 05/22/25 11:12:46      Addendum (preliminary) 1 of 1 by Henrry Mendoza MD (05/22/25 11:12:46)      Please note that lesion closely approximates the margin of the distal sacrum/coccyx, with smooth remodeling of the visualized distal coccyx.  Additional considerations to include midline lesion such as chordoma as well as a complex collection in the setting of CSF leak.  Further evaluation with dedicated MRI lumbar spine/pelvis with without contrast as clinically indicated.      Electronically signed by: Henrry Mendoza  Date:    05/22/2025  Time:    11:12                 Final result by Henrry Mendoza MD (05/22/25 09:09:42)                   Impression:      No evidence for acute pathology about the visualized chest, abdomen, or pelvis.    Large, fairly well-circumscribed heterogeneous appearing lesion centered about the right hemipelvis extending towards the level of the gluteal folds.  Lesion causes leftward displacement of the prostate and rectum, with a focal area of internal heterogeneous enhancement superiorly lesion abuts the right pelvic sidewall and surrounding vasculature.  Findings remain nonspecific, with considerations including a complex seroma or evolving hematoma (noting that patient recently had IVC filter placed), with additional etiologies such as an abscess or complex mass (both benign and malignant considerations) cannot be excluded.  Correlation is advised.    Unremarkable appearance of the aorta.    Hepatomegaly with hepatic steatosis.  No focal hepatic lesions.    Interval placement of IVC filter.    Additional findings as above.    This report was flagged in Epic as abnormal.      Electronically signed by: Henrry Mendoza  Date:    05/22/2025  Time:    09:09               Narrative:    EXAMINATION:  CTA CHEST  ABDOMEN PELVIS (XPD)    CLINICAL HISTORY:  Aortic infection or inflammation;Concern for possible dissection vs PE.  Mixed clinical picture.  Diskitis/osteomyelitis of the lumbar spine also a consideration;    TECHNIQUE:  Using 100 cc of  Omnipaque 350 IV contrast, and multi-detector helical CT technique, axial CT angiogram images of the abdomen were obtained of the chest, abdomen, and pelvis.  2D post-processing coronal and sagittal reconstructions of the abdominal aorta and visceral arteries performed.    COMPARISON:  Chest radiograph 05/22/2025    FINDINGS:  Structures at the base of the neck are unremarkable.    The thyroid is normal in size with no focal lesions.    Axillary and thoracic soft tissues are unremarkable.    The heart is normal size with no pericardial effusion.  No significant coronary artery calcification.    Pulmonary vasculature distribute normally.  No evidence for pulmonary thromboembolism.    Left-sided 3 vessel aortic arch without significant atherosclerosis or aneurysmal dilatation.    No hilar or mediastinal lymphadenopathy.    Airways are patent.    Lungs demonstrate bibasilar atelectasis.  No focal consolidation, pleural effusion, or pneumothorax.    Liver is enlarged in size measuring 17.7 cm, demonstrating diffusely decreased attenuation, suggesting hepatic steatosis.  No focal hepatic lesions.    Gallbladder is unremarkable.  No intrahepatic or extrahepatic ductal dilatation.    The spleen, adrenal glands, and pancreas are unremarkable.    Kidneys are normal in size and location, concentrating contrast appropriately.  No hydronephrosis.  Ureters are normal in course and caliber.  Urinary bladder is unremarkable.    Prostatomegaly.    Stomach and duodenum are unremarkable.  Loops of small bowel demonstrate no evidence for obstructive or inflammatory process.  Appendix is unremarkable.  The visualized colon demonstrates no significant abnormality.    No free intra-abdominal air or  lymphadenopathy.  There is a large fairly well-circumscribed lesion extending along the right hemipelvis and right perirectal soft tissues to the level of the gluteal folds, measuring on the order of 13.6  X 7.2 x 10.9 cm AP by TV by CC (series 4, image 967).  Additional extension about the presacral soft tissues without evidence for associated osseous erosive change there is a focal area of heterogeneous and somewhat serpiginous enhancement along the superior aspect of this lesion measuring approximately 4.4 x 4.5 x 4.5 cm (series 4, image 935).  Majority of this lesion demonstrates density greater than would be expected for simple fluid, with left displacement of the visualized prostate and rectum.  Additionally, lesion approximates the right pelvic sidewall and surrounding vasculature.    Abdominal aorta is normal in course and caliber.  Interval placement of IVC filter.    Extra-abdominal soft tissues are unremarkable.    Osseous structures demonstrate no evidence for acute fracture or osseous destructive lesion.                                       X-Ray Chest AP Portable (Final result)  Result time 05/22/25 08:25:58      Final result by Nba Montano MD (05/22/25 08:25:58)                   Impression:      No significant intrathoracic abnormality.  Allowing for differences in projection, no significant detrimental interval change in the appearance of the chest since 04/18/2023 is appreciated.      Electronically signed by: Nba Montano MD  Date:    05/22/2025  Time:    08:25               Narrative:    EXAMINATION:  XR CHEST AP PORTABLE    TECHNIQUE:  One view    COMPARISON:  Comparison is made to 04/18/2023.  Clinical information of shortness of breath is provided in the electronic medical record.    FINDINGS:  Heart size is normal, as is the appearance of the pulmonary vascularity.  Lung zones are clear, and are free of a significant airspace consolidation or volume loss.  No pleural fluid.  No abnormal  mediastinal widening.  No pneumothorax.                                    X-Rays:   Independently Interpreted Readings:   Other Readings:  Patient's chest x-ray was independently interpreted by me:  No significant intrathoracic abnormalities noted      Medications   sodium chloride 0.9% flush 10 mL (has no administration in time range)   naloxone 0.4 mg/mL injection 0.02 mg (has no administration in time range)   glucose chewable tablet 16 g (has no administration in time range)   glucose chewable tablet 24 g (has no administration in time range)   dextrose 50% injection 12.5 g (has no administration in time range)   dextrose 50% injection 25 g (has no administration in time range)   glucagon (human recombinant) injection 1 mg (has no administration in time range)   insulin aspart U-100 pen 0-5 Units (has no administration in time range)   atorvastatin tablet 20 mg (20 mg Oral Given 5/22/25 1350)   morphine injection 4 mg (4 mg Intravenous Given by Other 5/22/25 2335)   oxyCODONE immediate release tablet 5 mg (5 mg Oral Given 5/23/25 0115)   vancomycin - pharmacy to dose (has no administration in time range)   piperacillin-tazobactam (ZOSYN) 4.5 g in D5W 100 mL IVPB (MB+) (4.5 g Intravenous New Bag 5/23/25 0449)   vancomycin 1,500 mg in 0.9% NaCl 250 mL IVPB (admixture device) (0 mg Intravenous Stopped 5/23/25 0246)   gabapentin capsule 300 mg (300 mg Oral Given 5/22/25 0736)   morphine injection 4 mg (4 mg Intravenous Given 5/22/25 0857)   iohexoL (OMNIPAQUE 350) injection 100 mL (100 mLs Intravenous Given 5/22/25 0832)   vancomycin 2 g in 0.9% sodium chloride 500 mL IVPB (0 mg Intravenous Stopped 5/22/25 1439)   piperacillin-tazobactam (ZOSYN) 4.5 g in D5W 100 mL IVPB (MB+) (0 g Intravenous Stopped 5/22/25 1241)   potassium chloride SA CR tablet 40 mEq (40 mEq Oral Given 5/22/25 1349)   gadobutroL (GADAVIST) injection 10 mL (10 mLs Intravenous Given 5/23/25 0032)     Medical Decision Making  Patient is a  "44-year-old male who presents due to concerns of right lower extremity pain as well as an episode of shortness of breath.  Differential diagnosis includes but is not limited to sciatica, diskitis, osteomyelitis, phlegmon development, aortic dissection, pulmonary embolism, ACS.  Vital signs noted to be reassuring but the patient was clearly in a significant amount of pain on my evaluation.  Mixed clinical picture without exact alignment with a 1 specific differential diagnosis.  As part of the workup for my differential, I will obtain a CBC, CMP, CRP, lactate, EKG, troponin, BNP, chest x-ray and a CTA chest/abdomen/pelvis.  Patient given a dose of gabapentin for symptomatic control.    Mild leukocytosis at 12 however significantly elevated CRP of 75.  CTA significant for a "large heterogeneous lesion" with a very broad differential including a hematoma, abscess, seroma or malignancy.  It is causing mass effect pressing on his rectum/prostate.  I spoke with Radiology who believe that this was likely the etiology behind his lower extremity pain.  They recommended obtaining an MRI pelvis/lumbar spine with and without contrast to better evaluate for what this heterogeneous mass consists of.  Given his elevated CRP as well as having infectious etiologies be on the differential still, decision made to give the patient a dose of broad-spectrum antibiotics.    Given the significance of all these findings/need for further workup, patient admitted to hospital medicine for further management.    Amount and/or Complexity of Data Reviewed  Labs: ordered. Decision-making details documented in ED Course.  Radiology: ordered.    Risk  Prescription drug management.               ED Course as of 05/23/25 0738   Thu May 22, 2025   0706 44M with hx of pituitary adenoma with multiple resections, last earlier this month c/b CSF leak, known LLE DVT, IVC filter. [NN]   0724 EKG with sinus rhythm, rate 82, no STEMI [NN]   0751 Here for " multiple issues.  First, patient states that today he was having significant dyspnea on exertion.  He denies chest pain but felt like his chest was uncomfortable to to feeling like his breathing was restricted.  He states that this occurred with exertion.  He states that everything briefly went red.  He thought he was going to pass out but no episode of syncope.  This is what concerned him the most to come to the emergency department.  He also tells me that incidentally he felt like he tweaked his back 2 days ago but did not really have any significant back pain.  This morning he woke up with severe lower back pain that radiates down both posterior thighs but is worse on the right.  No fever or chills.  No abdominal pain or flank pain.  No nausea, vomiting or diarrhea. [NN]   0752 On exam, patient appears uncomfortable due to low back pain.  Positive bilateral straight leg raise.  No midline spinal tenderness.  Previous lumbar drain site well healing.  No fluctuance, erythema or drainage.  He has a right lower abdominal wall and right lateral thigh incision from his previous grafts that are well healing.  No surrounding warmth, erythema or drainage.  Abdominal exam soft, nontender, nondistended.  Lungs clear bilaterally.  No increased work of breathing.  Normal rate, regular rhythm.  Normal neurologic exam.  Good perfusion in all extremities.  No clinical signs of DVT.  No unilateral leg swelling, warmth or erythema. [NN]   0754 Given the chest discomfort, new dyspnea on exertion, near syncope and new low back pain, CTA ordered to rule out dissection versus PE.  Lower suspicion for PE given recent IVC filter placement. [NN]   0754 Additionally, his back pain could be related to sciatica which he states that he has had similar in the past just not as severe.  If CTA negative, will consider MRI to rule out epidural abscess versus diskitis given recent lumbar drain.  Cardiac labs ordered as well as inflammatory  markers.  Will order pain control. [NN]   0828 Chest x-ray independently interpreted by me shows no focal consolidation or pneumothorax. [NN]   1037 CRP(!): 75.2 [NN]   1037 CO2(!): 15 [NN]   1037 Hemoglobin(!): 11.3 [NN]      ED Course User Index  [NN] Lis Jackson MD                           Clinical Impression:  Final diagnoses:  [R52] Pain  [R06.02] Shortness of breath  [R19.00] Pelvic mass (Primary)          ED Disposition Condition    Observation                       [1]   Social History  Tobacco Use    Smoking status: Never    Smokeless tobacco: Never   Substance Use Topics    Alcohol use: No    Drug use: No        Minh Segovia MD  Resident  05/23/25 6401

## 2025-05-22 NOTE — ASSESSMENT & PLAN NOTE
Recent pituitary resection at [facility] on 5/1. Follow up admission 5/8-5/16 for CSF leak. Lumbar drain pulled on 5/15. No indication of recurrence of CSF leak. Only noted mass effect symptoms include asymmetric pupils.     - monitor for CSF rhinorrhea or vision changes

## 2025-05-22 NOTE — ASSESSMENT & PLAN NOTE
Anemia is likely due to unclear etiology. Most recent hemoglobin and hematocrit are listed below.  Recent Labs     05/22/25  0859 05/22/25  0904   HGB 11.3*  --    HCT 33.6* 34*     Plan  - Monitor serial CBC: Daily  - Transfuse PRBC if patient becomes hemodynamically unstable, symptomatic or H/H drops below 7/21.  - Patient has not received any PRBC transfusions to date  - Patient's anemia is currently being monitored

## 2025-05-22 NOTE — PLAN OF CARE
[facility] - Emergency Dept  Initial Discharge Assessment       Primary Care Provider: Jerome Mederos MD    Admission Diagnosis: Pain [R52]    Admission Date: 5/22/2025  Expected Discharge Date:     Pt stated he is independent with his ambulation and ADL's and does not require assistance or equipment.    Pt to d/c home with no needs when ready     Transition of Care Barriers: (P) None    Payor: [place] [employer] / Plan: [employer] / Product Type: Commercial /     Extended Emergency Contact Information  Primary Emergency Contact: Shanel Maria  Address: [address] [place] [address] [facility]  Home Phone: [phone]  Work Phone: [phone]  Mobile Phone: [phone]  Relation: Spouse   needed? No    Discharge Plan A: (P) Home with family  Discharge Plan B: (P) Home with family      [id number] [place] - [address] [facility]  [address] [facility]  [place] [address]  Phone: [phone] Fax: [phone]    [facility] [place] [facility]  [address]  [place] [address]  Phone: [phone] Fax: [phone]      Initial Assessment (most recent)       Adult Discharge Assessment - 05/22/25 1235          Discharge Assessment    Assessment Type Discharge Planning Assessment (P)      Confirmed/corrected address, phone number and insurance Yes (P)      Confirmed Demographics Correct on Facesheet (P)      Source of Information patient (P)      Communicated PORTER with patient/caregiver Yes (P)      People in Home spouse;child(young), dependent (P)      Name(s) of People in Home spouse Shanel (P)      Facility Arrived From: home (P)      Do you expect to return to your current living situation? Yes (P)      Do you have help at home or someone to help you manage your care at home? No (P)      Prior to hospitilization cognitive status: Alert/Oriented;No Deficits (P)      Current cognitive status: No Deficits;Alert/Oriented (P)       Walking or Climbing Stairs Difficulty no (P)      Dressing/Bathing Difficulty no (P)      Home Accessibility wheelchair accessible;stairs within home (P)      Number of Stairs, Within Home, Primary ten (P)      Home Layout Able to live on 1st floor (P)      Equipment Currently Used at Home CPAP (P)      Readmission within 30 days? No (P)      Patient currently being followed by outpatient case management? No (P)      Do you currently have service(s) that help you manage your care at home? No (P)      Do you have any problems affording any of your prescribed medications? No (P)      Is the patient taking medications as prescribed? yes (P)      Who is going to help you get home at discharge? family/friends (P)      How do you get to doctors appointments? family or friend will provide (P)      Are you on dialysis? No (P)      Do you take coumadin? No (P)      Discharge Plan A Home with family (P)      Discharge Plan B Home with family (P)      DME Needed Upon Discharge  none (P)      Discharge Plan discussed with: Patient (P)      Transition of Care Barriers None (P)         Physical Activity    On average, how many days per week do you engage in moderate to strenuous exercise (like a brisk walk)? 0 days (P)      On average, how many minutes do you engage in exercise at this level? 0 min (P)         Financial Resource Strain    How hard is it for you to pay for the very basics like food, housing, medical care, and heating? Not very hard (P)         Housing Stability    In the last 12 months, was there a time when you were not able to pay the mortgage or rent on time? No (P)      At any time in the past 12 months, were you homeless or living in a shelter (including now)? No (P)         Transportation Needs    In the past 12 months, has lack of transportation kept you from medical appointments or from getting medications? No (P)      In the past 12 months, has lack of transportation kept you from meetings, work, or from  getting things needed for daily living? No (P)         Food Insecurity    Within the past 12 months, you worried that your food would run out before you got the money to buy more. Never true (P)      Within the past 12 months, the food you bought just didn't last and you didn't have money to get more. Never true (P)         Stress    Do you feel stress - tense, restless, nervous, or anxious, or unable to sleep at night because your mind is troubled all the time - these days? Only a little (P)         Social Isolation    How often do you feel lonely or isolated from those around you?  Rarely (P)         Alcohol Use    Q1: How often do you have a drink containing alcohol? Never (P)      Q2: How many drinks containing alcohol do you have on a typical day when you are drinking? Patient does not drink (P)      Q3: How often do you have six or more drinks on one occasion? Never (P)         Utilities    In the past 12 months has the electric, gas, oil, or water company threatened to shut off services in your home? No (P)         Health Literacy    How often do you need to have someone help you when you read instructions, pamphlets, or other written material from your doctor or pharmacy? Rarely (P)                       Discharge Plan A and Plan B have been determined by review of patient's clinical status, future medical and therapeutic needs, and coverage/benefits for post-acute care in coordination with multidisciplinary team members.    Jazmin Becerra CD, MSW, LMSW, RSW   Case Management  [facility]  Email: [email or web address]

## 2025-05-22 NOTE — ED TRIAGE NOTES
Patient endorses shortness of breath, chest pain, leg and back pain since this morning. Patient denies N/V/D at this time.

## 2025-05-23 PROBLEM — R19.00 PELVIC MASS: Status: ACTIVE | Noted: 2025-05-23

## 2025-05-23 LAB
ABSOLUTE EOSINOPHIL (OHS): 0.11 K/UL
ABSOLUTE EOSINOPHIL (OHS): 0.22 K/UL
ABSOLUTE MONOCYTE (OHS): 1.36 K/UL (ref 0.3–1)
ABSOLUTE MONOCYTE (OHS): 1.44 K/UL (ref 0.3–1)
ABSOLUTE NEUTROPHIL COUNT (OHS): 11.84 K/UL (ref 1.8–7.7)
ABSOLUTE NEUTROPHIL COUNT (OHS): 14.33 K/UL (ref 1.8–7.7)
ALBUMIN SERPL BCP-MCNC: 3.8 G/DL (ref 3.5–5.2)
ALP SERPL-CCNC: 74 UNIT/L (ref 40–150)
ALT SERPL W/O P-5'-P-CCNC: 39 UNIT/L (ref 10–44)
ANION GAP (OHS): 14 MMOL/L (ref 8–16)
AST SERPL-CCNC: 19 UNIT/L (ref 11–45)
BASOPHILS # BLD AUTO: 0.04 K/UL
BASOPHILS # BLD AUTO: 0.04 K/UL
BASOPHILS NFR BLD AUTO: 0.2 %
BASOPHILS NFR BLD AUTO: 0.3 %
BILIRUB SERPL-MCNC: 0.9 MG/DL (ref 0.1–1)
BUN SERPL-MCNC: 18 MG/DL (ref 6–20)
CALCIUM SERPL-MCNC: 9.2 MG/DL (ref 8.7–10.5)
CHLORIDE SERPL-SCNC: 103 MMOL/L (ref 95–110)
CO2 SERPL-SCNC: 14 MMOL/L (ref 23–29)
CREAT SERPL-MCNC: 1.3 MG/DL (ref 0.5–1.4)
ERYTHROCYTE [DISTWIDTH] IN BLOOD BY AUTOMATED COUNT: 14.4 % (ref 11.5–14.5)
ERYTHROCYTE [DISTWIDTH] IN BLOOD BY AUTOMATED COUNT: 14.5 % (ref 11.5–14.5)
GFR SERPLBLD CREATININE-BSD FMLA CKD-EPI: >60 ML/MIN/1.73/M2
GLUCOSE SERPL-MCNC: 123 MG/DL (ref 70–110)
HCT VFR BLD AUTO: 34.1 % (ref 40–54)
HCT VFR BLD AUTO: 35.4 % (ref 40–54)
HGB BLD-MCNC: 11.5 GM/DL (ref 14–18)
HGB BLD-MCNC: 11.7 GM/DL (ref 14–18)
IMM GRANULOCYTES # BLD AUTO: 0.09 K/UL (ref 0–0.04)
IMM GRANULOCYTES # BLD AUTO: 0.12 K/UL (ref 0–0.04)
IMM GRANULOCYTES NFR BLD AUTO: 0.6 % (ref 0–0.5)
IMM GRANULOCYTES NFR BLD AUTO: 0.6 % (ref 0–0.5)
INR PPP: 1.4 (ref 0.8–1.2)
LYMPHOCYTES # BLD AUTO: 2.11 K/UL (ref 1–4.8)
LYMPHOCYTES # BLD AUTO: 2.43 K/UL (ref 1–4.8)
MAGNESIUM SERPL-MCNC: 1.9 MG/DL (ref 1.6–2.6)
MCH RBC QN AUTO: 26.3 PG (ref 27–31)
MCH RBC QN AUTO: 27.2 PG (ref 27–31)
MCHC RBC AUTO-ENTMCNC: 32.5 G/DL (ref 32–36)
MCHC RBC AUTO-ENTMCNC: 34.3 G/DL (ref 32–36)
MCV RBC AUTO: 79 FL (ref 82–98)
MCV RBC AUTO: 81 FL (ref 82–98)
NUCLEATED RBC (/100WBC) (OHS): 0 /100 WBC
NUCLEATED RBC (/100WBC) (OHS): 0 /100 WBC
OSMOLALITY SERPL: 293 MOSM/KG (ref 280–300)
OSMOLALITY UR: 715 MOSM/KG (ref 50–1200)
PHOSPHATE SERPL-MCNC: 4.6 MG/DL (ref 2.7–4.5)
PLATELET # BLD AUTO: 228 K/UL (ref 150–450)
PLATELET # BLD AUTO: 271 K/UL (ref 150–450)
PMV BLD AUTO: 9.2 FL (ref 9.2–12.9)
PMV BLD AUTO: 9.6 FL (ref 9.2–12.9)
POCT GLUCOSE: 110 MG/DL (ref 70–110)
POTASSIUM SERPL-SCNC: 4 MMOL/L (ref 3.5–5.1)
PROT SERPL-MCNC: 8.7 GM/DL (ref 6–8.4)
PROTHROMBIN TIME: 15.3 SECONDS (ref 9–12.5)
RBC # BLD AUTO: 4.3 M/UL (ref 4.6–6.2)
RBC # BLD AUTO: 4.38 M/UL (ref 4.6–6.2)
RELATIVE EOSINOPHIL (OHS): 0.7 %
RELATIVE EOSINOPHIL (OHS): 1.2 %
RELATIVE LYMPHOCYTE (OHS): 13.1 % (ref 18–48)
RELATIVE LYMPHOCYTE (OHS): 13.6 % (ref 18–48)
RELATIVE MONOCYTE (OHS): 7.8 % (ref 4–15)
RELATIVE MONOCYTE (OHS): 8.7 % (ref 4–15)
RELATIVE NEUTROPHIL (OHS): 76.1 % (ref 38–73)
RELATIVE NEUTROPHIL (OHS): 77.1 % (ref 38–73)
SODIUM SERPL-SCNC: 131 MMOL/L (ref 136–145)
SODIUM UR-SCNC: <10 MMOL/L (ref 20–250)
TSH SERPL-ACNC: 0.68 UIU/ML (ref 0.4–4)
VANCOMYCIN TROUGH SERPL-MCNC: 15.1 UG/ML (ref 10–22)
WBC # BLD AUTO: 15.55 K/UL (ref 3.9–12.7)
WBC # BLD AUTO: 18.58 K/UL (ref 3.9–12.7)

## 2025-05-23 PROCEDURE — 83930 ASSAY OF BLOOD OSMOLALITY: CPT

## 2025-05-23 PROCEDURE — 25000003 PHARM REV CODE 250: Performed by: STUDENT IN AN ORGANIZED HEALTH CARE EDUCATION/TRAINING PROGRAM

## 2025-05-23 PROCEDURE — 85025 COMPLETE CBC W/AUTO DIFF WBC: CPT

## 2025-05-23 PROCEDURE — 25500020 PHARM REV CODE 255: Performed by: STUDENT IN AN ORGANIZED HEALTH CARE EDUCATION/TRAINING PROGRAM

## 2025-05-23 PROCEDURE — 25000003 PHARM REV CODE 250

## 2025-05-23 PROCEDURE — 63600175 PHARM REV CODE 636 W HCPCS: Performed by: STUDENT IN AN ORGANIZED HEALTH CARE EDUCATION/TRAINING PROGRAM

## 2025-05-23 PROCEDURE — 11000001 HC ACUTE MED/SURG PRIVATE ROOM

## 2025-05-23 PROCEDURE — 83735 ASSAY OF MAGNESIUM: CPT

## 2025-05-23 PROCEDURE — 85610 PROTHROMBIN TIME: CPT | Performed by: PHYSICIAN ASSISTANT

## 2025-05-23 PROCEDURE — 21400001 HC TELEMETRY ROOM

## 2025-05-23 PROCEDURE — 80202 ASSAY OF VANCOMYCIN: CPT | Performed by: STUDENT IN AN ORGANIZED HEALTH CARE EDUCATION/TRAINING PROGRAM

## 2025-05-23 PROCEDURE — 88341 IMHCHEM/IMCYTCHM EA ADD ANTB: CPT | Mod: 26,,, | Performed by: STUDENT IN AN ORGANIZED HEALTH CARE EDUCATION/TRAINING PROGRAM

## 2025-05-23 PROCEDURE — 0QBS3ZX EXCISION OF COCCYX, PERCUTANEOUS APPROACH, DIAGNOSTIC: ICD-10-PCS | Performed by: STUDENT IN AN ORGANIZED HEALTH CARE EDUCATION/TRAINING PROGRAM

## 2025-05-23 PROCEDURE — 83935 ASSAY OF URINE OSMOLALITY: CPT

## 2025-05-23 PROCEDURE — 80053 COMPREHEN METABOLIC PANEL: CPT

## 2025-05-23 PROCEDURE — 88341 IMHCHEM/IMCYTCHM EA ADD ANTB: CPT | Mod: TC | Performed by: INTERNAL MEDICINE

## 2025-05-23 PROCEDURE — 88307 TISSUE EXAM BY PATHOLOGIST: CPT | Mod: 26,,, | Performed by: STUDENT IN AN ORGANIZED HEALTH CARE EDUCATION/TRAINING PROGRAM

## 2025-05-23 PROCEDURE — 63600175 PHARM REV CODE 636 W HCPCS

## 2025-05-23 PROCEDURE — 85025 COMPLETE CBC W/AUTO DIFF WBC: CPT | Performed by: INTERNAL MEDICINE

## 2025-05-23 PROCEDURE — 36415 COLL VENOUS BLD VENIPUNCTURE: CPT | Performed by: PHYSICIAN ASSISTANT

## 2025-05-23 PROCEDURE — 36415 COLL VENOUS BLD VENIPUNCTURE: CPT

## 2025-05-23 PROCEDURE — 36415 COLL VENOUS BLD VENIPUNCTURE: CPT | Performed by: INTERNAL MEDICINE

## 2025-05-23 PROCEDURE — 84100 ASSAY OF PHOSPHORUS: CPT

## 2025-05-23 PROCEDURE — A9585 GADOBUTROL INJECTION: HCPCS | Performed by: STUDENT IN AN ORGANIZED HEALTH CARE EDUCATION/TRAINING PROGRAM

## 2025-05-23 PROCEDURE — 88342 IMHCHEM/IMCYTCHM 1ST ANTB: CPT | Mod: 26,,, | Performed by: STUDENT IN AN ORGANIZED HEALTH CARE EDUCATION/TRAINING PROGRAM

## 2025-05-23 PROCEDURE — 84300 ASSAY OF URINE SODIUM: CPT

## 2025-05-23 PROCEDURE — 84443 ASSAY THYROID STIM HORMONE: CPT

## 2025-05-23 PROCEDURE — 99223 1ST HOSP IP/OBS HIGH 75: CPT | Mod: ,,, | Performed by: PHYSICIAN ASSISTANT

## 2025-05-23 PROCEDURE — 88333 PATH CONSLTJ SURG CYTO XM 1: CPT | Mod: 26,,, | Performed by: STUDENT IN AN ORGANIZED HEALTH CARE EDUCATION/TRAINING PROGRAM

## 2025-05-23 RX ORDER — GABAPENTIN 300 MG/1
300 CAPSULE ORAL 3 TIMES DAILY
Status: DISCONTINUED | OUTPATIENT
Start: 2025-05-23 | End: 2025-05-27

## 2025-05-23 RX ORDER — FENTANYL CITRATE 50 UG/ML
INJECTION, SOLUTION INTRAMUSCULAR; INTRAVENOUS
Status: COMPLETED | OUTPATIENT
Start: 2025-05-23 | End: 2025-05-23

## 2025-05-23 RX ORDER — MIDAZOLAM HYDROCHLORIDE 1 MG/ML
INJECTION, SOLUTION INTRAMUSCULAR; INTRAVENOUS
Status: COMPLETED | OUTPATIENT
Start: 2025-05-23 | End: 2025-05-23

## 2025-05-23 RX ORDER — LIDOCAINE HYDROCHLORIDE 10 MG/ML
INJECTION, SOLUTION INFILTRATION; PERINEURAL
Status: COMPLETED | OUTPATIENT
Start: 2025-05-23 | End: 2025-05-23

## 2025-05-23 RX ADMIN — ATORVASTATIN CALCIUM 20 MG: 20 TABLET, FILM COATED ORAL at 08:05

## 2025-05-23 RX ADMIN — MORPHINE SULFATE 4 MG: 4 INJECTION INTRAVENOUS at 07:05

## 2025-05-23 RX ADMIN — PIPERACILLIN SODIUM AND TAZOBACTAM SODIUM 4.5 G: 4; .5 INJECTION, POWDER, FOR SOLUTION INTRAVENOUS at 12:05

## 2025-05-23 RX ADMIN — OXYCODONE 5 MG: 5 TABLET ORAL at 08:05

## 2025-05-23 RX ADMIN — LIDOCAINE HYDROCHLORIDE 5 ML: 10 INJECTION, SOLUTION INFILTRATION; PERINEURAL at 04:05

## 2025-05-23 RX ADMIN — GABAPENTIN 300 MG: 300 CAPSULE ORAL at 08:05

## 2025-05-23 RX ADMIN — MORPHINE SULFATE 4 MG: 4 INJECTION INTRAVENOUS at 11:05

## 2025-05-23 RX ADMIN — FENTANYL CITRATE 25 MCG: 50 INJECTION, SOLUTION INTRAMUSCULAR; INTRAVENOUS at 04:05

## 2025-05-23 RX ADMIN — VANCOMYCIN HYDROCHLORIDE 1500 MG: 1.5 INJECTION, POWDER, LYOPHILIZED, FOR SOLUTION INTRAVENOUS at 11:05

## 2025-05-23 RX ADMIN — FENTANYL CITRATE 50 MCG: 50 INJECTION, SOLUTION INTRAMUSCULAR; INTRAVENOUS at 04:05

## 2025-05-23 RX ADMIN — VANCOMYCIN HYDROCHLORIDE 1500 MG: 1.5 INJECTION, POWDER, LYOPHILIZED, FOR SOLUTION INTRAVENOUS at 01:05

## 2025-05-23 RX ADMIN — SODIUM CHLORIDE 500 ML: 9 INJECTION, SOLUTION INTRAVENOUS at 11:05

## 2025-05-23 RX ADMIN — PIPERACILLIN SODIUM AND TAZOBACTAM SODIUM 4.5 G: 4; .5 INJECTION, POWDER, FOR SOLUTION INTRAVENOUS at 04:05

## 2025-05-23 RX ADMIN — MIDAZOLAM 1 MG: 1 INJECTION INTRAMUSCULAR; INTRAVENOUS at 04:05

## 2025-05-23 RX ADMIN — GADOBUTROL 10 ML: 604.72 INJECTION INTRAVENOUS at 12:05

## 2025-05-23 RX ADMIN — OXYCODONE 5 MG: 5 TABLET ORAL at 01:05

## 2025-05-23 RX ADMIN — PIPERACILLIN SODIUM AND TAZOBACTAM SODIUM 4.5 G: 4; .5 INJECTION, POWDER, FOR SOLUTION INTRAVENOUS at 08:05

## 2025-05-23 RX ADMIN — GABAPENTIN 300 MG: 300 CAPSULE ORAL at 03:05

## 2025-05-23 NOTE — CONSULTS
Interventional Radiology  Consult/History & Physical Note    Consult Requested By: Karen Holt MD  Reason for Consult: newly found coxygeal mass cconcerning for malignancy, needs biopsy    SUBJECTIVE:     Chief Complaint: coccygeal mass    History of Present Illness:  Gallo Maria Jr. is a 44 y.o. male with a PMHx of urinary obstruction, pituitary adenoma s/p resection, LLE DVT s/p IVC filter placement who presented to the ED on 5/22/25 for acute onset lower back pain that has radiating down his right leg along with pelvic pain, was found to have a new coccygeal mass and leukocytosis. Interventional Radiology has been consulted for image guided targeted coccygeal mass biopsy. Pt has had recent imaging including a MRI L spine on 5/23/25 which revealed a 13.4 cm coccygeal mass c/f chordoma vs chondrosarcoma vs giant cell tumor. WBC is 15. Pt is afebrile and hemodynamically stable. He does not have a history of PRECIOUS requiring nightly CPAP or difficulty breathing when lying flat. He does not take any anticoagulants. He has been NPO since midnight.    Review of Systems   Constitutional:  Negative for chills and fever.   Musculoskeletal:  Positive for back pain.       Scheduled Meds:   atorvastatin  20 mg Oral Daily    gabapentin  300 mg Oral TID    piperacillin-tazobactam (Zosyn) IV (PEDS and ADULTS) (extended infusion is not appropriate)  4.5 g Intravenous Q8H    sodium chloride 0.9%  500 mL Intravenous Once    vancomycin (VANCOCIN) IV (PEDS and ADULTS)  1,500 mg Intravenous Q12H     Continuous Infusions:  PRN Meds:  Current Facility-Administered Medications:     dextrose 50%, 12.5 g, Intravenous, PRN    dextrose 50%, 25 g, Intravenous, PRN    glucagon (human recombinant), 1 mg, Intramuscular, PRN    glucose, 16 g, Oral, PRN    glucose, 24 g, Oral, PRN    insulin aspart U-100, 0-5 Units, Subcutaneous, QID (AC + HS) PRN    morphine, 4 mg, Intravenous, Q4H PRN    naloxone, 0.02 mg, Intravenous, PRN    oxyCODONE,  5 mg, Oral, Q6H PRN    sodium chloride 0.9%, 10 mL, Intravenous, Q12H PRN    Pharmacy to dose Vancomycin consult, , , Once **AND** vancomycin - pharmacy to dose, , Intravenous, pharmacy to manage frequency    Review of patient's allergies indicates:  No Known Allergies    Past Medical History:   Diagnosis Date    ADD (attention deficit disorder)     treated by outside psychiatrist.    Asthma     BPH with urinary obstruction 02/05/2015    Decreased libido 08/31/2015    Depression     Erectile dysfunction 10/27/2015    Family history of prostate cancer 07/31/2013    History of migraine headaches     History of pituitary tumor 09/25/2015    S/p removal 2016      Hypogonadism male 10/27/2015    Insomnia     Pituitary adenoma 08/09/2012     Past Surgical History:   Procedure Laterality Date    BIOPSY OF TONSILS Right 9/9/2019    Procedure: BIOPSY, TONSILS;  Surgeon: Bruno Umaña MD;  Location: [facility] OR [address] [place] [address];  Service: ENT;  Laterality: Right;    COLONOSCOPY N/A 9/3/2024    Procedure: COLONOSCOPY;  Surgeon: Ander Wolfe MD;  Location: [facility];  Service: Endoscopy;  Laterality: N/A;    SURGICAL REMOVAL OF PITUITARY TUMOR BY TRANSSPHENOIDAL APPROACH  2015    Pituitary Adeonma via Dr. Blair 2015    SURGICAL REMOVAL OF PITUITARY TUMOR BY TRANSSPHENOIDAL APPROACH N/A 4/19/2023    Procedure: RESECTION, NEOPLASM, PITUITARY, TRANSSPHENOIDAL APPROACH;  Surgeon: Derek Blair MD;  Location: [address];  Service: Neurosurgery;  Laterality: N/A;  TRANSPHENOIDAL RESECTION PITUITARY ADENOMA/ 2 UNITS RBC, TEDS & SCD, FLOURO, TRANSPHENOIDAL SET, MICROSCOPE, [facility] SURGEON.    WISDOM TOOTH EXTRACTION       Family History   Problem Relation Name Age of Onset    No Known Problems Mother      Hyperlipidemia Father      Benign prostatic hyperplasia Father      Cancer Father      No Known Problems Sister      No Known Problems Brother      No Known Problems Brother      Prostate cancer Paternal Uncle      Diabetes  Maternal Grandmother      Diabetes Maternal Grandfather      Dementia Paternal Grandmother      Cancer Paternal Grandfather      Prostate cancer Paternal Grandfather      Coronary artery disease Neg Hx       Social History[1]    OBJECTIVE:     Vital Signs (Most Recent)  Temp: 97.6 °F (36.4 °C) (05/23/25 1127)  Pulse: 90 (05/23/25 1127)  Resp: 18 (05/23/25 1127)  BP: 102/76 (05/23/25 1127)  SpO2: 99 % (05/23/25 1127)    Physical Exam:  Physical Exam  Vitals and nursing note reviewed.   Constitutional:       General: He is not in acute distress.     Appearance: He is obese. He is ill-appearing.   HENT:      Head: Normocephalic and atraumatic.      Right Ear: External ear normal.      Left Ear: External ear normal.   Eyes:      Extraocular Movements: Extraocular movements intact.      Conjunctiva/sclera: Conjunctivae normal.      Pupils: Pupils are equal, round, and reactive to light.   Cardiovascular:      Rate and Rhythm: Normal rate.   Pulmonary:      Effort: Pulmonary effort is normal. No respiratory distress.   Abdominal:      General: Abdomen is flat.   Skin:     General: Skin is warm and dry.      Coloration: Skin is not jaundiced.   Neurological:      General: No focal deficit present.      Mental Status: He is alert and oriented to person, place, and time.   Psychiatric:         Mood and Affect: Mood normal.         Behavior: Behavior normal.         Thought Content: Thought content normal.         Judgment: Judgment normal.         Laboratory  I have reviewed all pertinent lab results within the past 24 hours.  CBC:   Recent Labs   Lab 05/23/25  0819   WBC 15.55*   RBC 4.30*   HGB 11.7*   HCT 34.1*      MCV 79*   MCH 27.2   MCHC 34.3     BMP:   Recent Labs   Lab 05/23/25  0819   *   *   K 4.0      CO2 14*   BUN 18   CREATININE 1.3   CALCIUM 9.2   MG 1.9     CMP:   Recent Labs   Lab 05/23/25  0819   *   CALCIUM 9.2   ALBUMIN 3.8   PROT 8.7*   *   K 4.0   CO2 14*     "  BUN 18   CREATININE 1.3   ALKPHOS 74   ALT 39   AST 19   BILITOT 0.9     LFTs:   Recent Labs   Lab 05/23/25  0819   ALT 39   AST 19   ALKPHOS 74   BILITOT 0.9   PROT 8.7*   ALBUMIN 3.8     Coagulation: No results for input(s): "LABPROT", "INR", "APTT" in the last 168 hours.  Microbiology Results (last 7 days)       Procedure Component Value Units Date/Time    Blood culture #1 **CANNOT BE ORDERED STAT** [[id number]]  (Normal) Collected: 05/22/25 1130    Order Status: Completed Specimen: Blood from Peripheral, Hand, Left Updated: 05/22/25 1901     Blood Culture No Growth After 6 Hours    Blood culture #2 **CANNOT BE ORDERED STAT** [[id number]]  (Normal) Collected: 05/22/25 1135    Order Status: Completed Specimen: Blood from Peripheral, Hand, Right Updated: 05/22/25 1901     Blood Culture No Growth After 6 Hours            ASA/Mallampati  ASA: 3  Mallampati: 2    Imaging:  Recent imaging studies including MRI L spine on 5/23/25 which was independently reviewed by Enedina Reich MD.     EXAMINATION:  MRI LUMBAR SPINE W WO CONTRAST; MRI PELVIS W WO CONTRAST     CLINICAL HISTORY:  Low back pain, infection suspected;Radiology recommendation, evaluation for pelvic fluid collection/mass causing mass effect;; Soft tissue mass, pelvis, deep;     TECHNIQUE:  MRI lumbar spine before and after intravenous administration of 10 mL Gadavist.     MRI pelvis before and after intravenous administration of 10 mL Gadavist.     COMPARISON:  MRI lumbar spine 12/11/2020; same day CTA     FINDINGS:  Lumbar spine:     ALIGNMENT: Normal.     BONE: No compression fractures.  There is a 1.7 cm STIR hyperintense enhancing lesion in the S1 vertebral body.     JOINT: Disc desiccation and height loss at each level from L3-S1.  Mild facet arthropathy in the lower lumbar spine.  No bone marrow edema.     SPINAL CANAL: The conus medullaris has a normal appearance and terminates at the L1 level.  Enhancement of several cauda equina nerve roots in the " lower lumbar spine (10:38).  There is abnormal enhancement in the posterior epidural space extending from L3-S1 (100:8).  No epidural collection.     PARASPINAL SOFT TISSUES: There is edema and enhancement in the L2-3 interspinous region.  No organized fluid collection.  Expansion of the infrarenal IVC and bilateral iliac veins with loss of the normal flow void, absent enhancement, and adjacent soft tissue edema compatible thrombus.  There is an IVC filter.     SIGNIFICANT FINDINGS BY LEVEL:     T12-L1: Unremarkable.     L1-2: Unremarkable.     L2-3: Unremarkable.     L3-4: Mild disc bulge.  Small central annular fissure.  No canal or foraminal stenosis.     L4-5: Mild disc bulge.  Small central annular fissure.  No canal or foraminal stenosis.     L5-S1: Mild disc bulge.  Small central annular fissure.  No canal or foraminal stenosis.     Pelvis:     BONE: There is a 13.4 x 9.7 x 8.2 cm coccygeal mass.  Compared to skeletal muscle it is mildly T1 hyperintense, T2 hyperintense, and demonstrates heterogeneous enhancement and restricted diffusion.  There are nonenhancing components internally suggesting necrosis.  Margins are lobulated but well-defined.  No mineralization on same day CT.     Anteriorly, it extends into the posterior pelvis with mass effect on the prostate and rectum but no definite invasion.  Posteriorly, it extends into subcutaneous fat.     No fracture or osteonecrosis.     JOINT: No sacroiliitis.  No subchondral bone marrow edema.  No joint effusion or synovitis.     SOFT TISSUE: Normal muscle bulk. Regional tendons are intact. There is soft tissue edema in the right anterolateral thigh, but no organized fluid collection.  Sciatic nerves are unremarkable.     MISCELLANEOUS: Ill-defined T1 hyperintense fluid collection extending along the right iliac vessels, largest component measuring 6.4 x 3.0 cm, which is new from the CTA performed earlier today (02:14).  No pelvic lymphadenopathy.      Impression:     13.4 cm coccygeal mass.  Differential considerations include chordoma, chondrosarcoma, and giant cell tumor.     1.7 cm lesion in the S1 vertebral body with similar signal characteristics, concerning for metastasis.     Extensive DVT involving the IVC and iliac veins, with an IVC filter.  Ill-defined fluid collection extending along the right iliac vessels, new from earlier today, compatible with hematoma.     Cauda equina and epidural enhancement in the lower lumbar spine, likely inflammatory from a recent lumbar drain although infection cannot be excluded.  No organized epidural collection.     Soft tissue edema in the right anterolateral thigh, which may be postoperative from recent graft harvest.  Hemorrhage and infection are difficult to exclude.     Mild degenerative changes as described.     This report was flagged in Epic as abnormal.     Electronically signed by resident: Lorna Thompson  Date:                                            05/23/2025  Time:                                           03:29     Electronically signed by: Toi Trejo  Date:                                            05/23/2025  Time:                                           08:44    ASSESSMENT/PLAN:     Assessment:  44 y.o. male with a PMHx of urinary obstruction, pituitary adenoma s/p resection, LLE DVT s/p IVC filter placement who has been referred to IR for CT guided targeted coccygeal mass biopsy. The procedure was discussed in great detail with the patient including thorough explanations of the potential risks and benefits of CT guided targeted coccygeal mass biopsy. Risks include but are not limited to sepsis, severe infection, hemorrhage, damage to surrounding structures and need for additional procedures. The patient is a candidate for CT guided targeted coccygeal mass biopsy under moderate sedation. Plan discussed with ordering physician and pt who verbalized understanding of the plan and would like to  proceed.    Plan:  Will proceed with CT guided targeted coccygeal mass biopsy under moderate sedation on 5/23/25 pending schedule availability.   Please keep pt NPO   Anticoagulation history reviewed.    If starting prophylactic AC following procedure, ok to start lovenox 12 hours following procedure and ok to start SQ heparin 6-8 hours following procedure per SIR guidelines  Coagulation labs reviewed. INR pending and plt count 271   Thank you for the consult. Please contact with questions via [email or web address] secure chat or [employer]    Time spent during patient care today was 79 minutes. This includes time spent before the visit reviewing the chart, discussing case with staff physician and ordering provider, time spent during the face to face patient visit, and time spent after the visit on documentation. Time excludes procedure time.     Savanah Carver PA-C  Interventional Radiology  Spectra: [phone]        [1]   Social History  Tobacco Use    Smoking status: Never    Smokeless tobacco: Never   Substance Use Topics    Alcohol use: No    Drug use: No

## 2025-05-23 NOTE — ASSESSMENT & PLAN NOTE
Recent pituitary resection at [facility] on 5/1. Follow up admission 5/8-5/16 for CSF leak. Lumbar drain pulled on 5/15. No indication of recurrence of CSF leak. Only noted mass effect symptoms include asymmetric pupils.     - monitor for CSF rhinorrhea or vision changes

## 2025-05-23 NOTE — PROGRESS NOTES
Pharmacokinetic Assessment Follow Up: IV Vancomycin    Vancomycin serum concentration assessment(s):  The vancomycin trough level, drawn at ~ 10 hours after the last dose, was 15.1 mcg/mL which is within the goal range of 15-20 mcg/mL.  A true 12 hour trough is likely closer to ~ 13 mcg/mL which is below the goal range.   Would anticipate some accumulation given body habitus.  The serum creatinine bumped from 0.9 to 1.3 today. Baseline appears to be ~ 1.     Vancomycin Regimen Plan:  Continue vancomycin 1500 mg (12 mg/kg) IV every 12 hours. If serum creatinine continues to uptrend, may consider dosing by levels.  Will get another vancomycin trough on 5/25/25 @ 1130 to assess stability of the level or sooner if clinically warranted.  Monitor for signs and symptoms of nephrotoxicity and infusion reactions.      Drug levels (last 3 results):  Recent Labs   Lab Result Units 05/23/25  1132   Vancomycin Trough ug/ml 15.1       Pharmacy will continue to follow and monitor vancomycin.    Please contact pharmacy for questions regarding this assessment.    Thank you for the consult,   Olivia Pederson       Patient brief summary:  Gallo Maria Jr. is a 44 y.o. male initiated on antimicrobial therapy with IV Vancomycin for treatment of intra-abdominal infection    The patient's current regimen is 1500 mg IV every 12 hours.    Drug Allergies:   Review of patient's allergies indicates:  No Known Allergies    Actual Body Weight:   129.3 kg    Renal Function:   Estimated Creatinine Clearance: 102.5 mL/min (based on SCr of 1.3 mg/dL).,       CBC (last 72 hours):  Recent Labs   Lab Result Units 05/22/25  0859 05/22/25  1654 05/23/25  0819   WBC K/uL 12.34 15.66* 15.55*   HGB gm/dL 11.3* 11.9* 11.7*   HCT % 33.6* 35.3* 34.1*   Platelet Count K/uL 346 359 271   Lymph % % 10.4* 13.7* 13.6*   Mono % % 7.7 6.6 8.7   Eos % % 0.2 0.1 0.7   Basophil % % 0.2 0.3 0.3       Metabolic Panel (last 72 hours):  Recent Labs   Lab Result Units  05/22/25  0859 05/23/25  0819 05/23/25  0911   Sodium mmol/L 134* 131*  --    Urine Sodium mmol/L  --   --  <10*   Potassium mmol/L 3.5 4.0  --    Chloride mmol/L 108 103  --    CO2 mmol/L 15* 14*  --    Glucose mg/dL 101 123*  --    BUN mg/dL 14 18  --    Creatinine mg/dL 0.9 1.3  --    Albumin g/dL  --  3.8  --    Bilirubin Total mg/dL  --  0.9  --    ALP unit/L  --  74  --    AST unit/L  --  19  --    ALT unit/L  --  39  --    Magnesium  mg/dL 1.9 1.9  --    Phosphorus Level mg/dL  --  4.6*  --        Vancomycin Administrations:  vancomycin given in the last 96 hours                     vancomycin 1,500 mg in 0.9% NaCl 250 mL IVPB (admixture device) (mg) 1,500 mg New Bag 05/23/25 1145     1,500 mg New Bag  0116    vancomycin 2 g in 0.9% sodium chloride 500 mL IVPB (mg) 2,000 mg New Bag 05/22/25 1239                    Microbiologic Results:  Microbiology Results (last 7 days)       Procedure Component Value Units Date/Time    Blood culture #1 **CANNOT BE ORDERED STAT** [[id number]]  (Normal) Collected: 05/22/25 1130    Order Status: Completed Specimen: Blood from Peripheral, Hand, Left Updated: 05/23/25 1300     Blood Culture No Growth After 24 Hours    Blood culture #2 **CANNOT BE ORDERED STAT** [[id number]]  (Normal) Collected: 05/22/25 1135    Order Status: Completed Specimen: Blood from Peripheral, Hand, Right Updated: 05/23/25 1300     Blood Culture No Growth After 24 Hours

## 2025-05-23 NOTE — ASSESSMENT & PLAN NOTE
Anemia is likely due to unclear etiology. Most recent hemoglobin and hematocrit are listed below.  Recent Labs     05/22/25  0859 05/22/25  0904 05/22/25  1654 05/23/25  0819   HGB 11.3*  --  11.9* 11.7*   HCT 33.6* 34* 35.3* 34.1*     Plan  - Monitor serial CBC: Daily  - Transfuse PRBC if patient becomes hemodynamically unstable, symptomatic or H/H drops below 7/21.  - Patient has not received any PRBC transfusions to date  - Patient's anemia is currently being monitored

## 2025-05-23 NOTE — PLAN OF CARE
[facility] - Med Surg  Discharge Reassessment    Primary Care Provider: Jerome Mederos MD    Expected Discharge Date: 5/26/2025        Sw met with pt and spouse at bedside. Pt and spouse reported no needs from case management at this time.     Discharge Plan A and Plan B have been determined by review of patient's clinical status, future medical and therapeutic needs, and coverage/benefits for post-acute care in coordination with multidisciplinary team members.    Reassessment (most recent)       Discharge Reassessment - 05/23/25 1415          Discharge Reassessment    Assessment Type Discharge Planning Reassessment (P)      Discharge Plan discussed with: Spouse/sig other;Patient (P)      Name(s) and Number(s) Shanel Maria [phone] (Spouse) (P)      Communicated PORTER with patient/caregiver Date not available/Unable to determine (P)      Discharge Plan A Home with family (P)      Discharge Plan B Home (P)      DME Needed Upon Discharge  none (P)      Transition of Care Barriers None (P)      Why the patient remains in the hospital Requires continued medical care (P)         Post-Acute Status    Post-Acute Authorization Other (P)      Other Status No Post-Acute Service Needs (P)      Discharge Delays None known at this time (P)                    RADHA Bach  Case Management  [phone]

## 2025-05-23 NOTE — HOSPITAL COURSE
Patient admitted to hospital medicine with severe pelvic pressure and radicular pain to BLE. MRI obtained with showed a 13.4 cm coccygeal mass concerning for malignancy, and a 1.7 cm lesion in S1 concerning for metastasis. 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 Discussed with surg-onc and rad-onc who recommended patient continue care with established team at [facility]. MRI also showed extensive DVT in BLE including iliac veins and IVC filter in place with possible hematoma along right iliac vessels. CT head obtained after discussion with patient's established [facility] neurosurgery team as patient reported headaches prior to admission which showed R subdural fluid collection w/ 8mm midline shift, and was stable on repeat CT head. Scan most concerning for hygroma given recent lumbar drain. He had no acute neuro changes. NSGY contacted who recommended we discuss with patient's established neurosurgeon at [facility]. After scans sent to [facility] and discussion with [facility] neurosurgery team, discussed risks vs benefits of anticoagulation with patient and wife who want to proceed with Eliquis. Started on loading dose Eliquis to treat extensive DVTs. Patient should have repeat CT head after discharge to ensure stability. During admission LFTs and T bili elevated, w/u unremarkable except for steatosis on liver US w/ doppler. He also developed gout during admission, s/p arthrocentesis with podiatry and started on colchicine which patient should take until improvement in symptoms. Gram stain negative for organisms. Pain control titrated to patient acceptable level prior to discharge with long acting oral agents and PRN hydromorphone. Patient has follow up scheduled with [facility] team.

## 2025-05-23 NOTE — PLAN OF CARE
Pt arrived to IR  for coccyxgeal mass biopsy. Pt oriented to unit and staff. Plan of care reviewed with patient, patient verbalizes understanding. Comfort measures utilized. Pt safely transferred from stretcher to procedural table. Fall risk reviewed with patient, fall risk interventions maintained. Safety strap applied, positioner pillows utilized to minimize pressure points. Blankets applied. Pt prepped and draped utilizing standard sterile technique. Patient placed on continuous monitoring, as required by sedation policy. Timeouts completed utilizing standard universal time-out, per department and facility policy. RN to remain at bedside, continuous monitoring maintained. Pt resting comfortably. Denies pain/discomfort. Will continue to monitor. See flow sheets for monitoring, medication administration, and updates.

## 2025-05-23 NOTE — PLAN OF CARE
Procedure completed. Patient tolerated well; VSS. Right buttocks site CDI. Specimen taken by pathologist. Patient to be transported to MPU for 1 hour recovery; report to be given at the bedside. Report to also be called to inpatient RN.

## 2025-05-23 NOTE — ASSESSMENT & PLAN NOTE
Hyponatremia is likely due to unclear etiology. The patient's most recent sodium results are listed below.  Recent Labs     05/22/25  0859 05/23/25  0819   * 131*     Plan  - Obtain the following studies: Urine sodium, urine osmolality, serum osmolality or TSH, T4.  - Monitor sodium Daily.   - Patient hyponatremia is being monitored

## 2025-05-23 NOTE — ASSESSMENT & PLAN NOTE
Recent pituitary resection at [facility] on 5/1. Follow up admission 5/8-5/16 for CSF leak. Lumbar drain pulled on 5/15. No indication of recurrence of CSF leak. Only noted mass effect symptoms include asymmetric pupils.     - monitor for CSF rhinorrhea or vision changes

## 2025-05-23 NOTE — ASSESSMENT & PLAN NOTE
Sudden onset pelvic pain morning of 5/22. CTA revealing large, fairly well-circumscribed heterogenous lesions in the right hemipelvis extending towards the gluteal folds. Leftward shift of pelvic organs. Recent IVC filter placed for DVTs. Recent lumbar csf drain removed on 5/15.     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  Differential considerations include chordoma, chondrosarcoma, and giant cell tumor.  1.7 cm lesion in the S1 vertebral body with similar signal characteristics, concerning for metastasis.  Extensive DVT involving the IVC and iliac veins, with an IVC filter.  Ill-defined fluid collection extending along the right iliac vessels, new from earlier today, compatible with hematoma.  Cauda equina and epidural enhancement in the lower lumbar spine, likely inflammatory from a recent lumbar drain although infection cannot be excluded.  No organized epidural collection.  Soft tissue edema in the right anterolateral thigh, which may be postoperative from recent graft harvest.  Hemorrhage and infection are difficult to exclude.      - IR consulted, plan for biopsy  - on vanc/zosyn  - gabapentin and morphine for pain control

## 2025-05-23 NOTE — SUBJECTIVE & OBJECTIVE
Interval History: MRI with 13.4 cm coccygeal mass and 1.7cm lesion in S1 concerning for mets. IR consulted for biopsy. Pain control with morphine and gabapentin.     Review of Systems   Constitutional:  Negative for fever.   Eyes:  Negative for visual disturbance.   Gastrointestinal:  Negative for abdominal distention, abdominal pain, constipation, diarrhea, nausea and vomiting.   Genitourinary:  Negative for difficulty urinating, dysuria, frequency and urgency.   Musculoskeletal:  Positive for back pain. Negative for arthralgias.        Pelvic pain  Radiating pain down R leg   Skin:  Positive for wound (well healing surgical wounds on low back and right leg).   Neurological:  Positive for headaches. Negative for weakness and numbness.     Objective:     Vital Signs (Most Recent):  Temp: 97.6 °F (36.4 °C) (05/23/25 1127)  Pulse: 90 (05/23/25 1127)  Resp: 18 (05/23/25 1127)  BP: 102/76 (05/23/25 1127)  SpO2: 99 % (05/23/25 1127) Vital Signs (24h Range):  Temp:  [97.6 °F (36.4 °C)-98.5 °F (36.9 °C)] 97.6 °F (36.4 °C)  Pulse:  [70-90] 90  Resp:  [16-20] 18  SpO2:  [98 %-100 %] 99 %  BP: (102-138)/(73-83) 102/76     Weight: 129.3 kg (285 lb)  Body mass index is 36.59 kg/m².  No intake or output data in the 24 hours ending 05/23/25 1332      Physical Exam  Constitutional:       General: He is not in acute distress.     Appearance: Normal appearance. He is not ill-appearing.   HENT:      Head: Normocephalic and atraumatic.   Eyes:      Extraocular Movements: Extraocular movements intact.      Conjunctiva/sclera: Conjunctivae normal.      Comments: Abnormal R pupil   Cardiovascular:      Rate and Rhythm: Normal rate and regular rhythm.      Pulses: Normal pulses.      Heart sounds: Normal heart sounds.   Pulmonary:      Effort: Pulmonary effort is normal. No respiratory distress.      Breath sounds: Normal breath sounds.   Abdominal:      General: There is no distension.      Palpations: Abdomen is soft.      Tenderness:  There is abdominal tenderness.   Genitourinary:     Comments: Pelvic tenderness  Pelvic fullness on palpation R>L  Musculoskeletal:         General: No swelling or tenderness.      Right lower leg: No edema.      Left lower leg: No edema.   Skin:     General: Skin is warm.      Capillary Refill: Capillary refill takes less than 2 seconds.      Comments: Well healing surgical scars (low back, R lateral thigh)   Neurological:      General: No focal deficit present.      Mental Status: He is alert and oriented to person, place, and time.      Cranial Nerves: No cranial nerve deficit.      Sensory: No sensory deficit.      Motor: No weakness.   Psychiatric:         Mood and Affect: Mood normal.         Thought Content: Thought content normal.               Significant Labs: All pertinent labs within the past 24 hours have been reviewed.  CBC:   Recent Labs   Lab 05/22/25  0859 05/22/25  0904 05/22/25  1654 05/23/25  0819   WBC 12.34  --  15.66* 15.55*   HGB 11.3*  --  11.9* 11.7*   HCT 33.6* 34* 35.3* 34.1*     --  359 271     CMP:   Recent Labs   Lab 05/22/25  0859 05/23/25  0819   * 131*   K 3.5 4.0    103   CO2 15* 14*    123*   BUN 14 18   CREATININE 0.9 1.3   CALCIUM 8.8 9.2   PROT  --  8.7*   ALBUMIN  --  3.8   BILITOT  --  0.9   ALKPHOS  --  74   AST  --  19   ALT  --  39   ANIONGAP 11 14       Significant Imaging: I have reviewed all pertinent imaging results/findings within the past 24 hours.    MRI Pelvis - 13.4 cm coccygeal mass.  Differential considerations include chordoma, chondrosarcoma, and giant cell tumor.     1.7 cm lesion in the S1 vertebral body with similar signal characteristics, concerning for metastasis.     Extensive DVT involving the IVC and iliac veins, with an IVC filter.  Ill-defined fluid collection extending along the right iliac vessels, new from earlier today, compatible with hematoma.     Cauda equina and epidural enhancement in the lower lumbar spine, likely  inflammatory from a recent lumbar drain although infection cannot be excluded.  No organized epidural collection.     Soft tissue edema in the right anterolateral thigh, which may be postoperative from recent graft harvest.  Hemorrhage and infection are difficult to exclude.     Mild degenerative changes as described.

## 2025-05-23 NOTE — ASSESSMENT & PLAN NOTE
Recent pituitary resection at [facility] on 5/1. Follow up admission 5/8-5/16 for CSF leak. Lumbar drain pulled on 5/15. No indication of recurrence of CSF leak. Only noted mass effect symptoms include asymmetric pupils.     - monitor for CSF rhinorrhea or vision changes

## 2025-05-23 NOTE — PROCEDURES
Interventional Radiology Post-Procedure Note     Pre Op Diagnosis:  pelvic/coccygeal mass       Post Op Diagnosis: same     Procedure:  Imaged guided biopsy of pelvic/coccygeal mass     Procedure performed by: Enedina Reich MD     Written Informed Consent Obtained: Yes     Specimen Removed: Yes.  18ga core samples x20     Estimated Blood Loss: Minimal     Findings:   Moderate sedation was administered.     Successful image-guided coaxial core biopsy of pelvic/coccygeal mass.  Pathology confirmed sample adequacy.     Enedina Reich MD

## 2025-05-24 LAB
ABSOLUTE EOSINOPHIL (OHS): 0.37 K/UL
ABSOLUTE EOSINOPHIL (OHS): 0.55 K/UL
ABSOLUTE MONOCYTE (OHS): 1.45 K/UL (ref 0.3–1)
ABSOLUTE MONOCYTE (OHS): 1.46 K/UL (ref 0.3–1)
ABSOLUTE NEUTROPHIL COUNT (OHS): 10.19 K/UL (ref 1.8–7.7)
ABSOLUTE NEUTROPHIL COUNT (OHS): 10.62 K/UL (ref 1.8–7.7)
ALBUMIN SERPL BCP-MCNC: 3.6 G/DL (ref 3.5–5.2)
ALP SERPL-CCNC: 89 UNIT/L (ref 40–150)
ALT SERPL W/O P-5'-P-CCNC: 46 UNIT/L (ref 10–44)
ANION GAP (OHS): 11 MMOL/L (ref 8–16)
AST SERPL-CCNC: 29 UNIT/L (ref 11–45)
BASOPHILS # BLD AUTO: 0.04 K/UL
BASOPHILS # BLD AUTO: 0.05 K/UL
BASOPHILS NFR BLD AUTO: 0.3 %
BASOPHILS NFR BLD AUTO: 0.3 %
BILIRUB SERPL-MCNC: 1 MG/DL (ref 0.1–1)
BUN SERPL-MCNC: 21 MG/DL (ref 6–20)
CALCIUM SERPL-MCNC: 9.1 MG/DL (ref 8.7–10.5)
CHLORIDE SERPL-SCNC: 107 MMOL/L (ref 95–110)
CO2 SERPL-SCNC: 17 MMOL/L (ref 23–29)
CREAT SERPL-MCNC: 1.3 MG/DL (ref 0.5–1.4)
ERYTHROCYTE [DISTWIDTH] IN BLOOD BY AUTOMATED COUNT: 13.9 % (ref 11.5–14.5)
ERYTHROCYTE [DISTWIDTH] IN BLOOD BY AUTOMATED COUNT: 14.4 % (ref 11.5–14.5)
GFR SERPLBLD CREATININE-BSD FMLA CKD-EPI: >60 ML/MIN/1.73/M2
GLUCOSE SERPL-MCNC: 107 MG/DL (ref 70–110)
HCT VFR BLD AUTO: 28.9 % (ref 40–54)
HCT VFR BLD AUTO: 34 % (ref 40–54)
HGB BLD-MCNC: 11.3 GM/DL (ref 14–18)
HGB BLD-MCNC: 9.6 GM/DL (ref 14–18)
IMM GRANULOCYTES # BLD AUTO: 0.09 K/UL (ref 0–0.04)
IMM GRANULOCYTES # BLD AUTO: 0.09 K/UL (ref 0–0.04)
IMM GRANULOCYTES NFR BLD AUTO: 0.6 % (ref 0–0.5)
IMM GRANULOCYTES NFR BLD AUTO: 0.6 % (ref 0–0.5)
LYMPHOCYTES # BLD AUTO: 2.41 K/UL (ref 1–4.8)
LYMPHOCYTES # BLD AUTO: 2.75 K/UL (ref 1–4.8)
MAGNESIUM SERPL-MCNC: 2 MG/DL (ref 1.6–2.6)
MCH RBC QN AUTO: 26.7 PG (ref 27–31)
MCH RBC QN AUTO: 26.7 PG (ref 27–31)
MCHC RBC AUTO-ENTMCNC: 33.2 G/DL (ref 32–36)
MCHC RBC AUTO-ENTMCNC: 33.2 G/DL (ref 32–36)
MCV RBC AUTO: 80 FL (ref 82–98)
MCV RBC AUTO: 80 FL (ref 82–98)
NUCLEATED RBC (/100WBC) (OHS): 0 /100 WBC
NUCLEATED RBC (/100WBC) (OHS): 0 /100 WBC
PHOSPHATE SERPL-MCNC: 3.8 MG/DL (ref 2.7–4.5)
PLATELET # BLD AUTO: 166 K/UL (ref 150–450)
PLATELET # BLD AUTO: 214 K/UL (ref 150–450)
PMV BLD AUTO: 10.1 FL (ref 9.2–12.9)
PMV BLD AUTO: 9.6 FL (ref 9.2–12.9)
POCT GLUCOSE: 114 MG/DL (ref 70–110)
POCT GLUCOSE: 115 MG/DL (ref 70–110)
POCT GLUCOSE: 140 MG/DL (ref 70–110)
POCT GLUCOSE: 97 MG/DL (ref 70–110)
POTASSIUM SERPL-SCNC: 4.3 MMOL/L (ref 3.5–5.1)
PROT SERPL-MCNC: 8.5 GM/DL (ref 6–8.4)
RBC # BLD AUTO: 3.6 M/UL (ref 4.6–6.2)
RBC # BLD AUTO: 4.23 M/UL (ref 4.6–6.2)
RELATIVE EOSINOPHIL (OHS): 2.4 %
RELATIVE EOSINOPHIL (OHS): 3.7 %
RELATIVE LYMPHOCYTE (OHS): 16.4 % (ref 18–48)
RELATIVE LYMPHOCYTE (OHS): 17.9 % (ref 18–48)
RELATIVE MONOCYTE (OHS): 9.5 % (ref 4–15)
RELATIVE MONOCYTE (OHS): 9.8 % (ref 4–15)
RELATIVE NEUTROPHIL (OHS): 69.2 % (ref 38–73)
RELATIVE NEUTROPHIL (OHS): 69.3 % (ref 38–73)
SODIUM SERPL-SCNC: 135 MMOL/L (ref 136–145)
WBC # BLD AUTO: 14.73 K/UL (ref 3.9–12.7)
WBC # BLD AUTO: 15.34 K/UL (ref 3.9–12.7)

## 2025-05-24 PROCEDURE — 83735 ASSAY OF MAGNESIUM: CPT

## 2025-05-24 PROCEDURE — 63600175 PHARM REV CODE 636 W HCPCS

## 2025-05-24 PROCEDURE — 36415 COLL VENOUS BLD VENIPUNCTURE: CPT

## 2025-05-24 PROCEDURE — 25000003 PHARM REV CODE 250

## 2025-05-24 PROCEDURE — 84450 TRANSFERASE (AST) (SGOT): CPT

## 2025-05-24 PROCEDURE — 84100 ASSAY OF PHOSPHORUS: CPT

## 2025-05-24 PROCEDURE — 85025 COMPLETE CBC W/AUTO DIFF WBC: CPT

## 2025-05-24 PROCEDURE — 11000001 HC ACUTE MED/SURG PRIVATE ROOM

## 2025-05-24 PROCEDURE — 21400001 HC TELEMETRY ROOM

## 2025-05-24 RX ORDER — MORPHINE SULFATE 2 MG/ML
6 INJECTION, SOLUTION INTRAMUSCULAR; INTRAVENOUS EVERY 4 HOURS PRN
Status: DISCONTINUED | OUTPATIENT
Start: 2025-05-24 | End: 2025-05-28

## 2025-05-24 RX ORDER — MORPHINE SULFATE 4 MG/ML
4 INJECTION, SOLUTION INTRAMUSCULAR; INTRAVENOUS EVERY 4 HOURS PRN
Status: DISCONTINUED | OUTPATIENT
Start: 2025-05-24 | End: 2025-05-28

## 2025-05-24 RX ADMIN — GABAPENTIN 300 MG: 300 CAPSULE ORAL at 09:05

## 2025-05-24 RX ADMIN — MORPHINE SULFATE 4 MG: 4 INJECTION INTRAVENOUS at 07:05

## 2025-05-24 RX ADMIN — MORPHINE SULFATE 6 MG: 2 INJECTION, SOLUTION INTRAMUSCULAR; INTRAVENOUS at 07:05

## 2025-05-24 RX ADMIN — MORPHINE SULFATE 6 MG: 2 INJECTION, SOLUTION INTRAMUSCULAR; INTRAVENOUS at 11:05

## 2025-05-24 RX ADMIN — PIPERACILLIN SODIUM AND TAZOBACTAM SODIUM 4.5 G: 4; .5 INJECTION, POWDER, FOR SOLUTION INTRAVENOUS at 04:05

## 2025-05-24 RX ADMIN — GABAPENTIN 300 MG: 300 CAPSULE ORAL at 02:05

## 2025-05-24 RX ADMIN — SODIUM CHLORIDE, POTASSIUM CHLORIDE, SODIUM LACTATE AND CALCIUM CHLORIDE 1000 ML: 600; 310; 30; 20 INJECTION, SOLUTION INTRAVENOUS at 09:05

## 2025-05-24 RX ADMIN — PIPERACILLIN SODIUM AND TAZOBACTAM SODIUM 4.5 G: 4; .5 INJECTION, POWDER, FOR SOLUTION INTRAVENOUS at 11:05

## 2025-05-24 RX ADMIN — ATORVASTATIN CALCIUM 20 MG: 20 TABLET, FILM COATED ORAL at 09:05

## 2025-05-24 RX ADMIN — PIPERACILLIN SODIUM AND TAZOBACTAM SODIUM 4.5 G: 4; .5 INJECTION, POWDER, FOR SOLUTION INTRAVENOUS at 09:05

## 2025-05-24 NOTE — ASSESSMENT & PLAN NOTE
Anemia is likely due to unclear etiology. Most recent hemoglobin and hematocrit are listed below.  Recent Labs     05/23/25  0819 05/23/25  1834 05/24/25  0340   HGB 11.7* 11.5* 11.3*   HCT 34.1* 35.4* 34.0*     Plan  - Monitor serial CBC: Daily  - Transfuse PRBC if patient becomes hemodynamically unstable, symptomatic or H/H drops below 7/21.  - Patient has not received any PRBC transfusions to date  - Patient's anemia is currently being monitored

## 2025-05-24 NOTE — PROGRESS NOTES
[facility] Medicine  Progress Note    Patient Name: Gallo Maria Jr.  MRN: [id number]  Patient Class: IP- Inpatient   Admission Date: 5/22/2025  Length of Stay: 1 days  Attending Physician: Emma Jackson MD  Primary Care Provider: Jerome Mederos MD        Subjective     Principal Problem:Pelvic mass        HPI:  Gallo Maria Jr. Is a 43yo male with a hx of BPH with urinary obstruction and pituitary adenoma s/p resection presenting for acute onset lower back pain that has radiating down his right leg, pelvic pain, and shortness of breath. The pain was sudden onset when he awoke from sleep this morning located in his lower back radiating down his right leg to mid thigh. The pelvic pain expands into his right groin. His shortness of breath is mainly with activity and has improved with rest. He also notes severe flushing and pre-syncopal symptoms while attempting to get to his car requiring him to take frequent breaks. His pain is still present when re-adjusting in bed but otherwise symptoms have largely improved with rest. He re[prts frequent headaches most recently yesterday unrelieved by tylenol #3. Otherwise he denies any vision changes, nasal leakage, congestion, cough, abdominal pain, N/V/D, constipation, urinary incontinence, or perianal numbness. Ambulation is limited by pain, not weakness.     He was recently admitted to [facility] on 5/1-5/5 for resection of a large pituitary adenoma and again from 5/8-5/16 for CSF leak requiring repair with lumbar drain and fat fascia bello grafts. Drain pulled 5/15. His hospital course also included two DVTs in the LLE. IVC filter placed on 5/15. He was advised to follow up outpatient to discuss utility of eliquis.     In the ED VSS, afebrile. CT chest abdomen pelvis revealing a large, fairly well-circumscribed heterogenous lesions in the right hemipelvis extending towards the gluteal folds. No PE. Labs notable for hgb 11.3, no  leukocytosis, bmp only notable for Na 134, BNP and troponin wnl, CRP elevated at 75.2.    Overview/Hospital Course:  MRI obtained with 13.4 cm coccygeal mass concerning for malignancy, 1.7 cm lesion in S1 concerning for metastasis, Extensive DVT in IVC and iliac veins with IVC filter in place, possible hematoma along right iliac vessels, Cauda equina and epidural enhancement in the lower lumbar spine, likely inflammatory from a recent lumbar drain, and soft tissue edema in right anterolateral thigh likely postoperative from recent graft harvest. S/p biopsy with IR. Family and patient updated. Pain control with gabapentin and PRN morphine.    Interval History: s/p biopsy with IR today. Increased swelling in BLE concerning for poor venous return 2/2 mass in pelvis. Will try elevation of legs. Also increased morphine dose for severe pain for better pain control.     Review of Systems  Objective:     Vital Signs (Most Recent):  Temp: 98.9 °F (37.2 °C) (05/24/25 1149)  Pulse: 92 (05/24/25 1149)  Resp: 19 (05/24/25 1149)  BP: 106/70 (05/24/25 1149)  SpO2: 99 % (05/24/25 1149) Vital Signs (24h Range):  Temp:  [97.2 °F (36.2 °C)-98.9 °F (37.2 °C)] 98.9 °F (37.2 °C)  Pulse:  [] 92  Resp:  [14-19] 19  SpO2:  [95 %-100 %] 99 %  BP: ()/(55-85) 106/70     Weight: 129.3 kg (285 lb)  Body mass index is 36.59 kg/m².    Intake/Output Summary (Last 24 hours) at 5/24/2025 1230  Last data filed at 5/24/2025 0928  Gross per 24 hour   Intake --   Output 475 ml   Net -475 ml         Physical Exam  Constitutional:       General: He is not in acute distress.     Appearance: Normal appearance. He is not ill-appearing.   HENT:      Head: Normocephalic and atraumatic.   Eyes:      Extraocular Movements: Extraocular movements intact.      Conjunctiva/sclera: Conjunctivae normal.      Comments: Abnormal R pupil   Cardiovascular:      Rate and Rhythm: Normal rate and regular rhythm.      Pulses: Normal pulses.      Heart sounds:  Normal heart sounds.   Pulmonary:      Effort: Pulmonary effort is normal. No respiratory distress.      Breath sounds: Normal breath sounds.   Abdominal:      General: There is no distension.      Palpations: Abdomen is soft.      Tenderness: There is abdominal tenderness.   Genitourinary:     Comments: Pelvic tenderness  Pelvic fullness on palpation R>L  Musculoskeletal:         General: No swelling or tenderness.      Right lower leg: No edema.      Left lower leg: No edema.   Skin:     General: Skin is warm.      Capillary Refill: Capillary refill takes less than 2 seconds.      Comments: Well healing surgical scars (low back, R lateral thigh)   Neurological:      General: No focal deficit present.      Mental Status: He is alert and oriented to person, place, and time.      Cranial Nerves: No cranial nerve deficit.      Sensory: No sensory deficit.      Motor: No weakness.   Psychiatric:         Mood and Affect: Mood normal.         Thought Content: Thought content normal.               Significant Labs: All pertinent labs within the past 24 hours have been reviewed.  CBC:   Recent Labs   Lab 05/23/25  0819 05/23/25  1834 05/24/25  0340   WBC 15.55* 18.58* 15.34*   HGB 11.7* 11.5* 11.3*   HCT 34.1* 35.4* 34.0*    228 214     CMP:   Recent Labs   Lab 05/23/25  0819 05/24/25  0340   * 135*   K 4.0 4.3    107   CO2 14* 17*   * 107   BUN 18 21*   CREATININE 1.3 1.3   CALCIUM 9.2 9.1   PROT 8.7* 8.5*   ALBUMIN 3.8 3.6   BILITOT 0.9 1.0   ALKPHOS 74 89   AST 19 29   ALT 39 46*   ANIONGAP 14 11       Significant Imaging: I have reviewed all pertinent imaging results/findings within the past 24 hours.      Assessment & Plan  Pelvic mass  Sudden onset pelvic pain morning of 5/22. CTA revealing large, fairly well-circumscribed heterogenous lesions in the right hemipelvis extending towards the gluteal folds. Leftward shift of pelvic organs. Recent IVC filter placed for DVTs. Recent lumbar csf  shortness of breath drain removed on 5/15.     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  Differential considerations include chordoma, chondrosarcoma, and giant cell tumor.  1.7 cm lesion in the S1 vertebral body with similar signal characteristics, concerning for metastasis.  Extensive DVT involving the IVC and iliac veins, with an IVC filter.  Ill-defined fluid collection extending along the right iliac vessels, new from earlier today, compatible with hematoma.  Cauda equina and epidural enhancement in the lower lumbar spine, likely inflammatory from a recent lumbar drain although infection cannot be excluded.  No organized epidural collection.  Soft tissue edema in the right anterolateral thigh, which may be postoperative from recent graft harvest.  Hemorrhage and infection are difficult to exclude.      - s/p biopsy with IR  - f/u pathology  - on zosyn, d/c vanc after 2 days  - gabapentin and morphine for pain control   - uptitrate as needed for pain control  - discussing case with surg vs radiation oncology based on pathology results, if radiosensitive, may need inpatient treatment if able given severity of symptoms  - consulted PT/OT for assistance with mobility and for any mobility aid recommendations      History of pituitary surgery  Postoperative CSF leak  Pituitary adenoma  Recent pituitary resection at [facility] on 5/1. Follow up admission 5/8-5/16 for CSF leak. Lumbar drain pulled on 5/15. No indication of recurrence of CSF leak. Only noted mass effect symptoms include asymmetric pupils.     - monitor for CSF rhinorrhea or vision changes  BPH with urinary obstruction  Noted, continue to monitor for urinary retention    Mixed hyperlipidemia  Continue home atorvastatin    Obesity (BMI 30-39.9)  Body mass index is 36.59 kg/m². Morbid obesity complicates all aspects of disease management from diagnostic modalities to treatment. Weight loss encouraged and health benefits explained to patient.     Hyponatremia  Hyponatremia is likely  due to unclear etiology. The patient's most recent sodium results are listed below.  Recent Labs     05/22/25  0859 05/23/25  0819 05/24/25  0340   * 131* 135*     Plan  - Obtain the following studies: Urine sodium, urine osmolality, serum osmolality or TSH, T4.  - Monitor sodium Daily.   - Patient hyponatremia is being monitored  Anemia  Anemia is likely due to unclear etiology. Most recent hemoglobin and hematocrit are listed below.  Recent Labs     05/23/25  0819 05/23/25  1834 05/24/25  0340   HGB 11.7* 11.5* 11.3*   HCT 34.1* 35.4* 34.0*     Plan  - Monitor serial CBC: Daily  - Transfuse PRBC if patient becomes hemodynamically unstable, symptomatic or H/H drops below 7/21.  - Patient has not received any PRBC transfusions to date  - Patient's anemia is currently being monitored  Acute deep vein thrombosis (DVT) of left lower extremity  Presence of IVC filter  B/L LE US 5/13/25 at [facility] with occlusive thrombus within the posterior tibial vein as well as nearly occlusive thrombus within the peroneal vein. Positive LLE DVT study. IVC filter in place    Plan:  - holding AC and VTE ppx pending additional workup and possible intervention       VTE Risk Mitigation (From admission, onward)           Ordered     Reason for No Pharmacological VTE Prophylaxis  Once        Question:  Reasons:  Answer:  Physician Provided (leave comment)  Comment:  Possible active bleed, pending possible intervention    05/22/25 1140     IP VTE HIGH RISK PATIENT  Once         05/22/25 1140     Place sequential compression device  Until discontinued         05/22/25 1140                    Discharge Planning   PORTER: 5/26/2025     Code Status: Full Code   Medical Readiness for Discharge Date:   Discharge Plan A: Home with family   Discharge Delays: None known at this time                    Yodit Hopper MD  Department of Hospital Medicine   [facility] S[facility]

## 2025-05-24 NOTE — ASSESSMENT & PLAN NOTE
Hyponatremia is likely due to unclear etiology. The patient's most recent sodium results are listed below.  Recent Labs     05/22/25  0859 05/23/25  0819 05/24/25  0340   * 131* 135*     Plan  - Obtain the following studies: Urine sodium, urine osmolality, serum osmolality or TSH, T4.  - Monitor sodium Daily.   - Patient hyponatremia is being monitored

## 2025-05-24 NOTE — ASSESSMENT & PLAN NOTE
Recent pituitary resection at [facility] on 5/1. Follow up admission 5/8-5/16 for CSF leak. Lumbar drain pulled on 5/15. No indication of recurrence of CSF leak. Only noted mass effect symptoms include asymmetric pupils.     - monitor for CSF rhinorrhea or vision changes

## 2025-05-24 NOTE — SUBJECTIVE & OBJECTIVE
Interval History: s/p biopsy with IR. Increased swelling in BLE concerning for poor venous return 2/2 mass in pelvis. Will try elevation of legs. Also increased morphine dose for severe pain for better pain control.     Review of Systems  Objective:     Vital Signs (Most Recent):  Temp: 98.9 °F (37.2 °C) (05/24/25 1149)  Pulse: 92 (05/24/25 1149)  Resp: 19 (05/24/25 1149)  BP: 106/70 (05/24/25 1149)  SpO2: 99 % (05/24/25 1149) Vital Signs (24h Range):  Temp:  [97.2 °F (36.2 °C)-98.9 °F (37.2 °C)] 98.9 °F (37.2 °C)  Pulse:  [] 92  Resp:  [14-19] 19  SpO2:  [95 %-100 %] 99 %  BP: ()/(55-85) 106/70     Weight: 129.3 kg (285 lb)  Body mass index is 36.59 kg/m².    Intake/Output Summary (Last 24 hours) at 5/24/2025 1230  Last data filed at 5/24/2025 0928  Gross per 24 hour   Intake --   Output 475 ml   Net -475 ml         Physical Exam  Constitutional:       General: He is not in acute distress.     Appearance: Normal appearance. He is not ill-appearing.   HENT:      Head: Normocephalic and atraumatic.   Eyes:      Extraocular Movements: Extraocular movements intact.      Conjunctiva/sclera: Conjunctivae normal.      Comments: Abnormal R pupil   Cardiovascular:      Rate and Rhythm: Normal rate and regular rhythm.      Pulses: Normal pulses.      Heart sounds: Normal heart sounds.   Pulmonary:      Effort: Pulmonary effort is normal. No respiratory distress.      Breath sounds: Normal breath sounds.   Abdominal:      General: There is no distension.      Palpations: Abdomen is soft.      Tenderness: There is abdominal tenderness.   Genitourinary:     Comments: Pelvic tenderness  Pelvic fullness on palpation R>L  Musculoskeletal:         General: No swelling or tenderness.      Right lower leg: No edema.      Left lower leg: No edema.   Skin:     General: Skin is warm.      Capillary Refill: Capillary refill takes less than 2 seconds.      Comments: Well healing surgical scars (low back, R lateral thigh)    Neurological:      General: No focal deficit present.      Mental Status: He is alert and oriented to person, place, and time.      Cranial Nerves: No cranial nerve deficit.      Sensory: No sensory deficit.      Motor: No weakness.   Psychiatric:         Mood and Affect: Mood normal.         Thought Content: Thought content normal.               Significant Labs: All pertinent labs within the past 24 hours have been reviewed.  CBC:   Recent Labs   Lab 05/23/25  0819 05/23/25  1834 05/24/25  0340   WBC 15.55* 18.58* 15.34*   HGB 11.7* 11.5* 11.3*   HCT 34.1* 35.4* 34.0*    228 214     CMP:   Recent Labs   Lab 05/23/25  0819 05/24/25  0340   * 135*   K 4.0 4.3    107   CO2 14* 17*   * 107   BUN 18 21*   CREATININE 1.3 1.3   CALCIUM 9.2 9.1   PROT 8.7* 8.5*   ALBUMIN 3.8 3.6   BILITOT 0.9 1.0   ALKPHOS 74 89   AST 19 29   ALT 39 46*   ANIONGAP 14 11       Significant Imaging: I have reviewed all pertinent imaging results/findings within the past 24 hours.

## 2025-05-24 NOTE — ASSESSMENT & PLAN NOTE
Sudden onset pelvic pain morning of 5/22. CTA revealing large, fairly well-circumscribed heterogenous lesions in the right hemipelvis extending towards the gluteal folds. Leftward shift of pelvic organs. Recent IVC filter placed for DVTs. Recent lumbar csf drain removed on 5/15.     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  Differential considerations include chordoma, chondrosarcoma, and giant cell tumor.  1.7 cm lesion in the S1 vertebral body with similar signal characteristics, concerning for metastasis.  Extensive DVT involving the IVC and iliac veins, with an IVC filter.  Ill-defined fluid collection extending along the right iliac vessels, new from earlier today, compatible with hematoma.  Cauda equina and epidural enhancement in the lower lumbar spine, likely inflammatory from a recent lumbar drain although infection cannot be excluded.  No organized epidural collection.  Soft tissue edema in the right anterolateral thigh, which may be postoperative from recent graft harvest.  Hemorrhage and infection are difficult to exclude.      - s/p biopsy with IR  - f/u pathology  - on zosyn, d/c vanc after 2 days  - gabapentin and morphine for pain control   - uptitrate as needed for pain control  - discussing case with surg vs radiation oncology based on pathology results, if radiosensitive, may need inpatient treatment if able given severity of symptoms  - consulted PT/OT for assistance with mobility and for any mobility aid recommendations

## 2025-05-24 NOTE — ASSESSMENT & PLAN NOTE
Recent pituitary resection at [facility] on 5/1. Follow up admission 5/8-5/16 for CSF leak. Lumbar drain pulled on 5/15. No indication of recurrence of CSF leak. Only noted mass effect symptoms include asymmetric pupils.     - monitor for CSF rhinorrhea or vision changes

## 2025-05-24 NOTE — PROGRESS NOTES
VANCOMYCIN DOSING BY PHARMACY DISCONTINUATION NOTE    Gallo Maria Jr. is a 44 y.o. male who had been consulted for vancomycin dosing.    The pharmacy consult for vancomycin dosing has been discontinued.     Vancomycin Dosing by Pharmacy Consult will sign-off. Please reconsult if necessary. Thank you for allowing us to participate in this patient's care.     Juan Ballesteros, PharmD, BCPS  Ext. [phone]

## 2025-05-25 LAB
ABSOLUTE EOSINOPHIL (OHS): 0.25 K/UL
ABSOLUTE EOSINOPHIL (OHS): 0.6 K/UL
ABSOLUTE MONOCYTE (OHS): 1.69 K/UL (ref 0.3–1)
ABSOLUTE MONOCYTE (OHS): 1.79 K/UL (ref 0.3–1)
ABSOLUTE NEUTROPHIL COUNT (OHS): 11.4 K/UL (ref 1.8–7.7)
ABSOLUTE NEUTROPHIL COUNT (OHS): 12.61 K/UL (ref 1.8–7.7)
ALBUMIN SERPL BCP-MCNC: 3.2 G/DL (ref 3.5–5.2)
ALP SERPL-CCNC: 94 UNIT/L (ref 40–150)
ALT SERPL W/O P-5'-P-CCNC: 54 UNIT/L (ref 10–44)
ANION GAP (OHS): 11 MMOL/L (ref 8–16)
AST SERPL-CCNC: 36 UNIT/L (ref 11–45)
BASOPHILS # BLD AUTO: 0.04 K/UL
BASOPHILS # BLD AUTO: 0.05 K/UL
BASOPHILS NFR BLD AUTO: 0.2 %
BASOPHILS NFR BLD AUTO: 0.3 %
BILIRUB SERPL-MCNC: 1.2 MG/DL (ref 0.1–1)
BUN SERPL-MCNC: 18 MG/DL (ref 6–20)
CALCIUM SERPL-MCNC: 8.8 MG/DL (ref 8.7–10.5)
CHLORIDE SERPL-SCNC: 104 MMOL/L (ref 95–110)
CO2 SERPL-SCNC: 17 MMOL/L (ref 23–29)
CREAT SERPL-MCNC: 1.1 MG/DL (ref 0.5–1.4)
ERYTHROCYTE [DISTWIDTH] IN BLOOD BY AUTOMATED COUNT: 13.8 % (ref 11.5–14.5)
ERYTHROCYTE [DISTWIDTH] IN BLOOD BY AUTOMATED COUNT: 13.9 % (ref 11.5–14.5)
GFR SERPLBLD CREATININE-BSD FMLA CKD-EPI: >60 ML/MIN/1.73/M2
GLUCOSE SERPL-MCNC: 112 MG/DL (ref 70–110)
HCT VFR BLD AUTO: 30.4 % (ref 40–54)
HCT VFR BLD AUTO: 30.7 % (ref 40–54)
HGB BLD-MCNC: 10.1 GM/DL (ref 14–18)
HGB BLD-MCNC: 10.1 GM/DL (ref 14–18)
IMM GRANULOCYTES # BLD AUTO: 0.12 K/UL (ref 0–0.04)
IMM GRANULOCYTES # BLD AUTO: 0.12 K/UL (ref 0–0.04)
IMM GRANULOCYTES NFR BLD AUTO: 0.7 % (ref 0–0.5)
IMM GRANULOCYTES NFR BLD AUTO: 0.7 % (ref 0–0.5)
LYMPHOCYTES # BLD AUTO: 2.25 K/UL (ref 1–4.8)
LYMPHOCYTES # BLD AUTO: 2.4 K/UL (ref 1–4.8)
MAGNESIUM SERPL-MCNC: 2 MG/DL (ref 1.6–2.6)
MCH RBC QN AUTO: 26.4 PG (ref 27–31)
MCH RBC QN AUTO: 26.5 PG (ref 27–31)
MCHC RBC AUTO-ENTMCNC: 32.9 G/DL (ref 32–36)
MCHC RBC AUTO-ENTMCNC: 33.2 G/DL (ref 32–36)
MCV RBC AUTO: 80 FL (ref 82–98)
MCV RBC AUTO: 80 FL (ref 82–98)
NUCLEATED RBC (/100WBC) (OHS): 0 /100 WBC
NUCLEATED RBC (/100WBC) (OHS): 0 /100 WBC
PHOSPHATE SERPL-MCNC: 3.1 MG/DL (ref 2.7–4.5)
PLATELET # BLD AUTO: 174 K/UL (ref 150–450)
PLATELET # BLD AUTO: 187 K/UL (ref 150–450)
PMV BLD AUTO: 9.8 FL (ref 9.2–12.9)
PMV BLD AUTO: 9.9 FL (ref 9.2–12.9)
POTASSIUM SERPL-SCNC: 4.1 MMOL/L (ref 3.5–5.1)
PROT SERPL-MCNC: 7.9 GM/DL (ref 6–8.4)
RBC # BLD AUTO: 3.81 M/UL (ref 4.6–6.2)
RBC # BLD AUTO: 3.83 M/UL (ref 4.6–6.2)
RELATIVE EOSINOPHIL (OHS): 1.5 %
RELATIVE EOSINOPHIL (OHS): 3.7 %
RELATIVE LYMPHOCYTE (OHS): 13.9 % (ref 18–48)
RELATIVE LYMPHOCYTE (OHS): 14 % (ref 18–48)
RELATIVE MONOCYTE (OHS): 10.4 % (ref 4–15)
RELATIVE MONOCYTE (OHS): 10.5 % (ref 4–15)
RELATIVE NEUTROPHIL (OHS): 70.8 % (ref 38–73)
RELATIVE NEUTROPHIL (OHS): 73.3 % (ref 38–73)
SODIUM SERPL-SCNC: 132 MMOL/L (ref 136–145)
WBC # BLD AUTO: 16.11 K/UL (ref 3.9–12.7)
WBC # BLD AUTO: 17.21 K/UL (ref 3.9–12.7)

## 2025-05-25 PROCEDURE — 97530 THERAPEUTIC ACTIVITIES: CPT

## 2025-05-25 PROCEDURE — 97535 SELF CARE MNGMENT TRAINING: CPT

## 2025-05-25 PROCEDURE — 97162 PT EVAL MOD COMPLEX 30 MIN: CPT

## 2025-05-25 PROCEDURE — 36415 COLL VENOUS BLD VENIPUNCTURE: CPT

## 2025-05-25 PROCEDURE — 97166 OT EVAL MOD COMPLEX 45 MIN: CPT

## 2025-05-25 PROCEDURE — 80053 COMPREHEN METABOLIC PANEL: CPT

## 2025-05-25 PROCEDURE — 83735 ASSAY OF MAGNESIUM: CPT

## 2025-05-25 PROCEDURE — 85025 COMPLETE CBC W/AUTO DIFF WBC: CPT

## 2025-05-25 PROCEDURE — 63600175 PHARM REV CODE 636 W HCPCS

## 2025-05-25 PROCEDURE — 25000003 PHARM REV CODE 250

## 2025-05-25 PROCEDURE — 84100 ASSAY OF PHOSPHORUS: CPT

## 2025-05-25 PROCEDURE — 21400001 HC TELEMETRY ROOM

## 2025-05-25 RX ORDER — AMOXICILLIN 250 MG
1 CAPSULE ORAL DAILY
Status: DISCONTINUED | OUTPATIENT
Start: 2025-05-25 | End: 2025-05-26

## 2025-05-25 RX ORDER — POLYETHYLENE GLYCOL 3350 17 G/17G
17 POWDER, FOR SOLUTION ORAL DAILY
Status: DISCONTINUED | OUTPATIENT
Start: 2025-05-25 | End: 2025-05-26

## 2025-05-25 RX ADMIN — GABAPENTIN 300 MG: 300 CAPSULE ORAL at 09:05

## 2025-05-25 RX ADMIN — MORPHINE SULFATE 6 MG: 2 INJECTION, SOLUTION INTRAMUSCULAR; INTRAVENOUS at 09:05

## 2025-05-25 RX ADMIN — MORPHINE SULFATE 6 MG: 2 INJECTION, SOLUTION INTRAMUSCULAR; INTRAVENOUS at 04:05

## 2025-05-25 RX ADMIN — POLYETHYLENE GLYCOL 3350 17 G: 17 POWDER, FOR SOLUTION ORAL at 09:05

## 2025-05-25 RX ADMIN — PIPERACILLIN SODIUM AND TAZOBACTAM SODIUM 4.5 G: 4; .5 INJECTION, POWDER, FOR SOLUTION INTRAVENOUS at 04:05

## 2025-05-25 RX ADMIN — GABAPENTIN 300 MG: 300 CAPSULE ORAL at 03:05

## 2025-05-25 RX ADMIN — MORPHINE SULFATE 6 MG: 2 INJECTION, SOLUTION INTRAMUSCULAR; INTRAVENOUS at 11:05

## 2025-05-25 RX ADMIN — MORPHINE SULFATE 4 MG: 4 INJECTION INTRAVENOUS at 12:05

## 2025-05-25 RX ADMIN — PIPERACILLIN SODIUM AND TAZOBACTAM SODIUM 4.5 G: 4; .5 INJECTION, POWDER, FOR SOLUTION INTRAVENOUS at 08:05

## 2025-05-25 RX ADMIN — SENNOSIDES AND DOCUSATE SODIUM 1 TABLET: 50; 8.6 TABLET ORAL at 09:05

## 2025-05-25 RX ADMIN — MORPHINE SULFATE 6 MG: 2 INJECTION, SOLUTION INTRAMUSCULAR; INTRAVENOUS at 03:05

## 2025-05-25 RX ADMIN — PIPERACILLIN SODIUM AND TAZOBACTAM SODIUM 4.5 G: 4; .5 INJECTION, POWDER, FOR SOLUTION INTRAVENOUS at 11:05

## 2025-05-25 RX ADMIN — ATORVASTATIN CALCIUM 20 MG: 20 TABLET, FILM COATED ORAL at 09:05

## 2025-05-25 RX ADMIN — MORPHINE SULFATE 4 MG: 4 INJECTION INTRAVENOUS at 09:05

## 2025-05-25 NOTE — SUBJECTIVE & OBJECTIVE
Interval History: NAEO. Pain improved on increased morphine. Leg tenderness increasing. Pending path. Bowel reg added.    Review of Systems   Gastrointestinal:  Negative for abdominal distention.   Genitourinary:  Negative for difficulty urinating.   Musculoskeletal:  Positive for back pain. Negative for arthralgias.        Pelvic pain  Radiating pain down R leg   Skin:  Positive for wound (well healing surgical wounds on low back and right leg).   Neurological:  Negative for weakness.     Objective:     Vital Signs (Most Recent):  Temp: 98 °F (36.7 °C) (05/25/25 0811)  Pulse: 92 (05/25/25 0811)  Resp: 16 (05/25/25 0811)  BP: 111/66 (05/25/25 0811)  SpO2: 99 % (05/25/25 0811) Vital Signs (24h Range):  Temp:  [97.9 °F (36.6 °C)-98.9 °F (37.2 °C)] 98 °F (36.7 °C)  Pulse:  [89-96] 92  Resp:  [16-20] 16  SpO2:  [97 %-100 %] 99 %  BP: (106-122)/(58-75) 111/66     Weight: 129.3 kg (285 lb)  Body mass index is 36.59 kg/m².    Intake/Output Summary (Last 24 hours) at 5/25/2025 0843  Last data filed at 5/24/2025 0928  Gross per 24 hour   Intake --   Output 475 ml   Net -475 ml         Physical Exam  Constitutional:       General: He is not in acute distress.     Appearance: Normal appearance. He is not ill-appearing.   HENT:      Head: Normocephalic and atraumatic.   Eyes:      Extraocular Movements: Extraocular movements intact.      Conjunctiva/sclera: Conjunctivae normal.   Cardiovascular:      Rate and Rhythm: Normal rate and regular rhythm.      Pulses: Normal pulses.      Heart sounds: Normal heart sounds.   Pulmonary:      Effort: Pulmonary effort is normal. No respiratory distress.      Breath sounds: Normal breath sounds.   Abdominal:      General: There is no distension.      Palpations: Abdomen is soft.      Tenderness: There is abdominal tenderness.   Genitourinary:     Comments: Pelvic tenderness  Pelvic fullness on palpation R>L  Musculoskeletal:         General: Tenderness (R lateral thigh) present. No  swelling.      Right lower leg: No edema.      Left lower leg: No edema.   Skin:     General: Skin is warm.      Capillary Refill: Capillary refill takes less than 2 seconds.      Comments: Well healing surgical scars (low back, R lateral thigh)   Neurological:      General: No focal deficit present.      Mental Status: He is alert and oriented to person, place, and time.      Cranial Nerves: No cranial nerve deficit.      Sensory: No sensory deficit.      Motor: No weakness.   Psychiatric:         Mood and Affect: Mood normal.         Thought Content: Thought content normal.               Significant Labs: All pertinent labs within the past 24 hours have been reviewed.  CBC:   Recent Labs   Lab 05/24/25  0340 05/24/25  1604 05/25/25  0510   WBC 15.34* 14.73* 16.11*   HGB 11.3* 9.6* 10.1*   HCT 34.0* 28.9* 30.4*    166 174     CMP:   Recent Labs   Lab 05/24/25  0340 05/25/25  0510   * 132*   K 4.3 4.1    104   CO2 17* 17*    112*   BUN 21* 18   CREATININE 1.3 1.1   CALCIUM 9.1 8.8   PROT 8.5* 7.9   ALBUMIN 3.6 3.2*   BILITOT 1.0 1.2*   ALKPHOS 89 94   AST 29 36   ALT 46* 54*   ANIONGAP 11 11       Significant Imaging: I have reviewed all pertinent imaging results/findings within the past 24 hours.

## 2025-05-25 NOTE — PT/OT/SLP EVAL
Occupational Therapy  Co-Evaluation & Treatment    Name: Gallo Maria Jr.  MRN: [id number]  Admitting Diagnosis: Pelvic mass  Recent Surgery: * No surgery found *      Recommendations:     Discharge Recommendations: High Intensity Therapy  Discharge Equipment Recommendations:  walker, rolling, wheelchair, bath bench, bedside commode  Barriers to discharge:  Other (Comment), Inaccessible home environment (incresed skilled assist required)    Assessment:     Gallo Maria Jr. is a 44 y.o. male with a medical diagnosis of Pelvic mass.  He presents with the following performance deficits affecting function: weakness, impaired endurance, impaired self care skills, impaired functional mobility, gait instability, impaired balance, decreased lower extremity function, pain.      Pt agreeable to session, motivated to participate, and tolerated well. Pt limited in optimal participation in session secondary to increased c/o BLE pain and decreased functional endurance as compared to his functional baseline. Pt still participatory throughout within his tolerance to activity. Pt would benefit from skilled OT services in the acute care setting to improve activity tolerance and functional endurance, increase participation in self-care routines, improve functional strength needed for safety with functional transfers and mobility, and facilitate a return to PLOF and least restrictive home environment. Patient presents with good participation and motivation to return to prior level of function with high intensity therapy.  The patient demonstrates appropriate endurance, strength, and fine motor control to participate in up to 3 hours or 15hrs of combined therapy post acute.    Rehab Prognosis: Good; patient would benefit from acute skilled OT services to address these deficits and reach maximum level of function.       Plan:     Patient to be seen 4 x/week to address the above listed problems via self-care/home management,  "therapeutic activities, therapeutic exercises, neuromuscular re-education  Plan of Care Expires: 06/22/25  Plan of Care Reviewed with: patient, spouse    Subjective     Chief Complaint: BLE pain  Patient/Family Comments/goals: "I don't understand how I was walking just three days ago and now it's like this."    Occupational Profile:  Living Environment: Pt lives with his wife and three children in a 2SH with ~1 ANJALI. Pt's main bedroom is on the first floor of the home, but his main bathroom (tub/shower) is on the second floor.   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 He enjoys reading and cooking in his free time.   Equipment Used at Home: none  Assistance upon Discharge: spouse available to assist as needed    Pain/Comfort:  Pain Rating 1: 8/10  Location 1: other (see comments) (pelvis, BLE)  Pain Addressed 1: Reposition, Distraction, Cessation of Activity    Patients cultural, spiritual, Yazdanism conflicts given the current situation: no    Objective:     Communicated with: Nursing prior to session.  Patient found HOB elevated with Other (comments) (no active lines) upon OT entry to room.    General Precautions: Standard, fall  Orthopedic Precautions: N/A  Braces: N/A  Respiratory Status: Room air    Occupational Performance:    Bed Mobility:    Patient completed Rolling/Turning to Left with stand by assistance and increased time  Patient completed Scooting/Bridging (x3 scoots along EOB) with stand by assistance and increased time  Patient completed Supine to Sit with minimum assistance  Patient completed Sit to Supine with minimum assistance    Functional Mobility/Transfers:  Patient completed Sit <> Stand Transfer with minimum assistance  with  rolling walker   Functional Mobility: Pt able to tolerate sitting EOB for ~10 minutes with SBA provided for safety. Pt performing " STS from EOB with RW and min assist provided; however, pt unable to initiate steps (forward or lateral) secondary to BLE pain.     Activities of Daily Living:  Upper Body Dressing: moderate assistance to adjust hospital gown in seated EOB  Lower Body Dressing: maximal assistance to don and doff hospital socks in supine    Cognitive/Visual Perceptual:  Cognitive/Psychosocial Skills:     -       Oriented to: Person, Place, Time, and Situation   -       Follows Commands/attention:Follows multistep  commands  -       Communication: clear/fluent  -       Memory: No Deficits noted  -       Safety awareness/insight to disability: intact   -       Mood/Affect/Coping skills/emotional control: Appropriate to situation, Cooperative, and Pleasant    Physical Exam:  Sensation:    -       Intact  light/touch BUE  Upper Extremity Range of Motion:     -       Right Upper Extremity: WFL  -       Left Upper Extremity: WFL  Upper Extremity Strength:    -       Right Upper Extremity: WFL  -       Left Upper Extremity: WFL   Strength:    -       Right Upper Extremity: WFL  -       Left Upper Extremity: WFL  Fine Motor Coordination:    -       Intact  Gross motor coordination:   WFL    AMPAC 6 Click ADL:  AMPAC Total Score: 21    Treatment & Education:  Provided education on the role of OT, POC, and therapy goals while in the acute care setting.   Provided education on the importance of continued mobilization and participation in OOB activities to increase functional endurance and activity tolerance for increased participation in ADL routines.   Provided education on safe transfer techniques and proper hand placement to promote safety awareness and prevent falls with functional transfers.   All questions/concerns within the scope of OT answered/addressed - pt verbalized understanding.   Instructed pt to call for assistance when wanting to ambulate or participate in OOB activities to promote safety and prevent falls.   White board  updated.    Co-evaluation/treatment performed to appropriately and safely assess patient's strength, endurance, functional mobility, and ADL performance while facilitating functional tasks in addition to accommodating for patient's activity tolerance and medical acuity.    Patient left HOB elevated with all lines intact, call button in reach, and spouse present    GOALS:   Multidisciplinary Problems       Occupational Therapy Goals          Problem: Occupational Therapy    Goal Priority Disciplines Outcome Interventions   Occupational Therapy Goal     OT, PT/OT Progressing    Description: Goals to be met by: 6/22/25.     Patient will increase functional independence with ADLs by performing:    UE Dressing with Stand-by Assistance.  LE Dressing with Stand-by Assistance.  Grooming while standing at sink with Stand-by Assistance.  Toileting from toilet with Stand-by Assistance for hygiene and clothing management.   Toilet transfer to toilet with Stand-by Assistance.                         DME Justifications:   Gallo requires a commode for home use because he is confined to a single room.  Gallo Maria Jr. has a mobility limitation that significantly impairs his ability to participate in one or more mobility related activities of daily living (MRADLs) such as toileting, feeding, dressing, grooming, and bathing in customary locations in the home.  The mobility limitation cannot be sufficiently resolved by the use of a cane or walker.   The use of a manual wheelchair will significantly improve the patients ability to participate in MRADLS and the patient will use it on regular basis in the home.  Gallo Maria Jr. has expressed his willingness to use a manual wheelchair in the home. Patients upper body strength is sufficient for propulsion.  He also has a caregiver who is available, willing, and able to provide assistance with the wheelchair when needed.     Arlettes mobility limitation cannot be sufficiently  resolved by the use of a cane. His functional mobility deficit can be sufficiently resolved with the use of a Rolling Walker. Patient's mobility limitation significantly impairs their ability to participate in one of more activities of daily living.  The use of a RW will significantly improve the patient's ability to participate in MRADLS and the patient will use it on regular basis in the home.    History:     Past Medical History:   Diagnosis Date    ADD (attention deficit disorder)     treated by outside psychiatrist.    Asthma     BPH with urinary obstruction 02/05/2015    Decreased libido 08/31/2015    Depression     Erectile dysfunction 10/27/2015    Family history of prostate cancer 07/31/2013    History of migraine headaches     History of pituitary tumor 09/25/2015    S/p removal 2016      Hypogonadism male 10/27/2015    Insomnia     Pituitary adenoma 08/09/2012         Past Surgical History:   Procedure Laterality Date    BIOPSY OF TONSILS Right 9/9/2019    Procedure: BIOPSY, TONSILS;  Surgeon: Bruno Umaña MD;  Location: [address];  Service: ENT;  Laterality: Right;    COLONOSCOPY N/A 9/3/2024    Procedure: COLONOSCOPY;  Surgeon: Ander Wolfe MD;  Location: [facility];  Service: Endoscopy;  Laterality: N/A;    SURGICAL REMOVAL OF PITUITARY TUMOR BY TRANSSPHENOIDAL APPROACH  2015    Pituitary [place] via Dr. Blair 2015    SURGICAL REMOVAL OF PITUITARY TUMOR BY TRANSSPHENOIDAL APPROACH N/A 4/19/2023    Procedure: RESECTION, NEOPLASM, PITUITARY, TRANSSPHENOIDAL APPROACH;  Surgeon: Derek Blair MD;  Location: [address];  Service: Neurosurgery;  Laterality: N/A;  TRANSPHENOIDAL RESECTION PITUITARY ADENOMA/ 2 UNITS RBC, TEDS & SCD, FLOURO, TRANSPHENOIDAL SET, MICROSCOPE, [facility] SURGEON.    WISDOM TOOTH EXTRACTION         Time Tracking:     OT Date of Treatment: 05/25/25  OT Start Time: 1118  OT Stop Time: 1139  OT Total Time (min): 21 min    Billable Minutes:Evaluation 10  minutes  Self Care/Home Management 8 minutes    5/25/2025

## 2025-05-25 NOTE — ASSESSMENT & PLAN NOTE
Hyponatremia is likely due to unclear etiology. The patient's most recent sodium results are listed below.  Recent Labs     05/23/25  0819 05/24/25  0340 05/25/25  0510   * 135* 132*     Plan  - Obtain the following studies: Urine sodium, urine osmolality, serum osmolality or TSH, T4.  - Monitor sodium Daily.   - Patient hyponatremia is stable

## 2025-05-25 NOTE — PROGRESS NOTES
[facility] Medicine  Progress Note    Patient Name: Gallo Maria Jr.  MRN: [id number]  Patient Class: IP- Inpatient   Admission Date: 5/22/2025  Length of Stay: 2 days  Attending Physician: Emma Jackson MD  Primary Care Provider: Jerome Mederos MD        Subjective     Principal Problem:Pelvic mass        HPI:  Gallo Maria Jr. Is a 45yo male with a hx of BPH with urinary obstruction and pituitary adenoma s/p resection presenting for acute onset lower back pain that has radiating down his right leg, pelvic pain, and shortness of breath. The pain was sudden onset when he awoke from sleep this morning located in his lower back radiating down his right leg to mid thigh. The pelvic pain expands into his right groin. His shortness of breath is mainly with activity and has improved with rest. He also notes severe flushing and pre-syncopal symptoms while attempting to get to his car requiring him to take frequent breaks. His pain is still present when re-adjusting in bed but otherwise symptoms have largely improved with rest. He re[prts frequent headaches most recently yesterday unrelieved by tylenol #3. Otherwise he denies any vision changes, nasal leakage, congestion, cough, abdominal pain, N/V/D, constipation, urinary incontinence, or perianal numbness. Ambulation is limited by pain, not weakness.     He was recently admitted to [facility] on 5/1-5/5 for resection of a large pituitary adenoma and again from 5/8-5/16 for CSF leak requiring repair with lumbar drain and fat fascia bello grafts. Drain pulled 5/15. His hospital course also included two DVTs in the LLE. IVC filter placed on 5/15. He was advised to follow up outpatient to discuss utility of eliquis.     In the ED VSS, afebrile. CT chest abdomen pelvis revealing a large, fairly well-circumscribed heterogenous lesions in the right hemipelvis extending towards the gluteal folds. No PE. Labs notable for hgb 11.3, no  leukocytosis, bmp only notable for Na 134, BNP and troponin wnl, CRP elevated at 75.2.    Overview/Hospital Course:  MRI obtained with 13.4 cm coccygeal mass concerning for malignancy, 1.7 cm lesion in S1 concerning for metastasis, Extensive DVT in IVC and iliac veins with IVC filter in place, possible hematoma along right iliac vessels, Cauda equina and epidural enhancement in the lower lumbar spine, likely inflammatory from a recent lumbar drain, and soft tissue edema in right anterolateral thigh likely postoperative from recent graft harvest. S/p biopsy with IR. Family and patient updated. Pain control with gabapentin and PRN morphine.    Interval History: NAEO. Pain improved on increased morphine. Leg tenderness increasing. Pending path. Bowel reg added.    Review of Systems   Gastrointestinal:  Negative for abdominal distention.   Genitourinary:  Negative for difficulty urinating.   Musculoskeletal:  Positive for back pain. Negative for arthralgias.        Pelvic pain  Radiating pain down R leg   Skin:  Positive for wound (well healing surgical wounds on low back and right leg).   Neurological:  Negative for weakness.     Objective:     Vital Signs (Most Recent):  Temp: 98 °F (36.7 °C) (05/25/25 0811)  Pulse: 92 (05/25/25 0811)  Resp: 16 (05/25/25 0811)  BP: 111/66 (05/25/25 0811)  SpO2: 99 % (05/25/25 0811) Vital Signs (24h Range):  Temp:  [97.9 °F (36.6 °C)-98.9 °F (37.2 °C)] 98 °F (36.7 °C)  Pulse:  [89-96] 92  Resp:  [16-20] 16  SpO2:  [97 %-100 %] 99 %  BP: (106-122)/(58-75) 111/66     Weight: 129.3 kg (285 lb)  Body mass index is 36.59 kg/m².    Intake/Output Summary (Last 24 hours) at 5/25/2025 0861  Last data filed at 5/24/2025 0928  Gross per 24 hour   Intake --   Output 475 ml   Net -475 ml         Physical Exam  Constitutional:       General: He is not in acute distress.     Appearance: Normal appearance. He is not ill-appearing.   HENT:      Head: Normocephalic and atraumatic.   Eyes:       Extraocular Movements: Extraocular movements intact.      Conjunctiva/sclera: Conjunctivae normal.   Cardiovascular:      Rate and Rhythm: Normal rate and regular rhythm.      Pulses: Normal pulses.      Heart sounds: Normal heart sounds.   Pulmonary:      Effort: Pulmonary effort is normal. No respiratory distress.      Breath sounds: Normal breath sounds.   Abdominal:      General: There is no distension.      Palpations: Abdomen is soft.      Tenderness: There is abdominal tenderness.   Genitourinary:     Comments: Pelvic tenderness  Pelvic fullness on palpation R>L  Musculoskeletal:         General: Tenderness (R lateral thigh) present. No swelling.      Right lower leg: No edema.      Left lower leg: No edema.   Skin:     General: Skin is warm.      Capillary Refill: Capillary refill takes less than 2 seconds.      Comments: Well healing surgical scars (low back, R lateral thigh)   Neurological:      General: No focal deficit present.      Mental Status: He is alert and oriented to person, place, and time.      Cranial Nerves: No cranial nerve deficit.      Sensory: No sensory deficit.      Motor: No weakness.   Psychiatric:         Mood and Affect: Mood normal.         Thought Content: Thought content normal.               Significant Labs: All pertinent labs within the past 24 hours have been reviewed.  CBC:   Recent Labs   Lab 05/24/25  0340 05/24/25  1604 05/25/25  0510   WBC 15.34* 14.73* 16.11*   HGB 11.3* 9.6* 10.1*   HCT 34.0* 28.9* 30.4*    166 174     CMP:   Recent Labs   Lab 05/24/25  0340 05/25/25  0510   * 132*   K 4.3 4.1    104   CO2 17* 17*    112*   BUN 21* 18   CREATININE 1.3 1.1   CALCIUM 9.1 8.8   PROT 8.5* 7.9   ALBUMIN 3.6 3.2*   BILITOT 1.0 1.2*   ALKPHOS 89 94   AST 29 36   ALT 46* 54*   ANIONGAP 11 11       Significant Imaging: I have reviewed all pertinent imaging results/findings within the past 24 hours.      Assessment & Plan  Pelvic mass  Sudden onset  pelvic pain morning of 5/22. CTA revealing large, fairly well-circumscribed heterogenous lesions in the right hemipelvis extending towards the gluteal folds. Leftward shift of pelvic organs. Recent IVC filter placed for DVTs. Recent lumbar csf drain removed on 5/15.     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  Differential considerations include chordoma, chondrosarcoma, and giant cell tumor.  1.7 cm lesion in the S1 vertebral body with similar signal characteristics, concerning for metastasis.  Extensive DVT involving the IVC and iliac veins, with an IVC filter.  Ill-defined fluid collection extending along the right iliac vessels, new from earlier today, compatible with hematoma.  Cauda equina and epidural enhancement in the lower lumbar spine, likely inflammatory from a recent lumbar drain although infection cannot be excluded.  No organized epidural collection.  Soft tissue edema in the right anterolateral thigh, which may be postoperative from recent graft harvest.  Hemorrhage and infection are difficult to exclude.      - s/p biopsy with IR  - f/u pathology  - on zosyn, d/c vanc after 2 days  - gabapentin and morphine for pain control   - uptitrate as needed for pain control  - discussing case with surg vs radiation oncology based on pathology results, if radiosensitive, may need inpatient treatment if able given severity of symptoms  - consulted PT/OT for assistance with mobility and for any mobility aid recommendations  - bowel reg added      History of pituitary surgery  Postoperative CSF leak  Pituitary adenoma  Recent pituitary resection at [facility] on 5/1. Follow up admission 5/8-5/16 for CSF leak. Lumbar drain pulled on 5/15. No indication of recurrence of CSF leak. Only noted mass effect symptoms include asymmetric pupils.     - monitor for CSF rhinorrhea or vision changes  BPH with urinary obstruction  Noted, continue to monitor for urinary retention    Mixed hyperlipidemia  Continue home  atorvastatin    Obesity (BMI 30-39.9)  Body mass index is 30.22 kg/m². Morbid obesity complicates all aspects of disease management from diagnostic modalities to treatment. Weight loss encouraged and health benefits explained to patient.     Hyponatremia  Hyponatremia is likely due to unclear etiology. The patient's most recent sodium results are listed below.  Recent Labs     05/23/25  0819 05/24/25  0340 05/25/25  0510   * 135* 132*     Plan  - Obtain the following studies: Urine sodium, urine osmolality, serum osmolality or TSH, T4.  - Monitor sodium Daily.   - Patient hyponatremia is stable  Anemia  Anemia is likely due to unclear etiology. Most recent hemoglobin and hematocrit are listed below.  Recent Labs     05/24/25  0340 05/24/25  1604 05/25/25  0510   HGB 11.3* 9.6* 10.1*   HCT 34.0* 28.9* 30.4*     Plan  - Monitor serial CBC: Daily  - Transfuse PRBC if patient becomes hemodynamically unstable, symptomatic or H/H drops below 7/21.  - Patient has not received any PRBC transfusions to date  - Patient's anemia is currently being monitored  Acute deep vein thrombosis (DVT) of left lower extremity  Presence of IVC filter  B/L LE US 5/13/25 at [facility] with occlusive thrombus within the posterior tibial vein as well as nearly occlusive thrombus within the peroneal vein. Positive LLE DVT study. IVC filter in place    Plan:  - holding AC and VTE ppx pending additional workup and possible intervention       VTE Risk Mitigation (From admission, onward)           Ordered     Reason for No Pharmacological VTE Prophylaxis  Once        Question:  Reasons:  Answer:  Physician Provided (leave comment)  Comment:  Possible active bleed, pending possible intervention    05/22/25 1140     IP VTE HIGH RISK PATIENT  Once         05/22/25 1140     Place sequential compression device  Until discontinued         05/22/25 1140                    Discharge Planning   PORTER: 5/26/2025     Code Status: Full Code   Medical  Readiness for Discharge Date:   Discharge Plan A: Home with family   Discharge Delays: None known at this time            Please place Justification for DME        Karen Holt MD  Department of Hospital Medicine   [facility]

## 2025-05-25 NOTE — ASSESSMENT & PLAN NOTE
Anemia is likely due to unclear etiology. Most recent hemoglobin and hematocrit are listed below.  Recent Labs     05/24/25  0340 05/24/25  1604 05/25/25  0510   HGB 11.3* 9.6* 10.1*   HCT 34.0* 28.9* 30.4*     Plan  - Monitor serial CBC: Daily  - Transfuse PRBC if patient becomes hemodynamically unstable, symptomatic or H/H drops below 7/21.  - Patient has not received any PRBC transfusions to date  - Patient's anemia is currently being monitored

## 2025-05-25 NOTE — PLAN OF CARE
Problem: Occupational Therapy  Goal: Occupational Therapy Goal  Description: Goals to be met by: 6/22/25.     Patient will increase functional independence with ADLs by performing:    UE Dressing with Stand-by Assistance.  LE Dressing with Stand-by Assistance.  Grooming while standing at sink with Stand-by Assistance.  Toileting from toilet with Stand-by Assistance for hygiene and clothing management.   Toilet transfer to toilet with Stand-by Assistance.    Outcome: Progressing     OT evaluation completed and goals established.

## 2025-05-25 NOTE — ASSESSMENT & PLAN NOTE
Recent pituitary resection at [facility] on 5/1. Follow up admission 5/8-5/16 for CSF leak. Lumbar drain pulled on 5/15. No indication of recurrence of CSF leak. Only noted mass effect symptoms include asymmetric pupils.     - monitor for CSF rhinorrhea or vision changes

## 2025-05-25 NOTE — ASSESSMENT & PLAN NOTE
Recent pituitary resection at [facility] on 5/1. Follow up admission 5/8-5/16 for CSF leak. Lumbar drain pulled on 5/15. No indication of recurrence of CSF leak. Only noted mass effect symptoms include asymmetric pupils.     - monitor for CSF rhinorrhea or vision changes

## 2025-05-25 NOTE — ASSESSMENT & PLAN NOTE
Recent pituitary resection at [facility] on 5/1. Follow up admission 5/8-5/16 for CSF leak. Lumbar drain pulled on 5/15. No indication of recurrence of CSF leak. Only noted mass effect symptoms include asymmetric pupils.     - monitor for CSF rhinorrhea or vision changes

## 2025-05-25 NOTE — ASSESSMENT & PLAN NOTE
Sudden onset pelvic pain morning of 5/22. CTA revealing large, fairly well-circumscribed heterogenous lesions in the right hemipelvis extending towards the gluteal folds. Leftward shift of pelvic organs. Recent IVC filter placed for DVTs. Recent lumbar csf drain removed on 5/15.     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  Differential considerations include chordoma, chondrosarcoma, and giant cell tumor.  1.7 cm lesion in the S1 vertebral body with similar signal characteristics, concerning for metastasis.  Extensive DVT involving the IVC and iliac veins, with an IVC filter.  Ill-defined fluid collection extending along the right iliac vessels, new from earlier today, compatible with hematoma.  Cauda equina and epidural enhancement in the lower lumbar spine, likely inflammatory from a recent lumbar drain although infection cannot be excluded.  No organized epidural collection.  Soft tissue edema in the right anterolateral thigh, which may be postoperative from recent graft harvest.  Hemorrhage and infection are difficult to exclude.      - s/p biopsy with IR  - f/u pathology  - on zosyn, d/c vanc after 2 days  - gabapentin and morphine for pain control   - uptitrate as needed for pain control  - discussing case with surg vs radiation oncology based on pathology results, if radiosensitive, may need inpatient treatment if able given severity of symptoms  - consulted PT/OT for assistance with mobility and for any mobility aid recommendations  - bowel reg added

## 2025-05-25 NOTE — PT/OT/SLP EVAL
Physical Therapy Co-Evaluation  Co-eval performed due to anticipated need for 2 skilled therapists to appropriately and safely assess patient's strength and endurance to facilitate functional and safe mobility in addition to accommodating for patient's activity tolerance.    Patient Name:  Gallo Maria Jr.   MRN:  [id number]    Recommendations:     Discharge Recommendations: High Intensity Therapy   Discharge Equipment Recommendations: wheelchair, walker, rolling   Barriers to discharge: Inaccessible home and Decreased caregiver support    Assessment:     Gallo Maria Jr. is a 44 y.o. male admitted with a medical diagnosis of Pelvic mass.  He presents with the following impairments/functional limitations: weakness, impaired endurance, impaired self care skills, impaired functional mobility, gait instability, impaired balance, decreased lower extremity function, pain, orthopedic precautions. Pt primarily limited by pain, but participated to the best of his ability. Pt would benefit from high intensity/frequency therapy for: Dynamic/static standing/sitting balance through skilled balance training, strengthening with the use of skilled therapeutic exercises interventions, mobility and safety training to ensure safe discharge home through skilled patient and caregiver education, and mobility through adaptive equipment training. Pt highly motivated to return to independent PLOF and can tolerate 3+hours of therapy. Pt continues to benefit from a collaborative multidisciplinary program to improve quality of life and focus on recovery of impairments.    Rehab Prognosis: Good; patient would benefit from acute skilled PT services to address these deficits and reach maximum level of function.    Recent Surgery: * No surgery found *      Plan:     During this hospitalization, patient to be seen 4 x/week to address the identified rehab impairments via gait training, therapeutic activities, therapeutic exercises,  neuromuscular re-education and progress toward the following goals:    Plan of Care Expires:  06/24/25    Subjective     Chief Complaint: pain  Patient/Family Comments/goals: pt reports pain in abdomen and BLEs  Pain/Comfort:  Pain Rating 1: 8/10  Location 1:  (abdomen and BLEs)  Pain Addressed 1: Distraction  Pain Rating Post-Intervention 1: 8/10    Patients cultural, spiritual, Judaism conflicts given the current situation: no    Living Environment:  Pt lives with his wife and kids in 2SH with 1 ANJALI. Pt's bedroom is downstairs, but his bathroom, a tub/shower, was upstairs.  Prior to admission, patients level of function was independent for all functional mobility and ADLs. Pt is an active  and works at [employer] (job involves heavy lifting and climbing. Pt reports he enjoys reading and relaxing.  Equipment used at home: none.  DME owned (not currently used): none.  Upon discharge, patient will have assistance from spouse.    Objective:     Communicated with RN prior to session.  Patient found HOB elevated with peripheral IV  upon PT entry to room.    General Precautions: Standard, fall  Orthopedic Precautions:N/A   Braces: N/A  Respiratory Status: Room air    Exams:  Cognitive Exam:  Patient is oriented to Person, Place, Time, and Situation  Gross Motor Coordination:  WFL  Postural Exam:  Patient presented with the following abnormalities:    -       Rounded shoulders  -       Forward head  Sensation:    -       Intact BLEs, but reports he feels a lot of pressure in BLEs from the waist down  RLE ROM: WFL  RLE Strength: 3/5 (unable to apply resistance 2/2 pain), knee extension 4/5, knee flexion 4/5, DF 4/5  LLE ROM: WFL  LLE Strength: 3/5 (unable to apply resistance 2/2 pain), knee extension 4/5, knee flexion 4/5, DF 4/5    Functional Mobility:  Bed Mobility:     Rolling Left:  stand by assistance  Scooting: stand by assistance  Supine to Sit: minimum assistance  Sit to Supine: minimum assistance  Transfers:      Sit to Stand:  minimum assistance with rolling walker  Gait: unable to complete 2/2 pain      AM-PAC 6 CLICK MOBILITY  Total Score:14       Treatment & Education:  Patient educated on role of therapy, goals of session, and benefits of mobilizing.   Discussed PT plan of care during hospitalization.   Patient educated on calling for assistance.   Patient educated on how their diagnosis impacts their mobility within PT scope of practice.   Communication board up to date.  All questions answered within PT scope of practice.    Patient left HOB elevated with all lines intact, call button in reach, and pt's spouse present.    GOALS:   Multidisciplinary Problems       Physical Therapy Goals          Problem: Physical Therapy    Goal Priority Disciplines Outcome Interventions   Physical Therapy Goal     PT, PT/OT Progressing    Description: Goals to be met by: 2025     Patient will increase functional independence with mobility by performin. Supine to sit with Stand-by Assistance  2. Sit to supine with Stand-by Assistance  3. Sit to stand transfer with Stand-by Assistance using no AD or LRAD.  4. Bed to chair transfer with Stand-by Assistance using no AD or LRAD.  5. Gait  x 150 feet with Stand-by Assistance using no AD or lRAD.   6. Ascend/Descend 8 inch curb step with Stand-by Assistance using no AD or LRAD.  7. Lower extremity exercise program x10 reps per handout, with supervision                         DME Justifications:  Gallo Maria Jr. has a mobility limitation that significantly impairs his ability to participate in one or more mobility related activities of daily living (MRADLs) such as toileting, feeding, dressing, grooming, and bathing in customary locations in the home.  The mobility limitation cannot be sufficiently resolved by the use of a cane or walker.   The use of a manual wheelchair will significantly improve the patients ability to participate in MRADLS and the patient will use it on  regular basis in the home.  Gallo Maria Jr. has expressed his willingness to use a manual wheelchair in the home. Patients upper body strength is sufficient for propulsion.  He also has a caregiver who is available, willing, and able to provide assistance with the wheelchair when needed.      History:     Past Medical History:   Diagnosis Date    ADD (attention deficit disorder)     treated by outside psychiatrist.    Asthma     BPH with urinary obstruction 02/05/2015    Decreased libido 08/31/2015    Depression     Erectile dysfunction 10/27/2015    Family history of prostate cancer 07/31/2013    History of migraine headaches     History of pituitary tumor 09/25/2015    S/p removal 2016      Hypogonadism male 10/27/2015    Insomnia     Pituitary adenoma 08/09/2012       Past Surgical History:   Procedure Laterality Date    BIOPSY OF TONSILS Right 9/9/2019    Procedure: BIOPSY, TONSILS;  Surgeon: Bruno Umaña MD;  Location: [facility] OR [address] [place] [address];  Service: ENT;  Laterality: Right;    COLONOSCOPY N/A 9/3/2024    Procedure: COLONOSCOPY;  Surgeon: Ander Wolfe MD;  Location: [facility];  Service: Endoscopy;  Laterality: N/A;    SURGICAL REMOVAL OF PITUITARY TUMOR BY TRANSSPHENOIDAL APPROACH  2015    Pituitary [place] via Dr. Blair 2015    SURGICAL REMOVAL OF PITUITARY TUMOR BY TRANSSPHENOIDAL APPROACH N/A 4/19/2023    Procedure: RESECTION, NEOPLASM, PITUITARY, TRANSSPHENOIDAL APPROACH;  Surgeon: Derek Blair MD;  Location: [address];  Service: Neurosurgery;  Laterality: N/A;  TRANSPHENOIDAL RESECTION PITUITARY ADENOMA/ 2 UNITS RBC, TEDS & SCD, FLOURO, TRANSPHENOIDAL SET, MICROSCOPE, [facility] SURGEON.    WISDOM TOOTH EXTRACTION         Time Tracking:     PT Received On: 05/25/25  PT Start Time: 1119     PT Stop Time: 1139  PT Total Time (min): 20 min     Billable Minutes: Evaluation 10 and Therapeutic Activity 10      05/25/2025

## 2025-05-25 NOTE — PLAN OF CARE
Problem: Physical Therapy  Goal: Physical Therapy Goal  Description: Goals to be met by: 2025     Patient will increase functional independence with mobility by performin. Supine to sit with Stand-by Assistance  2. Sit to supine with Stand-by Assistance  3. Sit to stand transfer with Stand-by Assistance using no AD or LRAD.  4. Bed to chair transfer with Stand-by Assistance using no AD or LRAD.  5. Gait  x 150 feet with Stand-by Assistance using no AD or lRAD.   6. Ascend/Descend 8 inch curb step with Stand-by Assistance using no AD or LRAD.  7. Lower extremity exercise program x10 reps per handout, with supervision    Outcome: Progressing

## 2025-05-26 PROBLEM — E80.6 HYPERBILIRUBINEMIA: Status: ACTIVE | Noted: 2025-05-26

## 2025-05-26 PROBLEM — R79.89 ELEVATED LFTS: Status: ACTIVE | Noted: 2025-05-26

## 2025-05-26 LAB
ABSOLUTE EOSINOPHIL (OHS): 0.25 K/UL
ABSOLUTE EOSINOPHIL (OHS): 0.47 K/UL
ABSOLUTE MONOCYTE (OHS): 2.15 K/UL (ref 0.3–1)
ABSOLUTE MONOCYTE (OHS): 2.15 K/UL (ref 0.3–1)
ABSOLUTE NEUTROPHIL COUNT (OHS): 12.59 K/UL (ref 1.8–7.7)
ABSOLUTE NEUTROPHIL COUNT (OHS): 12.82 K/UL (ref 1.8–7.7)
ALBUMIN SERPL BCP-MCNC: 3.1 G/DL (ref 3.5–5.2)
ALP SERPL-CCNC: 132 UNIT/L (ref 40–150)
ALT SERPL W/O P-5'-P-CCNC: 83 UNIT/L (ref 10–44)
ANION GAP (OHS): 13 MMOL/L (ref 8–16)
AST SERPL-CCNC: 60 UNIT/L (ref 11–45)
BASOPHILS # BLD AUTO: 0.05 K/UL
BASOPHILS # BLD AUTO: 0.07 K/UL
BASOPHILS NFR BLD AUTO: 0.3 %
BASOPHILS NFR BLD AUTO: 0.4 %
BILIRUB SERPL-MCNC: 1.3 MG/DL (ref 0.1–1)
BUN SERPL-MCNC: 21 MG/DL (ref 6–20)
CALCIUM SERPL-MCNC: 9 MG/DL (ref 8.7–10.5)
CHLORIDE SERPL-SCNC: 101 MMOL/L (ref 95–110)
CO2 SERPL-SCNC: 17 MMOL/L (ref 23–29)
CREAT SERPL-MCNC: 1.4 MG/DL (ref 0.5–1.4)
ERYTHROCYTE [DISTWIDTH] IN BLOOD BY AUTOMATED COUNT: 14.1 % (ref 11.5–14.5)
ERYTHROCYTE [DISTWIDTH] IN BLOOD BY AUTOMATED COUNT: 14.1 % (ref 11.5–14.5)
GFR SERPLBLD CREATININE-BSD FMLA CKD-EPI: >60 ML/MIN/1.73/M2
GLUCOSE SERPL-MCNC: 120 MG/DL (ref 70–110)
HCT VFR BLD AUTO: 31.3 % (ref 40–54)
HCT VFR BLD AUTO: 32.4 % (ref 40–54)
HGB BLD-MCNC: 10.4 GM/DL (ref 14–18)
HGB BLD-MCNC: 10.5 GM/DL (ref 14–18)
IMM GRANULOCYTES # BLD AUTO: 0.17 K/UL (ref 0–0.04)
IMM GRANULOCYTES # BLD AUTO: 0.2 K/UL (ref 0–0.04)
IMM GRANULOCYTES NFR BLD AUTO: 0.9 % (ref 0–0.5)
IMM GRANULOCYTES NFR BLD AUTO: 1.1 % (ref 0–0.5)
LYMPHOCYTES # BLD AUTO: 2.54 K/UL (ref 1–4.8)
LYMPHOCYTES # BLD AUTO: 2.96 K/UL (ref 1–4.8)
MAGNESIUM SERPL-MCNC: 2 MG/DL (ref 1.6–2.6)
MCH RBC QN AUTO: 26.4 PG (ref 27–31)
MCH RBC QN AUTO: 26.6 PG (ref 27–31)
MCHC RBC AUTO-ENTMCNC: 32.4 G/DL (ref 32–36)
MCHC RBC AUTO-ENTMCNC: 33.2 G/DL (ref 32–36)
MCV RBC AUTO: 80 FL (ref 82–98)
MCV RBC AUTO: 81 FL (ref 82–98)
NUCLEATED RBC (/100WBC) (OHS): 0 /100 WBC
NUCLEATED RBC (/100WBC) (OHS): 0 /100 WBC
PHOSPHATE SERPL-MCNC: 4 MG/DL (ref 2.7–4.5)
PLATELET # BLD AUTO: 200 K/UL (ref 150–450)
PLATELET # BLD AUTO: 214 K/UL (ref 150–450)
PMV BLD AUTO: 10.1 FL (ref 9.2–12.9)
PMV BLD AUTO: 9.9 FL (ref 9.2–12.9)
POTASSIUM SERPL-SCNC: 4.7 MMOL/L (ref 3.5–5.1)
PROT SERPL-MCNC: 8.2 GM/DL (ref 6–8.4)
RBC # BLD AUTO: 3.91 M/UL (ref 4.6–6.2)
RBC # BLD AUTO: 3.98 M/UL (ref 4.6–6.2)
RELATIVE EOSINOPHIL (OHS): 1.4 %
RELATIVE EOSINOPHIL (OHS): 2.6 %
RELATIVE LYMPHOCYTE (OHS): 13.9 % (ref 18–48)
RELATIVE LYMPHOCYTE (OHS): 16.3 % (ref 18–48)
RELATIVE MONOCYTE (OHS): 11.8 % (ref 4–15)
RELATIVE MONOCYTE (OHS): 11.8 % (ref 4–15)
RELATIVE NEUTROPHIL (OHS): 69.3 % (ref 38–73)
RELATIVE NEUTROPHIL (OHS): 70.2 % (ref 38–73)
SODIUM SERPL-SCNC: 131 MMOL/L (ref 136–145)
WBC # BLD AUTO: 18.17 K/UL (ref 3.9–12.7)
WBC # BLD AUTO: 18.25 K/UL (ref 3.9–12.7)

## 2025-05-26 PROCEDURE — 80053 COMPREHEN METABOLIC PANEL: CPT

## 2025-05-26 PROCEDURE — 36415 COLL VENOUS BLD VENIPUNCTURE: CPT

## 2025-05-26 PROCEDURE — 84100 ASSAY OF PHOSPHORUS: CPT

## 2025-05-26 PROCEDURE — 97530 THERAPEUTIC ACTIVITIES: CPT | Mod: CO

## 2025-05-26 PROCEDURE — 21400001 HC TELEMETRY ROOM

## 2025-05-26 PROCEDURE — 25000003 PHARM REV CODE 250

## 2025-05-26 PROCEDURE — 85025 COMPLETE CBC W/AUTO DIFF WBC: CPT

## 2025-05-26 PROCEDURE — 25000242 PHARM REV CODE 250 ALT 637 W/ HCPCS

## 2025-05-26 PROCEDURE — 83735 ASSAY OF MAGNESIUM: CPT

## 2025-05-26 PROCEDURE — 63600175 PHARM REV CODE 636 W HCPCS

## 2025-05-26 RX ORDER — AMOXICILLIN 250 MG
1 CAPSULE ORAL 2 TIMES DAILY
Status: DISCONTINUED | OUTPATIENT
Start: 2025-05-26 | End: 2025-06-04 | Stop reason: HOSPADM

## 2025-05-26 RX ORDER — ELECTROLYTES/DEXTROSE
600 SOLUTION, ORAL ORAL
Status: DISCONTINUED | OUTPATIENT
Start: 2025-05-26 | End: 2025-06-01

## 2025-05-26 RX ADMIN — MORPHINE SULFATE 6 MG: 2 INJECTION, SOLUTION INTRAMUSCULAR; INTRAVENOUS at 07:05

## 2025-05-26 RX ADMIN — PSYLLIUM HUSK 1 PACKET: 3.4 POWDER ORAL at 10:05

## 2025-05-26 RX ADMIN — PIPERACILLIN SODIUM AND TAZOBACTAM SODIUM 4.5 G: 4; .5 INJECTION, POWDER, FOR SOLUTION INTRAVENOUS at 08:05

## 2025-05-26 RX ADMIN — MORPHINE SULFATE 6 MG: 2 INJECTION, SOLUTION INTRAMUSCULAR; INTRAVENOUS at 10:05

## 2025-05-26 RX ADMIN — PIPERACILLIN SODIUM AND TAZOBACTAM SODIUM 4.5 G: 4; .5 INJECTION, POWDER, FOR SOLUTION INTRAVENOUS at 11:05

## 2025-05-26 RX ADMIN — Medication 600 ML: at 12:05

## 2025-05-26 RX ADMIN — SENNOSIDES AND DOCUSATE SODIUM 1 TABLET: 50; 8.6 TABLET ORAL at 08:05

## 2025-05-26 RX ADMIN — MORPHINE SULFATE 6 MG: 2 INJECTION, SOLUTION INTRAMUSCULAR; INTRAVENOUS at 11:05

## 2025-05-26 RX ADMIN — Medication 600 ML: at 04:05

## 2025-05-26 RX ADMIN — ATORVASTATIN CALCIUM 20 MG: 20 TABLET, FILM COATED ORAL at 09:05

## 2025-05-26 RX ADMIN — GABAPENTIN 300 MG: 300 CAPSULE ORAL at 03:05

## 2025-05-26 RX ADMIN — GABAPENTIN 300 MG: 300 CAPSULE ORAL at 09:05

## 2025-05-26 RX ADMIN — SENNOSIDES AND DOCUSATE SODIUM 1 TABLET: 50; 8.6 TABLET ORAL at 09:05

## 2025-05-26 RX ADMIN — PIPERACILLIN SODIUM AND TAZOBACTAM SODIUM 4.5 G: 4; .5 INJECTION, POWDER, FOR SOLUTION INTRAVENOUS at 03:05

## 2025-05-26 RX ADMIN — MORPHINE SULFATE 6 MG: 2 INJECTION, SOLUTION INTRAMUSCULAR; INTRAVENOUS at 05:05

## 2025-05-26 RX ADMIN — Medication 600 ML: at 08:05

## 2025-05-26 RX ADMIN — GABAPENTIN 300 MG: 300 CAPSULE ORAL at 08:05

## 2025-05-26 NOTE — ASSESSMENT & PLAN NOTE
Recent pituitary resection at [facility] on 5/1. Follow up admission 5/8-5/16 for CSF leak. Lumbar drain pulled on 5/15. No indication of recurrence of CSF leak. Only noted mass effect symptoms include asymmetric pupils.     - monitor for CSF rhinorrhea or vision changes

## 2025-05-26 NOTE — ASSESSMENT & PLAN NOTE
Recent pituitary resection at [facility] on 5/1. Follow up admission 5/8-5/16 for CSF leak. Lumbar drain pulled on 5/15. No indication of recurrence of CSF leak. Only noted mass effect symptoms include asymmetric pupils.     - monitor for CSF rhinorrhea or vision changes

## 2025-05-26 NOTE — ASSESSMENT & PLAN NOTE
Body mass index is 30.22 kg/m². Morbid obesity complicates all aspects of disease management from diagnostic modalities to treatment. Weight loss encouraged and health benefits explained to patient.

## 2025-05-26 NOTE — PROGRESS NOTES
[facility] Medicine  Progress Note    Patient Name: Gallo Maria Jr.  MRN: [id number]  Patient Class: IP- Inpatient   Admission Date: 5/22/2025  Length of Stay: 3 days  Attending Physician: Emma Jackson MD  Primary Care Provider: Jerome Mederos MD        Subjective     Principal Problem:Pelvic mass        HPI:  Gallo Maria Jr. Is a 43yo male with a hx of BPH with urinary obstruction and pituitary adenoma s/p resection presenting for acute onset lower back pain that has radiating down his right leg, pelvic pain, and shortness of breath. The pain was sudden onset when he awoke from sleep this morning located in his lower back radiating down his right leg to mid thigh. The pelvic pain expands into his right groin. His shortness of breath is mainly with activity and has improved with rest. He also notes severe flushing and pre-syncopal symptoms while attempting to get to his car requiring him to take frequent breaks. His pain is still present when re-adjusting in bed but otherwise symptoms have largely improved with rest. He re[prts frequent headaches most recently yesterday unrelieved by tylenol #3. Otherwise he denies any vision changes, nasal leakage, congestion, cough, abdominal pain, N/V/D, constipation, urinary incontinence, or perianal numbness. Ambulation is limited by pain, not weakness.     He was recently admitted to [facility] on 5/1-5/5 for resection of a large pituitary adenoma and again from 5/8-5/16 for CSF leak requiring repair with lumbar drain and fat fascia bello grafts. Drain pulled 5/15. His hospital course also included two DVTs in the LLE. IVC filter placed on 5/15. He was advised to follow up outpatient to discuss utility of eliquis.     In the ED VSS, afebrile. CT chest abdomen pelvis revealing a large, fairly well-circumscribed heterogenous lesions in the right hemipelvis extending towards the gluteal folds. No PE. Labs notable for hgb 11.3, no  leukocytosis, bmp only notable for Na 134, BNP and troponin wnl, CRP elevated at 75.2.    Overview/Hospital Course:  MRI obtained with 13.4 cm coccygeal mass concerning for malignancy, 1.7 cm lesion in S1 concerning for metastasis, Extensive DVT in IVC and iliac veins with IVC filter in place, possible hematoma along right iliac vessels, Cauda equina and epidural enhancement in the lower lumbar spine, likely inflammatory from a recent lumbar drain, and soft tissue edema in right anterolateral thigh likely postoperative from recent graft harvest. S/p biopsy with IR. Family and patient updated. Pain control with gabapentin and PRN morphine.    Interval History: NAEO. Pain controlled on current regimen. LFTs and Tbili increasing, will obtain RUQ US and hold statin. Cr 1.4, encouraged PO hydration.    Review of Systems   Gastrointestinal:  Negative for abdominal distention.   Genitourinary:  Negative for difficulty urinating.   Musculoskeletal:  Positive for myalgias (R thigh). Negative for arthralgias.        Pelvic pain  Radiating pain down R leg   Skin:  Positive for wound (well healing surgical wounds on low back and right leg).   Neurological:  Negative for weakness.     Objective:     Vital Signs (Most Recent):  Temp: 98.2 °F (36.8 °C) (05/26/25 0720)  Pulse: 96 (05/26/25 0720)  Resp: 18 (05/26/25 0720)  BP: 109/67 (05/26/25 0720)  SpO2: 99 % (05/26/25 0720) Vital Signs (24h Range):  Temp:  [97.9 °F (36.6 °C)-98.9 °F (37.2 °C)] 98.2 °F (36.8 °C)  Pulse:  [] 96  Resp:  [16-20] 18  SpO2:  [96 %-99 %] 99 %  BP: ()/(62-76) 109/67     Weight: 106.8 kg (235 lb 5.5 oz)  Body mass index is 30.22 kg/m².    Intake/Output Summary (Last 24 hours) at 5/26/2025 0830  Last data filed at 5/25/2025 1715  Gross per 24 hour   Intake 60 ml   Output --   Net 60 ml         Physical Exam  Constitutional:       General: He is not in acute distress.     Appearance: Normal appearance. He is not ill-appearing.   HENT:      Head:  Normocephalic and atraumatic.   Eyes:      Extraocular Movements: Extraocular movements intact.      Conjunctiva/sclera: Conjunctivae normal.   Cardiovascular:      Rate and Rhythm: Normal rate and regular rhythm.      Pulses: Normal pulses.      Heart sounds: Normal heart sounds.   Pulmonary:      Effort: Pulmonary effort is normal. No respiratory distress.      Breath sounds: Normal breath sounds.   Abdominal:      General: There is no distension.      Palpations: Abdomen is soft.      Tenderness: There is abdominal tenderness (very mild RUQ tenderness).   Genitourinary:     Comments: Pelvic tenderness  Pelvic fullness on palpation R>L  Musculoskeletal:         General: Tenderness (R lateral thigh) present. No swelling.      Right lower leg: No edema.      Left lower leg: No edema.   Skin:     General: Skin is warm.      Capillary Refill: Capillary refill takes less than 2 seconds.      Comments: Well healing surgical scars (low back, R lateral thigh)   Neurological:      General: No focal deficit present.      Mental Status: He is alert and oriented to person, place, and time.      Cranial Nerves: No cranial nerve deficit.      Sensory: No sensory deficit.      Motor: No weakness.   Psychiatric:         Mood and Affect: Mood normal.         Thought Content: Thought content normal.               Significant Labs: All pertinent labs within the past 24 hours have been reviewed.  CBC:   Recent Labs   Lab 05/25/25  0510 05/25/25  1607 05/26/25  0427   WBC 16.11* 17.21* 18.17*   HGB 10.1* 10.1* 10.5*   HCT 30.4* 30.7* 32.4*    187 200     CMP:   Recent Labs   Lab 05/25/25  0510 05/26/25  0427   * 131*   K 4.1 4.7    101   CO2 17* 17*   * 120*   BUN 18 21*   CREATININE 1.1 1.4   CALCIUM 8.8 9.0   PROT 7.9 8.2   ALBUMIN 3.2* 3.1*   BILITOT 1.2* 1.3*   ALKPHOS 94 132   AST 36 60*   ALT 54* 83*   ANIONGAP 11 13       Significant Imaging: I have reviewed all pertinent imaging results/findings  within the past 24 hours.      Assessment & Plan  Pelvic mass  Sudden onset pelvic pain morning of 5/22. CTA revealing large, fairly well-circumscribed heterogenous lesions in the right hemipelvis extending towards the gluteal folds. Leftward shift of pelvic organs. Recent IVC filter placed for DVTs. Recent lumbar csf drain removed on 5/15.     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  Differential considerations include chordoma, chondrosarcoma, and giant cell tumor.  1.7 cm lesion in the S1 vertebral body with similar signal characteristics, concerning for metastasis.  Extensive DVT involving the IVC and iliac veins, with an IVC filter.  Ill-defined fluid collection extending along the right iliac vessels, new from earlier today, compatible with hematoma.  Cauda equina and epidural enhancement in the lower lumbar spine, likely inflammatory from a recent lumbar drain although infection cannot be excluded.  No organized epidural collection.  Soft tissue edema in the right anterolateral thigh, which may be postoperative from recent graft harvest.  Hemorrhage and infection are difficult to exclude.      - s/p biopsy with IR  - f/u pathology   - on zosyn, d/c vanc after 2 days  - gabapentin and morphine for pain control   - uptitrate as needed for pain control  - discussing case with surg vs radiation oncology based on pathology results, if radiosensitive, may need inpatient treatment if able given severity of symptoms  - consulted PT/OT for assistance with mobility and for any mobility aid recommendations  - bowel reg added      Elevated LFTs  Hyperbilirubinemia  Elevated LFTs and Tbili noted on 5/26. Mild RUQ tenderness. Concern for shock liver or portal thrombosis given hypercoagulable state.    - f/u RUQ US with doppler  - holding atorvastatin, resume when able.        History of pituitary surgery  Postoperative CSF leak  Pituitary adenoma  Recent pituitary resection at [facility] on 5/1. Follow up admission 5/8-5/16 for  CSF leak. Lumbar drain pulled on 5/15. No indication of recurrence of CSF leak. Only noted mass effect symptoms include asymmetric pupils.     - monitor for CSF rhinorrhea or vision changes  BPH with urinary obstruction  Noted, continue to monitor for urinary retention    Mixed hyperlipidemia  Holding statin for elevated LFTs    Obesity (BMI 30-39.9)  Body mass index is 30.22 kg/m². Morbid obesity complicates all aspects of disease management from diagnostic modalities to treatment. Weight loss encouraged and health benefits explained to patient.     Hyponatremia  Hyponatremia is likely due to unclear etiology. The patient's most recent sodium results are listed below.  Recent Labs     05/24/25  0340 05/25/25  0510 05/26/25  0427   * 132* 131*     Plan  - Obtain the following studies: Urine sodium, urine osmolality, serum osmolality or TSH, T4.  - Monitor sodium Daily.   - Patient hyponatremia is stable  Anemia  Anemia is likely due to unclear etiology. Most recent hemoglobin and hematocrit are listed below.  Recent Labs     05/25/25  0510 05/25/25  1607 05/26/25  0427   HGB 10.1* 10.1* 10.5*   HCT 30.4* 30.7* 32.4*     Plan  - Monitor serial CBC: Daily  - Transfuse PRBC if patient becomes hemodynamically unstable, symptomatic or H/H drops below 7/21.  - Patient has not received any PRBC transfusions to date  - Patient's anemia is currently being monitored  Acute deep vein thrombosis (DVT) of left lower extremity  Presence of IVC filter  B/L LE US 5/13/25 at [facility] with occlusive thrombus within the posterior tibial vein as well as nearly occlusive thrombus within the peroneal vein. Positive LLE DVT study. IVC filter in place    Plan:  - holding AC and VTE ppx pending additional workup and possible intervention       VTE Risk Mitigation (From admission, onward)           Ordered     Reason for No Pharmacological VTE Prophylaxis  Once        Question:  Reasons:  Answer:  Physician Provided (leave comment)   Comment:  Possible active bleed, pending possible intervention    05/22/25 1140     IP VTE HIGH RISK PATIENT  Once         05/22/25 1140     Place sequential compression device  Until discontinued         05/22/25 1140                    Discharge Planning   PORTER: 5/29/2025     Code Status: Full Code   Medical Readiness for Discharge Date:   Discharge Plan A: Home with family   Discharge Delays: None known at this time            Please place Justification for DME        Karen Holt MD  Department of Hospital Medicine   [facility]

## 2025-05-26 NOTE — SUBJECTIVE & OBJECTIVE
Interval History: NAEO. Pain controlled on current regimen. LFTs and Tbili increasing, will obtain RUQ US and hold statin. Cr 1.4, encouraged PO hydration.    Review of Systems   Gastrointestinal:  Negative for abdominal distention.   Genitourinary:  Negative for difficulty urinating.   Musculoskeletal:  Positive for myalgias (R thigh). Negative for arthralgias.        Pelvic pain  Radiating pain down R leg   Skin:  Positive for wound (well healing surgical wounds on low back and right leg).   Neurological:  Negative for weakness.     Objective:     Vital Signs (Most Recent):  Temp: 98.2 °F (36.8 °C) (05/26/25 0720)  Pulse: 96 (05/26/25 0720)  Resp: 18 (05/26/25 0720)  BP: 109/67 (05/26/25 0720)  SpO2: 99 % (05/26/25 0720) Vital Signs (24h Range):  Temp:  [97.9 °F (36.6 °C)-98.9 °F (37.2 °C)] 98.2 °F (36.8 °C)  Pulse:  [] 96  Resp:  [16-20] 18  SpO2:  [96 %-99 %] 99 %  BP: ()/(62-76) 109/67     Weight: 106.8 kg (235 lb 5.5 oz)  Body mass index is 30.22 kg/m².    Intake/Output Summary (Last 24 hours) at 5/26/2025 0830  Last data filed at 5/25/2025 1715  Gross per 24 hour   Intake 60 ml   Output --   Net 60 ml         Physical Exam  Constitutional:       General: He is not in acute distress.     Appearance: Normal appearance. He is not ill-appearing.   HENT:      Head: Normocephalic and atraumatic.   Eyes:      Extraocular Movements: Extraocular movements intact.      Conjunctiva/sclera: Conjunctivae normal.   Cardiovascular:      Rate and Rhythm: Normal rate and regular rhythm.      Pulses: Normal pulses.      Heart sounds: Normal heart sounds.   Pulmonary:      Effort: Pulmonary effort is normal. No respiratory distress.      Breath sounds: Normal breath sounds.   Abdominal:      General: There is no distension.      Palpations: Abdomen is soft.      Tenderness: There is abdominal tenderness (very mild RUQ tenderness).   Genitourinary:     Comments: Pelvic tenderness  Pelvic fullness on palpation  R>L  Musculoskeletal:         General: Tenderness (R lateral thigh) present. No swelling.      Right lower leg: No edema.      Left lower leg: No edema.   Skin:     General: Skin is warm.      Capillary Refill: Capillary refill takes less than 2 seconds.      Comments: Well healing surgical scars (low back, R lateral thigh)   Neurological:      General: No focal deficit present.      Mental Status: He is alert and oriented to person, place, and time.      Cranial Nerves: No cranial nerve deficit.      Sensory: No sensory deficit.      Motor: No weakness.   Psychiatric:         Mood and Affect: Mood normal.         Thought Content: Thought content normal.               Significant Labs: All pertinent labs within the past 24 hours have been reviewed.  CBC:   Recent Labs   Lab 05/25/25  0510 05/25/25  1607 05/26/25  0427   WBC 16.11* 17.21* 18.17*   HGB 10.1* 10.1* 10.5*   HCT 30.4* 30.7* 32.4*    187 200     CMP:   Recent Labs   Lab 05/25/25  0510 05/26/25  0427   * 131*   K 4.1 4.7    101   CO2 17* 17*   * 120*   BUN 18 21*   CREATININE 1.1 1.4   CALCIUM 8.8 9.0   PROT 7.9 8.2   ALBUMIN 3.2* 3.1*   BILITOT 1.2* 1.3*   ALKPHOS 94 132   AST 36 60*   ALT 54* 83*   ANIONGAP 11 13       Significant Imaging: I have reviewed all pertinent imaging results/findings within the past 24 hours.

## 2025-05-26 NOTE — PT/OT/SLP PROGRESS
Occupational Therapy   Treatment    Name: Gallo Maria Jr.  MRN: [id number]  Admitting Diagnosis:  Pelvic mass       Recommendations:     Discharge Recommendations: High Intensity Therapy  Discharge Equipment Recommendations:  walker, rolling, wheelchair, bath bench, bedside commode  Barriers to discharge:  Inaccessible home environment (increased skilled (A) needed)    Assessment:     Gallo Maria Jr. is a 44 y.o. male with a medical diagnosis of Pelvic mass.  He presents with the following performance deficits affecting function are weakness, impaired balance, impaired endurance, impaired self care skills, impaired functional mobility, gait instability, decreased lower extremity function, pain, decreased safety awareness.     Rehab Prognosis:  Good; patient would benefit from acute skilled OT services to address these deficits and reach maximum level of function.       Plan:     Patient to be seen 4 x/week to address the above listed problems via therapeutic activities, self-care/home management, therapeutic exercises, neuromuscular re-education  Plan of Care Expires: 06/22/25  Plan of Care Reviewed with: patient, spouse    Subjective     Chief Complaint: pain in R hip  Patient/Family Comments/goals: to improve function  Pain/Comfort:  Pain Rating 1: 8/10  Location - Orientation 1: generalized  Location 1: other (see comments) (R > L pelvis)  Pain Addressed 1: Reposition, Cessation of Activity, Distraction  Pain Rating Post-Intervention 1: 8/10    Objective:     Communicated with: RN prior to session.  Patient found HOB elevated with  (no active lines) upon OT entry to room.  A client care conference was completed by the OTR and the KEBEDE prior to treatment by the KEBEDE to discuss the patient's POC and current status.    General Precautions: Standard, fall    Orthopedic Precautions:N/A  Braces: N/A  Respiratory Status: Room air     Occupational Performance:     Bed Mobility:    Patient completed Supine to  Sit with contact guard assistance  Patient completed Sit to Supine with contact guard assistance     Functional Mobility/Transfers:  Patient completed Sit <> Stand Transfer with contact guard assistance  with  rolling walker   Functional Mobility: pt taking ~3 lateral steps L and R with CGA using RW. Pt then taking 4 steps fwd and backwards, further ambulation deferred d/t pain.     Activities of Daily Living:  Lower Body Dressing: total assistance to don and doff socks      [facility] 6 Click ADL: 20    Treatment & Education:  Pt educated on OT POC and frequency during hospital stay.   Pt educated on proper hand placement and techniques for RW mgmt to improve safety awareness.   Pt educated on importance of OOB activity to improve function and activity tolerance.  Addressed all patient questions/concerns within KEBEDE scope of practice.     Patient left HOB elevated with all lines intact, call button in reach, and RN notified    GOALS:   Multidisciplinary Problems       Occupational Therapy Goals          Problem: Occupational Therapy    Goal Priority Disciplines Outcome Interventions   Occupational Therapy Goal     OT, PT/OT Progressing    Description: Goals to be met by: 6/22/25.     Patient will increase functional independence with ADLs by performing:    UE Dressing with Stand-by Assistance.  LE Dressing with Stand-by Assistance.  Grooming while standing at sink with Stand-by Assistance.  Toileting from toilet with Stand-by Assistance for hygiene and clothing management.   Toilet transfer to toilet with Stand-by Assistance.                       Time Tracking:     OT Date of Treatment: 05/26/25  OT Start Time: 0906  OT Stop Time: 0930  OT Total Time (min): 24 min    Billable Minutes:Therapeutic Activity 24    OT/ISIAH: ISIAH     Number of ISIAH visits since last OT visit: 1    5/26/2025

## 2025-05-26 NOTE — ASSESSMENT & PLAN NOTE
Hyponatremia is likely due to unclear etiology. The patient's most recent sodium results are listed below.  Recent Labs     05/24/25  0340 05/25/25  0510 05/26/25  0427   * 132* 131*     Plan  - Obtain the following studies: Urine sodium, urine osmolality, serum osmolality or TSH, T4.  - Monitor sodium Daily.   - Patient hyponatremia is stable   Writer sent prescriptions for the following:    Metformin 500 mg x one week and then two tablets once daily.  Levothyroxine 25 mcg daily.  Vitamin D 00649 units once weekly.  Vitamin D 2000 units twice daily.    The thyroid labs have been ordered.

## 2025-05-26 NOTE — ASSESSMENT & PLAN NOTE
Recent pituitary resection at [facility] on 5/1. Follow up admission 5/8-5/16 for CSF leak. Lumbar drain pulled on 5/15. No indication of recurrence of CSF leak. Only noted mass effect symptoms include asymmetric pupils.     - monitor for CSF rhinorrhea or vision changes

## 2025-05-26 NOTE — ASSESSMENT & PLAN NOTE
Anemia is likely due to unclear etiology. Most recent hemoglobin and hematocrit are listed below.  Recent Labs     05/25/25  0510 05/25/25  1607 05/26/25  0427   HGB 10.1* 10.1* 10.5*   HCT 30.4* 30.7* 32.4*     Plan  - Monitor serial CBC: Daily  - Transfuse PRBC if patient becomes hemodynamically unstable, symptomatic or H/H drops below 7/21.  - Patient has not received any PRBC transfusions to date  - Patient's anemia is currently being monitored

## 2025-05-26 NOTE — PLAN OF CARE
[facility]  Discharge Reassessment    Primary Care Provider: Jerome Mederos MD    Expected Discharge Date: 5/29/2025      Radha met with pt at bedside to discuss placement options. Pt reported that at this time, he does not want placement as he would like to see the plan of care. Pt would like for Sw to patient choice list in Epic for later review.     Pt choice list provided via Epic.     Patient is not medically ready for dc.     Discharge Plan A and Plan B have been determined by review of patient's clinical status, future medical and therapeutic needs, and coverage/benefits for post-acute care in coordination with multidisciplinary team members.    Reassessment (most recent)       Discharge Reassessment - 05/26/25 1642          Discharge Reassessment    Assessment Type Discharge Planning Reassessment (P)      Did the patient's condition or plan change since previous assessment? No (P)      Discharge Plan discussed with: Patient (P)      Communicated PORTER with patient/caregiver Yes (P)      Discharge Plan A Rehab (P)      Discharge Plan B Skilled Nursing Facility (P)      DME Needed Upon Discharge  none (P)      Transition of Care Barriers None (P)      Why the patient remains in the hospital Requires continued medical care (P)         Post-Acute Status    Post-Acute Authorization Placement (P)      Post-Acute Placement Status Patient List Provided (P)      Discharge Delays None known at this time (P)                    RADHA Bach  Case Management  [phone]

## 2025-05-26 NOTE — ASSESSMENT & PLAN NOTE
Elevated LFTs and Tbili noted on 5/26. Mild RUQ tenderness. Concern for shock liver or portal thrombosis given hypercoagulable state.    - f/u RUQ US with doppler  - holding atorvastatin, resume when able.

## 2025-05-27 LAB
ABSOLUTE EOSINOPHIL (OHS): 0.18 K/UL
ABSOLUTE EOSINOPHIL (OHS): 0.54 K/UL
ABSOLUTE MONOCYTE (OHS): 1.7 K/UL (ref 0.3–1)
ABSOLUTE MONOCYTE (OHS): 1.96 K/UL (ref 0.3–1)
ABSOLUTE NEUTROPHIL COUNT (OHS): 11.12 K/UL (ref 1.8–7.7)
ABSOLUTE NEUTROPHIL COUNT (OHS): 13.68 K/UL (ref 1.8–7.7)
ALBUMIN SERPL BCP-MCNC: 2.8 G/DL (ref 3.5–5.2)
ALLENS TEST: ABNORMAL
ALP SERPL-CCNC: 166 UNIT/L (ref 40–150)
ALT SERPL W/O P-5'-P-CCNC: 122 UNIT/L (ref 10–44)
ANION GAP (OHS): 13 MMOL/L (ref 8–16)
AST SERPL-CCNC: 118 UNIT/L (ref 11–45)
BACTERIA BLD CULT: NORMAL
BACTERIA BLD CULT: NORMAL
BASOPHILS # BLD AUTO: 0.05 K/UL
BASOPHILS # BLD AUTO: 0.06 K/UL
BASOPHILS NFR BLD AUTO: 0.3 %
BASOPHILS NFR BLD AUTO: 0.3 %
BILIRUB SERPL-MCNC: 1.2 MG/DL (ref 0.1–1)
BUN SERPL-MCNC: 22 MG/DL (ref 6–20)
CALCIUM SERPL-MCNC: 8.6 MG/DL (ref 8.7–10.5)
CHLORIDE SERPL-SCNC: 99 MMOL/L (ref 95–110)
CO2 SERPL-SCNC: 18 MMOL/L (ref 23–29)
CREAT SERPL-MCNC: 1.2 MG/DL (ref 0.5–1.4)
DELSYS: ABNORMAL
ERYTHROCYTE [DISTWIDTH] IN BLOOD BY AUTOMATED COUNT: 13.8 % (ref 11.5–14.5)
ERYTHROCYTE [DISTWIDTH] IN BLOOD BY AUTOMATED COUNT: 13.9 % (ref 11.5–14.5)
FIO2: 21
GFR SERPLBLD CREATININE-BSD FMLA CKD-EPI: >60 ML/MIN/1.73/M2
GLUCOSE SERPL-MCNC: 104 MG/DL (ref 70–110)
HAV IGM SERPL QL IA: NORMAL
HBV CORE IGM SERPL QL IA: NORMAL
HBV SURFACE AG SERPL QL IA: NORMAL
HCO3 UR-SCNC: 21.5 MMOL/L (ref 24–28)
HCT VFR BLD AUTO: 29 % (ref 40–54)
HCT VFR BLD AUTO: 31.5 % (ref 40–54)
HCT VFR BLD CALC: 31 %PCV (ref 36–54)
HCV AB SERPL QL IA: NORMAL
HGB BLD-MCNC: 10.4 GM/DL (ref 14–18)
HGB BLD-MCNC: 9.8 GM/DL (ref 14–18)
HIV 1+2 AB+HIV1 P24 AG SERPL QL IA: NORMAL
IMM GRANULOCYTES # BLD AUTO: 0.13 K/UL (ref 0–0.04)
IMM GRANULOCYTES # BLD AUTO: 0.25 K/UL (ref 0–0.04)
IMM GRANULOCYTES NFR BLD AUTO: 0.8 % (ref 0–0.5)
IMM GRANULOCYTES NFR BLD AUTO: 1.4 % (ref 0–0.5)
LYMPHOCYTES # BLD AUTO: 1.51 K/UL (ref 1–4.8)
LYMPHOCYTES # BLD AUTO: 2.37 K/UL (ref 1–4.8)
MAGNESIUM SERPL-MCNC: 2.2 MG/DL (ref 1.6–2.6)
MCH RBC QN AUTO: 26.5 PG (ref 27–31)
MCH RBC QN AUTO: 26.8 PG (ref 27–31)
MCHC RBC AUTO-ENTMCNC: 33 G/DL (ref 32–36)
MCHC RBC AUTO-ENTMCNC: 33.8 G/DL (ref 32–36)
MCV RBC AUTO: 79 FL (ref 82–98)
MCV RBC AUTO: 80 FL (ref 82–98)
MODE: ABNORMAL
NUCLEATED RBC (/100WBC) (OHS): 0 /100 WBC
NUCLEATED RBC (/100WBC) (OHS): 0 /100 WBC
PCO2 BLDA: 38.1 MMHG (ref 35–45)
PH SMN: 7.36 [PH] (ref 7.35–7.45)
PHOSPHATE SERPL-MCNC: 3.2 MG/DL (ref 2.7–4.5)
PLATELET # BLD AUTO: 236 K/UL (ref 150–450)
PLATELET # BLD AUTO: 261 K/UL (ref 150–450)
PMV BLD AUTO: 10 FL (ref 9.2–12.9)
PMV BLD AUTO: 10.3 FL (ref 9.2–12.9)
PO2 BLDA: 23 MMHG (ref 40–60)
POC BE: -4 MMOL/L (ref -2–2)
POC IONIZED CALCIUM: 1.17 MMOL/L (ref 1.06–1.42)
POC SATURATED O2: 38 % (ref 95–100)
POC TCO2: 23 MMOL/L (ref 24–29)
POTASSIUM BLD-SCNC: 4.2 MMOL/L (ref 3.5–5.1)
POTASSIUM SERPL-SCNC: 4.2 MMOL/L (ref 3.5–5.1)
PROT SERPL-MCNC: 7.7 GM/DL (ref 6–8.4)
RBC # BLD AUTO: 3.66 M/UL (ref 4.6–6.2)
RBC # BLD AUTO: 3.92 M/UL (ref 4.6–6.2)
RELATIVE EOSINOPHIL (OHS): 1 %
RELATIVE EOSINOPHIL (OHS): 3.3 %
RELATIVE LYMPHOCYTE (OHS): 14.7 % (ref 18–48)
RELATIVE LYMPHOCYTE (OHS): 8.7 % (ref 18–48)
RELATIVE MONOCYTE (OHS): 12.1 % (ref 4–15)
RELATIVE MONOCYTE (OHS): 9.8 % (ref 4–15)
RELATIVE NEUTROPHIL (OHS): 68.8 % (ref 38–73)
RELATIVE NEUTROPHIL (OHS): 78.8 % (ref 38–73)
SAMPLE: ABNORMAL
SITE: ABNORMAL
SODIUM BLD-SCNC: 131 MMOL/L (ref 136–145)
SODIUM SERPL-SCNC: 130 MMOL/L (ref 136–145)
WBC # BLD AUTO: 16.17 K/UL (ref 3.9–12.7)
WBC # BLD AUTO: 17.38 K/UL (ref 3.9–12.7)

## 2025-05-27 PROCEDURE — 80074 ACUTE HEPATITIS PANEL: CPT

## 2025-05-27 PROCEDURE — 36415 COLL VENOUS BLD VENIPUNCTURE: CPT

## 2025-05-27 PROCEDURE — 99900035 HC TECH TIME PER 15 MIN (STAT)

## 2025-05-27 PROCEDURE — 82803 BLOOD GASES ANY COMBINATION: CPT

## 2025-05-27 PROCEDURE — 84100 ASSAY OF PHOSPHORUS: CPT

## 2025-05-27 PROCEDURE — 25000242 PHARM REV CODE 250 ALT 637 W/ HCPCS

## 2025-05-27 PROCEDURE — 87389 HIV-1 AG W/HIV-1&-2 AB AG IA: CPT

## 2025-05-27 PROCEDURE — 21400001 HC TELEMETRY ROOM

## 2025-05-27 PROCEDURE — 25000003 PHARM REV CODE 250

## 2025-05-27 PROCEDURE — 82040 ASSAY OF SERUM ALBUMIN: CPT

## 2025-05-27 PROCEDURE — 63600175 PHARM REV CODE 636 W HCPCS

## 2025-05-27 PROCEDURE — 83735 ASSAY OF MAGNESIUM: CPT

## 2025-05-27 PROCEDURE — 85025 COMPLETE CBC W/AUTO DIFF WBC: CPT

## 2025-05-27 RX ORDER — GABAPENTIN 400 MG/1
400 CAPSULE ORAL 3 TIMES DAILY
Status: DISCONTINUED | OUTPATIENT
Start: 2025-05-27 | End: 2025-05-28

## 2025-05-27 RX ADMIN — SODIUM CHLORIDE 1000 ML: 0.9 INJECTION, SOLUTION INTRAVENOUS at 11:05

## 2025-05-27 RX ADMIN — GABAPENTIN 400 MG: 400 CAPSULE ORAL at 09:05

## 2025-05-27 RX ADMIN — Medication 600 ML: at 02:05

## 2025-05-27 RX ADMIN — MORPHINE SULFATE 6 MG: 2 INJECTION, SOLUTION INTRAMUSCULAR; INTRAVENOUS at 07:05

## 2025-05-27 RX ADMIN — Medication 600 ML: at 04:05

## 2025-05-27 RX ADMIN — MORPHINE SULFATE 6 MG: 2 INJECTION, SOLUTION INTRAMUSCULAR; INTRAVENOUS at 02:05

## 2025-05-27 RX ADMIN — Medication 600 ML: at 12:05

## 2025-05-27 RX ADMIN — SENNOSIDES AND DOCUSATE SODIUM 1 TABLET: 50; 8.6 TABLET ORAL at 11:05

## 2025-05-27 RX ADMIN — SENNOSIDES AND DOCUSATE SODIUM 1 TABLET: 50; 8.6 TABLET ORAL at 09:05

## 2025-05-27 RX ADMIN — GABAPENTIN 300 MG: 300 CAPSULE ORAL at 11:05

## 2025-05-27 RX ADMIN — GABAPENTIN 400 MG: 400 CAPSULE ORAL at 02:05

## 2025-05-27 RX ADMIN — Medication 600 ML: at 09:05

## 2025-05-27 RX ADMIN — LACTULOSE 10 G: 20 SOLUTION ORAL at 11:05

## 2025-05-27 RX ADMIN — PIPERACILLIN SODIUM AND TAZOBACTAM SODIUM 4.5 G: 4; .5 INJECTION, POWDER, FOR SOLUTION INTRAVENOUS at 04:05

## 2025-05-27 RX ADMIN — Medication 600 ML: at 06:05

## 2025-05-27 RX ADMIN — PSYLLIUM HUSK 1 PACKET: 3.4 POWDER ORAL at 11:05

## 2025-05-27 NOTE — ASSESSMENT & PLAN NOTE
Sudden onset pelvic pain morning of 5/22. CTA revealing large, fairly well-circumscribed heterogenous lesions in the right hemipelvis extending towards the gluteal folds. Leftward shift of pelvic organs. Recent IVC filter placed for DVTs. Recent lumbar csf drain removed on 5/15.     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  Differential considerations include chordoma, chondrosarcoma, and giant cell tumor.  1.7 cm lesion in the S1 vertebral body with similar signal characteristics, concerning for metastasis.  Extensive DVT involving the IVC and iliac veins, with an IVC filter.  Ill-defined fluid collection extending along the right iliac vessels, new from earlier today, compatible with hematoma.  Cauda equina and epidural enhancement in the lower lumbar spine, likely inflammatory from a recent lumbar drain although infection cannot be excluded.  No organized epidural collection.  Soft tissue edema in the right anterolateral thigh, which may be postoperative from recent graft harvest.  Hemorrhage and infection are difficult to exclude.      - s/p biopsy with IR  - f/u pathology   - antibiotics d/c after cx negative at 48h  - gabapentin and morphine for pain control   - uptitrate as needed for pain control  - discussing case with surg vs radiation oncology based on pathology results, if radiosensitive, may need inpatient treatment if able given severity of symptoms  - consulted PT/OT for assistance with mobility and for any mobility aid recommendations  - bowel reg added, giving lactulose

## 2025-05-27 NOTE — ASSESSMENT & PLAN NOTE
Anemia is likely due to unclear etiology. Most recent hemoglobin and hematocrit are listed below.  Recent Labs     05/26/25  0427 05/26/25  1548 05/27/25  0248   HGB 10.5* 10.4* 9.8*   HCT 32.4* 31.3* 29.0*     Plan  - Monitor serial CBC: Daily  - Transfuse PRBC if patient becomes hemodynamically unstable, symptomatic or H/H drops below 7/21.  - Patient has not received any PRBC transfusions to date  - Patient's anemia is currently being monitored

## 2025-05-27 NOTE — ASSESSMENT & PLAN NOTE
Recent pituitary resection at [facility] on 5/1. Follow up admission 5/8-5/16 for CSF leak. Lumbar drain pulled on 5/15. No indication of recurrence of CSF leak. Only noted mass effect symptoms include asymmetric pupils.     - monitor for CSF rhinorrhea or vision changes

## 2025-05-27 NOTE — ASSESSMENT & PLAN NOTE
Recent pituitary resection at [facility] on 5/1. Follow up admission 5/8-5/16 for CSF leak. Lumbar drain pulled on 5/15. No indication of recurrence of CSF leak. Only noted mass effect symptoms include asymmetric pupils.     - monitor for CSF rhinorrhea or vision changes

## 2025-05-27 NOTE — SUBJECTIVE & OBJECTIVE
Interval History: NAEO. Pain persistent, increasing gabapentin. Requests lactulose for constipation. RUQ US performed, pending read. Giving additional 1 L IVF. Abx stopped after cx negative for 48h.     Review of Systems   Gastrointestinal:  Negative for abdominal distention.   Genitourinary:  Negative for difficulty urinating.   Musculoskeletal:  Negative for arthralgias and myalgias.        Pelvic pain  R thigh pain   Skin:  Positive for wound (well healing surgical wounds on low back and right leg).   Neurological:  Negative for weakness.     Objective:     Vital Signs (Most Recent):  Temp: 98.5 °F (36.9 °C) (05/27/25 0800)  Pulse: 100 (05/27/25 0800)  Resp: 18 (05/27/25 0800)  BP: 108/71 (05/27/25 0800)  SpO2: 100 % (05/27/25 0800) Vital Signs (24h Range):  Temp:  [97.9 °F (36.6 °C)-98.5 °F (36.9 °C)] 98.5 °F (36.9 °C)  Pulse:  [] 100  Resp:  [1-20] 18  SpO2:  [95 %-100 %] 100 %  BP: (108-123)/(66-71) 108/71     Weight: 106.8 kg (235 lb 5.5 oz)  Body mass index is 30.22 kg/m².    Intake/Output Summary (Last 24 hours) at 5/27/2025 0884  Last data filed at 5/27/2025 0639  Gross per 24 hour   Intake 1500 ml   Output 850 ml   Net 650 ml         Physical Exam  Constitutional:       General: He is not in acute distress.     Appearance: Normal appearance. He is not ill-appearing.   HENT:      Head: Normocephalic and atraumatic.   Eyes:      Extraocular Movements: Extraocular movements intact.      Conjunctiva/sclera: Conjunctivae normal.   Cardiovascular:      Rate and Rhythm: Normal rate and regular rhythm.      Pulses: Normal pulses.      Heart sounds: Normal heart sounds.   Pulmonary:      Effort: Pulmonary effort is normal. No respiratory distress.      Breath sounds: Normal breath sounds.   Abdominal:      General: There is no distension.      Palpations: Abdomen is soft.      Tenderness: There is no abdominal tenderness.   Genitourinary:     Comments: Pelvic tenderness  Pelvic fullness on  palpation  Musculoskeletal:         General: Tenderness (R thigh) present. No swelling.      Right lower leg: No edema.      Left lower leg: No edema.   Skin:     General: Skin is warm.      Capillary Refill: Capillary refill takes less than 2 seconds.      Comments: Well healing surgical scars (low back, R lateral thigh)   Neurological:      General: No focal deficit present.      Mental Status: He is alert and oriented to person, place, and time.      Cranial Nerves: No cranial nerve deficit.      Sensory: No sensory deficit.      Motor: No weakness.   Psychiatric:         Mood and Affect: Mood normal.         Thought Content: Thought content normal.               Significant Labs: All pertinent labs within the past 24 hours have been reviewed.  CBC:   Recent Labs   Lab 05/26/25  0427 05/26/25  1548 05/27/25  0248   WBC 18.17* 18.25* 16.17*   HGB 10.5* 10.4* 9.8*   HCT 32.4* 31.3* 29.0*    214 236     CMP:   Recent Labs   Lab 05/26/25 0427 05/27/25  0248   * 130*   K 4.7 4.2    99   CO2 17* 18*   * 104   BUN 21* 22*   CREATININE 1.4 1.2   CALCIUM 9.0 8.6*   PROT 8.2 7.7   ALBUMIN 3.1* 2.8*   BILITOT 1.3* 1.2*   ALKPHOS 132 166*   AST 60* 118*   ALT 83* 122*   ANIONGAP 13 13       Significant Imaging: I have reviewed all pertinent imaging results/findings within the past 24 hours.

## 2025-05-27 NOTE — ASSESSMENT & PLAN NOTE
Recent pituitary resection at [facility] on 5/1. Follow up admission 5/8-5/16 for CSF leak. Lumbar drain pulled on 5/15. No indication of recurrence of CSF leak. Only noted mass effect symptoms include asymmetric pupils.     - monitor for CSF rhinorrhea or vision changes

## 2025-05-27 NOTE — PROGRESS NOTES
[facility] Medicine  Progress Note    Patient Name: Gallo Maria Jr.  MRN: [id number]  Patient Class: IP- Inpatient   Admission Date: 5/22/2025  Length of Stay: 4 days  Attending Physician: Emma Jackson MD  Primary Care Provider: Jerome Mederos MD        Subjective     Principal Problem:Pelvic mass        HPI:  Gallo Maria Jr. Is a 43yo male with a hx of BPH with urinary obstruction and pituitary adenoma s/p resection presenting for acute onset lower back pain that has radiating down his right leg, pelvic pain, and shortness of breath. The pain was sudden onset when he awoke from sleep this morning located in his lower back radiating down his right leg to mid thigh. The pelvic pain expands into his right groin. His shortness of breath is mainly with activity and has improved with rest. He also notes severe flushing and pre-syncopal symptoms while attempting to get to his car requiring him to take frequent breaks. His pain is still present when re-adjusting in bed but otherwise symptoms have largely improved with rest. He re[prts frequent headaches most recently yesterday unrelieved by tylenol #3. Otherwise he denies any vision changes, nasal leakage, congestion, cough, abdominal pain, N/V/D, constipation, urinary incontinence, or perianal numbness. Ambulation is limited by pain, not weakness.     He was recently [facility]1-5/5 for resection of a large pituitary adenoma and again from 5/8-5/16 for CSF leak requiring repair with lumbar drain and fat fascia bello grafts. Drain pulled 5/15. His hospital course also included two DVTs in the LLE. IVC filter placed on 5/15. He was advised to follow up outpatient to discuss utility of eliquis.     In the ED VSS, afebrile. CT chest abdomen pelvis revealing a large, fairly well-circumscribed heterogenous lesions in the right hemipelvis extending towards the gluteal folds. No PE. Labs notable for hgb 11.3, no  leukocytosis, bmp only notable for Na 134, BNP and troponin wnl, CRP elevated at 75.2.    Overview/Hospital Course:  MRI obtained with 13.4 cm coccygeal mass concerning for malignancy, 1.7 cm lesion in S1 concerning for metastasis, Extensive DVT in IVC and iliac veins with IVC filter in place, possible hematoma along right iliac vessels, Cauda equina and epidural enhancement in the lower lumbar spine, likely inflammatory from a recent lumbar drain, and soft tissue edema in right anterolateral thigh likely postoperative from recent graft harvest. S/p biopsy with IR. Family and patient updated. Pain control with gabapentin and PRN morphine. LFTs and Tbili elvated on 5/26, obtaining RUQ US with doppler.     Interval History: NAEO. Pain persistent, increasing gabapentin. Requests lactulose for constipation. RUQ US performed, pending read. Giving additional 1 L IVF. Abx stopped after cx negative for 48h.     Review of Systems   Gastrointestinal:  Negative for abdominal distention.   Genitourinary:  Negative for difficulty urinating.   Musculoskeletal:  Negative for arthralgias and myalgias.        Pelvic pain  R thigh pain   Skin:  Positive for wound (well healing surgical wounds on low back and right leg).   Neurological:  Negative for weakness.     Objective:     Vital Signs (Most Recent):  Temp: 98.5 °F (36.9 °C) (05/27/25 0800)  Pulse: 100 (05/27/25 0800)  Resp: 18 (05/27/25 0800)  BP: 108/71 (05/27/25 0800)  SpO2: 100 % (05/27/25 0800) Vital Signs (24h Range):  Temp:  [97.9 °F (36.6 °C)-98.5 °F (36.9 °C)] 98.5 °F (36.9 °C)  Pulse:  [] 100  Resp:  [1-20] 18  SpO2:  [95 %-100 %] 100 %  BP: (108-123)/(66-71) 108/71     Weight: 106.8 kg (235 lb 5.5 oz)  Body mass index is 30.22 kg/m².    Intake/Output Summary (Last 24 hours) at 5/27/2025 0815  Last data filed at 5/27/2025 0639  Gross per 24 hour   Intake 1500 ml   Output 850 ml   Net 650 ml         Physical Exam  Constitutional:       General: He is not in acute  distress.     Appearance: Normal appearance. He is not ill-appearing.   HENT:      Head: Normocephalic and atraumatic.   Eyes:      Extraocular Movements: Extraocular movements intact.      Conjunctiva/sclera: Conjunctivae normal.   Cardiovascular:      Rate and Rhythm: Normal rate and regular rhythm.      Pulses: Normal pulses.      Heart sounds: Normal heart sounds.   Pulmonary:      Effort: Pulmonary effort is normal. No respiratory distress.      Breath sounds: Normal breath sounds.   Abdominal:      General: There is no distension.      Palpations: Abdomen is soft.      Tenderness: There is no abdominal tenderness.   Genitourinary:     Comments: Pelvic tenderness  Pelvic fullness on palpation  Musculoskeletal:         General: Tenderness (R thigh) present. No swelling.      Right lower leg: No edema.      Left lower leg: No edema.   Skin:     General: Skin is warm.      Capillary Refill: Capillary refill takes less than 2 seconds.      Comments: Well healing surgical scars (low back, R lateral thigh)   Neurological:      General: No focal deficit present.      Mental Status: He is alert and oriented to person, place, and time.      Cranial Nerves: No cranial nerve deficit.      Sensory: No sensory deficit.      Motor: No weakness.   Psychiatric:         Mood and Affect: Mood normal.         Thought Content: Thought content normal.               Significant Labs: All pertinent labs within the past 24 hours have been reviewed.  CBC:   Recent Labs   Lab 05/26/25  0427 05/26/25  1548 05/27/25  0248   WBC 18.17* 18.25* 16.17*   HGB 10.5* 10.4* 9.8*   HCT 32.4* 31.3* 29.0*    214 236     CMP:   Recent Labs   Lab 05/26/25  0427 05/27/25  0248   * 130*   K 4.7 4.2    99   CO2 17* 18*   * 104   BUN 21* 22*   CREATININE 1.4 1.2   CALCIUM 9.0 8.6*   PROT 8.2 7.7   ALBUMIN 3.1* 2.8*   BILITOT 1.3* 1.2*   ALKPHOS 132 166*   AST 60* 118*   ALT 83* 122*   ANIONGAP 13 13       Significant Imaging: I  have reviewed all pertinent imaging results/findings within the past 24 hours.      Assessment & Plan  Pelvic mass  Sudden onset pelvic pain morning of 5/22. CTA revealing large, fairly well-circumscribed heterogenous lesions in the right hemipelvis extending towards the gluteal folds. Leftward shift of pelvic organs. Recent IVC filter placed for DVTs. Recent lumbar csf drain removed on 5/15.     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  Differential considerations include chordoma, chondrosarcoma, and giant cell tumor.  1.7 cm lesion in the S1 vertebral body with similar signal characteristics, concerning for metastasis.  Extensive DVT involving the IVC and iliac veins, with an IVC filter.  Ill-defined fluid collection extending along the right iliac vessels, new from earlier today, compatible with hematoma.  Cauda equina and epidural enhancement in the lower lumbar spine, likely inflammatory from a recent lumbar drain although infection cannot be excluded.  No organized epidural collection.  Soft tissue edema in the right anterolateral thigh, which may be postoperative from recent graft harvest.  Hemorrhage and infection are difficult to exclude.      - s/p biopsy with IR  - f/u pathology   - antibiotics d/c after cx negative at 48h  - gabapentin and morphine for pain control   - uptitrate as needed for pain control  - discussing case with surg vs radiation oncology based on pathology results, if radiosensitive, may need inpatient treatment if able given severity of symptoms  - consulted PT/OT for assistance with mobility and for any mobility aid recommendations  - bowel reg added, giving lactulose      Elevated LFTs  Hyperbilirubinemia  Elevated LFTs and Tbili noted on 5/26. Mild RUQ tenderness. Concern for shock liver or portal thrombosis given hypercoagulable state.    - f/u RUQ US with doppler  - holding atorvastatin, resume when able.  - f/u hepatitis panel and HIV        History of pituitary surgery  Postoperative  CSF leak  Pituitary adenoma  Recent pituitary resection at [facility] on 5/1. Follow up admission 5/8-5/16 for CSF leak. Lumbar drain pulled on 5/15. No indication of recurrence of CSF leak. Only noted mass effect symptoms include asymmetric pupils.     - monitor for CSF rhinorrhea or vision changes  BPH with urinary obstruction  Noted, continue to monitor for urinary retention    Mixed hyperlipidemia  Holding statin for elevated LFTs    Obesity (BMI 30-39.9)  Body mass index is 30.22 kg/m². Morbid obesity complicates all aspects of disease management from diagnostic modalities to treatment. Weight loss encouraged and health benefits explained to patient.     Hyponatremia  Hyponatremia is likely due to unclear etiology. The patient's most recent sodium results are listed below.  Recent Labs     05/25/25  0510 05/26/25  0427 05/27/25  0248   * 131* 130*     Plan  - Obtain the following studies: Urine sodium, urine osmolality, serum osmolality or TSH, T4.  - Monitor sodium Daily.   - Patient hyponatremia is stable  Anemia  Anemia is likely due to unclear etiology. Most recent hemoglobin and hematocrit are listed below.  Recent Labs     05/26/25  0427 05/26/25  1548 05/27/25  0248   HGB 10.5* 10.4* 9.8*   HCT 32.4* 31.3* 29.0*     Plan  - Monitor serial CBC: Daily  - Transfuse PRBC if patient becomes hemodynamically unstable, symptomatic or H/H drops below 7/21.  - Patient has not received any PRBC transfusions to date  - Patient's anemia is currently being monitored  Acute deep vein thrombosis (DVT) of left lower extremity  Presence of IVC filter  B/L LE US 5/13/25 at [facility] with occlusive thrombus within the posterior tibial vein as well as nearly occlusive thrombus within the peroneal vein. Positive LLE DVT study. IVC filter in place    Plan:  - holding AC and VTE ppx pending additional workup and possible intervention         VTE Risk Mitigation (From admission, onward)           Ordered     Reason for  No Pharmacological VTE Prophylaxis  Once        Question:  Reasons:  Answer:  Physician Provided (leave comment)  Comment:  Possible active bleed, pending possible intervention    05/22/25 1140     IP VTE HIGH RISK PATIENT  Once         05/22/25 1140     Place sequential compression device  Until discontinued         05/22/25 1140                    Discharge Planning   PORTER: 5/29/2025     Code Status: Full Code   Medical Readiness for Discharge Date:   Discharge Plan A: Rehab   Discharge Delays: None known at this time        Karne Holt MD  Department of Hospital Medicine   [facility]

## 2025-05-27 NOTE — ASSESSMENT & PLAN NOTE
Hyponatremia is likely due to unclear etiology. The patient's most recent sodium results are listed below.  Recent Labs     05/25/25  0510 05/26/25  0427 05/27/25  0248   * 131* 130*     Plan  - Obtain the following studies: Urine sodium, urine osmolality, serum osmolality or TSH, T4.  - Monitor sodium Daily.   - Patient hyponatremia is stable

## 2025-05-27 NOTE — ASSESSMENT & PLAN NOTE
Elevated LFTs and Tbili noted on 5/26. Mild RUQ tenderness. Concern for shock liver or portal thrombosis given hypercoagulable state.    - f/u RUQ US with doppler  - holding atorvastatin, resume when able.  - f/u hepatitis panel and HIV

## 2025-05-27 NOTE — PT/OT/SLP PROGRESS
Physical Therapy      Patient Name:  Gallo Maria Jr.   MRN:  [id number]    Patient not seen today secondary to Patient fatigue, Bedrest. Pt states they ambulated to the bathroom and bathed prior to attempted treatment. Will follow-up per POC.

## 2025-05-28 LAB
ABSOLUTE EOSINOPHIL (OHS): 0.48 K/UL
ABSOLUTE EOSINOPHIL (OHS): 0.51 K/UL
ABSOLUTE MONOCYTE (OHS): 2.1 K/UL (ref 0.3–1)
ABSOLUTE MONOCYTE (OHS): 2.14 K/UL (ref 0.3–1)
ABSOLUTE NEUTROPHIL COUNT (OHS): 12.84 K/UL (ref 1.8–7.7)
ABSOLUTE NEUTROPHIL COUNT (OHS): 14.65 K/UL (ref 1.8–7.7)
ALBUMIN SERPL BCP-MCNC: 2.7 G/DL (ref 3.5–5.2)
ALP SERPL-CCNC: 168 UNIT/L (ref 40–150)
ALT SERPL W/O P-5'-P-CCNC: 137 UNIT/L (ref 10–44)
ANION GAP (OHS): 11 MMOL/L (ref 8–16)
AST SERPL-CCNC: 123 UNIT/L (ref 11–45)
BASOPHILS # BLD AUTO: 0.04 K/UL
BASOPHILS # BLD AUTO: 0.05 K/UL
BASOPHILS NFR BLD AUTO: 0.2 %
BASOPHILS NFR BLD AUTO: 0.3 %
BILIRUB SERPL-MCNC: 1.1 MG/DL (ref 0.1–1)
BUN SERPL-MCNC: 18 MG/DL (ref 6–20)
CALCIUM SERPL-MCNC: 8.9 MG/DL (ref 8.7–10.5)
CHLORIDE SERPL-SCNC: 100 MMOL/L (ref 95–110)
CO2 SERPL-SCNC: 19 MMOL/L (ref 23–29)
CREAT SERPL-MCNC: 1 MG/DL (ref 0.5–1.4)
ERYTHROCYTE [DISTWIDTH] IN BLOOD BY AUTOMATED COUNT: 14.1 % (ref 11.5–14.5)
ERYTHROCYTE [DISTWIDTH] IN BLOOD BY AUTOMATED COUNT: 14.6 % (ref 11.5–14.5)
GFR SERPLBLD CREATININE-BSD FMLA CKD-EPI: >60 ML/MIN/1.73/M2
GLUCOSE SERPL-MCNC: 106 MG/DL (ref 70–110)
HCT VFR BLD AUTO: 27.8 % (ref 40–54)
HCT VFR BLD AUTO: 33 % (ref 40–54)
HGB BLD-MCNC: 10.5 GM/DL (ref 14–18)
HGB BLD-MCNC: 9.1 GM/DL (ref 14–18)
IMM GRANULOCYTES # BLD AUTO: 0.32 K/UL (ref 0–0.04)
IMM GRANULOCYTES # BLD AUTO: 0.49 K/UL (ref 0–0.04)
IMM GRANULOCYTES NFR BLD AUTO: 1.6 % (ref 0–0.5)
IMM GRANULOCYTES NFR BLD AUTO: 2.7 % (ref 0–0.5)
LYMPHOCYTES # BLD AUTO: 2.05 K/UL (ref 1–4.8)
LYMPHOCYTES # BLD AUTO: 2.5 K/UL (ref 1–4.8)
MAGNESIUM SERPL-MCNC: 2.3 MG/DL (ref 1.6–2.6)
MCH RBC QN AUTO: 26.3 PG (ref 27–31)
MCH RBC QN AUTO: 27.1 PG (ref 27–31)
MCHC RBC AUTO-ENTMCNC: 31.8 G/DL (ref 32–36)
MCHC RBC AUTO-ENTMCNC: 32.7 G/DL (ref 32–36)
MCV RBC AUTO: 80 FL (ref 82–98)
MCV RBC AUTO: 85 FL (ref 82–98)
NUCLEATED RBC (/100WBC) (OHS): 0 /100 WBC
NUCLEATED RBC (/100WBC) (OHS): 0 /100 WBC
PHOSPHATE SERPL-MCNC: 3.1 MG/DL (ref 2.7–4.5)
PLATELET # BLD AUTO: 307 K/UL (ref 150–450)
PLATELET # BLD AUTO: 331 K/UL (ref 150–450)
PMV BLD AUTO: 9.2 FL (ref 9.2–12.9)
PMV BLD AUTO: 9.6 FL (ref 9.2–12.9)
POTASSIUM SERPL-SCNC: 4.6 MMOL/L (ref 3.5–5.1)
PROT SERPL-MCNC: 8 GM/DL (ref 6–8.4)
RBC # BLD AUTO: 3.46 M/UL (ref 4.6–6.2)
RBC # BLD AUTO: 3.87 M/UL (ref 4.6–6.2)
RELATIVE EOSINOPHIL (OHS): 2.6 %
RELATIVE EOSINOPHIL (OHS): 2.6 %
RELATIVE LYMPHOCYTE (OHS): 10.4 % (ref 18–48)
RELATIVE LYMPHOCYTE (OHS): 13.6 % (ref 18–48)
RELATIVE MONOCYTE (OHS): 10.9 % (ref 4–15)
RELATIVE MONOCYTE (OHS): 11.4 % (ref 4–15)
RELATIVE NEUTROPHIL (OHS): 69.5 % (ref 38–73)
RELATIVE NEUTROPHIL (OHS): 74.2 % (ref 38–73)
SODIUM SERPL-SCNC: 130 MMOL/L (ref 136–145)
WBC # BLD AUTO: 18.45 K/UL (ref 3.9–12.7)
WBC # BLD AUTO: 19.72 K/UL (ref 3.9–12.7)

## 2025-05-28 PROCEDURE — 36415 COLL VENOUS BLD VENIPUNCTURE: CPT

## 2025-05-28 PROCEDURE — 63600175 PHARM REV CODE 636 W HCPCS

## 2025-05-28 PROCEDURE — 83735 ASSAY OF MAGNESIUM: CPT

## 2025-05-28 PROCEDURE — 85025 COMPLETE CBC W/AUTO DIFF WBC: CPT

## 2025-05-28 PROCEDURE — 97116 GAIT TRAINING THERAPY: CPT

## 2025-05-28 PROCEDURE — 21400001 HC TELEMETRY ROOM

## 2025-05-28 PROCEDURE — 25000242 PHARM REV CODE 250 ALT 637 W/ HCPCS

## 2025-05-28 PROCEDURE — 80053 COMPREHEN METABOLIC PANEL: CPT

## 2025-05-28 PROCEDURE — 84100 ASSAY OF PHOSPHORUS: CPT

## 2025-05-28 PROCEDURE — 25000003 PHARM REV CODE 250

## 2025-05-28 RX ORDER — HYDROMORPHONE HYDROCHLORIDE 2 MG/ML
0.5 INJECTION, SOLUTION INTRAMUSCULAR; INTRAVENOUS; SUBCUTANEOUS EVERY 6 HOURS PRN
Status: DISCONTINUED | OUTPATIENT
Start: 2025-05-28 | End: 2025-05-28

## 2025-05-28 RX ORDER — GABAPENTIN 300 MG/1
600 CAPSULE ORAL 3 TIMES DAILY
Status: DISCONTINUED | OUTPATIENT
Start: 2025-05-28 | End: 2025-06-04 | Stop reason: HOSPADM

## 2025-05-28 RX ORDER — HYDROMORPHONE HYDROCHLORIDE 2 MG/ML
1 INJECTION, SOLUTION INTRAMUSCULAR; INTRAVENOUS; SUBCUTANEOUS EVERY 6 HOURS
Status: DISCONTINUED | OUTPATIENT
Start: 2025-05-28 | End: 2025-05-29

## 2025-05-28 RX ORDER — HYDROMORPHONE HYDROCHLORIDE 2 MG/ML
0.5 INJECTION, SOLUTION INTRAMUSCULAR; INTRAVENOUS; SUBCUTANEOUS EVERY 6 HOURS PRN
Status: DISCONTINUED | OUTPATIENT
Start: 2025-05-28 | End: 2025-05-30

## 2025-05-28 RX ADMIN — MORPHINE SULFATE 6 MG: 2 INJECTION, SOLUTION INTRAMUSCULAR; INTRAVENOUS at 12:05

## 2025-05-28 RX ADMIN — GABAPENTIN 400 MG: 400 CAPSULE ORAL at 08:05

## 2025-05-28 RX ADMIN — MORPHINE SULFATE 6 MG: 2 INJECTION, SOLUTION INTRAMUSCULAR; INTRAVENOUS at 04:05

## 2025-05-28 RX ADMIN — PSYLLIUM HUSK 1 PACKET: 3.4 POWDER ORAL at 08:05

## 2025-05-28 RX ADMIN — GABAPENTIN 600 MG: 300 CAPSULE ORAL at 09:05

## 2025-05-28 RX ADMIN — SENNOSIDES AND DOCUSATE SODIUM 1 TABLET: 50; 8.6 TABLET ORAL at 08:05

## 2025-05-28 RX ADMIN — Medication 600 ML: at 12:05

## 2025-05-28 RX ADMIN — HYDROMORPHONE HYDROCHLORIDE 0.5 MG: 2 INJECTION INTRAMUSCULAR; INTRAVENOUS; SUBCUTANEOUS at 01:05

## 2025-05-28 RX ADMIN — Medication 600 ML: at 08:05

## 2025-05-28 RX ADMIN — Medication 600 ML: at 03:05

## 2025-05-28 RX ADMIN — HYDROMORPHONE HYDROCHLORIDE 1 MG: 2 INJECTION, SOLUTION INTRAMUSCULAR; INTRAVENOUS; SUBCUTANEOUS at 06:05

## 2025-05-28 RX ADMIN — GABAPENTIN 600 MG: 300 CAPSULE ORAL at 02:05

## 2025-05-28 NOTE — PLAN OF CARE
Problem: Adult Inpatient Plan of Care  Goal: Plan of Care Review  Outcome: Not Progressing  Goal: Patient-Specific Goal (Individualized)  Outcome: Not Progressing  Goal: Absence of Hospital-Acquired Illness or Injury  Outcome: Not Progressing  Goal: Optimal Comfort and Wellbeing  Outcome: Not Progressing  Goal: Readiness for Transition of Care  Outcome: Not Progressing     Problem: Wound  Goal: Optimal Coping  Outcome: Not Progressing  Goal: Optimal Functional Ability  Outcome: Not Progressing  Goal: Absence of Infection Signs and Symptoms  Outcome: Not Progressing  Goal: Improved Oral Intake  Outcome: Not Progressing  Goal: Optimal Pain Control and Function  Outcome: Not Progressing  Goal: Skin Health and Integrity  Outcome: Not Progressing  Goal: Optimal Wound Healing  Outcome: Not Progressing     Problem: Skin Injury Risk Increased  Goal: Skin Health and Integrity  Outcome: Not Progressing

## 2025-05-28 NOTE — PROGRESS NOTES
[facility] Medicine  Progress Note    Patient Name: Gallo Maria Jr.  MRN: [id number]  Patient Class: IP- Inpatient   Admission Date: 5/22/2025  Length of Stay: 5 days  Attending Physician: Emma Jackson MD  Primary Care Provider: Jerome Mederos MD        Subjective     Principal Problem:Pelvic mass        HPI:  Gallo Maria Jr. Is a 45yo male with a hx of BPH with urinary obstruction and pituitary adenoma s/p resection presenting for acute onset lower back pain that has radiating down his right leg, pelvic pain, and shortness of breath. The pain was sudden onset when he awoke from sleep this morning located in his lower back radiating down his right leg to mid thigh. The pelvic pain expands into his right groin. His shortness of breath is mainly with activity and has improved with rest. He also notes severe flushing and pre-syncopal symptoms while attempting to get to his car requiring him to take frequent breaks. His pain is still present when re-adjusting in bed but otherwise symptoms have largely improved with rest. He re[prts frequent headaches most recently yesterday unrelieved by tylenol #3. Otherwise he denies any vision changes, nasal leakage, congestion, cough, abdominal pain, N/V/D, constipation, urinary incontinence, or perianal numbness. Ambulation is limited by pain, not weakness.     He was recently [facility]5/5 for resection of a large pituitary adenoma and again from 5/8-5/16 for CSF leak requiring repair with lumbar drain and fat fascia bello grafts. Drain pulled 5/15. His hospital course also included two DVTs in the LLE. IVC filter placed on 5/15. He was advised to follow up outpatient to discuss utility of eliquis.     In the ED VSS, afebrile. CT chest abdomen pelvis revealing a large, fairly well-circumscribed heterogenous lesions in the right hemipelvis extending towards the gluteal folds. No PE. Labs notable for hgb 11.3, no  leukocytosis, bmp only notable for Na 134, BNP and troponin wnl, CRP elevated at 75.2.    Overview/Hospital Course:  MRI obtained with 13.4 cm coccygeal mass concerning for malignancy, 1.7 cm lesion in S1 concerning for metastasis, Extensive DVT in IVC and iliac veins with IVC filter in place, possible hematoma along right iliac vessels, Cauda equina and epidural enhancement in the lower lumbar spine, likely inflammatory from a recent lumbar drain, and soft tissue edema in right anterolateral thigh likely postoperative from recent graft harvest. S/p biopsy with IR. Family and patient updated. Pain control with gabapentin and PRN morphine. LFTs and Tbili elvated on 5/26. RUQ US with doppler notable for hepatic steatosis with no noted lesions or thrombi. Pathology sent to [place] [facility] for diagnostic stains.     Interval History: NAEO. Pending path. Resumed having Bms relieving some pressure    Review of Systems   Genitourinary:  Negative for difficulty urinating.   Musculoskeletal:  Negative for arthralgias and myalgias.        Pelvic pain  R thigh pain   Skin:  Positive for wound (well healing surgical wounds on low back and right leg).   Neurological:  Negative for weakness.     Objective:     Vital Signs (Most Recent):  Temp: 98.1 °F (36.7 °C) (05/28/25 0713)  Pulse: 94 (05/28/25 0713)  Resp: 18 (05/28/25 0713)  BP: 119/74 (05/28/25 0713)  SpO2: 97 % (05/28/25 0713) Vital Signs (24h Range):  Temp:  [97.8 °F (36.6 °C)-98.6 °F (37 °C)] 98.1 °F (36.7 °C)  Pulse:  [91-95] 94  Resp:  [14-20] 18  SpO2:  [96 %-100 %] 97 %  BP: ()/(57-74) 119/74     Weight: 106.8 kg (235 lb 5.5 oz)  Body mass index is 30.22 kg/m².    Intake/Output Summary (Last 24 hours) at 5/28/2025 0928  Last data filed at 5/27/2025 2129  Gross per 24 hour   Intake 1400 ml   Output --   Net 1400 ml         Physical Exam  Constitutional:       General: He is not in acute distress.     Appearance: Normal appearance. He is not ill-appearing.   HENT:       Head: Normocephalic and atraumatic.   Eyes:      Extraocular Movements: Extraocular movements intact.      Conjunctiva/sclera: Conjunctivae normal.   Cardiovascular:      Rate and Rhythm: Normal rate and regular rhythm.      Pulses: Normal pulses.      Heart sounds: Normal heart sounds.   Pulmonary:      Effort: Pulmonary effort is normal. No respiratory distress.      Breath sounds: Normal breath sounds.   Abdominal:      General: There is no distension.      Palpations: Abdomen is soft.      Tenderness: There is no abdominal tenderness.   Genitourinary:     Comments: Pelvic tenderness  Pelvic fullness on palpation  Musculoskeletal:         General: No swelling or tenderness.      Right lower leg: No edema.      Left lower leg: No edema.   Skin:     General: Skin is warm.      Capillary Refill: Capillary refill takes less than 2 seconds.      Comments: Well healing surgical scars (low back, R lateral thigh)   Neurological:      General: No focal deficit present.      Mental Status: He is alert and oriented to person, place, and time.      Cranial Nerves: No cranial nerve deficit.      Sensory: No sensory deficit.      Motor: No weakness.   Psychiatric:         Mood and Affect: Mood normal.         Thought Content: Thought content normal.               Significant Labs: All pertinent labs within the past 24 hours have been reviewed.  CBC:   Recent Labs   Lab 05/27/25  0248 05/27/25  1220 05/27/25  1616 05/28/25  0719   WBC 16.17*  --  17.38* 18.45*   HGB 9.8*  --  10.4* 10.5*   HCT 29.0* 31* 31.5* 33.0*     --  261 307     CMP:   Recent Labs   Lab 05/27/25  0248 05/28/25  0719   * 130*   K 4.2 4.6   CL 99 100   CO2 18* 19*    106   BUN 22* 18   CREATININE 1.2 1.0   CALCIUM 8.6* 8.9   PROT 7.7 8.0   ALBUMIN 2.8* 2.7*   BILITOT 1.2* 1.1*   ALKPHOS 166* 168*   * 123*   * 137*   ANIONGAP 13 11       Significant Imaging: I have reviewed all pertinent imaging results/findings within the  past 24 hours.      Assessment & Plan  Pelvic mass  Sudden onset pelvic pain morning of 5/22. CTA revealing large, fairly well-circumscribed heterogenous lesions in the right hemipelvis extending towards the gluteal folds. Leftward shift of pelvic organs. Recent IVC filter placed for DVTs. Recent lumbar csf drain removed on 5/15.     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  Differential considerations include chordoma, chondrosarcoma, and giant cell tumor.  1.7 cm lesion in the S1 vertebral body with similar signal characteristics, concerning for metastasis.  Extensive DVT involving the IVC and iliac veins, with an IVC filter.  Ill-defined fluid collection extending along the right iliac vessels, new from earlier today, compatible with hematoma.  Cauda equina and epidural enhancement in the lower lumbar spine, likely inflammatory from a recent lumbar drain although infection cannot be excluded.  No organized epidural collection.  Soft tissue edema in the right anterolateral thigh, which may be postoperative from recent graft harvest.  Hemorrhage and infection are difficult to exclude.      - s/p biopsy with IR  - f/u pathology   - antibiotics d/c after cx negative at 48h  - gabapentin and morphine for pain control   - uptitrate as needed for pain control  - discussing case with surg vs radiation oncology based on pathology results, if radiosensitive, may need inpatient treatment if able given severity of symptoms  - consulted PT/OT for assistance with mobility and for any mobility aid recommendations  - bowel reg added      Elevated LFTs  Hyperbilirubinemia  Elevated LFTs and Tbili noted on 5/26. Mild RUQ tenderness. Concern for shock liver or portal thrombosis given hypercoagulable state.      - holding atorvastatin, resume when able.  - hepatitis and HIV negative  - RUQ US with hepatic steatosis, no lesions or thrombi        History of pituitary surgery  Postoperative CSF leak  Pituitary adenoma  Recent pituitary  resection at [facility] on 5/1. Follow up admission 5/8-5/16 for CSF leak. Lumbar drain pulled on 5/15. No indication of recurrence of CSF leak. Only noted mass effect symptoms include asymmetric pupils.     - monitor for CSF rhinorrhea or vision changes  - will [facility] to discuss with current oncologist  BPH with urinary obstruction  Noted, continue to monitor for urinary retention    Mixed hyperlipidemia  Holding statin for elevated LFTs    Obesity (BMI 30-39.9)  Body mass index is 30.22 kg/m². Morbid obesity complicates all aspects of disease management from diagnostic modalities to treatment. Weight loss encouraged and health benefits explained to patient.     Hyponatremia  Hyponatremia is likely due to unclear etiology. The patient's most recent sodium results are listed below.  Recent Labs     05/26/25  0427 05/27/25  0248 05/28/25  0719   * 130* 130*     Plan  - Obtain the following studies: Urine sodium, urine osmolality, serum osmolality or TSH, T4.  - Monitor sodium Daily.   - Patient hyponatremia is stable  Anemia  Anemia is likely due to unclear etiology. Most recent hemoglobin and hematocrit are listed below.  Recent Labs     05/27/25  0248 05/27/25  1220 05/27/25  1616 05/28/25  0719   HGB 9.8*  --  10.4* 10.5*   HCT 29.0* 31* 31.5* 33.0*     Plan  - Monitor serial CBC: Daily  - Transfuse PRBC if patient becomes hemodynamically unstable, symptomatic or H/H drops below 7/21.  - Patient has not received any PRBC transfusions to date  - Patient's anemia is currently being monitored  Acute deep vein thrombosis (DVT) of left lower extremity  Presence of IVC filter  B/L LE US 5/13/25 at [facility] with occlusive thrombus within the posterior tibial vein as well as nearly occlusive thrombus within the peroneal vein. Positive LLE DVT study. IVC filter in place    Plan:  - holding AC and VTE ppx pending additional workup and possible intervention         VTE Risk Mitigation (From admission, onward)            Ordered     Reason for No Pharmacological VTE Prophylaxis  Once        Question:  Reasons:  Answer:  Physician Provided (leave comment)  Comment:  Possible active bleed, pending possible intervention    05/22/25 1140     IP VTE HIGH RISK PATIENT  Once         05/22/25 1140     Place sequential compression device  Until discontinued         05/22/25 1140                    Discharge Planning   PORTER: 5/30/2025     Code Status: Full Code   Medical Readiness for Discharge Date:   Discharge Plan A: Rehab   Discharge Delays: None known at this time      Karen Holt MD  Department of Hospital Medicine   [facility]urg

## 2025-05-28 NOTE — ASSESSMENT & PLAN NOTE
Sudden onset pelvic pain morning of 5/22. CTA revealing large, fairly well-circumscribed heterogenous lesions in the right hemipelvis extending towards the gluteal folds. Leftward shift of pelvic organs. Recent IVC filter placed for DVTs. Recent lumbar csf drain removed on 5/15.     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  Differential considerations include chordoma, chondrosarcoma, and giant cell tumor.  1.7 cm lesion in the S1 vertebral body with similar signal characteristics, concerning for metastasis.  Extensive DVT involving the IVC and iliac veins, with an IVC filter.  Ill-defined fluid collection extending along the right iliac vessels, new from earlier today, compatible with hematoma.  Cauda equina and epidural enhancement in the lower lumbar spine, likely inflammatory from a recent lumbar drain although infection cannot be excluded.  No organized epidural collection.  Soft tissue edema in the right anterolateral thigh, which may be postoperative from recent graft harvest.  Hemorrhage and infection are difficult to exclude.      - s/p biopsy with IR  - f/u pathology   - antibiotics d/c after cx negative at 48h  - gabapentin and morphine for pain control   - uptitrate as needed for pain control  - discussing case with surg vs radiation oncology based on pathology results, if radiosensitive, may need inpatient treatment if able given severity of symptoms  - consulted PT/OT for assistance with mobility and for any mobility aid recommendations  - bowel reg added

## 2025-05-28 NOTE — ASSESSMENT & PLAN NOTE
Recent pituitary resection at [facility] on 5/1. Follow up admission 5/8-5/16 for CSF leak. Lumbar drain pulled on 5/15. No indication of recurrence of CSF leak. Only noted mass effect symptoms include asymmetric pupils.     - monitor for CSF rhinorrhea or vision changes  - will [facility] to discuss with current oncologist

## 2025-05-28 NOTE — ASSESSMENT & PLAN NOTE
Elevated LFTs and Tbili noted on 5/26. Mild RUQ tenderness. Concern for shock liver or portal thrombosis given hypercoagulable state.      - holding atorvastatin, resume when able.  - hepatitis and HIV negative  - RUQ US with hepatic steatosis, no lesions or thrombi

## 2025-05-28 NOTE — ASSESSMENT & PLAN NOTE
Anemia is likely due to unclear etiology. Most recent hemoglobin and hematocrit are listed below.  Recent Labs     05/27/25  0248 05/27/25  1220 05/27/25  1616 05/28/25  0719   HGB 9.8*  --  10.4* 10.5*   HCT 29.0* 31* 31.5* 33.0*     Plan  - Monitor serial CBC: Daily  - Transfuse PRBC if patient becomes hemodynamically unstable, symptomatic or H/H drops below 7/21.  - Patient has not received any PRBC transfusions to date  - Patient's anemia is currently being monitored

## 2025-05-28 NOTE — ASSESSMENT & PLAN NOTE
Hyponatremia is likely due to unclear etiology. The patient's most recent sodium results are listed below.  Recent Labs     05/26/25  0427 05/27/25  0248 05/28/25  0719   * 130* 130*     Plan  - Obtain the following studies: Urine sodium, urine osmolality, serum osmolality or TSH, T4.  - Monitor sodium Daily.   - Patient hyponatremia is stable

## 2025-05-28 NOTE — PLAN OF CARE
[facility] Med Surg  Discharge Reassessment    Primary Care Provider: Jerome Mederos MD    Expected Discharge Date: 5/30/2025    SW met with pt and pt's wife, Shanel, regarding d/c plan and recommendations for high intensity/ inpt rehab. Pt not medically ready for d/c at this time.  Pt and wife would like to wait to hear from medical team before making a decision on placement.  Pt reported he was able to ambulate to the restroom with assistance from his wife.  SW will continue to follow and assist with d/c plan.     Discharge Plan A and Plan B have been determined by review of patient's clinical status, future medical and therapeutic needs, and coverage/benefits for post-acute care in coordination with multidisciplinary team members.    Reassessment (most recent)       Discharge Reassessment - 05/28/25 1226          Discharge Reassessment    Assessment Type Discharge Planning Reassessment     Did the patient's condition or plan change since previous assessment? No     Discharge Plan discussed with: Patient;Spouse/sig other     Name(s) and Number(s) Shanel Maria [phone]     Communicated PORTER with patient/caregiver Yes     Discharge Plan A Rehab     Discharge Plan B Home;Home with family;Home Health     DME Needed Upon Discharge  other (see comments)   TBD    Transition of Care Barriers None     Why the patient remains in the hospital Requires continued medical care        Post-Acute Status    Post-Acute Authorization Placement     Post-Acute Placement Status Pending medical clearance/testing   Pt undecided on if he would like placement.  Pt would like to wait for more information from medical team.    Discharge Delays None known at this time                   Kaci Smiley LMSW  Part-Time-  [facility]  Ext. [phone]

## 2025-05-28 NOTE — PT/OT/SLP PROGRESS
Physical Therapy Co-Treatment    Patient Name:  Gallo aMria Jr.   MRN:  [id number]  Admitting Diagnosis:  Pelvic mass   Recent Surgery: * No surgery found *    Admit Date: 5/22/2025  Length of Stay: 5 days    Recommendations:     Discharge Recommendations: High Intensity Therapy   Discharge Equipment Recommendations: wheelchair, walker, rolling, bedside commode, bath bench   Barriers to discharge: Decreased caregiver support and increased need for skilled assistance    Plan:     During this hospitalization, patient to be seen 4 x/week to address the identified rehab impairments via gait training, therapeutic activities, therapeutic exercises, neuromuscular re-education and progress towards the established goals.  Plan of Care Expires:  06/24/25  Plan of Care Reviewed with: patient, spouse    Assessment:     Gallo Maria Jr. is a 44 y.o. male admitted with a medical diagnosis of Pelvic mass.     Pt pre-medicated and agreeable to therapy session. Pt demo'd improvements as he stood and took steps today, but minimal distance completed 2/2 pt increased pain with mobility. Pt declined additional mobility trial reporting he needs to elevated his legs due to pain. Pt functioning significantly below his baseline PLOF and is a fall risk. Patient would benefit from skilled therapy services to maximize safety and independence, increase activity tolerance, decrease fall risk, decrease caregiver burden, improve QOL, improve patient's functional mobility, and decrease risk of contractures and pressure sores. Patient has demonstrated sufficient progression to warrant high intensity therapy evidenced by objectives noted below.    Problem List: weakness, impaired endurance, impaired self care skills, impaired functional mobility, gait instability, impaired balance, pain, impaired cardiopulmonary response to activity, decreased safety awareness, decreased lower extremity function, edema.  Rehab Prognosis: Good; patient would  "benefit from acute skilled PT services to address these deficits and reach maximum level of function.    Subjective     RN notified prior to session. Spouse present upon PT entrance into room. Patient agreeable to PT treatment session.    Chief Complaint: "I did not walk to the bathroom with my wife I got into the chair and she wheeled me to the bathroom and wiped me down in the chair"  Patient/Family Comments/goals: increase mobility as tolerated   Pain/Comfort:  Pain Rating 1: other (see comments) (unrated but very high and increased with mobility)  Location - Side 1: Bilateral  Location - Orientation 1: generalized  Location 1: other (see comments) (pelvis)  Pain Addressed 1: Reposition, Distraction, Cessation of Activity, Pre-medicate for activity, Nurse notified      Objective:     Additional staff present: N/A    Patient found supine with: Other (comments) (no active lines)   Cognition:   Alert and Cooperative  Patient is oriented to Person, Place, Time, Situation  General Precautions: Standard, Cardiac fall   Orthopedic Precautions:N/A   Braces: N/A   Body mass index is 30.22 kg/m².  Oxygen Device: Room Air  Vitals: /66 (BP Location: Left arm, Patient Position: Lying)   Pulse 88   Temp 97.9 °F (36.6 °C) (Oral)   Resp 14   Ht 6' 2" (1.88 m)   Wt 106.8 kg (235 lb 5.5 oz)   SpO2 96%   BMI 30.22 kg/m²     Functional Mobility:    Bed Mobility:   Lateral scooting along EOB with contact guard assistance x3 trials  Supine > Sit with stand by assistance  Sit > Supine with minimum assistance for (A) with BLE    Transfers:   Sit <> Stand Transfer: contact guard assistance with rolling walker   Bed <> Chair Transfer: pt with no chair in room    Balance:  Sitting Balance  Static: supervision  Dynamic: supervision  Standing:  Static: contact guard assistance  Dynamic: contact guard assistance      Gait:  Patient ambulated: 4' fwd/back   Patient required: contact guard  Patient used:  rolling walker   Gait " Deviation(s): decreased step length, decreased weight shift, decreased foot clearance, flexed posture, decreased karla, and shuffle gait  all lines remained intact throughout ambulation trial  Comments: Patient with increased FFP and reliance on RW for balance assist, cues for upright stance provided.    Treatment and Education:    Pt with increased pain reported upon sitting EOB and with mobility.  Pt took seated rest break at EOB with anticipation for additional gait trial but reporting need to elevate legs and return to supine after first gait trial, so additional mobility not performed this date.     Time provided for education, counseling and discussion of health disposition in regards to patient's current status  All questions answered within PT scope of practice and to patient's satisfaction  PT role in POC to address current functional deficits  Call nursing/pct to transfer to chair/use bathroom. Pt stated understanding.    Patient left supine with all lines intact, call button in reach, RN notified, and spouse present.    AM-PAC 6 CLICK MOBILITY  Turning over in bed (including adjusting bedclothes, sheets and blankets)?: 3  Sitting down on and standing up from a chair with arms (e.g., wheelchair, bedside commode, etc.): 3  Moving from lying on back to sitting on the side of the bed?: 3  Moving to and from a bed to a chair (including a wheelchair)?: 3  Need to walk in hospital room?: 3  Climbing 3-5 steps with a railing?: 1  Basic Mobility Total Score: 16     Goals:   Multidisciplinary Problems       Physical Therapy Goals          Problem: Physical Therapy    Goal Priority Disciplines Outcome Interventions   Physical Therapy Goal     PT, PT/OT Progressing    Description: Goals to be met by: 2025     Patient will increase functional independence with mobility by performin. Supine to sit with Stand-by Assistance  2. Sit to supine with Stand-by Assistance  3. Sit to stand transfer with Stand-by  Assistance using no AD or LRAD.  4. Bed to chair transfer with Stand-by Assistance using no AD or LRAD.  5. Gait  x 150 feet with Stand-by Assistance using no AD or lRAD.   6. Ascend/Descend 8 inch curb step with Stand-by Assistance using no AD or LRAD.  7. Lower extremity exercise program x10 reps per handout, with supervision                         DME Justifications:   Gallo requires a commode for home use because he is confined to a single room.  Gallo Maria Jr. has a mobility limitation that significantly impairs his ability to participate in one or more mobility related activities of daily living (MRADLs) such as toileting, feeding, dressing, grooming, and bathing in customary locations in the home.  The mobility limitation cannot be sufficiently resolved by the use of a cane or walker.   The use of a manual wheelchair will significantly improve the patients ability to participate in MRADLS and the patient will use it on regular basis in the home.  Gallo Maria Jr. has expressed his willingness to use a manual wheelchair in the home. Patients upper body strength is sufficient for propulsion.  He also has a caregiver who is available, willing, and able to provide assistance with the wheelchair when needed.     Gallo's mobility limitation cannot be sufficiently resolved by the use of a cane. His functional mobility deficit can be sufficiently resolved with the use of a Rolling Walker. Patient's mobility limitation significantly impairs their ability to participate in one of more activities of daily living.  The use of a RW will significantly improve the patient's ability to participate in MRADLS and the patient will use it on regular basis in the home.  Time Tracking:     PT Received On: 05/28/25  PT Start Time: 1241     PT Stop Time: 1259  PT Total Time (min): 18 min     Billable Minutes:   Gait Training 10 minutes    Treatment Type: Treatment  PT/PTA: PT       5/28/2025

## 2025-05-28 NOTE — SUBJECTIVE & OBJECTIVE
Interval History: NAEO. Pending path. Resumed having Bms relieving some pressure    Review of Systems   Genitourinary:  Negative for difficulty urinating.   Musculoskeletal:  Negative for arthralgias and myalgias.        Pelvic pain  R thigh pain   Skin:  Positive for wound (well healing surgical wounds on low back and right leg).   Neurological:  Negative for weakness.     Objective:     Vital Signs (Most Recent):  Temp: 98.1 °F (36.7 °C) (05/28/25 0713)  Pulse: 94 (05/28/25 0713)  Resp: 18 (05/28/25 0713)  BP: 119/74 (05/28/25 0713)  SpO2: 97 % (05/28/25 0713) Vital Signs (24h Range):  Temp:  [97.8 °F (36.6 °C)-98.6 °F (37 °C)] 98.1 °F (36.7 °C)  Pulse:  [91-95] 94  Resp:  [14-20] 18  SpO2:  [96 %-100 %] 97 %  BP: ()/(57-74) 119/74     Weight: 106.8 kg (235 lb 5.5 oz)  Body mass index is 30.22 kg/m².    Intake/Output Summary (Last 24 hours) at 5/28/2025 0951  Last data filed at 5/27/2025 212  Gross per 24 hour   Intake 1400 ml   Output --   Net 1400 ml         Physical Exam  Constitutional:       General: He is not in acute distress.     Appearance: Normal appearance. He is not ill-appearing.   HENT:      Head: Normocephalic and atraumatic.   Eyes:      Extraocular Movements: Extraocular movements intact.      Conjunctiva/sclera: Conjunctivae normal.   Cardiovascular:      Rate and Rhythm: Normal rate and regular rhythm.      Pulses: Normal pulses.      Heart sounds: Normal heart sounds.   Pulmonary:      Effort: Pulmonary effort is normal. No respiratory distress.      Breath sounds: Normal breath sounds.   Abdominal:      General: There is no distension.      Palpations: Abdomen is soft.      Tenderness: There is no abdominal tenderness.   Genitourinary:     Comments: Pelvic tenderness  Pelvic fullness on palpation  Musculoskeletal:         General: No swelling or tenderness.      Right lower leg: No edema.      Left lower leg: No edema.   Skin:     General: Skin is warm.      Capillary Refill: Capillary  refill takes less than 2 seconds.      Comments: Well healing surgical scars (low back, R lateral thigh)   Neurological:      General: No focal deficit present.      Mental Status: He is alert and oriented to person, place, and time.      Cranial Nerves: No cranial nerve deficit.      Sensory: No sensory deficit.      Motor: No weakness.   Psychiatric:         Mood and Affect: Mood normal.         Thought Content: Thought content normal.               Significant Labs: All pertinent labs within the past 24 hours have been reviewed.  CBC:   Recent Labs   Lab 05/27/25  0248 05/27/25  1220 05/27/25  1616 05/28/25  0719   WBC 16.17*  --  17.38* 18.45*   HGB 9.8*  --  10.4* 10.5*   HCT 29.0* 31* 31.5* 33.0*     --  261 307     CMP:   Recent Labs   Lab 05/27/25  0248 05/28/25  0719   * 130*   K 4.2 4.6   CL 99 100   CO2 18* 19*    106   BUN 22* 18   CREATININE 1.2 1.0   CALCIUM 8.6* 8.9   PROT 7.7 8.0   ALBUMIN 2.8* 2.7*   BILITOT 1.2* 1.1*   ALKPHOS 166* 168*   * 123*   * 137*   ANIONGAP 13 11       Significant Imaging: I have reviewed all pertinent imaging results/findings within the past 24 hours.

## 2025-05-29 PROBLEM — G93.89 SUBDURAL FLUID COLLECTION: Status: ACTIVE | Noted: 2025-05-29

## 2025-05-29 LAB
ABSOLUTE EOSINOPHIL (OHS): 0.6 K/UL
ABSOLUTE EOSINOPHIL (OHS): 0.66 K/UL
ABSOLUTE MONOCYTE (OHS): 1.91 K/UL (ref 0.3–1)
ABSOLUTE MONOCYTE (OHS): 2.03 K/UL (ref 0.3–1)
ABSOLUTE NEUTROPHIL COUNT (OHS): 13.44 K/UL (ref 1.8–7.7)
ABSOLUTE NEUTROPHIL COUNT (OHS): 13.59 K/UL (ref 1.8–7.7)
ALBUMIN SERPL BCP-MCNC: 2.7 G/DL (ref 3.5–5.2)
ALP SERPL-CCNC: 177 UNIT/L (ref 40–150)
ALT SERPL W/O P-5'-P-CCNC: 156 UNIT/L (ref 10–44)
ANION GAP (OHS): 11 MMOL/L (ref 8–16)
AST SERPL-CCNC: 159 UNIT/L (ref 11–45)
BASOPHILS # BLD AUTO: 0.05 K/UL
BASOPHILS # BLD AUTO: 0.06 K/UL
BASOPHILS NFR BLD AUTO: 0.3 %
BASOPHILS NFR BLD AUTO: 0.3 %
BILIRUB SERPL-MCNC: 1.3 MG/DL (ref 0.1–1)
BUN SERPL-MCNC: 17 MG/DL (ref 6–20)
CALCIUM SERPL-MCNC: 9 MG/DL (ref 8.7–10.5)
CHLORIDE SERPL-SCNC: 99 MMOL/L (ref 95–110)
CK SERPL-CCNC: 58 U/L (ref 20–200)
CO2 SERPL-SCNC: 21 MMOL/L (ref 23–29)
CREAT SERPL-MCNC: 1.1 MG/DL (ref 0.5–1.4)
ERYTHROCYTE [DISTWIDTH] IN BLOOD BY AUTOMATED COUNT: 14.2 % (ref 11.5–14.5)
ERYTHROCYTE [DISTWIDTH] IN BLOOD BY AUTOMATED COUNT: 14.4 % (ref 11.5–14.5)
GFR SERPLBLD CREATININE-BSD FMLA CKD-EPI: >60 ML/MIN/1.73/M2
GLUCOSE SERPL-MCNC: 104 MG/DL (ref 70–110)
HCT VFR BLD AUTO: 28.2 % (ref 40–54)
HCT VFR BLD AUTO: 28.5 % (ref 40–54)
HGB BLD-MCNC: 9 GM/DL (ref 14–18)
HGB BLD-MCNC: 9.3 GM/DL (ref 14–18)
IMM GRANULOCYTES # BLD AUTO: 0.28 K/UL (ref 0–0.04)
IMM GRANULOCYTES # BLD AUTO: 0.34 K/UL (ref 0–0.04)
IMM GRANULOCYTES NFR BLD AUTO: 1.5 % (ref 0–0.5)
IMM GRANULOCYTES NFR BLD AUTO: 1.8 % (ref 0–0.5)
LYMPHOCYTES # BLD AUTO: 2.25 K/UL (ref 1–4.8)
LYMPHOCYTES # BLD AUTO: 2.5 K/UL (ref 1–4.8)
MAGNESIUM SERPL-MCNC: 2.4 MG/DL (ref 1.6–2.6)
MCH RBC QN AUTO: 25.5 PG (ref 27–31)
MCH RBC QN AUTO: 26.6 PG (ref 27–31)
MCHC RBC AUTO-ENTMCNC: 31.6 G/DL (ref 32–36)
MCHC RBC AUTO-ENTMCNC: 33 G/DL (ref 32–36)
MCV RBC AUTO: 81 FL (ref 82–98)
MCV RBC AUTO: 81 FL (ref 82–98)
NUCLEATED RBC (/100WBC) (OHS): 0 /100 WBC
NUCLEATED RBC (/100WBC) (OHS): 0 /100 WBC
PHOSPHATE SERPL-MCNC: 4.1 MG/DL (ref 2.7–4.5)
PLATELET # BLD AUTO: 369 K/UL (ref 150–450)
PLATELET # BLD AUTO: 416 K/UL (ref 150–450)
PMV BLD AUTO: 9.3 FL (ref 9.2–12.9)
PMV BLD AUTO: 9.4 FL (ref 9.2–12.9)
POTASSIUM SERPL-SCNC: 4.7 MMOL/L (ref 3.5–5.1)
PROT SERPL-MCNC: 7.8 GM/DL (ref 6–8.4)
RBC # BLD AUTO: 3.5 M/UL (ref 4.6–6.2)
RBC # BLD AUTO: 3.53 M/UL (ref 4.6–6.2)
RELATIVE EOSINOPHIL (OHS): 3.2 %
RELATIVE EOSINOPHIL (OHS): 3.5 %
RELATIVE LYMPHOCYTE (OHS): 12 % (ref 18–48)
RELATIVE LYMPHOCYTE (OHS): 13.2 % (ref 18–48)
RELATIVE MONOCYTE (OHS): 10.2 % (ref 4–15)
RELATIVE MONOCYTE (OHS): 10.7 % (ref 4–15)
RELATIVE NEUTROPHIL (OHS): 70.8 % (ref 38–73)
RELATIVE NEUTROPHIL (OHS): 72.5 % (ref 38–73)
SODIUM SERPL-SCNC: 131 MMOL/L (ref 136–145)
WBC # BLD AUTO: 18.74 K/UL (ref 3.9–12.7)
WBC # BLD AUTO: 18.97 K/UL (ref 3.9–12.7)

## 2025-05-29 PROCEDURE — 97530 THERAPEUTIC ACTIVITIES: CPT

## 2025-05-29 PROCEDURE — 84100 ASSAY OF PHOSPHORUS: CPT

## 2025-05-29 PROCEDURE — 21400001 HC TELEMETRY ROOM

## 2025-05-29 PROCEDURE — 25000242 PHARM REV CODE 250 ALT 637 W/ HCPCS

## 2025-05-29 PROCEDURE — 80053 COMPREHEN METABOLIC PANEL: CPT

## 2025-05-29 PROCEDURE — 63600175 PHARM REV CODE 636 W HCPCS

## 2025-05-29 PROCEDURE — 25000003 PHARM REV CODE 250

## 2025-05-29 PROCEDURE — 85025 COMPLETE CBC W/AUTO DIFF WBC: CPT

## 2025-05-29 PROCEDURE — 83735 ASSAY OF MAGNESIUM: CPT

## 2025-05-29 PROCEDURE — 82550 ASSAY OF CK (CPK): CPT

## 2025-05-29 PROCEDURE — 36415 COLL VENOUS BLD VENIPUNCTURE: CPT

## 2025-05-29 RX ORDER — HYDROMORPHONE HYDROCHLORIDE 2 MG/ML
1 INJECTION, SOLUTION INTRAMUSCULAR; INTRAVENOUS; SUBCUTANEOUS EVERY 6 HOURS PRN
Status: DISCONTINUED | OUTPATIENT
Start: 2025-05-29 | End: 2025-05-29

## 2025-05-29 RX ORDER — TALC
6 POWDER (GRAM) TOPICAL NIGHTLY PRN
Status: DISCONTINUED | OUTPATIENT
Start: 2025-05-29 | End: 2025-06-04 | Stop reason: HOSPADM

## 2025-05-29 RX ORDER — MORPHINE SULFATE 15 MG/1
15 TABLET, FILM COATED, EXTENDED RELEASE ORAL EVERY 12 HOURS
Status: DISCONTINUED | OUTPATIENT
Start: 2025-05-29 | End: 2025-05-29

## 2025-05-29 RX ORDER — HYDROMORPHONE HYDROCHLORIDE 2 MG/ML
1 INJECTION, SOLUTION INTRAMUSCULAR; INTRAVENOUS; SUBCUTANEOUS EVERY 4 HOURS PRN
Status: DISCONTINUED | OUTPATIENT
Start: 2025-05-29 | End: 2025-05-30

## 2025-05-29 RX ORDER — MORPHINE SULFATE 15 MG/1
15 TABLET, FILM COATED, EXTENDED RELEASE ORAL EVERY 8 HOURS
Status: DISCONTINUED | OUTPATIENT
Start: 2025-05-29 | End: 2025-06-02

## 2025-05-29 RX ADMIN — MORPHINE SULFATE 15 MG: 15 TABLET, EXTENDED RELEASE ORAL at 09:05

## 2025-05-29 RX ADMIN — HYDROMORPHONE HYDROCHLORIDE 1 MG: 2 INJECTION, SOLUTION INTRAMUSCULAR; INTRAVENOUS; SUBCUTANEOUS at 12:05

## 2025-05-29 RX ADMIN — GABAPENTIN 600 MG: 300 CAPSULE ORAL at 08:05

## 2025-05-29 RX ADMIN — Medication 6 MG: at 12:05

## 2025-05-29 RX ADMIN — HYDROMORPHONE HYDROCHLORIDE 1 MG: 2 INJECTION, SOLUTION INTRAMUSCULAR; INTRAVENOUS; SUBCUTANEOUS at 08:05

## 2025-05-29 RX ADMIN — HYDROMORPHONE HYDROCHLORIDE 1 MG: 2 INJECTION, SOLUTION INTRAMUSCULAR; INTRAVENOUS; SUBCUTANEOUS at 06:05

## 2025-05-29 RX ADMIN — Medication 600 ML: at 08:05

## 2025-05-29 RX ADMIN — GABAPENTIN 600 MG: 300 CAPSULE ORAL at 09:05

## 2025-05-29 RX ADMIN — MORPHINE SULFATE 15 MG: 15 TABLET, EXTENDED RELEASE ORAL at 02:05

## 2025-05-29 RX ADMIN — GABAPENTIN 600 MG: 300 CAPSULE ORAL at 02:05

## 2025-05-29 RX ADMIN — PSYLLIUM HUSK 1 PACKET: 3.4 POWDER ORAL at 08:05

## 2025-05-29 NOTE — PLAN OF CARE
Problem: Adult Inpatient Plan of Care  Goal: Plan of Care Review  Outcome: Not Progressing  Goal: Patient-Specific Goal (Individualized)  Outcome: Not Progressing  Goal: Absence of Hospital-Acquired Illness or Injury  Outcome: Not Progressing  Goal: Optimal Comfort and Wellbeing  Outcome: Not Progressing  Goal: Readiness for Transition of Care  Outcome: Not Progressing     Problem: Skin Injury Risk Increased  Goal: Skin Health and Integrity  Outcome: Not Progressing     Problem: Wound  Goal: Optimal Coping  Outcome: Not Progressing  Goal: Optimal Functional Ability  Outcome: Not Progressing  Goal: Absence of Infection Signs and Symptoms  Outcome: Not Progressing  Goal: Improved Oral Intake  Outcome: Not Progressing  Goal: Optimal Pain Control and Function  Outcome: Not Progressing  Goal: Skin Health and Integrity  Outcome: Not Progressing  Goal: Optimal Wound Healing  Outcome: Not Progressing

## 2025-05-29 NOTE — ASSESSMENT & PLAN NOTE
Elevated LFTs and Tbili noted on 5/26. Mild RUQ tenderness. Concern for shock liver or portal thrombosis given hypercoagulable state.      - holding atorvastatin, resume when able.  - hepatitis and HIV negative  - RUQ US with hepatic steatosis, no lesions or thrombi  - pending DVT US

## 2025-05-29 NOTE — ASSESSMENT & PLAN NOTE
Sudden onset pelvic pain morning of 5/22. CTA revealing large, fairly well-circumscribed heterogenous lesions in the right hemipelvis extending towards the gluteal folds. Leftward shift of pelvic organs. Recent IVC filter placed for DVTs. Recent lumbar csf drain removed on 5/15.     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  Differential considerations include chordoma, chondrosarcoma, and giant cell tumor.  1.7 cm lesion in the S1 vertebral body with similar signal characteristics, concerning for metastasis.  Extensive DVT involving the IVC and iliac veins, with an IVC filter.  Ill-defined fluid collection extending along the right iliac vessels, new from earlier today, compatible with hematoma.  Cauda equina and epidural enhancement in the lower lumbar spine, likely inflammatory from a recent lumbar drain although infection cannot be excluded.  No organized epidural collection.  Soft tissue edema in the right anterolateral thigh, which may be postoperative from recent graft harvest.  Hemorrhage and infection are difficult to exclude.      - s/p biopsy with IR  - f/u pathology   - antibiotics d/c after cx negative at 48h  - discussing case with surg vs radiation oncology based on pathology results, if radiosensitive, may need inpatient treatment if able given severity of symptoms  - consulted PT/OT for assistance with mobility and for any mobility aid recommendations  - bowel reg added  - MM pain control    - MS contin    - PRN dilaudid   - gabapentin

## 2025-05-29 NOTE — ASSESSMENT & PLAN NOTE
Anemia is likely due to unclear etiology. Most recent hemoglobin and hematocrit are listed below.  Recent Labs     05/28/25  0719 05/28/25  1538 05/29/25  0402   HGB 10.5* 9.1* 9.3*   HCT 33.0* 27.8* 28.2*     Plan  - Monitor serial CBC: Daily  - Transfuse PRBC if patient becomes hemodynamically unstable, symptomatic or H/H drops below 7/21.  - Patient has not received any PRBC transfusions to date  - Patient's anemia is currently being monitored

## 2025-05-29 NOTE — ASSESSMENT & PLAN NOTE
B/L LE US 5/13/25 at [facility] with occlusive thrombus within the posterior tibial vein as well as nearly occlusive thrombus within the peroneal vein. Positive LLE DVT study. IVC filter in place    Plan:  - holding AC and VTE ppx pending additional workup and possible intervention   - pending LE US

## 2025-05-29 NOTE — NURSING
22G PIV placed to LFA due to due date to be changed. Patient tolerated well. No needs at this time.

## 2025-05-29 NOTE — PT/OT/SLP PROGRESS
Occupational Therapy  Treatment    Name: Gallo Maria Jr.  MRN: [id number]  Admitting Diagnosis: Pelvic mass  Recent Surgery: * No surgery found *      Recommendations:     Discharge Recommendations: High Intensity Therapy  Discharge Equipment Recommendations: walker, rolling, wheelchair, bath bench, bedside commode  Barriers to discharge: Inaccessible home environment, Other (Comment) (requires increased assistance)    Assessment:     Gallo Maria Jr. is a 44 y.o. male with a medical diagnosis of Pelvic mass. He presents with performance deficits affecting function including: weakness, impaired endurance, impaired self care skills, impaired functional mobility, gait instability, impaired balance, decreased coordination, decreased upper extremity function, decreased lower extremity function, decreased safety awareness, pain, impaired cardiopulmonary response to activity. Patient agreeable to participate in therapy session this AM. Patient making progress with established OT goals. At this time, patient remains limited in ADLs, transfers, and functional mobility and is currently not performing tasks at their reported PLOF. Patient would benefit from continued skilled acute OT services in order to maximize IND with ADLs and functional mobility to ensure safe return to PLOF in the least restrictive environment. OT continuing to recommend High Intensity Therapy once patient is medically appropriate for d/c.    Rehab Prognosis: Good; patient would benefit from acute OT services to address these deficits and reach maximum level of function.    Plan:     Patient to be seen 4 x/week to address the above listed problems via self-care/home management, therapeutic activities, therapeutic exercises, neuromuscular re-education  Plan of Care Expires: 06/22/25  Plan of Care Reviewed with: patient, spouse    Subjective     Chief Complaint: Patient with c/o pain in BLEs. RN notified and aware.  Patient Comments/Goals:  Patient with goal to return to PLOF.  Pain/Comfort:  Pain Rating 1: 4/10  Location - Side 1: Bilateral  Location - Orientation 1: generalized  Location 1: leg  Pain Addressed 1: Reposition, Distraction, Cessation of Activity, Nurse notified  Pain Rating Post-Intervention 1: 8/10    Objective:     Communicated with RN prior to session. Patient cleared to participate in therapy at this time. Patient found supine with no active lines upon OT entry to room. Spouse present in room upon OT entry.    General Precautions: Standard, fall   Orthopedic Precautions: N/A   Braces: N/A  Respiratory Status: Room air    Occupational Performance    Bed Mobility:  Patient completed Supine to Sit with minimum assistance  Patient completed anterior Scooting towards the EOB with minimum assistance  Patient completed Sit to Supine with moderate assistance    Functional Mobility/Transfers:  Patient completed Sit <> Stand Transfer with minimum assistance with rolling walker  Functional Mobility: Patient participated in in-room mobility to simulate home/community distances for ~ 5 ft with contact guard assistance and rolling walker. No LOB or SOB noted. Patient politely declined to perform any additional functional mobility at this time due to increased c/o pain in BLEs.    Activities of Daily Living:  Grooming: setup assistance to wash face with washcloth seated EOB  Lower Body Dressing: total assistance to antonio and doff B socks at bed-level  Toileting: patient declined the need to complete at this time     Balance:  Static sitting balance: SBA  Dynamic sitting balance: CGA-SBA  Static standing balance: CGA  Dynamic standing balance: CGA    AMPAC 6 Click ADL:  AMPAC Total Score: 16    Treatment & Education:  Patient and spouse educated on:   Role of OT, OT POC, and discharge planning.  Safe transfer techniques and proper body mechanics for fall prevention and improved independence with functional transfers.  Importance of OOB activities to  increase endurance and tolerance for increased participation in daily ADLs.  Time provided for therapeutic counseling and discussion of health disposition.  Utilizing the call bell to request for assistance with all functional mobility to ensure safety during hospital stay.  Whiteboard updated.    Patient and spouse verbalized understanding and all questions were addressed within the scope of OT.    Patient left HOB elevated with all lines intact, call button in reach, RN notified, and spouse present.    GOALS:   Multidisciplinary Problems       Occupational Therapy Goals          Problem: Occupational Therapy    Goal Priority Disciplines Outcome Interventions   Occupational Therapy Goal     OT, PT/OT Progressing    Description: Goals to be met by: 6/22/25.     Patient will increase functional independence with ADLs by performing:    UE Dressing with Stand-by Assistance.  LE Dressing with Stand-by Assistance.  Grooming while standing at sink with Stand-by Assistance.  Toileting from toilet with Stand-by Assistance for hygiene and clothing management.   Toilet transfer to toilet with Stand-by Assistance.  Supine to sit with Stand-by Assistance.  Sit to stand transfer with Stand-by Assistance.  Functional mobility to simulate household/community distances with Stand-by Assistance and utilizing LRAD in order to maximize functional activity tolerance and standing balance required for engagement in occupations of choice.                       DME Justifications:  Bedside Commode - Patient requires a commode for home use because he is confined to a single room.  Wheelchair - Patient has a mobility limitation that significantly impairs his/her ability to participate in one or more mobility related activities of daily living (MRADL's) such as toileting, feeding, dressing, grooming, and bathing in customary locations in the home. The mobility limitation cannot be sufficiently resolved by the use of a cane or walker. The use  of a manual wheelchair will significantly improve the patient's ability to participate in MRADLS and the patient will use it on regular basis in the home. Patient has expressed his/her willingness to use a manual wheelchair in the home. Patient's upper body strength is sufficient for propulsion. He/She also has a caregiver who is available, willing, and able to provide assistance with the wheelchair when needed.  Rolling Walker - Patient's mobility limitation cannot be sufficiently resolved by the use of a cane. His/Her functional mobility deficit can be sufficiently resolved with the use of a Rolling Walker. Patient's mobility limitation significantly impairs their ability to participate in one of more activities of daily living.  The use of a RW will significantly improve the patient's ability to participate in MRADLS and the patient will use it on regular basis in the home..    Time Tracking:     OT Date of Treatment: 05/29/25  OT Start Time: 1043  OT Stop Time: 1106  OT Total Time (min): 23 min    Billable Minutes: Therapeutic Activity 23 5/29/2025

## 2025-05-29 NOTE — PROGRESS NOTES
[facility] Medicine  Progress Note    Patient Name: Gallo Maria Jr.  MRN: [id number]  Patient Class: IP- Inpatient   Admission Date: 5/22/2025  Length of Stay: 6 days  Attending Physician: Chau Pierce III*  Primary Care Provider: Jerome Mederos MD        Subjective     Principal Problem:Pelvic mass        HPI:  Gallo Maria Jr. Is a 43yo male with a hx of BPH with urinary obstruction and pituitary adenoma s/p resection presenting for acute onset lower back pain that has radiating down his right leg, pelvic pain, and shortness of breath. The pain was sudden onset when he awoke from sleep this morning located in his lower back radiating down his right leg to mid thigh. The pelvic pain expands into his right groin. His shortness of breath is mainly with activity and has improved with rest. He also notes severe flushing and pre-syncopal symptoms while attempting to get to his car requiring him to take frequent breaks. His pain is still present when re-adjusting in bed but otherwise symptoms have largely improved with rest. He re[prts frequent headaches most recently yesterday unrelieved by tylenol #3. Otherwise he denies any vision changes, nasal leakage, congestion, cough, abdominal pain, N/V/D, constipation, urinary incontinence, or perianal numbness. Ambulation is limited by pain, not weakness.     He was recently admitted to [facility] on 5/1-5/5 for resection of a large pituitary adenoma and again from 5/8-5/16 for CSF leak requiring repair with lumbar drain and fat fascia bello grafts. Drain pulled 5/15. His hospital course also included two DVTs in the LLE. IVC filter placed on 5/15. He was advised to follow up outpatient to discuss utility of eliquis.     In the ED VSS, afebrile. CT chest abdomen pelvis revealing a large, fairly well-circumscribed heterogenous lesions in the right hemipelvis extending towards the gluteal folds. No PE. Labs notable for hgb 11.3, no  leukocytosis, bmp only notable for Na 134, BNP and troponin wnl, CRP elevated at 75.2.    Overview/Hospital Course:  MRI obtained with 13.4 cm coccygeal mass concerning for malignancy, 1.7 cm lesion in S1 concerning for metastasis, Extensive DVT in IVC and iliac veins with IVC filter in place, possible hematoma along right iliac vessels, Cauda equina and epidural enhancement in the lower lumbar spine, likely inflammatory from a recent lumbar drain, and soft tissue edema in right anterolateral thigh likely postoperative from recent graft harvest. S/p biopsy with IR. Family and patient updated. Pain control with gabapentin and PRN morphine. LFTs and Tbili elvated on 5/26. RUQ US with doppler notable for hepatic steatosis with no noted lesions or thrombi. Pathology sent to [place] for diagnostic stains. Case discussed with oncologist NP at [facility] who asked us to obtain a CT head. CT head noted right subdural fluid collection with 8mm midline shift. NSGY consulted who advised contacting [facility] for further workup. Remains without acute neuro changes.     Interval History: CT head obtained overnight at request of [facility] revealing R subdural fluid collection with 8mm midline shift. Neuro intact. Contacting [facility] team regarding findings.     Review of Systems   Genitourinary:  Negative for difficulty urinating.   Musculoskeletal:  Negative for arthralgias and myalgias.        Pelvic pain  R thigh pain   Skin:  Positive for wound (well healing surgical wounds on low back and right leg).   Neurological:  Negative for weakness.     Objective:     Vital Signs (Most Recent):  Temp: 97.7 °F (36.5 °C) (05/29/25 1105)  Pulse: 85 (05/29/25 1212)  Resp: 18 (05/29/25 1105)  BP: 119/69 (05/29/25 1105)  SpO2: 100 % (05/29/25 1105) Vital Signs (24h Range):  Temp:  [97.7 °F (36.5 °C)-98.5 °F (36.9 °C)] 97.7 °F (36.5 °C)  Pulse:  [85-98] 85  Resp:  [14-19] 18  SpO2:  [98 %-100 %] 100 %  BP: (107-119)/(60-77) 119/69      Weight: 106.8 kg (235 lb 5.5 oz)  Body mass index is 30.22 kg/m².    Intake/Output Summary (Last 24 hours) at 5/29/2025 1420  Last data filed at 5/29/2025 0620  Gross per 24 hour   Intake 240 ml   Output 200 ml   Net 40 ml         Physical Exam  Constitutional:       General: He is not in acute distress.     Appearance: Normal appearance. He is not ill-appearing.   HENT:      Head: Normocephalic and atraumatic.   Eyes:      Extraocular Movements: Extraocular movements intact.      Conjunctiva/sclera: Conjunctivae normal.   Cardiovascular:      Rate and Rhythm: Normal rate and regular rhythm.      Pulses: Normal pulses.      Heart sounds: Normal heart sounds.   Pulmonary:      Effort: Pulmonary effort is normal. No respiratory distress.      Breath sounds: Normal breath sounds.   Abdominal:      General: There is no distension.      Palpations: Abdomen is soft.      Tenderness: There is no abdominal tenderness.   Genitourinary:     Comments: Pelvic tenderness  Pelvic fullness on palpation  Musculoskeletal:         General: Swelling (BLE R>L) present. No tenderness.      Right lower leg: No edema.      Left lower leg: No edema.   Skin:     General: Skin is warm.      Capillary Refill: Capillary refill takes less than 2 seconds.      Comments: Well healing surgical scars (low back, R lateral thigh)   Neurological:      General: No focal deficit present.      Mental Status: He is alert and oriented to person, place, and time.      Cranial Nerves: No cranial nerve deficit.      Sensory: No sensory deficit.      Motor: No weakness.   Psychiatric:         Mood and Affect: Mood normal.         Thought Content: Thought content normal.               Significant Labs: All pertinent labs within the past 24 hours have been reviewed.  CBC:   Recent Labs   Lab 05/28/25  0719 05/28/25  1538 05/29/25  0402   WBC 18.45* 19.72* 18.97*   HGB 10.5* 9.1* 9.3*   HCT 33.0* 27.8* 28.2*    331 369     CMP:   Recent Labs   Lab  05/28/25  0719 05/29/25  0402   * 131*   K 4.6 4.7    99   CO2 19* 21*    104   BUN 18 17   CREATININE 1.0 1.1   CALCIUM 8.9 9.0   PROT 8.0 7.8   ALBUMIN 2.7* 2.7*   BILITOT 1.1* 1.3*   ALKPHOS 168* 177*   * 159*   * 156*   ANIONGAP 11 11     CPK wnl    Significant Imaging: I have reviewed all pertinent imaging results/findings within the past 24 hours.    CT head - New subdural collection along the right cerebral convexity with 0.8 cm of leftward midline shift.  Favored to represent remote subdural hemorrhage versus chronic collection.     Operative change pituitary mass resection.  No evidence of recurrence.     Sinusitis.         Assessment & Plan  Pelvic mass  Sudden onset pelvic pain morning of 5/22. CTA revealing large, fairly well-circumscribed heterogenous lesions in the right hemipelvis extending towards the gluteal folds. Leftward shift of pelvic organs. Recent IVC filter placed for DVTs. Recent lumbar csf drain removed on 5/15.     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  Differential considerations include chordoma, chondrosarcoma, and giant cell tumor.  1.7 cm lesion in the S1 vertebral body with similar signal characteristics, concerning for metastasis.  Extensive DVT involving the IVC and iliac veins, with an IVC filter.  Ill-defined fluid collection extending along the right iliac vessels, new from earlier today, compatible with hematoma.  Cauda equina and epidural enhancement in the lower lumbar spine, likely inflammatory from a recent lumbar drain although infection cannot be excluded.  No organized epidural collection.  Soft tissue edema in the right anterolateral thigh, which may be postoperative from recent graft harvest.  Hemorrhage and infection are difficult to exclude.      - s/p biopsy with IR  - f/u pathology   - antibiotics d/c after cx negative at 48h  - discussing case with surg vs radiation oncology based on pathology results, if radiosensitive, may need  inpatient treatment if able given severity of symptoms  - consulted PT/OT for assistance with mobility and for any mobility aid recommendations  - bowel reg added  - MM pain control    - MS contin    - PRN dilaudid   - gabapentin    Subdural fluid collection  CT head obtained at recommendation of [facility] on 5/28 - New subdural collection along the right cerebral convexity with 0.8 cm of leftward midline shift.  Favored to represent remote subdural hemorrhage versus chronic collection.  Operative change pituitary mass resection.  No evidence of recurrence.  Sinusitis.    - will contact [facility] regarding findings    Elevated LFTs  Hyperbilirubinemia  Elevated LFTs and Tbili noted on 5/26. Mild RUQ tenderness. Concern for shock liver or portal thrombosis given hypercoagulable state.      - holding atorvastatin, resume when able.  - hepatitis and HIV negative  - RUQ US with hepatic steatosis, no lesions or thrombi  - pending DVT US        History of pituitary surgery  Postoperative CSF leak  Pituitary adenoma  Recent pituitary resection at [facility] on 5/1. Follow up admission 5/8-5/16 for CSF leak. Lumbar drain pulled on 5/15. No indication of recurrence of CSF leak. Only noted mass effect symptoms include asymmetric pupils.     - monitor for CSF rhinorrhea or vision changes  - will [facility] to discuss with current oncologist  BPH with urinary obstruction  Noted, continue to monitor for urinary retention    Mixed hyperlipidemia  Holding statin for elevated LFTs    Obesity (BMI 30-39.9)  Body mass index is 30.22 kg/m². Morbid obesity complicates all aspects of disease management from diagnostic modalities to treatment. Weight loss encouraged and health benefits explained to patient.     Hyponatremia  Hyponatremia is likely due to dehydration. The patient's most recent sodium results are listed below.  Recent Labs     05/27/25  0248 05/28/25  0719 05/29/25  0402   * 130* 131*     Plan  - urine Na low,  serum and urine osm wnl  - Monitor sodium Daily.   - Patient hyponatremia is stable  Anemia  Anemia is likely due to unclear etiology. Most recent hemoglobin and hematocrit are listed below.  Recent Labs     05/28/25  0719 05/28/25  1538 05/29/25  0402   HGB 10.5* 9.1* 9.3*   HCT 33.0* 27.8* 28.2*     Plan  - Monitor serial CBC: Daily  - Transfuse PRBC if patient becomes hemodynamically unstable, symptomatic or H/H drops below 7/21.  - Patient has not received any PRBC transfusions to date  - Patient's anemia is currently being monitored  Acute deep vein thrombosis (DVT) of left lower extremity  Presence of IVC filter  B/L LE US 5/13/25 at [facility] with occlusive thrombus within the posterior tibial vein as well as nearly occlusive thrombus within the peroneal vein. Positive LLE DVT study. IVC filter in place    Plan:  - holding AC and VTE ppx pending additional workup and possible intervention   - pending [facility]    VTE Risk Mitigation (From admission, onward)           Ordered     Reason for No Pharmacological VTE Prophylaxis  Once        Question:  Reasons:  Answer:  Physician Provided (leave comment)  Comment:  Possible active bleed, pending possible intervention    05/22/25 1140     IP VTE HIGH RISK PATIENT  Once         05/22/25 1140     Place sequential compression device  Until discontinued         05/22/25 1140                    Discharge Planning   PORTER: 5/30/2025     Code Status: Full Code   Medical Readiness for Discharge Date:   Discharge Plan A: Rehab   Discharge Delays: None known at this time      Karen Holt MD  Department of Hospital Medicine   [facility] S[facility]

## 2025-05-29 NOTE — PLAN OF CARE
Problem: Occupational Therapy  Goal: Occupational Therapy Goal  Description: Goals to be met by: 6/22/25.     Patient will increase functional independence with ADLs by performing:    UE Dressing with Stand-by Assistance.  LE Dressing with Stand-by Assistance.  Grooming while standing at sink with Stand-by Assistance.  Toileting from toilet with Stand-by Assistance for hygiene and clothing management.   Toilet transfer to toilet with Stand-by Assistance.  Supine to sit with Stand-by Assistance.  Sit to stand transfer with Stand-by Assistance.  Functional mobility to simulate household/community distances with Stand-by Assistance and utilizing LRAD in order to maximize functional activity tolerance and standing balance required for engagement in occupations of choice.    Outcome: Progressing

## 2025-05-29 NOTE — ASSESSMENT & PLAN NOTE
CT head obtained at recommendation of [facility] on 5/28 - New subdural collection along the right cerebral convexity with 0.8 cm of leftward midline shift.  Favored to represent remote subdural hemorrhage versus chronic collection.  Operative change pituitary mass resection.  No evidence of recurrence.  Sinusitis.    - will contact [facility] regarding findings

## 2025-05-29 NOTE — SUBJECTIVE & OBJECTIVE
Interval History: CT head obtained overnight at request of [facility] revealing R subdural fluid collection with 8mm midline shift. Neuro intact. Contacting [facility] team regarding findings.     Review of Systems   Genitourinary:  Negative for difficulty urinating.   Musculoskeletal:  Negative for arthralgias and myalgias.        Pelvic pain  R thigh pain   Skin:  Positive for wound (well healing surgical wounds on low back and right leg).   Neurological:  Negative for weakness.     Objective:     Vital Signs (Most Recent):  Temp: 97.7 °F (36.5 °C) (05/29/25 1105)  Pulse: 85 (05/29/25 1212)  Resp: 18 (05/29/25 1105)  BP: 119/69 (05/29/25 1105)  SpO2: 100 % (05/29/25 1105) Vital Signs (24h Range):  Temp:  [97.7 °F (36.5 °C)-98.5 °F (36.9 °C)] 97.7 °F (36.5 °C)  Pulse:  [85-98] 85  Resp:  [14-19] 18  SpO2:  [98 %-100 %] 100 %  BP: (107-119)/(60-77) 119/69     Weight: 106.8 kg (235 lb 5.5 oz)  Body mass index is 30.22 kg/m².    Intake/Output Summary (Last 24 hours) at 5/29/2025 1420  Last data filed at 5/29/2025 0620  Gross per 24 hour   Intake 240 ml   Output 200 ml   Net 40 ml         Physical Exam  Constitutional:       General: He is not in acute distress.     Appearance: Normal appearance. He is not ill-appearing.   HENT:      Head: Normocephalic and atraumatic.   Eyes:      Extraocular Movements: Extraocular movements intact.      Conjunctiva/sclera: Conjunctivae normal.   Cardiovascular:      Rate and Rhythm: Normal rate and regular rhythm.      Pulses: Normal pulses.      Heart sounds: Normal heart sounds.   Pulmonary:      Effort: Pulmonary effort is normal. No respiratory distress.      Breath sounds: Normal breath sounds.   Abdominal:      General: There is no distension.      Palpations: Abdomen is soft.      Tenderness: There is no abdominal tenderness.   Genitourinary:     Comments: Pelvic tenderness  Pelvic fullness on palpation  Musculoskeletal:         General: Swelling (BLE R>L) present. No  tenderness.      Right lower leg: No edema.      Left lower leg: No edema.   Skin:     General: Skin is warm.      Capillary Refill: Capillary refill takes less than 2 seconds.      Comments: Well healing surgical scars (low back, R lateral thigh)   Neurological:      General: No focal deficit present.      Mental Status: He is alert and oriented to person, place, and time.      Cranial Nerves: No cranial nerve deficit.      Sensory: No sensory deficit.      Motor: No weakness.   Psychiatric:         Mood and Affect: Mood normal.         Thought Content: Thought content normal.               Significant Labs: All pertinent labs within the past 24 hours have been reviewed.  CBC:   Recent Labs   Lab 05/28/25  0719 05/28/25  1538 05/29/25  0402   WBC 18.45* 19.72* 18.97*   HGB 10.5* 9.1* 9.3*   HCT 33.0* 27.8* 28.2*    331 369     CMP:   Recent Labs   Lab 05/28/25  0719 05/29/25  0402   * 131*   K 4.6 4.7    99   CO2 19* 21*    104   BUN 18 17   CREATININE 1.0 1.1   CALCIUM 8.9 9.0   PROT 8.0 7.8   ALBUMIN 2.7* 2.7*   BILITOT 1.1* 1.3*   ALKPHOS 168* 177*   * 159*   * 156*   ANIONGAP 11 11     CPK wnl    Significant Imaging: I have reviewed all pertinent imaging results/findings within the past 24 hours.    CT head - New subdural collection along the right cerebral convexity with 0.8 cm of leftward midline shift.  Favored to represent remote subdural hemorrhage versus chronic collection.     Operative change pituitary mass resection.  No evidence of recurrence.     Sinusitis.

## 2025-05-29 NOTE — ASSESSMENT & PLAN NOTE
Hyponatremia is likely due to dehydration. The patient's most recent sodium results are listed below.  Recent Labs     05/27/25  0248 05/28/25  0719 05/29/25  0402   * 130* 131*     Plan  - urine Na low, serum and urine osm wnl  - Monitor sodium Daily.   - Patient hyponatremia is stable

## 2025-05-30 LAB
ABSOLUTE EOSINOPHIL (OHS): 0.5 K/UL
ABSOLUTE EOSINOPHIL (OHS): 0.8 K/UL
ABSOLUTE MONOCYTE (OHS): 1.67 K/UL (ref 0.3–1)
ABSOLUTE MONOCYTE (OHS): 1.69 K/UL (ref 0.3–1)
ABSOLUTE NEUTROPHIL COUNT (OHS): 11.81 K/UL (ref 1.8–7.7)
ABSOLUTE NEUTROPHIL COUNT (OHS): 12.49 K/UL (ref 1.8–7.7)
ALBUMIN SERPL BCP-MCNC: 2.7 G/DL (ref 3.5–5.2)
ALP SERPL-CCNC: 176 UNIT/L (ref 40–150)
ALT SERPL W/O P-5'-P-CCNC: 177 UNIT/L (ref 10–44)
ANION GAP (OHS): 11 MMOL/L (ref 8–16)
AST SERPL-CCNC: 188 UNIT/L (ref 11–45)
BASOPHILS # BLD AUTO: 0.05 K/UL
BASOPHILS # BLD AUTO: 0.07 K/UL
BASOPHILS NFR BLD AUTO: 0.3 %
BASOPHILS NFR BLD AUTO: 0.4 %
BILIRUB SERPL-MCNC: 1.2 MG/DL (ref 0.1–1)
BUN SERPL-MCNC: 17 MG/DL (ref 6–20)
CALCIUM SERPL-MCNC: 8.8 MG/DL (ref 8.7–10.5)
CHLORIDE SERPL-SCNC: 99 MMOL/L (ref 95–110)
CO2 SERPL-SCNC: 21 MMOL/L (ref 23–29)
CREAT SERPL-MCNC: 1.1 MG/DL (ref 0.5–1.4)
ERYTHROCYTE [DISTWIDTH] IN BLOOD BY AUTOMATED COUNT: 14.2 % (ref 11.5–14.5)
ERYTHROCYTE [DISTWIDTH] IN BLOOD BY AUTOMATED COUNT: 14.6 % (ref 11.5–14.5)
GFR SERPLBLD CREATININE-BSD FMLA CKD-EPI: >60 ML/MIN/1.73/M2
GLUCOSE SERPL-MCNC: 115 MG/DL (ref 70–110)
HCT VFR BLD AUTO: 26.4 % (ref 40–54)
HCT VFR BLD AUTO: 29.3 % (ref 40–54)
HGB BLD-MCNC: 8.8 GM/DL (ref 14–18)
HGB BLD-MCNC: 9.7 GM/DL (ref 14–18)
IMM GRANULOCYTES # BLD AUTO: 0.39 K/UL (ref 0–0.04)
IMM GRANULOCYTES # BLD AUTO: 0.47 K/UL (ref 0–0.04)
IMM GRANULOCYTES NFR BLD AUTO: 2.3 % (ref 0–0.5)
IMM GRANULOCYTES NFR BLD AUTO: 2.7 % (ref 0–0.5)
LYMPHOCYTES # BLD AUTO: 2.21 K/UL (ref 1–4.8)
LYMPHOCYTES # BLD AUTO: 2.41 K/UL (ref 1–4.8)
MAGNESIUM SERPL-MCNC: 2.3 MG/DL (ref 1.6–2.6)
MAYO GENERIC ORDERABLE RESULT: NORMAL
MCH RBC QN AUTO: 26.5 PG (ref 27–31)
MCH RBC QN AUTO: 26.6 PG (ref 27–31)
MCHC RBC AUTO-ENTMCNC: 33.1 G/DL (ref 32–36)
MCHC RBC AUTO-ENTMCNC: 33.3 G/DL (ref 32–36)
MCV RBC AUTO: 80 FL (ref 82–98)
MCV RBC AUTO: 80 FL (ref 82–98)
NUCLEATED RBC (/100WBC) (OHS): 0 /100 WBC
NUCLEATED RBC (/100WBC) (OHS): 0 /100 WBC
PHOSPHATE SERPL-MCNC: 4.2 MG/DL (ref 2.7–4.5)
PLATELET # BLD AUTO: 427 K/UL (ref 150–450)
PLATELET # BLD AUTO: 464 K/UL (ref 150–450)
PMV BLD AUTO: 9.1 FL (ref 9.2–12.9)
PMV BLD AUTO: 9.1 FL (ref 9.2–12.9)
POTASSIUM SERPL-SCNC: 4.4 MMOL/L (ref 3.5–5.1)
PROT SERPL-MCNC: 7.8 GM/DL (ref 6–8.4)
RBC # BLD AUTO: 3.32 M/UL (ref 4.6–6.2)
RBC # BLD AUTO: 3.65 M/UL (ref 4.6–6.2)
RELATIVE EOSINOPHIL (OHS): 2.9 %
RELATIVE EOSINOPHIL (OHS): 4.7 %
RELATIVE LYMPHOCYTE (OHS): 12.7 % (ref 18–48)
RELATIVE LYMPHOCYTE (OHS): 14.1 % (ref 18–48)
RELATIVE MONOCYTE (OHS): 9.7 % (ref 4–15)
RELATIVE MONOCYTE (OHS): 9.7 % (ref 4–15)
RELATIVE NEUTROPHIL (OHS): 68.9 % (ref 38–73)
RELATIVE NEUTROPHIL (OHS): 71.6 % (ref 38–73)
SODIUM SERPL-SCNC: 131 MMOL/L (ref 136–145)
WBC # BLD AUTO: 17.13 K/UL (ref 3.9–12.7)
WBC # BLD AUTO: 17.43 K/UL (ref 3.9–12.7)

## 2025-05-30 PROCEDURE — 94761 N-INVAS EAR/PLS OXIMETRY MLT: CPT

## 2025-05-30 PROCEDURE — 21400001 HC TELEMETRY ROOM

## 2025-05-30 PROCEDURE — 63600175 PHARM REV CODE 636 W HCPCS

## 2025-05-30 PROCEDURE — 11000001 HC ACUTE MED/SURG PRIVATE ROOM

## 2025-05-30 PROCEDURE — 83735 ASSAY OF MAGNESIUM: CPT

## 2025-05-30 PROCEDURE — 85025 COMPLETE CBC W/AUTO DIFF WBC: CPT

## 2025-05-30 PROCEDURE — 80053 COMPREHEN METABOLIC PANEL: CPT

## 2025-05-30 PROCEDURE — 36415 COLL VENOUS BLD VENIPUNCTURE: CPT

## 2025-05-30 PROCEDURE — 25000242 PHARM REV CODE 250 ALT 637 W/ HCPCS

## 2025-05-30 PROCEDURE — 25000003 PHARM REV CODE 250

## 2025-05-30 PROCEDURE — 84100 ASSAY OF PHOSPHORUS: CPT

## 2025-05-30 RX ORDER — HYDROMORPHONE HYDROCHLORIDE 2 MG/ML
1 INJECTION, SOLUTION INTRAMUSCULAR; INTRAVENOUS; SUBCUTANEOUS ONCE
Status: DISCONTINUED | OUTPATIENT
Start: 2025-05-30 | End: 2025-05-30

## 2025-05-30 RX ORDER — HYDROMORPHONE HYDROCHLORIDE 2 MG/ML
0.5 INJECTION, SOLUTION INTRAMUSCULAR; INTRAVENOUS; SUBCUTANEOUS EVERY 4 HOURS PRN
Status: DISCONTINUED | OUTPATIENT
Start: 2025-05-30 | End: 2025-06-03

## 2025-05-30 RX ORDER — OXYCODONE HYDROCHLORIDE 10 MG/1
10 TABLET ORAL EVERY 4 HOURS PRN
Refills: 0 | Status: DISCONTINUED | OUTPATIENT
Start: 2025-05-30 | End: 2025-06-01

## 2025-05-30 RX ORDER — HYDROMORPHONE HYDROCHLORIDE 2 MG/ML
1 INJECTION, SOLUTION INTRAMUSCULAR; INTRAVENOUS; SUBCUTANEOUS ONCE
Status: COMPLETED | OUTPATIENT
Start: 2025-05-30 | End: 2025-05-30

## 2025-05-30 RX ADMIN — GABAPENTIN 600 MG: 300 CAPSULE ORAL at 02:05

## 2025-05-30 RX ADMIN — GABAPENTIN 600 MG: 300 CAPSULE ORAL at 09:05

## 2025-05-30 RX ADMIN — Medication 600 ML: at 04:05

## 2025-05-30 RX ADMIN — MORPHINE SULFATE 15 MG: 15 TABLET, EXTENDED RELEASE ORAL at 05:05

## 2025-05-30 RX ADMIN — SENNOSIDES AND DOCUSATE SODIUM 1 TABLET: 50; 8.6 TABLET ORAL at 09:05

## 2025-05-30 RX ADMIN — Medication 600 ML: at 09:05

## 2025-05-30 RX ADMIN — HYDROMORPHONE HYDROCHLORIDE 1 MG: 2 INJECTION INTRAMUSCULAR; INTRAVENOUS; SUBCUTANEOUS at 10:05

## 2025-05-30 RX ADMIN — Medication 600 ML: at 08:05

## 2025-05-30 RX ADMIN — MORPHINE SULFATE 15 MG: 15 TABLET, EXTENDED RELEASE ORAL at 02:05

## 2025-05-30 RX ADMIN — MORPHINE SULFATE 15 MG: 15 TABLET, EXTENDED RELEASE ORAL at 09:05

## 2025-05-30 RX ADMIN — PSYLLIUM HUSK 1 PACKET: 3.4 POWDER ORAL at 09:05

## 2025-05-30 RX ADMIN — HYDROMORPHONE HYDROCHLORIDE 1 MG: 2 INJECTION, SOLUTION INTRAMUSCULAR; INTRAVENOUS; SUBCUTANEOUS at 03:05

## 2025-05-30 NOTE — ASSESSMENT & PLAN NOTE
Sudden onset pelvic pain morning of 5/22. CTA revealing large, fairly well-circumscribed heterogenous lesions in the right hemipelvis extending towards the gluteal folds. Leftward shift of pelvic organs. Recent IVC filter placed for DVTs. Recent lumbar csf drain removed on 5/15.     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  Differential considerations include chordoma, chondrosarcoma, and giant cell tumor.  1.7 cm lesion in the S1 vertebral body with similar signal characteristics, concerning for metastasis.  Extensive DVT involving the IVC and iliac veins, with an IVC filter.  Ill-defined fluid collection extending along the right iliac vessels, new from earlier today, compatible with hematoma.  Cauda equina and epidural enhancement in the lower lumbar spine, likely inflammatory from a recent lumbar drain although infection cannot be excluded.  No organized epidural collection.  Soft tissue edema in the right anterolateral thigh, which may be postoperative from recent graft harvest.  Hemorrhage and infection are difficult to exclude.      - s/p biopsy with IR  - f/u pathology   - antibiotics d/c after cx negative at 48h  - discussing case with surg vs radiation oncology based on pathology results, if radiosensitive, may need inpatient treatment if able given severity of symptoms  - trying to establish on oral pain regimen to allow d/c for patient to follow up with [facility] specialist   - consulted PT/OT for assistance with mobility and for any mobility aid recommendations  - bowel reg added  - MM pain control    - MS contin    - PRN dilaudid   - gabapentin

## 2025-05-30 NOTE — ASSESSMENT & PLAN NOTE
B/L LE US 5/13/25 at [facility] with occlusive thrombus within the posterior tibial vein as well as nearly occlusive thrombus within the peroneal vein. Positive LLE DVT study. IVC filter in place    Plan:  - holding AC and VTE ppx pending additional workup and possible intervention   - DVT US with extensive DVTs, discussed with vascular surgery who would not recommend thrombectomy w/o ability to anticoagulate  - will discuss with [facility] team ability to anticoagulate based on CT head imaging

## 2025-05-30 NOTE — ASSESSMENT & PLAN NOTE
Hyponatremia is likely due to dehydration. The patient's most recent sodium results are listed below.  Recent Labs     05/28/25  0719 05/29/25  0402 05/30/25  0237   * 131* 131*     Plan  - urine Na low, serum and urine osm wnl  - Monitor sodium Daily.   - Patient hyponatremia is stable

## 2025-05-30 NOTE — PLAN OF CARE
[facility]  Discharge Reassessment    Primary Care Provider: Jerome Mederos MD    Expected Discharge Date: 6/3/2025    SW met with patient and pt's wife, Shanel, to discuss discharge plan. Pt reported he is ambulating better than previously.  SW complete authorization for release of information to Dr. Deutsch with [facility] office to send CT scans.  RADHA spoke to [facility] with [employer] with request to send CT scans via [employer].  SW completed [employer] request.      Discharge Plan A and Plan B have been determined by review of patient's clinical status, future medical and therapeutic needs, and coverage/benefits for post-acute care in coordination with multidisciplinary team members.    Reassessment (most recent)       Discharge Reassessment - 05/30/25 1049          Discharge Reassessment    Assessment Type Discharge Planning Reassessment     Did the patient's condition or plan change since previous assessment? No     Discharge Plan discussed with: Patient;Spouse/sig other     Name(s) and Number(s) Shanel Maria [phone]     Communicated PORTER with patient/caregiver Yes     Discharge Plan A Rehab     Discharge Plan B Home;Home with family;Home Health     DME Needed Upon Discharge  other (see comments)   TBD    Transition of Care Barriers None     Why the patient remains in the hospital Requires continued medical care        Post-Acute Status    Post-Acute Authorization Placement     Post-Acute Placement Status Pending medical clearance/testing     Discharge Delays None known at this time                   Kaci Smiley LMSW  Part-Time-  [facility]  Ext. [phone]

## 2025-05-30 NOTE — PROGRESS NOTES
[facility] Medicine  Progress Note    Patient Name: Gallo Maria Jr.  MRN: [id number]  Patient Class: IP- Inpatient   Admission Date: 5/22/2025  Length of Stay: 7 days  Attending Physician: Chau Pierce III*  Primary Care Provider: Jerome Mederos MD        Subjective     Principal Problem:Pelvic mass        HPI:  Gallo Maria Jr. Is a 45yo male with a hx of BPH with urinary obstruction and pituitary adenoma s/p resection presenting for acute onset lower back pain that has radiating down his right leg, pelvic pain, and shortness of breath. The pain was sudden onset when he awoke from sleep this morning located in his lower back radiating down his right leg to mid thigh. The pelvic pain expands into his right groin. His shortness of breath is mainly with activity and has improved with rest. He also notes severe flushing and pre-syncopal symptoms while attempting to get to his car requiring him to take frequent breaks. His pain is still present when re-adjusting in bed but otherwise symptoms have largely improved with rest. He re[prts frequent headaches most recently yesterday unrelieved by tylenol #3. Otherwise he denies any vision changes, nasal leakage, congestion, cough, abdominal pain, N/V/D, constipation, urinary incontinence, or perianal numbness. Ambulation is limited by pain, not weakness.     He was recently admitted to [facility] on 5/1-5/5 for resection of a large pituitary adenoma and again from 5/8-5/16 for CSF leak requiring repair with lumbar drain and fat fascia bello grafts. Drain pulled 5/15. His hospital course also included two DVTs in the LLE. IVC filter placed on 5/15. He was advised to follow up outpatient to discuss utility of eliquis.     In the ED VSS, afebrile. CT chest abdomen pelvis revealing a large, fairly well-circumscribed heterogenous lesions in the right hemipelvis extending towards the gluteal folds. No PE. Labs notable for hgb 11.3, no  leukocytosis, bmp only notable for Na 134, BNP and troponin wnl, CRP elevated at 75.2.    Overview/Hospital Course:  MRI obtained with 13.4 cm coccygeal mass concerning for malignancy, 1.7 cm lesion in S1 concerning for metastasis. Extensive DVT in IVC and iliac veins with IVC filter in place, possible hematoma along right iliac vessels, Cauda equina and epidural enhancement in the lower lumbar spine, likely inflammatory from a recent lumbar drain, and soft tissue edema in right anterolateral thigh likely postoperative from recent graft harvest. S/p biopsy with IR, still pending pathology results. Family and patient updated. Pain control with gabapentin and PRN morphine. LFTs and Tbili elvated on 5/26. RUQ US with doppler notable for hepatic steatosis with no noted lesions or thrombi. Case discussed with oncologist NP at [facility] who asked us to obtain a CT head. CT head noted right subdural fluid collection with 8mm midline shift, stable on repeat imaging. NSGY consulted who advised contacting [facility] for further workup. Remains without acute neuro changes. Trying to establish on oral pain regimen in anticipation of d/c for follow up at [facility].    Interval History: Repeat CT head with stable findings from prior. Patient does report headache overnight that resolved with pain medication. In discussion with [facility] team regarding ongoing care. BLE US with extensive DVTs, dicussed with vascular who do not recommend thrombectomy if we cannot anticoagulate. Pending CT head scans sent to [facility] for their input on ability to anticoagulate.    Review of Systems  Objective:     Vital Signs (Most Recent):  Temp: 98 °F (36.7 °C) (05/30/25 1117)  Pulse: 99 (05/30/25 1117)  Resp: 18 (05/30/25 1449)  BP: 110/76 (05/30/25 1117)  SpO2: 99 % (05/30/25 1117) Vital Signs (24h Range):  Temp:  [97.4 °F (36.3 °C)-98.3 °F (36.8 °C)] 98 °F (36.7 °C)  Pulse:  [89-99] 99  Resp:  [18-20] 18  SpO2:  [97 %-100 %] 99 %  BP:  (101-119)/(60-76) 110/76     Weight: 106.8 kg (235 lb 5.5 oz)  Body mass index is 30.22 kg/m².    Intake/Output Summary (Last 24 hours) at 5/30/2025 1458  Last data filed at 5/30/2025 0816  Gross per 24 hour   Intake 600 ml   Output 800 ml   Net -200 ml         Physical Exam  Constitutional:       General: He is not in acute distress.     Appearance: Normal appearance. He is not ill-appearing.   HENT:      Head: Normocephalic and atraumatic.   Eyes:      Extraocular Movements: Extraocular movements intact.      Conjunctiva/sclera: Conjunctivae normal.   Cardiovascular:      Rate and Rhythm: Normal rate and regular rhythm.      Pulses: Normal pulses.      Heart sounds: Normal heart sounds.   Pulmonary:      Effort: Pulmonary effort is normal. No respiratory distress.      Breath sounds: Normal breath sounds.   Abdominal:      General: There is no distension.      Palpations: Abdomen is soft.      Tenderness: There is no abdominal tenderness.   Genitourinary:     Comments: Pelvic tenderness  Pelvic fullness on palpation  Musculoskeletal:         General: Swelling (BLE R>L) present. No tenderness.      Right lower leg: No edema.      Left lower leg: No edema.   Skin:     General: Skin is warm.      Capillary Refill: Capillary refill takes less than 2 seconds.      Comments: Well healing surgical scars (low back, R lateral thigh)   Neurological:      General: No focal deficit present.      Mental Status: He is alert and oriented to person, place, and time.      Cranial Nerves: No cranial nerve deficit.      Sensory: No sensory deficit.      Motor: No weakness.   Psychiatric:         Mood and Affect: Mood normal.         Thought Content: Thought content normal.               Significant Labs: All pertinent labs within the past 24 hours have been reviewed.  CBC:   Recent Labs   Lab 05/29/25  0402 05/29/25  1655 05/30/25  0237   WBC 18.97* 18.74* 17.13*   HGB 9.3* 9.0* 8.8*   HCT 28.2* 28.5* 26.4*    416 427      CMP:   Recent Labs   Lab 05/29/25  0402 05/30/25  0237   * 131*   K 4.7 4.4   CL 99 99   CO2 21* 21*    115*   BUN 17 17   CREATININE 1.1 1.1   CALCIUM 9.0 8.8   PROT 7.8 7.8   ALBUMIN 2.7* 2.7*   BILITOT 1.3* 1.2*   ALKPHOS 177* 176*   * 188*   * 177*   ANIONGAP 11 11       Significant Imaging: I have reviewed all pertinent imaging results/findings within the past 24 hours.      Assessment & Plan  Pelvic mass  Sudden onset pelvic pain morning of 5/22. CTA revealing large, fairly well-circumscribed heterogenous lesions in the right hemipelvis extending towards the gluteal folds. Leftward shift of pelvic organs. Recent IVC filter placed for DVTs. Recent lumbar csf drain removed on 5/15.     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  Differential considerations include chordoma, chondrosarcoma, and giant cell tumor.  1.7 cm lesion in the S1 vertebral body with similar signal characteristics, concerning for metastasis.  Extensive DVT involving the IVC and iliac veins, with an IVC filter.  Ill-defined fluid collection extending along the right iliac vessels, new from earlier today, compatible with hematoma.  Cauda equina and epidural enhancement in the lower lumbar spine, likely inflammatory from a recent lumbar drain although infection cannot be excluded.  No organized epidural collection.  Soft tissue edema in the right anterolateral thigh, which may be postoperative from recent graft harvest.  Hemorrhage and infection are difficult to exclude.      - s/p biopsy with IR  - f/u pathology   - antibiotics d/c after cx negative at 48h  - discussing case with surg vs radiation oncology based on pathology results, if radiosensitive, may need inpatient treatment if able given severity of symptoms  - trying to establish on oral pain regimen to allow d/c for patient to follow up with [facility] specialist   - consulted PT/OT for assistance with mobility and for any mobility aid recommendations  - bowel  reg added  - MM pain control    - MS contin    - PRN dilaudid   - gabapentin    Subdural fluid collection  CT head obtained at recommendation of [facility] on 5/28 - New subdural collection along the right cerebral convexity with 0.8 cm of leftward midline shift.  Favored to represent remote subdural hemorrhage versus chronic collection.  Operative change pituitary mass resection.  No evidence of recurrence.  Sinusitis.    - will contact [facility] regarding findings  - repeat CT head with stable findings   - will discuss with [facility] team for input on ability to anticoagulate    Elevated LFTs  Hyperbilirubinemia  Elevated LFTs and Tbili noted on 5/26. Mild RUQ tenderness. Concern for shock liver or portal thrombosis given hypercoagulable state.      - holding atorvastatin, resume when able.  - hepatitis and HIV negative  - RUQ US with hepatic steatosis, no lesions or thrombi      History of pituitary surgery  Postoperative CSF leak  Pituitary adenoma  Recent pituitary resection at [facility] on 5/1. Follow up admission 5/8-5/16 for CSF leak. Lumbar drain pulled on 5/15. No indication of recurrence of CSF leak. Only noted mass effect symptoms include asymmetric pupils.     - monitor for CSF rhinorrhea or vision changes  - will [facility] to discuss with current oncologist  BPH with urinary obstruction  Noted, continue to monitor for urinary retention    Mixed hyperlipidemia  Holding statin for elevated LFTs    Obesity (BMI 30-39.9)  Body mass index is 30.22 kg/m². Morbid obesity complicates all aspects of disease management from diagnostic modalities to treatment. Weight loss encouraged and health benefits explained to patient.     Hyponatremia  Hyponatremia is likely due to dehydration. The patient's most recent sodium results are listed below.  Recent Labs     05/28/25  0719 05/29/25  0402 05/30/25  0237   * 131* 131*     Plan  - urine Na low, serum and urine osm wnl  - Monitor sodium Daily.   -  Patient hyponatremia is stable  Anemia  Anemia is likely due to unclear etiology. Most recent hemoglobin and hematocrit are listed below.  Recent Labs     05/29/25  0402 05/29/25  1655 05/30/25  0237   HGB 9.3* 9.0* 8.8*   HCT 28.2* 28.5* 26.4*     Plan  - Monitor serial CBC: Daily  - Transfuse PRBC if patient becomes hemodynamically unstable, symptomatic or H/H drops below 7/21.  - Patient has not received any PRBC transfusions to date  - Patient's anemia is currently being monitored  Acute deep vein thrombosis (DVT) of left lower extremity  Presence of IVC filter  B/L LE US 5/13/25 at [facility] with occlusive thrombus within the posterior tibial vein as well as nearly occlusive thrombus within the peroneal vein. Positive LLE DVT study. IVC filter in place    Plan:  - holding AC and VTE ppx pending additional workup and possible intervention   - DVT US with extensive DVTs, discussed with vascular surgery who would not recommend thrombectomy w/o ability to anticoagulate  - will discuss with [facility] team ability to anticoagulate based on CT head imaging    VTE Risk Mitigation (From admission, onward)           Ordered     Reason for No Pharmacological VTE Prophylaxis  Once        Question:  Reasons:  Answer:  Physician Provided (leave comment)  Comment:  Possible active bleed, pending possible intervention    05/22/25 1140     IP VTE HIGH RISK PATIENT  Once         05/22/25 1140     Place sequential compression device  Until discontinued         05/22/25 1140                    Discharge Planning   PORTER: 6/3/2025     Code Status: Full Code   Medical Readiness for Discharge Date:   Discharge Plan A: Rehab   Discharge Delays: None known at this time            Please place Justification for DME        Yodit Hopper MD  Department of Hospital Medicine   [facility]

## 2025-05-30 NOTE — ASSESSMENT & PLAN NOTE
Anemia is likely due to unclear etiology. Most recent hemoglobin and hematocrit are listed below.  Recent Labs     05/29/25  0402 05/29/25  1655 05/30/25  0237   HGB 9.3* 9.0* 8.8*   HCT 28.2* 28.5* 26.4*     Plan  - Monitor serial CBC: Daily  - Transfuse PRBC if patient becomes hemodynamically unstable, symptomatic or H/H drops below 7/21.  - Patient has not received any PRBC transfusions to date  - Patient's anemia is currently being monitored

## 2025-05-30 NOTE — SUBJECTIVE & OBJECTIVE
Interval History: Repeat CT head with stable findings from prior. Patient does report headache overnight that resolved with pain medication. In discussion with [facility] team regarding ongoing care. BLE US with extensive DVTs, dicussed with vascular who do not recommend thrombectomy if we cannot anticoagulate. Pending CT head scans sent to [facility] for their input on ability to anticoagulate.    Review of Systems  Objective:     Vital Signs (Most Recent):  Temp: 98 °F (36.7 °C) (05/30/25 1117)  Pulse: 99 (05/30/25 1117)  Resp: 18 (05/30/25 1449)  BP: 110/76 (05/30/25 1117)  SpO2: 99 % (05/30/25 1117) Vital Signs (24h Range):  Temp:  [97.4 °F (36.3 °C)-98.3 °F (36.8 °C)] 98 °F (36.7 °C)  Pulse:  [89-99] 99  Resp:  [18-20] 18  SpO2:  [97 %-100 %] 99 %  BP: (101-119)/(60-76) 110/76     Weight: 106.8 kg (235 lb 5.5 oz)  Body mass index is 30.22 kg/m².    Intake/Output Summary (Last 24 hours) at 5/30/2025 1458  Last data filed at 5/30/2025 0816  Gross per 24 hour   Intake 600 ml   Output 800 ml   Net -200 ml         Physical Exam  Constitutional:       General: He is not in acute distress.     Appearance: Normal appearance. He is not ill-appearing.   HENT:      Head: Normocephalic and atraumatic.   Eyes:      Extraocular Movements: Extraocular movements intact.      Conjunctiva/sclera: Conjunctivae normal.   Cardiovascular:      Rate and Rhythm: Normal rate and regular rhythm.      Pulses: Normal pulses.      Heart sounds: Normal heart sounds.   Pulmonary:      Effort: Pulmonary effort is normal. No respiratory distress.      Breath sounds: Normal breath sounds.   Abdominal:      General: There is no distension.      Palpations: Abdomen is soft.      Tenderness: There is no abdominal tenderness.   Genitourinary:     Comments: Pelvic tenderness  Pelvic fullness on palpation  Musculoskeletal:         General: Swelling (BLE R>L) present. No tenderness.      Right lower leg: No edema.      Left lower leg: No edema.    Skin:     General: Skin is warm.      Capillary Refill: Capillary refill takes less than 2 seconds.      Comments: Well healing surgical scars (low back, R lateral thigh)   Neurological:      General: No focal deficit present.      Mental Status: He is alert and oriented to person, place, and time.      Cranial Nerves: No cranial nerve deficit.      Sensory: No sensory deficit.      Motor: No weakness.   Psychiatric:         Mood and Affect: Mood normal.         Thought Content: Thought content normal.               Significant Labs: All pertinent labs within the past 24 hours have been reviewed.  CBC:   Recent Labs   Lab 05/29/25  0402 05/29/25  1655 05/30/25  0237   WBC 18.97* 18.74* 17.13*   HGB 9.3* 9.0* 8.8*   HCT 28.2* 28.5* 26.4*    416 427     CMP:   Recent Labs   Lab 05/29/25  0402 05/30/25  0237   * 131*   K 4.7 4.4   CL 99 99   CO2 21* 21*    115*   BUN 17 17   CREATININE 1.1 1.1   CALCIUM 9.0 8.8   PROT 7.8 7.8   ALBUMIN 2.7* 2.7*   BILITOT 1.3* 1.2*   ALKPHOS 177* 176*   * 188*   * 177*   ANIONGAP 11 11       Significant Imaging: I have reviewed all pertinent imaging results/findings within the past 24 hours.

## 2025-05-30 NOTE — PT/OT/SLP PROGRESS
Physical Therapy Treatment    Patient Name:  Gallo Maria Jr.   MRN:  [id number]    Recommendations:     Discharge Recommendations: High Intensity Therapy  Discharge Equipment Recommendations: wheelchair, walker, rolling, bedside commode, bath bench  Barriers to discharge: Decreased caregiver support and increased need for skilled assistance.    Assessment:     Gallo Maria Jr. is a 44 y.o. male admitted with a medical diagnosis of Pelvic mass.  He presents with the following impairments/functional limitations: weakness, impaired endurance, impaired self care skills, impaired functional mobility, gait instability, impaired balance, decreased coordination, decreased upper extremity function, decreased lower extremity function, decreased safety awareness, pain, impaired cardiopulmonary response to activity Pt agreeable to therapy and performed within functional limits.    Rehab Prognosis: Good; patient would benefit from acute skilled PT services to address these deficits and reach maximum level of function.    Recent Surgery: * No surgery found *      Plan:     During this hospitalization, patient to be seen 4 x/week to address the identified rehab impairments via gait training, therapeutic activities, therapeutic exercises, neuromuscular re-education and progress toward the following goals:    Plan of Care Expires:  06/24/25    Subjective     Chief Complaint: Pain   Patient/Family Comments/goals: increase mobility   Pain/Comfort:  Pain Rating 1: 7/10  Location - Side 1: Bilateral  Location - Orientation 1: generalized  Location 1: leg  Pain Addressed 1: Distraction, Reposition, Pre-medicate for activity  Pain Rating Post-Intervention 1: 7/10      Objective:     Communicated with RN prior to session.  Patient found HOB elevated with   upon PT entry to room.     General Precautions: Standard, fall  Orthopedic Precautions: N/A  Braces: N/A  Respiratory Status: Room air     Functional Mobility:  Bed Mobility:      Rolling Left:  stand by assistance  Supine to Sit: stand by assistance  Transfers:     Sit to Stand:  contact guard assistance with rolling walker  Bed to Chair: contact guard assistance with  rolling walker  using  Stand Pivot  Gait: Ambulated 24 ft with RW CGA. Pt had decreased speed and step length due to px.      AM-PAC 6 CLICK MOBILITY  Turning over in bed (including adjusting bedclothes, sheets and blankets)?: 4  Sitting down on and standing up from a chair with arms (e.g., wheelchair, bedside commode, etc.): 3  Moving from lying on back to sitting on the side of the bed?: 3  Moving to and from a bed to a chair (including a wheelchair)?: 3  Need to walk in hospital room?: 3  Climbing 3-5 steps with a railing?: 1  Basic Mobility Total Score: 17       Treatment & Education:  Educated pt on the importance of OOB activity to maintain strength and mobility.   All questions answered within the PTA scope of practice and to the patient's satisfaction.   Pt reported increased pain while seated EOB and required break before transferring to chair.   Educated pt on importance of chair transfer daily for 2 hrs to increase strength.     Patient left up in chair with call button in reach and spouse present..    GOALS:   Multidisciplinary Problems       Physical Therapy Goals          Problem: Physical Therapy    Goal Priority Disciplines Outcome Interventions   Physical Therapy Goal     PT, PT/OT Progressing    Description: Goals to be met by: 2025     Patient will increase functional independence with mobility by performin. Supine to sit with Stand-by Assistance  2. Sit to supine with Stand-by Assistance  3. Sit to stand transfer with Stand-by Assistance using no AD or LRAD.  4. Bed to chair transfer with Stand-by Assistance using no AD or LRAD.  5. Gait  x 150 feet with Stand-by Assistance using no AD or lRAD.   6. Ascend/Descend 8 inch curb step with Stand-by Assistance using no AD or LRAD.  7. Lower  extremity exercise program x10 reps per handout, with supervision                         DME Justifications:   Gallo requires a commode for home use because he is confined to a single room.  Gallo Maria Jr. has a mobility limitation that significantly impairs his ability to participate in one or more mobility related activities of daily living (MRADLs) such as toileting, feeding, dressing, grooming, and bathing in customary locations in the home.  The mobility limitation cannot be sufficiently resolved by the use of a cane or walker.   The use of a manual wheelchair will significantly improve the patients ability to participate in MRADLS and the patient will use it on regular basis in the home.  Gallo Maria Jr. has expressed his willingness to use a manual wheelchair in the home. Patients upper body strength is sufficient for propulsion.  He also has a caregiver who is available, willing, and able to provide assistance with the wheelchair when needed.     Gallo's mobility limitation cannot be sufficiently resolved by the use of a cane. His functional mobility deficit can be sufficiently resolved with the use of a Rolling Walker. Patient's mobility limitation significantly impairs their ability to participate in one of more activities of daily living.  The use of a RW will significantly improve the patient's ability to participate in MRADLS and the patient will use it on regular basis in the home.    Time Tracking:     PT Received On: 05/30/25  PT Start Time: 1030     PT Stop Time: 1053  PT Total Time (min): 23 min     Billable Minutes: Gait Training 23    Treatment Type: Treatment  PT/PTA: PTA     Number of PTA visits since last PT visit: 1 05/30/2025

## 2025-05-30 NOTE — ASSESSMENT & PLAN NOTE
CT head obtained at recommendation of [facility] on 5/28 - New subdural collection along the right cerebral convexity with 0.8 cm of leftward midline shift.  Favored to represent remote subdural hemorrhage versus chronic collection.  Operative change pituitary mass resection.  No evidence of recurrence.  Sinusitis.    - will contact [facility] regarding findings  - repeat CT head with stable findings   - will discuss with [facility] team for input on ability to anticoagulate

## 2025-05-31 PROBLEM — C41.2: Status: ACTIVE | Noted: 2025-05-31

## 2025-05-31 LAB
ABSOLUTE EOSINOPHIL (OHS): 0.7 K/UL
ABSOLUTE MONOCYTE (OHS): 1.52 K/UL (ref 0.3–1)
ABSOLUTE NEUTROPHIL COUNT (OHS): 11.11 K/UL (ref 1.8–7.7)
ALBUMIN SERPL BCP-MCNC: 2.7 G/DL (ref 3.5–5.2)
ALP SERPL-CCNC: 203 UNIT/L (ref 40–150)
ALT SERPL W/O P-5'-P-CCNC: 218 UNIT/L (ref 10–44)
ANION GAP (OHS): 12 MMOL/L (ref 8–16)
AST SERPL-CCNC: 261 UNIT/L (ref 11–45)
BASOPHILS # BLD AUTO: 0.05 K/UL
BASOPHILS NFR BLD AUTO: 0.3 %
BILIRUB SERPL-MCNC: 1.2 MG/DL (ref 0.1–1)
BUN SERPL-MCNC: 16 MG/DL (ref 6–20)
CALCIUM SERPL-MCNC: 8.9 MG/DL (ref 8.7–10.5)
CHLORIDE SERPL-SCNC: 98 MMOL/L (ref 95–110)
CK SERPL-CCNC: 67 U/L (ref 20–200)
CO2 SERPL-SCNC: 20 MMOL/L (ref 23–29)
CREAT SERPL-MCNC: 1 MG/DL (ref 0.5–1.4)
ERYTHROCYTE [DISTWIDTH] IN BLOOD BY AUTOMATED COUNT: 14.6 % (ref 11.5–14.5)
GFR SERPLBLD CREATININE-BSD FMLA CKD-EPI: >60 ML/MIN/1.73/M2
GLUCOSE SERPL-MCNC: 103 MG/DL (ref 70–110)
HCT VFR BLD AUTO: 29.5 % (ref 40–54)
HGB BLD-MCNC: 9.5 GM/DL (ref 14–18)
IMM GRANULOCYTES # BLD AUTO: 0.45 K/UL (ref 0–0.04)
IMM GRANULOCYTES NFR BLD AUTO: 2.8 % (ref 0–0.5)
LYMPHOCYTES # BLD AUTO: 2.52 K/UL (ref 1–4.8)
MAGNESIUM SERPL-MCNC: 2.4 MG/DL (ref 1.6–2.6)
MCH RBC QN AUTO: 25.9 PG (ref 27–31)
MCHC RBC AUTO-ENTMCNC: 32.2 G/DL (ref 32–36)
MCV RBC AUTO: 80 FL (ref 82–98)
NUCLEATED RBC (/100WBC) (OHS): 0 /100 WBC
PHOSPHATE SERPL-MCNC: 4.5 MG/DL (ref 2.7–4.5)
PLATELET # BLD AUTO: 516 K/UL (ref 150–450)
PMV BLD AUTO: 9.2 FL (ref 9.2–12.9)
POTASSIUM SERPL-SCNC: 4.5 MMOL/L (ref 3.5–5.1)
PROT SERPL-MCNC: 7.8 GM/DL (ref 6–8.4)
RBC # BLD AUTO: 3.67 M/UL (ref 4.6–6.2)
RELATIVE EOSINOPHIL (OHS): 4.3 %
RELATIVE LYMPHOCYTE (OHS): 15.4 % (ref 18–48)
RELATIVE MONOCYTE (OHS): 9.3 % (ref 4–15)
RELATIVE NEUTROPHIL (OHS): 67.9 % (ref 38–73)
SODIUM SERPL-SCNC: 130 MMOL/L (ref 136–145)
WBC # BLD AUTO: 16.35 K/UL (ref 3.9–12.7)

## 2025-05-31 PROCEDURE — 84100 ASSAY OF PHOSPHORUS: CPT

## 2025-05-31 PROCEDURE — 25000003 PHARM REV CODE 250

## 2025-05-31 PROCEDURE — 36415 COLL VENOUS BLD VENIPUNCTURE: CPT

## 2025-05-31 PROCEDURE — 21400001 HC TELEMETRY ROOM

## 2025-05-31 PROCEDURE — 11000001 HC ACUTE MED/SURG PRIVATE ROOM

## 2025-05-31 PROCEDURE — 85025 COMPLETE CBC W/AUTO DIFF WBC: CPT

## 2025-05-31 PROCEDURE — 82550 ASSAY OF CK (CPK): CPT | Performed by: FAMILY MEDICINE

## 2025-05-31 PROCEDURE — 83735 ASSAY OF MAGNESIUM: CPT

## 2025-05-31 PROCEDURE — 25000242 PHARM REV CODE 250 ALT 637 W/ HCPCS

## 2025-05-31 PROCEDURE — 82565 ASSAY OF CREATININE: CPT

## 2025-05-31 RX ADMIN — Medication 600 ML: at 10:05

## 2025-05-31 RX ADMIN — Medication 600 ML: at 12:05

## 2025-05-31 RX ADMIN — GABAPENTIN 600 MG: 300 CAPSULE ORAL at 10:05

## 2025-05-31 RX ADMIN — Medication 600 ML: at 04:05

## 2025-05-31 RX ADMIN — PSYLLIUM HUSK 1 PACKET: 3.4 POWDER ORAL at 09:05

## 2025-05-31 RX ADMIN — MORPHINE SULFATE 15 MG: 15 TABLET, EXTENDED RELEASE ORAL at 07:05

## 2025-05-31 RX ADMIN — Medication 600 ML: at 09:05

## 2025-05-31 RX ADMIN — GABAPENTIN 600 MG: 300 CAPSULE ORAL at 02:05

## 2025-05-31 RX ADMIN — MORPHINE SULFATE 15 MG: 15 TABLET, EXTENDED RELEASE ORAL at 02:05

## 2025-05-31 RX ADMIN — OXYCODONE HYDROCHLORIDE 10 MG: 10 TABLET ORAL at 11:05

## 2025-05-31 RX ADMIN — GABAPENTIN 600 MG: 300 CAPSULE ORAL at 09:05

## 2025-05-31 RX ADMIN — MORPHINE SULFATE 15 MG: 15 TABLET, EXTENDED RELEASE ORAL at 10:05

## 2025-05-31 RX ADMIN — Medication 600 ML: at 02:05

## 2025-05-31 NOTE — SUBJECTIVE & OBJECTIVE
Interval History: NAEO. Pain not well controlled but PRNs not utilized, counseled on PRN options at this time.     Review of Systems   Genitourinary:  Negative for difficulty urinating.   Musculoskeletal:  Negative for arthralgias and myalgias.        Pelvic pain  R thigh pain   Skin:  Positive for wound (well healing surgical wounds on low back and right leg).   Neurological:  Negative for weakness.     Objective:     Vital Signs (Most Recent):  Temp: 98.4 °F (36.9 °C) (05/31/25 0735)  Pulse: 91 (05/31/25 0735)  Resp: 17 (05/31/25 0735)  BP: 118/70 (05/31/25 0735)  SpO2: 100 % (05/31/25 0735) Vital Signs (24h Range):  Temp:  [97.9 °F (36.6 °C)-99.4 °F (37.4 °C)] 98.4 °F (36.9 °C)  Pulse:  [68-99] 91  Resp:  [16-20] 17  SpO2:  [97 %-100 %] 100 %  BP: (110-118)/(70-76) 118/70     Weight: 106.8 kg (235 lb 5.5 oz)  Body mass index is 30.22 kg/m².  No intake or output data in the 24 hours ending 05/31/25 0845      Physical Exam  Constitutional:       General: He is not in acute distress.     Appearance: Normal appearance. He is not ill-appearing.   HENT:      Head: Normocephalic and atraumatic.      Mouth/Throat:      Mouth: Mucous membranes are moist.      Pharynx: Oropharynx is clear.   Eyes:      Extraocular Movements: Extraocular movements intact.      Conjunctiva/sclera: Conjunctivae normal.   Cardiovascular:      Rate and Rhythm: Normal rate and regular rhythm.      Heart sounds: Normal heart sounds.   Pulmonary:      Effort: Pulmonary effort is normal. No respiratory distress.      Breath sounds: Normal breath sounds.   Abdominal:      General: There is no distension.      Palpations: Abdomen is soft.      Tenderness: There is no abdominal tenderness.   Genitourinary:     Comments: Pelvic tenderness  Pelvic fullness on palpation  Musculoskeletal:         General: Swelling (BLE R>L) present. No tenderness.      Right lower leg: No edema.      Left lower leg: No edema.   Skin:     General: Skin is warm.       Capillary Refill: Capillary refill takes less than 2 seconds.      Comments: Well healing surgical scars (low back, R lateral thigh)   Neurological:      General: No focal deficit present.      Mental Status: He is alert and oriented to person, place, and time.      Cranial Nerves: No cranial nerve deficit.      Sensory: No sensory deficit.      Motor: No weakness.   Psychiatric:         Mood and Affect: Mood normal.         Thought Content: Thought content normal.               Significant Labs: All pertinent labs within the past 24 hours have been reviewed.  CBC:   Recent Labs   Lab 05/30/25  0237 05/30/25  1436 05/31/25  0443   WBC 17.13* 17.43* 16.35*   HGB 8.8* 9.7* 9.5*   HCT 26.4* 29.3* 29.5*    464* 516*     CMP:   Recent Labs   Lab 05/30/25  0237 05/31/25  0443   * 130*   K 4.4 4.5   CL 99 98   CO2 21* 20*   * 103   BUN 17 16   CREATININE 1.1 1.0   CALCIUM 8.8 8.9   PROT 7.8 7.8   ALBUMIN 2.7* 2.7*   BILITOT 1.2* 1.2*   ALKPHOS 176* 203*   * 261*   * 218*   ANIONGAP 11 12       Significant Imaging: I have reviewed all pertinent imaging results/findings within the past 24 hours.

## 2025-05-31 NOTE — PROGRESS NOTES
[facility]  Progress Note    Patient Name: Gallo Maria Jr.  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 Is a 43yo male with a hx of BPH with urinary obstruction and pituitary adenoma s/p resection presenting for acute onset lower back pain that has radiating down his right leg, pelvic pain, and shortness of breath. The pain was sudden onset when he awoke from sleep this morning located in his lower back radiating down his right leg to mid thigh. The pelvic pain expands into his right groin. His shortness of breath is mainly with activity and has improved with rest. He also notes severe flushing and pre-syncopal symptoms while attempting to get to his car requiring him to take frequent breaks. His pain is still present when re-adjusting in bed but otherwise symptoms have largely improved with rest. He re[prts frequent headaches most recently yesterday unrelieved by tylenol #3. Otherwise he denies any vision changes, nasal leakage, congestion, cough, abdominal pain, N/V/D, constipation, urinary incontinence, or perianal numbness. Ambulation is limited by pain, not weakness.     He was recently admitted to [facility] on 5/1-5/5 for resection of a large pituitary adenoma and again from 5/8-5/16 for CSF leak requiring repair with lumbar drain and fat fascia bello grafts. Drain pulled 5/15. His hospital course also included two DVTs in the LLE. IVC filter placed on 5/15. He was advised to follow up outpatient to discuss utility of eliquis.     In the ED VSS, afebrile. CT chest abdomen pelvis revealing a large, fairly well-circumscribed heterogenous lesions in the right hemipelvis extending towards the gluteal folds. No PE. Labs  notable for hgb 11.3, no leukocytosis, bmp only notable for Na 134, BNP and troponin wnl, CRP elevated at 75.2.    Overview/Hospital Course:  MRI obtained with 13.4 cm coccygeal mass concerning for malignancy, 1.7 cm lesion in S1 concerning for metastasis. Extensive DVT in IVC and iliac veins with IVC filter in place, possible hematoma along right iliac vessels, Cauda equina and epidural enhancement in the lower lumbar spine, likely inflammatory from a recent lumbar drain, and soft tissue edema in right anterolateral thigh likely postoperative from recent graft harvest. S/p biopsy with IR, still pending pathology results. Family and patient updated. Pain control with gabapentin and PRN morphine. LFTs and Tbili elvated on 5/26. RUQ US with doppler notable for hepatic steatosis with no noted lesions or thrombi. Case discussed with oncologist NP at [facility] who asked us to obtain a CT head. CT head noted right subdural fluid collection with 8mm midline shift, stable on repeat imaging. NSGY consulted who advised contacting [facility] for further workup. Remains without acute neuro changes. Trying to establish on oral pain regimen in anticipation of d/c for follow up at [facility].    Interval History: NAEO. Pain not well controlled but PRNs not utilized, counseled on PRN options at this time.     Review of Systems   Genitourinary:  Negative for difficulty urinating.   Musculoskeletal:  Negative for arthralgias and myalgias.        Pelvic pain  R thigh pain   Skin:  Positive for wound (well healing surgical wounds on low back and right leg).   Neurological:  Negative for weakness.     Objective:     Vital Signs (Most Recent):  Temp: 98.4 °F (36.9 °C) (05/31/25 0735)  Pulse: 91 (05/31/25 0735)  Resp: 17 (05/31/25 0735)  BP: 118/70 (05/31/25 0735)  SpO2: 100 % (05/31/25 0735) Vital Signs (24h Range):  Temp:  [97.9 °F (36.6 °C)-99.4 °F (37.4 °C)] 98.4 °F (36.9 °C)  Pulse:  [68-99] 91  Resp:  [16-20] 17  SpO2:  [97  %-100 %] 100 %  BP: (110-118)/(70-76) 118/70     Weight: 106.8 kg (235 lb 5.5 oz)  Body mass index is 30.22 kg/m².  No intake or output data in the 24 hours ending 05/31/25 0845      Physical Exam  Constitutional:       General: He is not in acute distress.     Appearance: Normal appearance. He is not ill-appearing.   HENT:      Head: Normocephalic and atraumatic.      Mouth/Throat:      Mouth: Mucous membranes are moist.      Pharynx: Oropharynx is clear.   Eyes:      Extraocular Movements: Extraocular movements intact.      Conjunctiva/sclera: Conjunctivae normal.   Cardiovascular:      Rate and Rhythm: Normal rate and regular rhythm.      Heart sounds: Normal heart sounds.   Pulmonary:      Effort: Pulmonary effort is normal. No respiratory distress.      Breath sounds: Normal breath sounds.   Abdominal:      General: There is no distension.      Palpations: Abdomen is soft.      Tenderness: There is no abdominal tenderness.   Genitourinary:     Comments: Pelvic tenderness  Pelvic fullness on palpation  Musculoskeletal:         General: Swelling (BLE R>L) present. No tenderness.      Right lower leg: No edema.      Left lower leg: No edema.   Skin:     General: Skin is warm.      Capillary Refill: Capillary refill takes less than 2 seconds.      Comments: Well healing surgical scars (low back, R lateral thigh)   Neurological:      General: No focal deficit present.      Mental Status: He is alert and oriented to person, place, and time.      Cranial Nerves: No cranial nerve deficit.      Sensory: No sensory deficit.      Motor: No weakness.   Psychiatric:         Mood and Affect: Mood normal.         Thought Content: Thought content normal.               Significant Labs: All pertinent labs within the past 24 hours have been reviewed.  CBC:   Recent Labs   Lab 05/30/25  0237 05/30/25  1436 05/31/25  0443   WBC 17.13* 17.43* 16.35*   HGB 8.8* 9.7* 9.5*   HCT 26.4* 29.3* 29.5*    464* 516*     CMP:   Recent  Labs   Lab 05/30/25  0237 05/31/25  0443   * 130*   K 4.4 4.5   CL 99 98   CO2 21* 20*   * 103   BUN 17 16   CREATININE 1.1 1.0   CALCIUM 8.8 8.9   PROT 7.8 7.8   ALBUMIN 2.7* 2.7*   BILITOT 1.2* 1.2*   ALKPHOS 176* 203*   * 261*   * 218*   ANIONGAP 11 12       Significant Imaging: I have reviewed all pertinent imaging results/findings within the past 24 hours.      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 CTA revealing large, fairly well-circumscribed heterogenous lesions in the right hemipelvis extending towards the gluteal folds. Leftward shift of pelvic organs. Recent IVC filter placed for DVTs. Recent lumbar csf drain removed on 5/15.     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  Differential considerations include chordoma, chondrosarcoma, and giant cell tumor.  1.7 cm lesion in the S1 vertebral body with similar signal characteristics, concerning for metastasis.  Extensive DVT involving the IVC and iliac veins, with an IVC filter.  Ill-defined fluid collection extending along the right iliac vessels, new from earlier today, compatible with hematoma.  Cauda equina and epidural enhancement in the lower lumbar spine, likely inflammatory from a recent lumbar drain although infection cannot be excluded.  No organized epidural collection.  Soft tissue edema in the right anterolateral thigh, which may be postoperative from recent graft harvest.  Hemorrhage and infection are difficult to exclude.      - s/p biopsy with IR  - f/u pathology   - antibiotics d/c after cx negative at 48h  - discussing case with surg vs radiation oncology based on pathology results, if radiosensitive, may need inpatient treatment if able given severity of symptoms  - trying to establish on oral pain regimen to allow d/c for patient to follow up with [facility] specialist   - consulted PT/OT for assistance with mobility and for any mobility aid  recommendations  - bowel reg added  - MM pain control    - MS contin    - PRN oxycodone, breakthrough dilaudid   - gabapentin          Subdural fluid collection  CT head obtained at recommendation of [facility] on 5/28 - New subdural collection along the right cerebral convexity with 0.8 cm of leftward midline shift.  Favored to represent remote subdural hemorrhage versus chronic collection.  Operative change pituitary mass resection.  No evidence of recurrence.  Sinusitis.    - will contact [facility] regarding findings  - repeat CT head with stable findings   - will discuss with [facility] team for input on ability to anticoagulate    Elevated LFTs  Hyperbilirubinemia  Elevated LFTs and Tbili noted on 5/26. Mild RUQ tenderness. Concern for shock liver or portal thrombosis given hypercoagulable state.      - holding atorvastatin, resume when able.  - hepatitis and HIV negative  - RUQ US with hepatic steatosis, no lesions or thrombi      History of pituitary surgery  Postoperative CSF leak  Pituitary adenoma  Recent pituitary resection at [facility] on 5/1. Follow up admission 5/8-5/16 for CSF leak. Lumbar drain pulled on 5/15. No indication of recurrence of CSF leak. Only noted mass effect symptoms include asymmetric pupils.     - monitor for CSF rhinorrhea or vision changes  - will [facility] to discuss with current oncologist  BPH with urinary obstruction  Noted, continue to monitor for urinary retention    Mixed hyperlipidemia  Holding statin for elevated LFTs    Obesity (BMI 30-39.9)  Body mass index is 30.22 kg/m². Morbid obesity complicates all aspects of disease management from diagnostic modalities to treatment. Weight loss encouraged and health benefits explained to patient.     Hyponatremia  Hyponatremia is likely due to dehydration. The patient's most recent sodium results are listed below.  Recent Labs     05/29/25  0402 05/30/25  0237 05/31/25  0443   * 131* 130*     Plan  - urine Na low,  serum and urine osm wnl  - Monitor sodium Daily.   - Patient hyponatremia is stable  Anemia  Anemia is likely due to unclear etiology. Most recent hemoglobin and hematocrit are listed below.  Recent Labs     05/30/25  0237 05/30/25  1436 05/31/25  0443   HGB 8.8* 9.7* 9.5*   HCT 26.4* 29.3* 29.5*     Plan  - Monitor serial CBC: Daily  - Transfuse PRBC if patient becomes hemodynamically unstable, symptomatic or H/H drops below 7/21.  - Patient has not received any PRBC transfusions to date  - Patient's anemia is currently being monitored  Acute deep vein thrombosis (DVT) of left lower extremity  Presence of IVC filter  B/L LE US 5/13/25 at [facility] with occlusive thrombus within the posterior tibial vein as well as nearly occlusive thrombus within the peroneal vein. Positive LLE DVT study. IVC filter in place    Plan:  - holding AC and VTE ppx pending additional workup and possible intervention   - DVT US with extensive DVTs, discussed with vascular surgery who would not recommend thrombectomy w/o ability to anticoagulate  - will discuss with [facility] team ability to anticoagulate based on CT head imaging      VTE Risk Mitigation (From admission, onward)           Ordered     Reason for No Pharmacological VTE Prophylaxis  Once        Question:  Reasons:  Answer:  Physician Provided (leave comment)  Comment:  Possible active bleed, pending possible intervention    05/22/25 1140     IP VTE HIGH RISK PATIENT  Once         05/22/25 1140     Place sequential compression device  Until discontinued         05/22/25 1140                    Discharge Planning   PORTER: 6/3/2025     Code Status: Full Code   Medical Readiness for Discharge Date:   Discharge Plan A: Rehab   Discharge Delays: None known at this time        Karen Holt MD  Department of Hospital Medicine   [facility]

## 2025-05-31 NOTE — ASSESSMENT & PLAN NOTE
Anemia is likely due to unclear etiology. Most recent hemoglobin and hematocrit are listed below.  Recent Labs     05/30/25  0237 05/30/25  1436 05/31/25  0443   HGB 8.8* 9.7* 9.5*   HCT 26.4* 29.3* 29.5*     Plan  - Monitor serial CBC: Daily  - Transfuse PRBC if patient becomes hemodynamically unstable, symptomatic or H/H drops below 7/21.  - Patient has not received any PRBC transfusions to date  - Patient's anemia is currently being monitored

## 2025-05-31 NOTE — ASSESSMENT & PLAN NOTE
Sudden onset pelvic pain morning of 5/22. CTA revealing large, fairly well-circumscribed heterogenous lesions in the right hemipelvis extending towards the gluteal folds. Leftward shift of pelvic organs. Recent IVC filter placed for DVTs. Recent lumbar csf drain removed on 5/15.     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  Differential considerations include chordoma, chondrosarcoma, and giant cell tumor.  1.7 cm lesion in the S1 vertebral body with similar signal characteristics, concerning for metastasis.  Extensive DVT involving the IVC and iliac veins, with an IVC filter.  Ill-defined fluid collection extending along the right iliac vessels, new from earlier today, compatible with hematoma.  Cauda equina and epidural enhancement in the lower lumbar spine, likely inflammatory from a recent lumbar drain although infection cannot be excluded.  No organized epidural collection.  Soft tissue edema in the right anterolateral thigh, which may be postoperative from recent graft harvest.  Hemorrhage and infection are difficult to exclude.      - s/p biopsy with IR  - f/u pathology   - antibiotics d/c after cx negative at 48h  - discussing case with surg vs radiation oncology based on pathology results, if radiosensitive, may need inpatient treatment if able given severity of symptoms  - trying to establish on oral pain regimen to allow d/c for patient to follow up with [facility] specialist   - consulted PT/OT for assistance with mobility and for any mobility aid recommendations  - bowel reg added  - MM pain control    - MS contin    - PRN oxycodone, breakthrough dilaudid   - gabapentin

## 2025-05-31 NOTE — PLAN OF CARE
Praneeth notified that [facility] is also requesting MRI scans to be sent over not just head CT scans. PRANEETH will follow with imaging departments to see if able to send over the weekend.     CM placed call to number provided by provider [phone] to verify of any additional scans will be needed, number answers then hangs up no ability top leave message. Message left to Radiology department to see if scans could be obtained over the weekend.      3:36 PM 2 computers in ED not downloading the disc properly they will attempt again on 6/1, CM notified provider.     Yuko Smith RN  Case Management  [phone]

## 2025-05-31 NOTE — ASSESSMENT & PLAN NOTE
Hyponatremia is likely due to dehydration. The patient's most recent sodium results are listed below.  Recent Labs     05/29/25  0402 05/30/25  0237 05/31/25  0443   * 131* 130*     Plan  - urine Na low, serum and urine osm wnl  - Monitor sodium Daily.   - Patient hyponatremia is stable   Yes

## 2025-05-31 NOTE — PT/OT/SLP PROGRESS
Occupational Therapy      Patient Name:  Gallo Maria .   MRN:  [id number]    Patient not seen today secondary to Patient fatigue, Pain (pt has been moving with nursing staff, sitting up in chair). Will follow-up 6/1.    5/31/2025

## 2025-06-01 LAB
ABSOLUTE EOSINOPHIL (OHS): 0.69 K/UL
ABSOLUTE MONOCYTE (OHS): 1.39 K/UL (ref 0.3–1)
ABSOLUTE NEUTROPHIL COUNT (OHS): 11.46 K/UL (ref 1.8–7.7)
ALBUMIN SERPL BCP-MCNC: 2.7 G/DL (ref 3.5–5.2)
ALP SERPL-CCNC: 180 UNIT/L (ref 40–150)
ALT SERPL W/O P-5'-P-CCNC: 212 UNIT/L (ref 10–44)
ANION GAP (OHS): 11 MMOL/L (ref 8–16)
AST SERPL-CCNC: 203 UNIT/L (ref 11–45)
BASOPHILS # BLD AUTO: 0.07 K/UL
BASOPHILS NFR BLD AUTO: 0.4 %
BILIRUB SERPL-MCNC: 1.2 MG/DL (ref 0.1–1)
BUN SERPL-MCNC: 15 MG/DL (ref 6–20)
CALCIUM SERPL-MCNC: 9.4 MG/DL (ref 8.7–10.5)
CHLORIDE SERPL-SCNC: 97 MMOL/L (ref 95–110)
CO2 SERPL-SCNC: 23 MMOL/L (ref 23–29)
CREAT SERPL-MCNC: 1.1 MG/DL (ref 0.5–1.4)
ERYTHROCYTE [DISTWIDTH] IN BLOOD BY AUTOMATED COUNT: 14.7 % (ref 11.5–14.5)
GFR SERPLBLD CREATININE-BSD FMLA CKD-EPI: >60 ML/MIN/1.73/M2
GLUCOSE SERPL-MCNC: 97 MG/DL (ref 70–110)
HCT VFR BLD AUTO: 28.7 % (ref 40–54)
HGB BLD-MCNC: 9.3 GM/DL (ref 14–18)
IMM GRANULOCYTES # BLD AUTO: 0.67 K/UL (ref 0–0.04)
IMM GRANULOCYTES NFR BLD AUTO: 4 % (ref 0–0.5)
LYMPHOCYTES # BLD AUTO: 2.63 K/UL (ref 1–4.8)
MAGNESIUM SERPL-MCNC: 2.4 MG/DL (ref 1.6–2.6)
MCH RBC QN AUTO: 26.1 PG (ref 27–31)
MCHC RBC AUTO-ENTMCNC: 32.4 G/DL (ref 32–36)
MCV RBC AUTO: 81 FL (ref 82–98)
NUCLEATED RBC (/100WBC) (OHS): 0 /100 WBC
PHOSPHATE SERPL-MCNC: 4.5 MG/DL (ref 2.7–4.5)
PLATELET # BLD AUTO: 578 K/UL (ref 150–450)
PMV BLD AUTO: 9.2 FL (ref 9.2–12.9)
POTASSIUM SERPL-SCNC: 5.2 MMOL/L (ref 3.5–5.1)
POTASSIUM SERPL-SCNC: 5.3 MMOL/L (ref 3.5–5.1)
PROT SERPL-MCNC: 8.1 GM/DL (ref 6–8.4)
RBC # BLD AUTO: 3.56 M/UL (ref 4.6–6.2)
RELATIVE EOSINOPHIL (OHS): 4.1 %
RELATIVE LYMPHOCYTE (OHS): 15.6 % (ref 18–48)
RELATIVE MONOCYTE (OHS): 8.2 % (ref 4–15)
RELATIVE NEUTROPHIL (OHS): 67.7 % (ref 38–73)
SODIUM SERPL-SCNC: 131 MMOL/L (ref 136–145)
WBC # BLD AUTO: 16.91 K/UL (ref 3.9–12.7)

## 2025-06-01 PROCEDURE — 11000001 HC ACUTE MED/SURG PRIVATE ROOM

## 2025-06-01 PROCEDURE — 25000242 PHARM REV CODE 250 ALT 637 W/ HCPCS

## 2025-06-01 PROCEDURE — 84132 ASSAY OF SERUM POTASSIUM: CPT

## 2025-06-01 PROCEDURE — 99223 1ST HOSP IP/OBS HIGH 75: CPT | Mod: ,,, | Performed by: STUDENT IN AN ORGANIZED HEALTH CARE EDUCATION/TRAINING PROGRAM

## 2025-06-01 PROCEDURE — 36415 COLL VENOUS BLD VENIPUNCTURE: CPT

## 2025-06-01 PROCEDURE — 25000003 PHARM REV CODE 250

## 2025-06-01 RX ORDER — ELECTROLYTES/DEXTROSE
600 SOLUTION, ORAL ORAL EVERY 8 HOURS
Status: DISCONTINUED | OUTPATIENT
Start: 2025-06-01 | End: 2025-06-03

## 2025-06-01 RX ADMIN — PSYLLIUM HUSK 1 PACKET: 3.4 POWDER ORAL at 08:06

## 2025-06-01 RX ADMIN — Medication 600 ML: at 10:06

## 2025-06-01 RX ADMIN — Medication 600 ML: at 04:06

## 2025-06-01 RX ADMIN — MORPHINE SULFATE 15 MG: 15 TABLET, EXTENDED RELEASE ORAL at 02:06

## 2025-06-01 RX ADMIN — SENNOSIDES AND DOCUSATE SODIUM 1 TABLET: 50; 8.6 TABLET ORAL at 09:06

## 2025-06-01 RX ADMIN — Medication 600 ML: at 12:06

## 2025-06-01 RX ADMIN — GABAPENTIN 600 MG: 300 CAPSULE ORAL at 08:06

## 2025-06-01 RX ADMIN — OXYCODONE 15 MG: 5 TABLET ORAL at 05:06

## 2025-06-01 RX ADMIN — Medication 600 ML: at 08:06

## 2025-06-01 RX ADMIN — GABAPENTIN 600 MG: 300 CAPSULE ORAL at 02:06

## 2025-06-01 RX ADMIN — MORPHINE SULFATE 15 MG: 15 TABLET, EXTENDED RELEASE ORAL at 06:06

## 2025-06-01 RX ADMIN — MORPHINE SULFATE 15 MG: 15 TABLET, EXTENDED RELEASE ORAL at 09:06

## 2025-06-01 RX ADMIN — GABAPENTIN 600 MG: 300 CAPSULE ORAL at 09:06

## 2025-06-01 NOTE — ASSESSMENT & PLAN NOTE
B/L LE US 5/13/25 at [facility] with occlusive thrombus within the posterior tibial vein as well as nearly occlusive thrombus within the peroneal vein. Positive LLE DVT study. IVC filter in place    Plan:  - holding AC and VTE ppx pending additional workup and possible intervention   - DVT US with extensive DVTs, discussed with vascular surgery who would not recommend thrombectomy w/o ability to anticoagulate  - will discuss with [facility] team ability to anticoagulate based on CT head imaging

## 2025-06-01 NOTE — PROGRESS NOTES
[facility] Medicine  Progress Note    Patient Name: Gallo Maria Jr.  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 Is a 45yo male with a hx of BPH with urinary obstruction and pituitary adenoma s/p resection presenting for acute onset lower back pain that has radiating down his right leg, pelvic pain, and shortness of breath. The pain was sudden onset when he awoke from sleep this morning located in his lower back radiating down his right leg to mid thigh. The pelvic pain expands into his right groin. His shortness of breath is mainly with activity and has improved with rest. He also notes severe flushing and pre-syncopal symptoms while attempting to get to his car requiring him to take frequent breaks. His pain is still present when re-adjusting in bed but otherwise symptoms have largely improved with rest. He re[prts frequent headaches most recently yesterday unrelieved by tylenol #3. Otherwise he denies any vision changes, nasal leakage, congestion, cough, abdominal pain, N/V/D, constipation, urinary incontinence, or perianal numbness. Ambulation is limited by pain, not weakness.     He was recently admitted to [facility] on 5/1-5/5 for resection of a large pituitary adenoma and again from 5/8-5/16 for CSF leak requiring repair with lumbar drain and fat fascia bello grafts. Drain pulled 5/15. His hospital course also included two DVTs in the LLE. IVC filter placed on 5/15. He was advised to follow up outpatient to discuss utility of eliquis.     In the ED VSS, afebrile. CT chest abdomen pelvis revealing a large, fairly well-circumscribed heterogenous lesions in the right hemipelvis extending towards the gluteal folds. No PE. Labs  notable for hgb 11.3, no leukocytosis, bmp only notable for Na 134, BNP and troponin wnl, CRP elevated at 75.2.    Overview/Hospital Course:  MRI obtained with 13.4 cm coccygeal mass concerning for malignancy, 1.7 cm lesion in S1 concerning for metastasis. Extensive DVT in IVC and iliac veins with IVC filter in place, possible hematoma along right iliac vessels, Cauda equina and epidural enhancement in the lower lumbar spine, likely inflammatory from a recent lumbar drain, and soft tissue edema in right anterolateral thigh likely postoperative from recent graft harvest. S/p biopsy with IR, still pending pathology results. Family and patient updated. Pain control with gabapentin and PRN morphine. LFTs and Tbili elvated on 5/26. RUQ US with doppler notable for hepatic steatosis with no noted lesions or thrombi. Case discussed with oncologist NP at [facility] who asked us to obtain a CT head. CT head noted right subdural fluid collection with 8mm midline shift, stable on repeat imaging. NSGY consulted who advised contacting [facility] for further workup. Remains without acute neuro changes. Trying to establish on oral pain regimen in anticipation of d/c for follow up at [facility]. Pending Rad Onc consult to evaluate for any inpatient treatment here.    Interval History: NAEO. Pain not controlled, will increase PRN oxy. Working well with therapy. LFTs improving. K 5.3, pending recheck.    Objective:     Vital Signs (Most Recent):  Temp: 97.7 °F (36.5 °C) (06/01/25 0803)  Pulse: 82 (06/01/25 0803)  Resp: 17 (06/01/25 0803)  BP: 115/71 (06/01/25 0803)  SpO2: 100 % (06/01/25 0803) Vital Signs (24h Range):  Temp:  [97.5 °F (36.4 °C)-98.5 °F (36.9 °C)] 97.7 °F (36.5 °C)  Pulse:  [82-91] 82  Resp:  [16-20] 17  SpO2:  [98 %-100 %] 100 %  BP: (100-115)/(64-73) 115/71     Weight: 106.8 kg (235 lb 5.5 oz)  Body mass index is 30.22 kg/m².    Intake/Output Summary (Last 24 hours) at 6/1/2025 0837  Last data filed at 6/1/2025  0415  Gross per 24 hour   Intake 600 ml   Output --   Net 600 ml         Physical Exam  Constitutional:       General: He is not in acute distress.     Appearance: Normal appearance. He is not ill-appearing.   HENT:      Head: Normocephalic and atraumatic.      Mouth/Throat:      Mouth: Mucous membranes are moist.      Pharynx: Oropharynx is clear.   Eyes:      Extraocular Movements: Extraocular movements intact.      Conjunctiva/sclera: Conjunctivae normal.   Cardiovascular:      Rate and Rhythm: Normal rate and regular rhythm.      Heart sounds: Normal heart sounds.   Pulmonary:      Effort: Pulmonary effort is normal. No respiratory distress.      Breath sounds: Normal breath sounds.   Abdominal:      General: There is no distension.      Palpations: Abdomen is soft.      Tenderness: There is no abdominal tenderness.   Genitourinary:     Comments: Pelvic tenderness  Pelvic fullness on palpation  Musculoskeletal:         General: Swelling (BLE R>L, improved) present. No tenderness.      Right lower leg: No edema.      Left lower leg: No edema.   Skin:     General: Skin is warm.      Capillary Refill: Capillary refill takes less than 2 seconds.      Comments: Well healing surgical scars (low back, R lateral thigh)   Neurological:      General: No focal deficit present.      Mental Status: He is alert and oriented to person, place, and time.      Cranial Nerves: No cranial nerve deficit.      Sensory: No sensory deficit.      Motor: No weakness.   Psychiatric:         Mood and Affect: Mood normal.         Thought Content: Thought content normal.               Significant Labs: All pertinent labs within the past 24 hours have been reviewed.  CBC:   Recent Labs   Lab 05/30/25  1436 05/31/25  0443 06/01/25  0358   WBC 17.43* 16.35* 16.91*   HGB 9.7* 9.5* 9.3*   HCT 29.3* 29.5* 28.7*   * 516* 578*     CMP:   Recent Labs   Lab 05/31/25  0443 06/01/25  0358   * 131*   K 4.5 5.3*   CL 98 97   CO2 20* 23   GLU  103 97   BUN 16 15   CREATININE 1.0 1.1   CALCIUM 8.9 9.4   PROT 7.8 8.1   ALBUMIN 2.7* 2.7*   BILITOT 1.2* 1.2*   ALKPHOS 203* 180*   * 203*   * 212*   ANIONGAP 12 11       Significant Imaging: I have reviewed all pertinent imaging results/findings within the past 24 hours.      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 CTA revealing large, fairly well-circumscribed heterogenous lesions in the right hemipelvis extending towards the gluteal folds. Leftward shift of pelvic organs. Recent IVC filter placed for DVTs. Recent lumbar csf drain removed on 5/15.     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  Differential considerations include chordoma, chondrosarcoma, and giant cell tumor.  1.7 cm lesion in the S1 vertebral body with similar signal characteristics, concerning for metastasis.  Extensive DVT involving the IVC and iliac veins, with an IVC filter.  Ill-defined fluid collection extending along the right iliac vessels, new from earlier today, compatible with hematoma.  Cauda equina and epidural enhancement in the lower lumbar spine, likely inflammatory from a recent lumbar drain although infection cannot be excluded.  No organized epidural collection.  Soft tissue edema in the right anterolateral thigh, which may be postoperative from recent graft harvest.  Hemorrhage and infection are difficult to exclude.      - s/p biopsy with IR  - f/u pathology   - antibiotics d/c after cx negative at 48h  - discussing case with surg vs radiation oncology based on pathology results, if radiosensitive, may need inpatient treatment if able given severity of symptoms  - trying to establish on oral pain regimen to allow d/c for patient to follow up with [facility] specialist   - consulted PT/OT for assistance with mobility and for any mobility aid recommendations  - bowel reg added  - MM pain control    - MS contin    - PRN oxycodone, breakthrough  dilaudid   - gabapentin    Subdural fluid collection  CT head obtained at recommendation of [facility] on 5/28 - New subdural collection along the right cerebral convexity with 0.8 cm of leftward midline shift.  Favored to represent remote subdural hemorrhage versus chronic collection.  Operative change pituitary mass resection.  No evidence of recurrence.  Sinusitis.    - will contact [facility] regarding findings  - repeat CT head with stable findings   - will discuss with [facility] team for input on ability to anticoagulate    Elevated LFTs  Hyperbilirubinemia  Elevated LFTs and Tbili noted on 5/26. Mild RUQ tenderness. Concern for shock liver or portal thrombosis given hypercoagulable state.      - holding atorvastatin, resume when able.  - hepatitis and HIV negative  - RUQ US with hepatic steatosis, no lesions or thrombi      History of pituitary surgery  Postoperative CSF leak  Pituitary adenoma  Recent pituitary resection at [facility] on 5/1. Follow up admission 5/8-5/16 for CSF leak. Lumbar drain pulled on 5/15. No indication of recurrence of CSF leak. Only noted mass effect symptoms include asymmetric pupils.     - monitor for CSF rhinorrhea or vision changes  - will [facility] to discuss with current oncologist  BPH with urinary obstruction  Noted, continue to monitor for urinary retention    Mixed hyperlipidemia  Holding statin for elevated LFTs    Obesity (BMI 30-39.9)  Body mass index is 30.22 kg/m². Morbid obesity complicates all aspects of disease management from diagnostic modalities to treatment. Weight loss encouraged and health benefits explained to patient.     Hyponatremia  Hyponatremia is likely due to dehydration. The patient's most recent sodium results are listed below.  Recent Labs     05/30/25  0237 05/31/25  0443 06/01/25  0358   * 130* 131*     Plan  - urine Na low, serum and urine osm wnl  - Monitor sodium Daily.   - Patient hyponatremia is stable  Anemia  Anemia is likely  due to unclear etiology. Most recent hemoglobin and hematocrit are listed below.  Recent Labs     05/30/25  1436 05/31/25  0443 06/01/25  0358   HGB 9.7* 9.5* 9.3*   HCT 29.3* 29.5* 28.7*     Plan  - Monitor serial CBC: Daily  - Transfuse PRBC if patient becomes hemodynamically unstable, symptomatic or H/H drops below 7/21.  - Patient has not received any PRBC transfusions to date  - Patient's anemia is currently being monitored  Acute deep vein thrombosis (DVT) of left lower extremity  Presence of IVC filter  B/L LE US 5/13/25 at [facility] with occlusive thrombus within the posterior tibial vein as well as nearly occlusive thrombus within the peroneal vein. Positive LLE DVT study. IVC filter in place    Plan:  - holding AC and VTE ppx pending additional workup and possible intervention   - DVT US with extensive DVTs, discussed with vascular surgery who would not recommend thrombectomy w/o ability to anticoagulate  - will discuss with [facility] team ability to anticoagulate based on CT head imaging    VTE Risk Mitigation (From admission, onward)           Ordered     Reason for No Pharmacological VTE Prophylaxis  Once        Question:  Reasons:  Answer:  Physician Provided (leave comment)  Comment:  Possible active bleed, pending possible intervention    05/22/25 1140     IP VTE HIGH RISK PATIENT  Once         05/22/25 1140     Place sequential compression device  Until discontinued         05/22/25 1140                    Discharge Planning   PORTER: 6/3/2025     Code Status: Full Code   Medical Readiness for Discharge Date:   Discharge Plan A: Rehab   Discharge Delays: None known at this time            Please place Justification for DME        Karen Holt MD  Department of Hospital Medicine   [facility]

## 2025-06-01 NOTE — ASSESSMENT & PLAN NOTE
Hyponatremia is likely due to dehydration. The patient's most recent sodium results are listed below.  Recent Labs     05/30/25  0237 05/31/25  0443 06/01/25  0358   * 130* 131*     Plan  - urine Na low, serum and urine osm wnl  - Monitor sodium Daily.   - Patient hyponatremia is stable

## 2025-06-01 NOTE — ASSESSMENT & PLAN NOTE
Anemia is likely due to unclear etiology. Most recent hemoglobin and hematocrit are listed below.  Recent Labs     05/30/25  1436 05/31/25  0443 06/01/25  0358   HGB 9.7* 9.5* 9.3*   HCT 29.3* 29.5* 28.7*     Plan  - Monitor serial CBC: Daily  - Transfuse PRBC if patient becomes hemodynamically unstable, symptomatic or H/H drops below 7/21.  - Patient has not received any PRBC transfusions to date  - Patient's anemia is currently being monitored
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
## 2025-06-01 NOTE — SUBJECTIVE & OBJECTIVE
Interval History: NAEO. Pain not controlled, will increase PRN oxy. Working well with therapy. LFTs improving.     Objective:     Vital Signs (Most Recent):  Temp: 97.7 °F (36.5 °C) (06/01/25 0803)  Pulse: 82 (06/01/25 0803)  Resp: 17 (06/01/25 0803)  BP: 115/71 (06/01/25 0803)  SpO2: 100 % (06/01/25 0803) Vital Signs (24h Range):  Temp:  [97.5 °F (36.4 °C)-98.5 °F (36.9 °C)] 97.7 °F (36.5 °C)  Pulse:  [82-91] 82  Resp:  [16-20] 17  SpO2:  [98 %-100 %] 100 %  BP: (100-115)/(64-73) 115/71     Weight: 106.8 kg (235 lb 5.5 oz)  Body mass index is 30.22 kg/m².    Intake/Output Summary (Last 24 hours) at 6/1/2025 0837  Last data filed at 6/1/2025 0415  Gross per 24 hour   Intake 600 ml   Output --   Net 600 ml         Physical Exam  Constitutional:       General: He is not in acute distress.     Appearance: Normal appearance. He is not ill-appearing.   HENT:      Head: Normocephalic and atraumatic.      Mouth/Throat:      Mouth: Mucous membranes are moist.      Pharynx: Oropharynx is clear.   Eyes:      Extraocular Movements: Extraocular movements intact.      Conjunctiva/sclera: Conjunctivae normal.   Cardiovascular:      Rate and Rhythm: Normal rate and regular rhythm.      Heart sounds: Normal heart sounds.   Pulmonary:      Effort: Pulmonary effort is normal. No respiratory distress.      Breath sounds: Normal breath sounds.   Abdominal:      General: There is no distension.      Palpations: Abdomen is soft.      Tenderness: There is no abdominal tenderness.   Genitourinary:     Comments: Pelvic tenderness  Pelvic fullness on palpation  Musculoskeletal:         General: Swelling (BLE R>L, improved) present. No tenderness.      Right lower leg: No edema.      Left lower leg: No edema.   Skin:     General: Skin is warm.      Capillary Refill: Capillary refill takes less than 2 seconds.      Comments: Well healing surgical scars (low back, R lateral thigh)   Neurological:      General: No focal deficit present.       Mental Status: He is alert and oriented to person, place, and time.      Cranial Nerves: No cranial nerve deficit.      Sensory: No sensory deficit.      Motor: No weakness.   Psychiatric:         Mood and Affect: Mood normal.         Thought Content: Thought content normal.               Significant Labs: All pertinent labs within the past 24 hours have been reviewed.  CBC:   Recent Labs   Lab 05/30/25  1436 05/31/25  0443 06/01/25  0358   WBC 17.43* 16.35* 16.91*   HGB 9.7* 9.5* 9.3*   HCT 29.3* 29.5* 28.7*   * 516* 578*     CMP:   Recent Labs   Lab 05/31/25  0443 06/01/25  0358   * 131*   K 4.5 5.3*   CL 98 97   CO2 20* 23    97   BUN 16 15   CREATININE 1.0 1.1   CALCIUM 8.9 9.4   PROT 7.8 8.1   ALBUMIN 2.7* 2.7*   BILITOT 1.2* 1.2*   ALKPHOS 203* 180*   * 203*   * 212*   ANIONGAP 12 11       Significant Imaging: I have reviewed all pertinent imaging results/findings within the past 24 hours.

## 2025-06-02 PROBLEM — M79.675 TOE PAIN, LEFT: Status: ACTIVE | Noted: 2025-06-02

## 2025-06-02 LAB
ABSOLUTE EOSINOPHIL (OHS): 0.62 K/UL
ABSOLUTE MONOCYTE (OHS): 1.32 K/UL (ref 0.3–1)
ABSOLUTE NEUTROPHIL COUNT (OHS): 10.03 K/UL (ref 1.8–7.7)
ALBUMIN SERPL BCP-MCNC: 2.6 G/DL (ref 3.5–5.2)
ALP SERPL-CCNC: 157 UNIT/L (ref 40–150)
ALT SERPL W/O P-5'-P-CCNC: 171 UNIT/L (ref 10–44)
ANION GAP (OHS): 9 MMOL/L (ref 8–16)
AST SERPL-CCNC: 143 UNIT/L (ref 11–45)
BASOPHILS # BLD AUTO: 0.05 K/UL
BASOPHILS NFR BLD AUTO: 0.3 %
BILIRUB SERPL-MCNC: 0.9 MG/DL (ref 0.1–1)
BUN SERPL-MCNC: 15 MG/DL (ref 6–20)
CALCIUM SERPL-MCNC: 9 MG/DL (ref 8.7–10.5)
CHLORIDE SERPL-SCNC: 98 MMOL/L (ref 95–110)
CO2 SERPL-SCNC: 24 MMOL/L (ref 23–29)
CREAT SERPL-MCNC: 1.1 MG/DL (ref 0.5–1.4)
ERYTHROCYTE [DISTWIDTH] IN BLOOD BY AUTOMATED COUNT: 15 % (ref 11.5–14.5)
GFR SERPLBLD CREATININE-BSD FMLA CKD-EPI: >60 ML/MIN/1.73/M2
GLUCOSE SERPL-MCNC: 109 MG/DL (ref 70–110)
GRAM STN SPEC: NORMAL
GRAM STN SPEC: NORMAL
HCT VFR BLD AUTO: 26.4 % (ref 40–54)
HGB BLD-MCNC: 8.6 GM/DL (ref 14–18)
IMM GRANULOCYTES # BLD AUTO: 0.54 K/UL (ref 0–0.04)
IMM GRANULOCYTES NFR BLD AUTO: 3.7 % (ref 0–0.5)
LYMPHOCYTES # BLD AUTO: 2.18 K/UL (ref 1–4.8)
MAGNESIUM SERPL-MCNC: 2.2 MG/DL (ref 1.6–2.6)
MCH RBC QN AUTO: 26.7 PG (ref 27–31)
MCHC RBC AUTO-ENTMCNC: 32.6 G/DL (ref 32–36)
MCV RBC AUTO: 82 FL (ref 82–98)
NUCLEATED RBC (/100WBC) (OHS): 0 /100 WBC
PHOSPHATE SERPL-MCNC: 4.3 MG/DL (ref 2.7–4.5)
PLATELET # BLD AUTO: 559 K/UL (ref 150–450)
PMV BLD AUTO: 9.2 FL (ref 9.2–12.9)
POTASSIUM SERPL-SCNC: 4.7 MMOL/L (ref 3.5–5.1)
PROT SERPL-MCNC: 7.6 GM/DL (ref 6–8.4)
RBC # BLD AUTO: 3.22 M/UL (ref 4.6–6.2)
RELATIVE EOSINOPHIL (OHS): 4.2 %
RELATIVE LYMPHOCYTE (OHS): 14.8 % (ref 18–48)
RELATIVE MONOCYTE (OHS): 9 % (ref 4–15)
RELATIVE NEUTROPHIL (OHS): 68 % (ref 38–73)
SODIUM SERPL-SCNC: 131 MMOL/L (ref 136–145)
URATE SERPL-MCNC: 6.6 MG/DL (ref 3.4–7)
WBC # BLD AUTO: 14.74 K/UL (ref 3.9–12.7)

## 2025-06-02 PROCEDURE — 85025 COMPLETE CBC W/AUTO DIFF WBC: CPT

## 2025-06-02 PROCEDURE — 36415 COLL VENOUS BLD VENIPUNCTURE: CPT

## 2025-06-02 PROCEDURE — 80053 COMPREHEN METABOLIC PANEL: CPT

## 2025-06-02 PROCEDURE — 87075 CULTR BACTERIA EXCEPT BLOOD: CPT

## 2025-06-02 PROCEDURE — 25000003 PHARM REV CODE 250

## 2025-06-02 PROCEDURE — 87070 CULTURE OTHR SPECIMN AEROBIC: CPT

## 2025-06-02 PROCEDURE — 87205 SMEAR GRAM STAIN: CPT

## 2025-06-02 PROCEDURE — 25000242 PHARM REV CODE 250 ALT 637 W/ HCPCS

## 2025-06-02 PROCEDURE — 11000001 HC ACUTE MED/SURG PRIVATE ROOM

## 2025-06-02 PROCEDURE — 87206 SMEAR FLUORESCENT/ACID STAI: CPT

## 2025-06-02 PROCEDURE — 84100 ASSAY OF PHOSPHORUS: CPT

## 2025-06-02 PROCEDURE — 99223 1ST HOSP IP/OBS HIGH 75: CPT | Mod: 25,,, | Performed by: PODIATRIST

## 2025-06-02 PROCEDURE — 97535 SELF CARE MNGMENT TRAINING: CPT | Mod: CO

## 2025-06-02 PROCEDURE — 0S9N3ZX DRAINAGE OF LEFT METATARSAL-PHALANGEAL JOINT, PERCUTANEOUS APPROACH, DIAGNOSTIC: ICD-10-PCS | Performed by: PODIATRIST

## 2025-06-02 PROCEDURE — 83735 ASSAY OF MAGNESIUM: CPT

## 2025-06-02 PROCEDURE — 20600 DRAIN/INJ JOINT/BURSA W/O US: CPT | Mod: TA,,, | Performed by: PODIATRIST

## 2025-06-02 PROCEDURE — 87116 MYCOBACTERIA CULTURE: CPT

## 2025-06-02 PROCEDURE — 84550 ASSAY OF BLOOD/URIC ACID: CPT

## 2025-06-02 PROCEDURE — 88311 DECALCIFY TISSUE: CPT | Mod: TC

## 2025-06-02 PROCEDURE — 63600175 PHARM REV CODE 636 W HCPCS

## 2025-06-02 RX ORDER — HYDROMORPHONE HYDROCHLORIDE 2 MG/1
4 TABLET ORAL EVERY 4 HOURS PRN
Refills: 0 | Status: DISCONTINUED | OUTPATIENT
Start: 2025-06-02 | End: 2025-06-04 | Stop reason: HOSPADM

## 2025-06-02 RX ORDER — HYDROMORPHONE HYDROCHLORIDE 2 MG/ML
1 INJECTION, SOLUTION INTRAMUSCULAR; INTRAVENOUS; SUBCUTANEOUS ONCE
Status: COMPLETED | OUTPATIENT
Start: 2025-06-02 | End: 2025-06-02

## 2025-06-02 RX ORDER — MORPHINE SULFATE 30 MG/1
30 TABLET, FILM COATED, EXTENDED RELEASE ORAL EVERY 12 HOURS
Refills: 0 | Status: DISCONTINUED | OUTPATIENT
Start: 2025-06-02 | End: 2025-06-04 | Stop reason: HOSPADM

## 2025-06-02 RX ORDER — MORPHINE SULFATE 15 MG/1
15 TABLET ORAL ONCE
Refills: 0 | Status: COMPLETED | OUTPATIENT
Start: 2025-06-02 | End: 2025-06-02

## 2025-06-02 RX ORDER — INDOMETHACIN 25 MG/1
25 CAPSULE ORAL
Status: DISCONTINUED | OUTPATIENT
Start: 2025-06-02 | End: 2025-06-03

## 2025-06-02 RX ADMIN — GABAPENTIN 600 MG: 300 CAPSULE ORAL at 08:06

## 2025-06-02 RX ADMIN — MORPHINE SULFATE 15 MG: 15 TABLET ORAL at 10:06

## 2025-06-02 RX ADMIN — Medication 600 ML: at 06:06

## 2025-06-02 RX ADMIN — Medication 600 ML: at 02:06

## 2025-06-02 RX ADMIN — MORPHINE SULFATE 15 MG: 15 TABLET, EXTENDED RELEASE ORAL at 05:06

## 2025-06-02 RX ADMIN — GABAPENTIN 600 MG: 300 CAPSULE ORAL at 02:06

## 2025-06-02 RX ADMIN — INDOMETHACIN 25 MG: 25 CAPSULE ORAL at 06:06

## 2025-06-02 RX ADMIN — MORPHINE SULFATE 30 MG: 30 TABLET, EXTENDED RELEASE ORAL at 09:06

## 2025-06-02 RX ADMIN — HYDROMORPHONE HYDROCHLORIDE 4 MG: 2 TABLET ORAL at 09:06

## 2025-06-02 RX ADMIN — Medication 600 ML: at 10:06

## 2025-06-02 RX ADMIN — HYDROMORPHONE HYDROCHLORIDE 4 MG: 2 TABLET ORAL at 03:06

## 2025-06-02 RX ADMIN — GABAPENTIN 600 MG: 300 CAPSULE ORAL at 09:06

## 2025-06-02 RX ADMIN — PSYLLIUM HUSK 1 PACKET: 3.4 POWDER ORAL at 08:06

## 2025-06-02 RX ADMIN — HYDROMORPHONE HYDROCHLORIDE 1 MG: 2 INJECTION INTRAMUSCULAR; INTRAVENOUS; SUBCUTANEOUS at 07:06

## 2025-06-02 NOTE — PT/OT/SLP PROGRESS
"Occupational Therapy   Treatment    Name: Gallo Maria Jr.  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 is a 44 y.o. 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  He presents with the following performance deficits affecting function: weakness, impaired functional mobility, impaired endurance, gait instability, impaired self care skills, impaired balance, decreased lower extremity function, decreased coordination, decreased safety awareness, pain.     Rehab Prognosis:  Good; patient would benefit from acute skilled OT services to address these deficits and reach maximum level of function.       Plan:     Patient to be seen 4 x/week to address the above listed problems via self-care/home management, therapeutic activities, therapeutic exercises, neuromuscular re-education  Plan of Care Expires: 06/22/25  Plan of Care Reviewed with: patient, spouse    Subjective     Patient/Family Comments/goals: "I'm feeling lighter"  Pain/Comfort:  Pain Rating 1:  (unrated)  Location - Side 1: Bilateral  Location - Orientation 1: generalized  Location 1: leg  Pain Addressed 1: Pre-medicate for activity, Reposition, Distraction  Pain Rating Post-Intervention 1:  (unrated)    Objective:     Communicated with: RN prior to session.  Patient found on toilet with  (no active lines) upon OT entry to room.    General Precautions: Standard, fall    Orthopedic Precautions:N/A  Braces: N/A  Respiratory Status: Room air     Occupational Performance:     Bed Mobility:    Pt OOB    Functional Mobility/Transfers:  Patient completed Sit <> Stand Transfer with stand by assistance  with  no " assistive device   Patient completed Toilet Transfer Step Transfer technique with supervision with  no AD  Functional Mobility: pt ambulating ~8ft with CGA using no AD.     Activities of Daily Living:  Grooming: stand by assistance for hand hygiene standing at sink  Toileting: supervision for pericare and clothing mgmt      [facility] 6 Click ADL: 19    Treatment & Education:  Pt educated on OT POC and frequency during hospital stay.   Pt educated on proper hand placement and techniques for RW mgmt to improve safety awareness.   Pt educated on importance of OOB activity to improve function and activity tolerance.  Addressed all patient questions/concerns within [facility] scope of practice.     Patient left HOB elevated with all lines intact, call button in reach, and RN notified    GOALS:   Multidisciplinary Problems       Occupational Therapy Goals          Problem: Occupational Therapy    Goal Priority Disciplines Outcome Interventions   Occupational Therapy Goal     OT, PT/OT Progressing    Description: Goals to be met by: 6/22/25.     Patient will increase functional independence with ADLs by performing:    UE Dressing with Stand-by Assistance.  LE Dressing with Stand-by Assistance.  Grooming while standing at sink with Stand-by Assistance.  Toileting from toilet with Stand-by Assistance for hygiene and clothing management.   Toilet transfer to toilet with Stand-by Assistance.  Supine to sit with Stand-by Assistance.  Sit to stand transfer with Stand-by Assistance.  Functional mobility to simulate household/community distances with Stand-by Assistance and utilizing LRAD in order to maximize functional activity tolerance and standing balance required for engagement in occupations of choice.                       Time Tracking:     OT Date of Treatment: 06/02/25  OT Start Time: 1039  OT Stop Time: 1058  OT Total Time (min): 19 min    Billable Minutes:Self Care/Home Management 19    OT/ISIAH: ISIAH     Number of ISIAH visits since  last OT visit: 1 6/2/2025

## 2025-06-02 NOTE — ASSESSMENT & PLAN NOTE
Sudden onset pelvic pain morning of 5/22. CTA revealing large, fairly well-circumscribed heterogenous lesions in the right hemipelvis extending towards the gluteal folds. Leftward shift of pelvic organs. Recent IVC filter placed for DVTs. Recent lumbar csf drain removed on 5/15.     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  Differential considerations include chordoma, chondrosarcoma, and giant cell tumor.  1.7 cm lesion in the S1 vertebral body with similar signal characteristics, concerning for metastasis.  Extensive DVT involving the IVC and iliac veins, with an IVC filter.  Ill-defined fluid collection extending along the right iliac vessels, new from earlier today, compatible with hematoma.  Cauda equina and epidural enhancement in the lower lumbar spine, likely inflammatory from a recent lumbar drain although infection cannot be excluded.  No organized epidural collection.  Soft tissue edema in the right anterolateral thigh, which may be postoperative from recent graft harvest.  Hemorrhage and infection are difficult to exclude.      - s/p biopsy with IR  - f/u pathology   - antibiotics d/c after cx negative at 48h  - consulted rad onc regarding inpatient care. Recommend treatment at [facility] for continuity of care.   - trying to establish on oral pain regimen to allow d/c for patient to follow up with [facility] specialist   - consulted PT/OT for assistance with mobility and for any mobility aid recommendations  - bowel reg added  - MM pain control    - MS contin    - PRN hydrmorphone, breakthrough dilaudid   - gabapentin

## 2025-06-02 NOTE — ASSESSMENT & PLAN NOTE
Sudden onset pelvic pain morning of 5/22. CTA revealing large, fairly well-circumscribed heterogenous lesions in the right hemipelvis extending towards the gluteal folds. Leftward shift of pelvic organs. Recent IVC filter placed for DVTs. Recent lumbar csf drain removed on 5/15. Differential includes abscess, hematoma, seroma, mass    - NSGY following, recommend MRI  - f/u MRI lumbar spine and MRI pelvis  Ill-defined fluid collection extending along the right iliac vessels, new from earlier today, compatible with hematoma.   - on vanc/zosyn  - prn pain contol

## 2025-06-02 NOTE — HPI
""Gallo Maria Jr. Is a 45yo male with a hx of BPH with urinary obstruction and pituitary adenoma s/p resection presenting for acute onset lower back pain that has radiating down his right leg, pelvic pain, and shortness of breath. The pain was sudden onset when he awoke from sleep this morning located in his lower back radiating down his right leg to mid thigh. The pelvic pain expands into his right groin. His shortness of breath is mainly with activity and has improved with rest. He also notes severe flushing and pre-syncopal symptoms while attempting to get to his car requiring him to take frequent breaks. His pain is still present when re-adjusting in bed but otherwise symptoms have largely improved with rest. He re[prts frequent headaches most recently yesterday unrelieved by tylenol #3. Otherwise he denies any vision changes, nasal leakage, congestion, cough, abdominal pain, N/V/D, constipation, urinary incontinence, or perianal numbness. Ambulation is limited by pain, not weakness.      He was recently admitted to [facility] on 5/1-5/5 for resection of a large pituitary adenoma and again from 5/8-5/16 for CSF leak requiring repair with lumbar drain and fat fascia bello grafts. Drain pulled 5/15. His hospital course also included two DVTs in the LLE. IVC filter placed on 5/15. He was advised to follow up outpatient to discuss utility of eliquis."    Podiatry consulted for left foot pain x 1 day. Pain occurred randomly while in bed after waking up. Pain is localized to left 1st MTPJ. Sharp, severe pain when walking or moving  big toe. No pain at rest. Has not had this happen before. VSS, afebrile. WBC elevated. Radiographs demonstrate No acute fracture or dislocation.  No erosive osseous changes.  Joint spaces are well maintained.  Small dorsal calcaneal spur.  Soft tissues are unremarkable   "

## 2025-06-02 NOTE — ASSESSMENT & PLAN NOTE
Sudden onset pelvic pain morning of 5/22. CTA revealing large, fairly well-circumscribed heterogenous lesions in the right hemipelvis extending towards the gluteal folds. Leftward shift of pelvic organs. Recent IVC filter placed for DVTs. Recent lumbar csf drain removed on 5/15.     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  Differential considerations include chordoma, chondrosarcoma, and giant cell tumor.  1.7 cm lesion in the S1 vertebral body with similar signal characteristics, concerning for metastasis.  Extensive DVT involving the IVC and iliac veins, with an IVC filter.  Ill-defined fluid collection extending along the right iliac vessels, new from earlier today, compatible with hematoma.  Cauda equina and epidural enhancement in the lower lumbar spine, likely inflammatory from a recent lumbar drain although infection cannot be excluded.  No organized epidural collection.  Soft tissue edema in the right anterolateral thigh, which may be postoperative from recent graft harvest.  Hemorrhage and infection are difficult to exclude.      - s/p biopsy with IR  - f/u pathology   - antibiotics d/c after cx negative at 48h  - consulted rad onc regarding inpatient care. Recommend treatment at [facility] for continuity of care.   - trying to establish on oral pain regimen to allow d/c for patient to follow up with [facility] specialist   - consulted PT/OT for assistance with mobility and for any mobility aid recommendations  - bowel reg added  - MM pain control    - MS contin    - PRN hydrmorphone, breakthrough dilaudid   - gabapentin

## 2025-06-02 NOTE — PROGRESS NOTES
[facility] - Med Surg  Adult Nutrition  Progress Note    SUMMARY       Recommendations    Recommendation/Intervention:   1. Continue regular diet as tolerated     - please continue to document PO % intake via flowsheets     2. d/c boost breeze per pt's request   3. Encourage good intake      4. RD to monitor weight, labs, intake, tolerance    Goals:   1. % nutritional needs met with diet during admission     2. 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 PMHx: asthma, Pituitary adenoma. No new surgical hx. 1+ and 2+ edema. Wound: incision on spine and buttocks. No GI s/s - BM: 5/31. Pt reported having a good intake, intake has been improving, having a fair to good intake - PO: 50-75%. Pt is not drinking the boost b/c he doesn't like it, declined boost plus as well. Pt mentioned his wife is bringing him food and is eating that also. Noted wt loss per chart, 50 lbs/17.5% x 7 months. Pt thinks it was less wt lost. The wt loss was both intentional and unintentional. Pt looked well nourished for age and condition.    Nutrition/Diet History    Nutrition Intake History: fasts, has eating window  Spiritual, Cultural Beliefs, Congregational Practices, Values that Affect Care: no  Food Allergies: NKFA  Factors Affecting Nutritional Intake: decreased appetite    Nutrition Related Social Determinants of Health: SDOH: None Identified    Food Insecurity: No Food Insecurity (5/22/2025)    Hunger Vital Sign     Worried About Running Out of Food in the Last Year:  "Never true     Ran Out of Food in the Last Year: Never true     Anthropometrics    Height: 6' 2" (188 cm)  Height (inches): 74 in  Height Method: Measured  Weight: 106.8 kg (235 lb 5.5 oz)  Weight (lb): 235.34 lb  Weight Method: Standard Scale  Ideal Body Weight (IBW), Male: 190 lb  % Ideal Body Weight, Male (lb): 150 %  BMI (Calculated): 30.2  BMI Grade: 30 - 34.9- obesity - grade I  Weight Loss: other (see comments) (both, intentional and unintentional)    Lab/Procedures/Meds    Pertinent Labs Reviewed: reviewed  Pertinent Labs Comments: H/H 8.6/26.4 low, Na 131 low, albumin 2.6 low,  high,  high, CRP 75.2 high  Pertinent Medications Reviewed: reviewed  Pertinent Medications Comments: Pedialyte, gabapentin, morphine, psyllium husk, senna    Estimated/Assessed Needs    Weight Used For Calorie Calculations: 106.8 kg (235 lb 7.2 oz)  Energy Calorie Requirements (kcal): 2428 kcal  Energy Need Method: [place] Je[place] (* 1.2)  Protein Requirements:  g/pro (0.8-1.0 g/kg)  Weight Used For Protein Calculations: 106.8 kg (235 lb 7.2 oz)        RDA Method (mL): 2428  CHO Requirement: 304 g    Nutrition Prescription Ordered    Current Diet Order: regular  Oral Nutrition Supplement: boost breeze TID    Evaluation of Received Nutrient/Fluid Intake    Energy Calories Required: not meeting needs  Protein Required: not meeting needs  Fluid Required: other (see comments) (per MD)  Tolerance: tolerating  % Intake of Estimated Energy Needs: 25 - 50 %  % Meal Intake: 25 - 50 %    PES Statement    Inadequate energy intake related to Other (comment) as evidenced by Intake <75% estimated needs  Status: New    Nutrition Risk    Level of Risk/Frequency of Follow-up: low (1/week)     Monitor and Evaluation    Monitor and Evaluation: Energy intake, Food and beverage intake, Protein intake, Carbohydrate intake, Diet order, Weight, Electrolyte and renal panel, Gastrointestinal profile, Glucose/endocrine profile, " Inflammatory profile, Lipid profile, Nutrition focused physical findings, Skin     Nutrition Follow-Up    RD Follow-up?: Yes

## 2025-06-02 NOTE — ASSESSMENT & PLAN NOTE
Hyponatremia is likely due to dehydration. The patient's most recent sodium results are listed below.  Recent Labs     05/31/25  0443 06/01/25  0358 06/02/25  0453   * 131* 131*     Plan  - urine Na low, serum and urine osm wnl  - Monitor sodium Daily.   - Patient hyponatremia is stable

## 2025-06-02 NOTE — ASSESSMENT & PLAN NOTE
Anemia is likely due to unclear etiology. Most recent hemoglobin and hematocrit are listed below.  Recent Labs     05/31/25  0443 06/01/25  0358 06/02/25  0453   HGB 9.5* 9.3* 8.6*   HCT 29.5* 28.7* 26.4*     Plan  - Monitor serial CBC: Daily  - Transfuse PRBC if patient becomes hemodynamically unstable, symptomatic or H/H drops below 7/21.  - Patient has not received any PRBC transfusions to date  - Patient's anemia is currently being monitored

## 2025-06-02 NOTE — ASSESSMENT & PLAN NOTE
10/10 pain in his L forefoot. There is a focus of erythema along the base of his big toe. Tender to light touch. concerning for gout, pseudogout, septic arthritis. Uric acid WNL.     Plan:   - consulted podiatry, appreciate recs   - Left 1st MTP joint aspirated, specimen sent to micro and path.   - Recommend starting patient on colchicine. If symptoms persist, consider Medrol dosepack   - Gram stain negative

## 2025-06-02 NOTE — SUBJECTIVE & OBJECTIVE
Scheduled Meds:   electrolytes-dextrose  600 mL Oral Q8H    gabapentin  600 mg Oral TID    morphine  30 mg Oral Q12H    psyllium husk  1 packet Oral Daily    senna-docusate  1 tablet Oral BID     Continuous Infusions:  PRN Meds:  Current Facility-Administered Medications:     dextrose 50%, 12.5 g, Intravenous, PRN    dextrose 50%, 25 g, Intravenous, PRN    glucagon (human recombinant), 1 mg, Intramuscular, PRN    glucose, 16 g, Oral, PRN    glucose, 24 g, Oral, PRN    HYDROmorphone, 0.5 mg, Intravenous, Q4H PRN    HYDROmorphone, 4 mg, Oral, Q4H PRN    melatonin, 6 mg, Oral, Nightly PRN    naloxone, 0.02 mg, Intravenous, PRN    sodium chloride 0.9%, 10 mL, Intravenous, Q12H PRN    Review of patient's allergies indicates:  No Known Allergies     Past Medical History:   Diagnosis Date    ADD (attention deficit disorder)     treated by outside psychiatrist.    Asthma     BPH with urinary obstruction 02/05/2015    Decreased libido 08/31/2015    Depression     Erectile dysfunction 10/27/2015    Family history of prostate cancer 07/31/2013    History of migraine headaches     History of pituitary tumor 09/25/2015    S/p removal 2016      Hypogonadism male 10/27/2015    Insomnia     Pituitary adenoma 08/09/2012     Past Surgical History:   Procedure Laterality Date    BIOPSY OF TONSILS Right 9/9/2019    Procedure: BIOPSY, TONSILS;  Surgeon: Bruno Umaña MD;  Location: [address];  Service: ENT;  Laterality: Right;    COLONOSCOPY N/A 9/3/2024    Procedure: COLONOSCOPY;  Surgeon: Ander Wolfe MD;  Location: [facility];  Service: Endoscopy;  Laterality: N/A;    SURGICAL REMOVAL OF PITUITARY TUMOR BY TRANSSPHENOIDAL APPROACH  2015    Pituitary Adeonma via Dr. Blair 2015    SURGICAL REMOVAL OF PITUITARY TUMOR BY TRANSSPHENOIDAL APPROACH N/A 4/19/2023    Procedure: RESECTION, NEOPLASM, PITUITARY, TRANSSPHENOIDAL APPROACH;  Surgeon: Derek Blair MD;  Location: [address];  Service: Neurosurgery;   Laterality: N/A;  TRANSPHENOIDAL RESECTION PITUITARY ADENOMA/ 2 UNITS RBC, TEDS & SCD, FLOURO, TRANSPHENOIDAL SET, MICROSCOPE, MIRTHA CO SURGEON.    WISDOM TOOTH EXTRACTION         Family History       Problem Relation (Age of Onset)    Benign prostatic hyperplasia Father    Cancer Father, Paternal Grandfather    Dementia Paternal Grandmother    Diabetes Maternal Grandmother, Maternal Grandfather    Hyperlipidemia Father    No Known Problems Mother, Sister, Brother, Brother    Prostate cancer Paternal Uncle, Paternal Grandfather          Tobacco Use    Smoking status: Never    Smokeless tobacco: Never   Substance and Sexual Activity    Alcohol use: No    Drug use: No    Sexual activity: Yes     Partners: Female     Review of Systems  Objective:     Vital Signs (Most Recent):  Temp: 98.2 °F (36.8 °C) (06/02/25 1153)  Pulse: 88 (06/02/25 1153)  Resp: 18 (06/02/25 1153)  BP: 107/68 (06/02/25 1153)  SpO2: (!) 92 % (06/02/25 1153) Vital Signs (24h Range):  Temp:  [97.4 °F (36.3 °C)-98.2 °F (36.8 °C)] 98.2 °F (36.8 °C)  Pulse:  [83-96] 88  Resp:  [17-18] 18  SpO2:  [92 %-100 %] 92 %  BP: (101-129)/(67-71) 107/68     Weight: 106.8 kg (235 lb 5.5 oz)  Body mass index is 30.22 kg/m².    Foot Exam    General  General Appearance: appears stated age and healthy   Orientation: alert and oriented to person, place, and time   Affect: appropriate       Left Foot/Ankle      Inspection and Palpation  Left foot ecchymosis: plantar 1s MTPJ.  Tenderness: great toe metatarsophalangeal joint   Swelling: none   Skin Exam: abnormal color; no drainage, no dry skin, no maceration, no cellulitis and no erythema     Neurovascular  Dorsalis pedis: 1+  Posterior tibial: 1+  Saphenous nerve sensation: normal  Tibial nerve sensation: normal  Superficial peroneal nerve sensation: normal  Deep peroneal nerve sensation: normal  Sural nerve sensation: normal    Comments  Left foot without open wounds or obvious deformity. Ecchymosis like discoloration  noted to left plantar 1st MTPJ with tenderness to palpation. ROM limited secondary to pain with extension/flexion. No edema.     Clinical media:            Laboratory:    CBC:   Recent Labs   Lab 06/02/25  0453   WBC 14.74*   RBC 3.22*   HGB 8.6*   HCT 26.4*   *   MCV 82   MCH 26.7*   MCHC 32.6     CMP:   Recent Labs   Lab 06/02/25  0453      CALCIUM 9.0   ALBUMIN 2.6*   PROT 7.6   *   K 4.7   CO2 24   CL 98   BUN 15   CREATININE 1.1   ALKPHOS 157*   *   *   BILITOT 0.9       Diagnostic Results:  I have reviewed all pertinent imaging results/findings within the past 24 hours.

## 2025-06-02 NOTE — PLAN OF CARE
Recommendations     Recommendation/Intervention:   1. Continue regular diet as tolerated     - please continue to document PO % intake via flowsheets     2. d/c boost breeze  per pt's request   3. Encourage good intake      4. RD to monitor weight, labs, intake, tolerance     Goals:   1. % nutritional needs met with diet during admission     2. Maintain weight during admission  Nutrition Goal Status: new  Communication of RD Recs: other (comment) (POC)     Nutrition Discharge Planning     Nutrition Discharge Planning: General healthy diet, Oral supplement regimen (comments)  Oral supplement regimen (comments): supplement of choice and MVI

## 2025-06-02 NOTE — PROCEDURES
Arthrocentesis    Date/Time: 6/2/2025 4:10 PM  Location procedure was performed: [facility] MEDICAL SURGICAL UNIT    Performed by: Jameel Morrell MD  Authorized by: Mildred Herring DPM  Assisting provider: Cyrus Osorio MD  Pre-op diagnosis: left foot pain  Post-op diagnosis: left foot pain  Consent Done: Not Needed  Indications: joint swelling, possible septic joint and pain   Body area: toe  Location details: left big toe  Joint: left big MTP  Local anesthesia used: yes  Anesthesia: local infiltration    Anesthesia:  Local anesthesia used: yes  Local Anesthetic: lidocaine 2% without epinephrine  Anesthetic total: 4 mL    Patient sedated: no  Description of findings: minute aspirate yielded   Preparation: Patient was prepped and draped in the usual sterile fashion.  Needle size: 18 G  Approach: medial  Aspirate amount: 0.1 mL  Aspirate: blood-tinged and clear  Technical procedures used: single needle 18 gauge  Significant surgical tasks conducted by the assistant(s): dressing  Complications: No  Estimated blood loss (mL): 0  Specimens: No  Implants: No  Comments: Specimen sent to microbiology and pathology         Cyrus Osorio DPM PGY-2  Podiatric Medicine & Surgery  [facility]  Secure Chat Preferred  Pager: [phone]

## 2025-06-02 NOTE — ASSESSMENT & PLAN NOTE
44 y.o. 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 Podiatry consulted for Left 1st MTPJ pain. VSS, Afebrile. WBC elevated. Radiographs demonstrate No acute fracture or dislocation.  No erosive osseous changes.  Joint spaces are well maintained.  Small dorsal calcaneal spur.  Soft tissues are unremarkable. Physical exam significant for tenderness and pain with ROM at left 1 MTPJ. Differential include gout, septic arthritis, tendonitis.    Plan:  - Discussed physical exam findings with patient in detail.   - No surgical intervention at this time  - Left 1st MTP joint aspirated, specimen sent to micro and path. Procedure note to follow.  - Recommend starting patient on Indomethacin 25mg TID. If symptoms persist, consider Medrol dosepack   - Weight bearing status: WBAT  - Podiatry will continue to follow

## 2025-06-02 NOTE — PLAN OF CARE
[facility] - Med S[facility]  Discharge Reassessment    Primary Care Provider: Jerome Mederos MD    Expected Discharge Date: 6/6/2025    RADHA spoke to Ms. Joyner w/ [employer] [facility] library regarding MRI and Scans.  Anya advised she will send information through [employer] today.      RADHA met with patient and spouse, Shanel [phone], regarding d/c plan.  RADHA advised CT scans and MRIs were sent to Dr. Get nathan/ [facility] via [facility].  Process usually takes up to 72 hours; request to expedite completed.  Pt will make a decision on placement once they receive feedback regarding scans/MRI.    RADHA spoke to Gloria NATHAN/ Dr. Deutsch's office [phone] to see if scans were received via [employer].  Gloria will follow up and with their imaging department and provide SW with feedback.     Discharge Plan A and Plan B have been determined by review of patient's clinical status, future medical and therapeutic needs, and coverage/benefits for post-acute care in coordination with multidisciplinary team members.    Reassessment (most recent)       Discharge Reassessment - 06/02/25 1314          Discharge Reassessment    Assessment Type Discharge Planning Reassessment     Did the patient's condition or plan change since previous assessment? No     Discharge Plan discussed with: Patient;Spouse/sig other     Name(s) and Number(s) Shanel Maria [phone]     Communicated PORTER with patient/caregiver Yes     Discharge Plan A Rehab     Discharge Plan B Home;Home with family;Home Health;Other   VS outpatient PT/OT    DME Needed Upon Discharge  other (see comments)   TBD    Transition of Care Barriers None     Why the patient remains in the hospital Requires continued medical care        Post-Acute Status    Post-Acute Authorization Placement;Other     Post-Acute Placement Status Pending medical clearance/testing     Other Status See Comments   TBD- Pending feedback from [facility] on scans and MRIs.    Discharge Delays None known at this  time                   Kaci Smiley LMSW  Part-Time-  [facility]  Ext. [phone]

## 2025-06-02 NOTE — PROGRESS NOTES
[facility] - [facility] Surg  Podiatry  Progress Note    Patient Name: Gallo Maria Jr.  MRN: [id number]  Admission Date: 5/22/2025  Hospital Length of Stay: 10 days  Attending Physician: Chau Pierce III*  Primary Care Provider: Jerome Mederos MD   Scheduled Meds:   electrolytes-dextrose  600 mL Oral Q8H    gabapentin  600 mg Oral TID    morphine  30 mg Oral Q12H    psyllium husk  1 packet Oral Daily    senna-docusate  1 tablet Oral BID     Continuous Infusions:  PRN Meds:  Current Facility-Administered Medications:     dextrose 50%, 12.5 g, Intravenous, PRN    dextrose 50%, 25 g, Intravenous, PRN    glucagon (human recombinant), 1 mg, Intramuscular, PRN    glucose, 16 g, Oral, PRN    glucose, 24 g, Oral, PRN    HYDROmorphone, 0.5 mg, Intravenous, Q4H PRN    HYDROmorphone, 4 mg, Oral, Q4H PRN    melatonin, 6 mg, Oral, Nightly PRN    naloxone, 0.02 mg, Intravenous, PRN    sodium chloride 0.9%, 10 mL, Intravenous, Q12H PRN    Review of patient's allergies indicates:  No Known Allergies     Past Medical History:   Diagnosis Date    ADD (attention deficit disorder)     treated by outside psychiatrist.    Asthma     BPH with urinary obstruction 02/05/2015    Decreased libido 08/31/2015    Depression     Erectile dysfunction 10/27/2015    Family history of prostate cancer 07/31/2013    History of migraine headaches     History of pituitary tumor 09/25/2015    S/p removal 2016      Hypogonadism male 10/27/2015    Insomnia     Pituitary adenoma 08/09/2012     Past Surgical History:   Procedure Laterality Date    BIOPSY OF TONSILS Right 9/9/2019    Procedure: BIOPSY, TONSILS;  Surgeon: Bruno Umaña MD;  Location: [address];  Service: ENT;  Laterality: Right;    COLONOSCOPY N/A 9/3/2024    Procedure: COLONOSCOPY;  Surgeon: Ander Wolfe MD;  Location: [facility];  Service: Endoscopy;  Laterality: N/A;    SURGICAL REMOVAL OF PITUITARY TUMOR BY TRANSSPHENOIDAL APPROACH  2015    Pituitary Adeonma  via Dr. Blair 2015    SURGICAL REMOVAL OF PITUITARY TUMOR BY TRANSSPHENOIDAL APPROACH N/A 4/19/2023    Procedure: RESECTION, NEOPLASM, PITUITARY, TRANSSPHENOIDAL APPROACH;  Surgeon: Derek Blair MD;  Location: [facility] OR [address] [place] [address];  Service: Neurosurgery;  Laterality: N/A;  TRANSPHENOIDAL RESECTION PITUITARY ADENOMA/ 2 UNITS RBC, TEDS & SCD, FLOURO, TRANSPHENOIDAL SET, MICROSCOPE, MIRTHA CO SURGEON.    WISDOM TOOTH EXTRACTION         Family History       Problem Relation (Age of Onset)    Benign prostatic hyperplasia Father    Cancer Father, Paternal Grandfather    Dementia Paternal Grandmother    Diabetes Maternal Grandmother, Maternal Grandfather    Hyperlipidemia Father    No Known Problems Mother, Sister, Brother, Brother    Prostate cancer Paternal Uncle, Paternal Grandfather          Tobacco Use    Smoking status: Never    Smokeless tobacco: Never   Substance and Sexual Activity    Alcohol use: No    Drug use: No    Sexual activity: Yes     Partners: Female     Review of Systems  Objective:     Vital Signs (Most Recent):  Temp: 98.2 °F (36.8 °C) (06/02/25 1153)  Pulse: 88 (06/02/25 1153)  Resp: 18 (06/02/25 1153)  BP: 107/68 (06/02/25 1153)  SpO2: (!) 92 % (06/02/25 1153) Vital Signs (24h Range):  Temp:  [97.4 °F (36.3 °C)-98.2 °F (36.8 °C)] 98.2 °F (36.8 °C)  Pulse:  [83-96] 88  Resp:  [17-18] 18  SpO2:  [92 %-100 %] 92 %  BP: (101-129)/(67-71) 107/68     Weight: 106.8 kg (235 lb 5.5 oz)  Body mass index is 30.22 kg/m².    Foot Exam    General  General Appearance: appears stated age and healthy   Orientation: alert and oriented to person, place, and time   Affect: appropriate       Left Foot/Ankle      Inspection and Palpation  Left foot ecchymosis: plantar 1s MTPJ.  Tenderness: great toe metatarsophalangeal joint   Swelling: none   Skin Exam: abnormal color; no drainage, no dry skin, no maceration, no cellulitis and no erythema     Neurovascular  Dorsalis pedis: 1+  Posterior tibial: 1+  Saphenous nerve  sensation: normal  Tibial nerve sensation: normal  Superficial peroneal nerve sensation: normal  Deep peroneal nerve sensation: normal  Sural nerve sensation: normal    Comments  Left foot without open wounds or obvious deformity. Ecchymosis like discoloration noted to left plantar 1st MTPJ with tenderness to palpation. ROM limited secondary to pain with extension/flexion. No edema.     Clinical media:            Laboratory:    CBC:   Recent Labs   Lab 06/02/25  0453   WBC 14.74*   RBC 3.22*   HGB 8.6*   HCT 26.4*   *   MCV 82   MCH 26.7*   MCHC 32.6     CMP:   Recent Labs   Lab 06/02/25  0453      CALCIUM 9.0   ALBUMIN 2.6*   PROT 7.6   *   K 4.7   CO2 24   CL 98   BUN 15   CREATININE 1.1   ALKPHOS 157*   *   *   BILITOT 0.9       Diagnostic Results:  I have reviewed all pertinent imaging results/findings within the past 24 hours.  Assessment/Plan:     Orthopedic  Toe pain, left  44 y.o. 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 Podiatry consulted for Left 1st MTPJ pain. Radiographs demonstrate No acute fracture or dislocation.  No erosive osseous changes.  Joint spaces are well maintained.  Small dorsal calcaneal spur.  Soft tissues are unremarkable. Physical exam significant for tenderness and pain with ROM at left 1 MTPJ. Differential include gout, septic arthritis, tendonitis.    Plan:  - Discussed physical exam findings with patient in detail.   - No surgical intervention at this time  - Left 1st MTP joint aspirated, specimen sent to micro and path. Procedure note to follow.  - Recommend starting patient on Indomethacin 25mg TID. If symptoms persist, consider Medrol dosepack   - Weight bearing status: WBAT  - Podiatry will continue to follow       Cyrus Osorio DPM PGY-2  Podiatric Medicine & Surgery  [facility]  Secure Chat Preferred  Pager: [phone]    [facility] - Med Surg

## 2025-06-02 NOTE — SUBJECTIVE & OBJECTIVE
Interval History: This morning pt has 10/10 pain in his L forefoot. They are a focus of erythema along the base of his big toe. Tender to light touch. Concern for gout. Consulted ortho for arthrocentesis. Rad onc recommending treatment at [facility] for continuity of care. Continuing to optimize pain regimen. Continuing discussion with [facility].     Review of Systems   Genitourinary:  Negative for difficulty urinating.   Musculoskeletal:  Positive for joint swelling (L 1st toe). Negative for arthralgias and myalgias.        Pelvic pain  R thigh pain   Skin:  Positive for wound (well healing surgical wounds on low back and right leg).   Neurological:  Negative for weakness.     Objective:     Vital Signs (Most Recent):  Temp: 98.2 °F (36.8 °C) (06/02/25 1153)  Pulse: 88 (06/02/25 1153)  Resp: 18 (06/02/25 1153)  BP: 107/68 (06/02/25 1153)  SpO2: (!) 92 % (06/02/25 1153) Vital Signs (24h Range):  Temp:  [97.4 °F (36.3 °C)-98.2 °F (36.8 °C)] 98.2 °F (36.8 °C)  Pulse:  [83-96] 88  Resp:  [17-18] 18  SpO2:  [92 %-100 %] 92 %  BP: (101-129)/(67-71) 107/68     Weight: 106.8 kg (235 lb 5.5 oz)  Body mass index is 30.22 kg/m².    Intake/Output Summary (Last 24 hours) at 6/2/2025 1251  Last data filed at 6/2/2025 0945  Gross per 24 hour   Intake 1020 ml   Output 1700 ml   Net -680 ml         Physical Exam  Constitutional:       General: He is not in acute distress.     Appearance: Normal appearance. He is not ill-appearing.   HENT:      Head: Normocephalic and atraumatic.      Mouth/Throat:      Mouth: Mucous membranes are moist.      Pharynx: Oropharynx is clear.   Eyes:      Extraocular Movements: Extraocular movements intact.      Conjunctiva/sclera: Conjunctivae normal.   Cardiovascular:      Rate and Rhythm: Normal rate and regular rhythm.      Heart sounds: Normal heart sounds.   Pulmonary:      Effort: Pulmonary effort is normal. No respiratory distress.      Breath sounds: Normal breath sounds.   Abdominal:       General: There is no distension.      Palpations: Abdomen is soft.      Tenderness: There is no abdominal tenderness.   Genitourinary:     Comments: Pelvic tenderness  Pelvic fullness on palpation  Musculoskeletal:         General: Swelling (BLE R>L, improved. Also base of L 1st toe.) and tenderness (base of L 1st toe) present.      Right lower leg: No edema.      Left lower leg: No edema.   Skin:     General: Skin is warm.      Capillary Refill: Capillary refill takes less than 2 seconds.      Comments: Well healing surgical scars (low back, R lateral thigh)   Neurological:      General: No focal deficit present.      Mental Status: He is alert and oriented to person, place, and time.      Cranial Nerves: No cranial nerve deficit.      Sensory: No sensory deficit.      Motor: No weakness.   Psychiatric:         Mood and Affect: Mood normal.         Thought Content: Thought content normal.               Significant Labs: All pertinent labs within the past 24 hours have been reviewed.    Significant Imaging: I have reviewed all pertinent imaging results/findings within the past 24 hours.

## 2025-06-02 NOTE — PROGRESS NOTES
[facility] Medicine  Progress Note    Patient Name: Gallo Maria Jr.  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 Is a 45yo male with a hx of BPH with urinary obstruction and pituitary adenoma s/p resection presenting for acute onset lower back pain that has radiating down his right leg, pelvic pain, and shortness of breath. The pain was sudden onset when he awoke from sleep this morning located in his lower back radiating down his right leg to mid thigh. The pelvic pain expands into his right groin. His shortness of breath is mainly with activity and has improved with rest. He also notes severe flushing and pre-syncopal symptoms while attempting to get to his car requiring him to take frequent breaks. His pain is still present when re-adjusting in bed but otherwise symptoms have largely improved with rest. He re[prts frequent headaches most recently yesterday unrelieved by tylenol #3. Otherwise he denies any vision changes, nasal leakage, congestion, cough, abdominal pain, N/V/D, constipation, urinary incontinence, or perianal numbness. Ambulation is limited by pain, not weakness.     He was recently admitted to [facility] on [facility]-5/5 for resection of a large pituitary adenoma and again from 5/8-5/16 for CSF leak requiring repair with lumbar drain and fat fascia bello grafts. Drain pulled 5/15. His hospital course also included two DVTs in the LLE. IVC filter placed on 5/15. He was advised to follow up outpatient to discuss utility of eliquis.     In the ED VSS, afebrile. CT chest abdomen pelvis revealing a large, fairly well-circumscribed heterogenous lesions in the right hemipelvis extending towards the gluteal folds. No PE. Labs  notable for hgb 11.3, no leukocytosis, bmp only notable for Na 134, BNP and troponin wnl, CRP elevated at 75.2.    Overview/Hospital Course:  MRI obtained with 13.4 cm coccygeal mass concerning for malignancy, 1.7 cm lesion in S1 concerning for metastasis. Extensive DVT in IVC and iliac veins with IVC filter in place, possible hematoma along right iliac vessels, Cauda equina and epidural enhancement in the lower lumbar spine, likely inflammatory from a recent lumbar drain, and soft tissue edema in right anterolateral thigh likely postoperative from recent graft harvest. S/p biopsy with IR, still pending pathology results. Family and patient updated. Pain control with gabapentin and PRN morphine. LFTs and Tbili elvated on 5/26. RUQ US with doppler notable for hepatic steatosis with no noted lesions or thrombi. Case discussed with oncologist NP at [facility] who asked us to obtain a CT head. CT head noted right subdural fluid collection with 8mm midline shift, stable on repeat imaging. NSGY consulted who advised contacting [facility] for further workup. Remains without acute neuro changes. Trying to establish on oral pain regimen in anticipation of d/c for follow up at [facility]. Rad onc consulted, recommending treatment at [facility] for continuity of care.     Interval History: This morning pt has 10/10 pain in his L forefoot. They are a focus of erythema along the base of his big toe. Tender to light touch. Concern for gout. Consulted ortho for arthrocentesis. Rad onc recommending treatment at [facility] for continuity of care. Continuing to optimize pain regimen. Continuing discussion with [facility].     Review of Systems   Genitourinary:  Negative for difficulty urinating.   Musculoskeletal:  Positive for joint swelling (L 1st toe). Negative for arthralgias and myalgias.        Pelvic pain  R thigh pain   Skin:  Positive for wound (well healing surgical wounds on low back and right leg).   Neurological:   Negative for weakness.     Objective:     Vital Signs (Most Recent):  Temp: 98.2 °F (36.8 °C) (06/02/25 1153)  Pulse: 88 (06/02/25 1153)  Resp: 18 (06/02/25 1153)  BP: 107/68 (06/02/25 1153)  SpO2: (!) 92 % (06/02/25 1153) Vital Signs (24h Range):  Temp:  [97.4 °F (36.3 °C)-98.2 °F (36.8 °C)] 98.2 °F (36.8 °C)  Pulse:  [83-96] 88  Resp:  [17-18] 18  SpO2:  [92 %-100 %] 92 %  BP: (101-129)/(67-71) 107/68     Weight: 106.8 kg (235 lb 5.5 oz)  Body mass index is 30.22 kg/m².    Intake/Output Summary (Last 24 hours) at 6/2/2025 1251  Last data filed at 6/2/2025 0945  Gross per 24 hour   Intake 1020 ml   Output 1700 ml   Net -680 ml         Physical Exam  Constitutional:       General: He is not in acute distress.     Appearance: Normal appearance. He is not ill-appearing.   HENT:      Head: Normocephalic and atraumatic.      Mouth/Throat:      Mouth: Mucous membranes are moist.      Pharynx: Oropharynx is clear.   Eyes:      Extraocular Movements: Extraocular movements intact.      Conjunctiva/sclera: Conjunctivae normal.   Cardiovascular:      Rate and Rhythm: Normal rate and regular rhythm.      Heart sounds: Normal heart sounds.   Pulmonary:      Effort: Pulmonary effort is normal. No respiratory distress.      Breath sounds: Normal breath sounds.   Abdominal:      General: There is no distension.      Palpations: Abdomen is soft.      Tenderness: There is no abdominal tenderness.   Genitourinary:     Comments: Pelvic tenderness  Pelvic fullness on palpation  Musculoskeletal:         General: Swelling (BLE R>L, improved. Also base of L 1st toe.) and tenderness (base of L 1st toe) present.      Right lower leg: No edema.      Left lower leg: No edema.   Skin:     General: Skin is warm.      Capillary Refill: Capillary refill takes less than 2 seconds.      Comments: Well healing surgical scars (low back, R lateral thigh)   Neurological:      General: No focal deficit present.      Mental Status: He is alert and  oriented to person, place, and time.      Cranial Nerves: No cranial nerve deficit.      Sensory: No sensory deficit.      Motor: No weakness.   Psychiatric:         Mood and Affect: Mood normal.         Thought Content: Thought content normal.               Significant Labs: All pertinent labs within the past 24 hours have been reviewed.    Significant Imaging: I have reviewed all pertinent imaging results/findings within the past 24 hours.      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 CTA revealing large, fairly well-circumscribed heterogenous lesions in the right hemipelvis extending towards the gluteal folds. Leftward shift of pelvic organs. Recent IVC filter placed for DVTs. Recent lumbar csf drain removed on 5/15.     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  Differential considerations include chordoma, chondrosarcoma, and giant cell tumor.  1.7 cm lesion in the S1 vertebral body with similar signal characteristics, concerning for metastasis.  Extensive DVT involving the IVC and iliac veins, with an IVC filter.  Ill-defined fluid collection extending along the right iliac vessels, new from earlier today, compatible with hematoma.  Cauda equina and epidural enhancement in the lower lumbar spine, likely inflammatory from a recent lumbar drain although infection cannot be excluded.  No organized epidural collection.  Soft tissue edema in the right anterolateral thigh, which may be postoperative from recent graft harvest.  Hemorrhage and infection are difficult to exclude.      - s/p biopsy with IR  - f/u pathology   - antibiotics d/c after cx negative at 48h  - consulted rad onc regarding inpatient care. Recommend treatment at [facility] for continuity of care.   - trying to establish on oral pain regimen to allow d/c for patient to follow up with [facility] specialist   - consulted PT/OT for assistance with mobility and for any mobility aid  recommendations  - bowel reg added  - MM pain control    - MS contin    - PRN hydrmorphone, breakthrough dilaudid   - gabapentin    Subdural fluid collection  CT head obtained at recommendation of [facility] on 5/28 - New subdural collection along the right cerebral convexity with 0.8 cm of leftward midline shift.  Favored to represent remote subdural hemorrhage versus chronic collection.  Operative change pituitary mass resection.  No evidence of recurrence.  Sinusitis.    - will contact [facility] regarding findings  - repeat CT head with stable findings   - will discuss with [facility] team for input on ability to anticoagulate    Elevated LFTs  Hyperbilirubinemia  Elevated LFTs and Tbili noted on 5/26. Mild RUQ tenderness. Concern for shock liver or portal thrombosis given hypercoagulable state.      - holding atorvastatin, resume when able.  - hepatitis and HIV negative  - RUQ US with hepatic steatosis, no lesions or thrombi        History of pituitary surgery  Postoperative CSF leak  Pituitary adenoma  Recent pituitary resection at [facility] on 5/1. Follow up admission 5/8-5/16 for CSF leak. Lumbar drain pulled on 5/15. No indication of recurrence of CSF leak. Only noted mass effect symptoms include asymmetric pupils.     - monitor for CSF rhinorrhea or vision changes  - will [facility] to discuss with current oncologist    BPH with urinary obstruction  Noted, continue to monitor for urinary retention    Mixed hyperlipidemia  Holding statin for elevated LFTs    Obesity (BMI 30-39.9)  Body mass index is 30.22 kg/m². Morbid obesity complicates all aspects of disease management from diagnostic modalities to treatment. Weight loss encouraged and health benefits explained to patient.     Hyponatremia  Hyponatremia is likely due to dehydration. The patient's most recent sodium results are listed below.  Recent Labs     05/31/25  0443 06/01/25  0358 06/02/25  0453   * 131* 131*     Plan  - urine Na low,  serum and urine osm wnl  - Monitor sodium Daily.   - Patient hyponatremia is stable  Anemia  Anemia is likely due to unclear etiology. Most recent hemoglobin and hematocrit are listed below.  Recent Labs     05/31/25  0443 06/01/25  0358 06/02/25  0453   HGB 9.5* 9.3* 8.6*   HCT 29.5* 28.7* 26.4*     Plan  - Monitor serial CBC: Daily  - Transfuse PRBC if patient becomes hemodynamically unstable, symptomatic or H/H drops below 7/21.  - Patient has not received any PRBC transfusions to date  - Patient's anemia is currently being monitored  Acute deep vein thrombosis (DVT) of left lower extremity  Presence of IVC filter  B/L LE US 5/13/25 at [facility] with occlusive thrombus within the posterior tibial vein as well as nearly occlusive thrombus within the peroneal vein. Positive LLE DVT study. IVC filter in place    Plan:  - holding AC and VTE ppx pending additional workup and possible intervention   - DVT US with extensive DVTs, discussed with vascular surgery who would not recommend thrombectomy w/o ability to anticoagulate  - will discuss with [facility] team ability to anticoagulate based on CT head imaging    VTE Risk Mitigation (From admission, onward)           Ordered     Reason for No Pharmacological VTE Prophylaxis  Once        Question:  Reasons:  Answer:  Physician Provided (leave comment)  Comment:  Possible active bleed, pending possible intervention    05/22/25 1140     IP VTE HIGH RISK PATIENT  Once         05/22/25 1140     Place sequential compression device  Until discontinued         05/22/25 1140                    Discharge Planning   PORTER: 6/6/2025     Code Status: Full Code   Medical Readiness for Discharge Date:   Discharge Plan A: Rehab   Discharge Delays: None known at this time            Please place Justification for DME        Smitha Putnam MD  Department of Hospital Medicine   [facility] - [facility] S[facility]

## 2025-06-03 ENCOUNTER — TUMOR BOARD CONFERENCE (OUTPATIENT)
Dept: NEUROSURGERY | Facility: CLINIC | Age: 45
End: 2025-06-03
Payer: COMMERCIAL

## 2025-06-03 DIAGNOSIS — D35.2 PITUITARY ADENOMA: Primary | ICD-10-CM

## 2025-06-03 DIAGNOSIS — C41.4: ICD-10-CM

## 2025-06-03 LAB
ABSOLUTE EOSINOPHIL (OHS): 0.45 K/UL
ABSOLUTE MONOCYTE (OHS): 1.06 K/UL (ref 0.3–1)
ABSOLUTE NEUTROPHIL COUNT (OHS): 8.06 K/UL (ref 1.8–7.7)
ACID FAST MOD KINY STN SPEC: NORMAL
ALBUMIN SERPL BCP-MCNC: 2.8 G/DL (ref 3.5–5.2)
ALP SERPL-CCNC: 146 UNIT/L (ref 40–150)
ALT SERPL W/O P-5'-P-CCNC: 151 UNIT/L (ref 10–44)
ANION GAP (OHS): 11 MMOL/L (ref 8–16)
AST SERPL-CCNC: 109 UNIT/L (ref 11–45)
BASOPHILS # BLD AUTO: 0.05 K/UL
BASOPHILS NFR BLD AUTO: 0.4 %
BILIRUB SERPL-MCNC: 1 MG/DL (ref 0.1–1)
BUN SERPL-MCNC: 14 MG/DL (ref 6–20)
CALCIUM SERPL-MCNC: 9.3 MG/DL (ref 8.7–10.5)
CHLORIDE SERPL-SCNC: 98 MMOL/L (ref 95–110)
CO2 SERPL-SCNC: 25 MMOL/L (ref 23–29)
CREAT SERPL-MCNC: 1 MG/DL (ref 0.5–1.4)
ERYTHROCYTE [DISTWIDTH] IN BLOOD BY AUTOMATED COUNT: 15.3 % (ref 11.5–14.5)
GFR SERPLBLD CREATININE-BSD FMLA CKD-EPI: >60 ML/MIN/1.73/M2
GLUCOSE SERPL-MCNC: 99 MG/DL (ref 70–110)
HCT VFR BLD AUTO: 27.9 % (ref 40–54)
HGB BLD-MCNC: 9.1 GM/DL (ref 14–18)
IMM GRANULOCYTES # BLD AUTO: 0.44 K/UL (ref 0–0.04)
IMM GRANULOCYTES NFR BLD AUTO: 3.7 % (ref 0–0.5)
LYMPHOCYTES # BLD AUTO: 1.72 K/UL (ref 1–4.8)
MAGNESIUM SERPL-MCNC: 2.3 MG/DL (ref 1.6–2.6)
MCH RBC QN AUTO: 26.7 PG (ref 27–31)
MCHC RBC AUTO-ENTMCNC: 32.6 G/DL (ref 32–36)
MCV RBC AUTO: 82 FL (ref 82–98)
NUCLEATED RBC (/100WBC) (OHS): 0 /100 WBC
PHOSPHATE SERPL-MCNC: 4.7 MG/DL (ref 2.7–4.5)
PLATELET # BLD AUTO: 581 K/UL (ref 150–450)
PMV BLD AUTO: 9.2 FL (ref 9.2–12.9)
POTASSIUM SERPL-SCNC: 4.9 MMOL/L (ref 3.5–5.1)
PROT SERPL-MCNC: 8.1 GM/DL (ref 6–8.4)
RBC # BLD AUTO: 3.41 M/UL (ref 4.6–6.2)
RELATIVE EOSINOPHIL (OHS): 3.8 %
RELATIVE LYMPHOCYTE (OHS): 14.6 % (ref 18–48)
RELATIVE MONOCYTE (OHS): 9 % (ref 4–15)
RELATIVE NEUTROPHIL (OHS): 68.5 % (ref 38–73)
SODIUM SERPL-SCNC: 134 MMOL/L (ref 136–145)
WBC # BLD AUTO: 11.78 K/UL (ref 3.9–12.7)

## 2025-06-03 PROCEDURE — 25000003 PHARM REV CODE 250

## 2025-06-03 PROCEDURE — 99233 SBSQ HOSP IP/OBS HIGH 50: CPT | Mod: ,,, | Performed by: PODIATRIST

## 2025-06-03 PROCEDURE — 97116 GAIT TRAINING THERAPY: CPT | Mod: CQ

## 2025-06-03 PROCEDURE — 85025 COMPLETE CBC W/AUTO DIFF WBC: CPT

## 2025-06-03 PROCEDURE — 97530 THERAPEUTIC ACTIVITIES: CPT

## 2025-06-03 PROCEDURE — 80053 COMPREHEN METABOLIC PANEL: CPT

## 2025-06-03 PROCEDURE — 11000001 HC ACUTE MED/SURG PRIVATE ROOM

## 2025-06-03 PROCEDURE — 84100 ASSAY OF PHOSPHORUS: CPT

## 2025-06-03 PROCEDURE — 25000242 PHARM REV CODE 250 ALT 637 W/ HCPCS

## 2025-06-03 PROCEDURE — 36415 COLL VENOUS BLD VENIPUNCTURE: CPT

## 2025-06-03 PROCEDURE — 83735 ASSAY OF MAGNESIUM: CPT

## 2025-06-03 RX ORDER — HYDROMORPHONE HYDROCHLORIDE 2 MG/ML
0.5 INJECTION, SOLUTION INTRAMUSCULAR; INTRAVENOUS; SUBCUTANEOUS EVERY 4 HOURS PRN
Status: DISCONTINUED | OUTPATIENT
Start: 2025-06-03 | End: 2025-06-03

## 2025-06-03 RX ORDER — HYDROMORPHONE HYDROCHLORIDE 1 MG/ML
0.5 INJECTION, SOLUTION INTRAMUSCULAR; INTRAVENOUS; SUBCUTANEOUS EVERY 4 HOURS PRN
Status: DISCONTINUED | OUTPATIENT
Start: 2025-06-03 | End: 2025-06-04 | Stop reason: HOSPADM

## 2025-06-03 RX ORDER — COLCHICINE 0.6 MG/1
0.6 TABLET, FILM COATED ORAL DAILY
Status: DISCONTINUED | OUTPATIENT
Start: 2025-06-03 | End: 2025-06-04 | Stop reason: HOSPADM

## 2025-06-03 RX ADMIN — Medication 600 ML: at 06:06

## 2025-06-03 RX ADMIN — GABAPENTIN 600 MG: 300 CAPSULE ORAL at 02:06

## 2025-06-03 RX ADMIN — MORPHINE SULFATE 30 MG: 30 TABLET, EXTENDED RELEASE ORAL at 09:06

## 2025-06-03 RX ADMIN — GABAPENTIN 600 MG: 300 CAPSULE ORAL at 09:06

## 2025-06-03 RX ADMIN — INDOMETHACIN 25 MG: 25 CAPSULE ORAL at 09:06

## 2025-06-03 RX ADMIN — SENNOSIDES AND DOCUSATE SODIUM 1 TABLET: 50; 8.6 TABLET ORAL at 09:06

## 2025-06-03 RX ADMIN — PSYLLIUM HUSK 1 PACKET: 3.4 POWDER ORAL at 09:06

## 2025-06-03 RX ADMIN — COLCHICINE 0.6 MG: 0.6 TABLET, FILM COATED ORAL at 10:06

## 2025-06-03 NOTE — CONSULTS
"Interventional Radiology  Consult/History & Physical Note    Consult Requested By: Chuck Stauffer Jr., MD  Reason for Consult: S1 lesion biopsy    SUBJECTIVE:     Chief Complaint:  coccygeal chordoma    History of Present Illness:  Gallo Maria Jr. is a 44 y.o. male with a PMHx of  urinary obstruction, pituitary adenoma s/p resection, LLE DVT s/p IVC filter placement who presented to the ED on 5/22/25 for acute onset lower back pain that has radiating down his right leg along with pelvic pain, was found to have a new coccygeal mass and leukocytosis. He is no s/p coccygeal mass biopsy which proved 13cm coccygeal chordoma. Interventional Radiology has been consulted for CT guided S1 lesion biopsy. Pt has had recent imaging including a MRI Lumbar spine on 5/23 which revealed "a 1.7 cm STIR hyperintense enhancing lesion in the S1 vertebral body.". The pt has undergone biopsy in the past. His last biopsy on 5/23 revealed "Epithelioid neoplasm with abundant myxoid stroma, features consistent with chordoma ". Pt is afebrile and hemodynamically stable. WBC is 11.78.  He does not have a history of PRECIOUS requiring nightly CPAP or difficulty breathing when lying flat. He does not take any anticoagulants. He is not currently NPO.    Review of Systems   Constitutional:  Negative for chills and fever.   HENT: Negative.     Eyes: Negative.    Respiratory: Negative.     Cardiovascular: Negative.    Gastrointestinal:  Positive for abdominal pain (Low abdominal/pelvic pain).   Musculoskeletal:  Positive for back pain (Low back/sacral pain) and myalgias (bilateral upper legs).   Neurological: Negative.    Psychiatric/Behavioral: Negative.         Scheduled Meds:   colchicine  0.6 mg Oral Daily    gabapentin  600 mg Oral TID    morphine  30 mg Oral Q12H    psyllium husk  1 packet Oral Daily    senna-docusate  1 tablet Oral BID     Continuous Infusions:  PRN Meds:  Current Facility-Administered Medications:     dextrose 50%, 12.5 " g, Intravenous, PRN    dextrose 50%, 25 g, Intravenous, PRN    glucagon (human recombinant), 1 mg, Intramuscular, PRN    glucose, 16 g, Oral, PRN    glucose, 24 g, Oral, PRN    HYDROmorphone, 0.5 mg, Intravenous, Q4H PRN    HYDROmorphone, 4 mg, Oral, Q4H PRN    melatonin, 6 mg, Oral, Nightly PRN    naloxone, 0.02 mg, Intravenous, PRN    sodium chloride 0.9%, 10 mL, Intravenous, Q12H PRN    Review of patient's allergies indicates:  No Known Allergies    Past Medical History:   Diagnosis Date    ADD (attention deficit disorder)     treated by outside psychiatrist.    Asthma     BPH with urinary obstruction 02/05/2015    Decreased libido 08/31/2015    Depression     Erectile dysfunction 10/27/2015    Family history of prostate cancer 07/31/2013    History of migraine headaches     History of pituitary tumor 09/25/2015    S/p removal 2016      Hypogonadism male 10/27/2015    Insomnia     Pituitary adenoma 08/09/2012     Past Surgical History:   Procedure Laterality Date    BIOPSY OF TONSILS Right 9/9/2019    Procedure: BIOPSY, TONSILS;  Surgeon: Bruno Umaña MD;  Location: [address];  Service: ENT;  Laterality: Right;    COLONOSCOPY N/A 9/3/2024    Procedure: COLONOSCOPY;  Surgeon: Ander Wolfe MD;  Location: [facility];  Service: Endoscopy;  Laterality: N/A;    SURGICAL REMOVAL OF PITUITARY TUMOR BY TRANSSPHENOIDAL APPROACH  2015    Pituitary [facility] via Dr. Blair 2015    SURGICAL REMOVAL OF PITUITARY TUMOR BY TRANSSPHENOIDAL APPROACH N/A 4/19/2023    Procedure: RESECTION, NEOPLASM, PITUITARY, TRANSSPHENOIDAL APPROACH;  Surgeon: Derek Blair MD;  Location: [address];  Service: Neurosurgery;  Laterality: N/A;  TRANSPHENOIDAL RESECTION PITUITARY ADENOMA/ 2 UNITS RBC, TEDS & SCD, FLOURO, TRANSPHENOIDAL SET, MICROSCOPE, [facility] SURGEON.    WISDOM TOOTH EXTRACTION       Family History   Problem Relation Name Age of Onset    No Known Problems Mother      Hyperlipidemia Father      Benign  prostatic hyperplasia Father      Cancer Father      No Known Problems Sister      No Known Problems Brother      No Known Problems Brother      Prostate cancer Paternal Uncle      Diabetes Maternal Grandmother      Diabetes Maternal Grandfather      Dementia Paternal Grandmother      Cancer Paternal Grandfather      Prostate cancer Paternal Grandfather      Coronary artery disease Neg Hx       Social History[1]    OBJECTIVE:     Vital Signs (Most Recent)  Temp: 98.3 °F (36.8 °C) (06/03/25 1253)  Pulse: 84 (06/03/25 1253)  Resp: 18 (06/03/25 0905)  BP: 128/79 (06/03/25 1253)  SpO2: 100 % (06/03/25 1253)    Physical Exam:  Physical Exam  Vitals and nursing note reviewed.   Constitutional:       General: He is not in acute distress.  HENT:      Head: Normocephalic and atraumatic.      Nose: Nose normal.      Mouth/Throat:      Mouth: Mucous membranes are moist.      Pharynx: Oropharynx is clear.   Eyes:      Conjunctiva/sclera: Conjunctivae normal.      Pupils: Pupils are equal, round, and reactive to light.   Cardiovascular:      Rate and Rhythm: Normal rate.      Pulses: Normal pulses.   Pulmonary:      Effort: Pulmonary effort is normal. No respiratory distress.   Abdominal:      Palpations: There is no mass.   Musculoskeletal:      Cervical back: Normal range of motion.      Right lower leg: No edema.      Left lower leg: No edema.   Skin:     General: Skin is warm and dry.   Neurological:      General: No focal deficit present.      Mental Status: He is alert and oriented to person, place, and time.   Psychiatric:         Mood and Affect: Mood normal.         Behavior: Behavior normal.         Laboratory  I have reviewed all pertinent lab results within the past 24 hours.  CBC:   Recent Labs   Lab 06/03/25  0632   WBC 11.78   RBC 3.41*   HGB 9.1*   HCT 27.9*   *   MCV 82   MCH 26.7*   MCHC 32.6     CMP:   Recent Labs   Lab 06/03/25  0632   GLU 99   CALCIUM 9.3   ALBUMIN 2.8*   PROT 8.1   *   K 4.9  "  CO2 25   CL 98   BUN 14   CREATININE 1.0   ALKPHOS 146   *   *   BILITOT 1.0     Coagulation: No results for input(s): "LABPROT", "INR", "APTT" in the last 168 hours.    ASA/Mallampati  ASA: 3  Mallampati: 2    Imaging:  Recent imaging studies including MRI Lumbar spine  on 5/23 which was independently reviewed by Dr. Briceño.     ASSESSMENT/PLAN:     Assessment:  44 y.o. male with a PMHx of urinary obstruction, pituitary adenoma s/p resection, LLE DVT s/p IVC filter placement, s/p coccygeal mass bx who has been referred to IR for CT guided  S1 vertebral body lesion biopsy. The procedure was discussed in great detail with the patient including thorough explanations of the potential risks and benefits. Risks include but are not limited to sepsis, severe infection, hemorrhage, damage to surrounding structures and need for additional procedures. The patient is a candidate for CT guided S1 vertebral body lesion biopsy under moderate sedation. Plan discussed with ordering physician and pt who verbalized understanding of the plan and would like to proceed.    Plan:  Will proceed with CT guided S1 vertebral body lesion biopsy under moderate sedation on date TBD per IR board.   Please keep pt NPO starting at midnight the night before the procedure.   Anticoagulation history reviewed.  If starting prophylactic AC following procedure, ok to start lovenox 12 hours following procedure and ok to start SQ heparin 6-8 hours following procedure per SIR guidelines  Coagulation labs reviewed. Plt count 581 on 6/3.  Thank you for the consult. Please contact with questions via [email or web address] secure chat      Time spent during patient care today was 75-89 minutes. This includes time spent before the visit reviewing the chart, discussing case with staff physician and ordering provider, time spent during the face to face patient visit, and time spent after the visit on documentation. Time excludes procedure time.     Arnaldo Cedillo, " ELIUD  Interventional Radiology        [1]   Social History  Tobacco Use    Smoking status: Never    Smokeless tobacco: Never   Substance Use Topics    Alcohol use: No    Drug use: No

## 2025-06-03 NOTE — PROGRESS NOTES
[facility] Medicine  Progress Note    Patient Name: Gallo Maria Jr.  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 Is a 43yo male with a hx of BPH with urinary obstruction and pituitary adenoma s/p resection presenting for acute onset lower back pain that has radiating down his right leg, pelvic pain, and shortness of breath. The pain was sudden onset when he awoke from sleep this morning located in his lower back radiating down his right leg to mid thigh. The pelvic pain expands into his right groin. His shortness of breath is mainly with activity and has improved with rest. He also notes severe flushing and pre-syncopal symptoms while attempting to get to his car requiring him to take frequent breaks. His pain is still present when re-adjusting in bed but otherwise symptoms have largely improved with rest. He re[prts frequent headaches most recently yesterday unrelieved by tylenol #3. Otherwise he denies any vision changes, nasal leakage, congestion, cough, abdominal pain, N/V/D, constipation, urinary incontinence, or perianal numbness. Ambulation is limited by pain, not weakness.     He was recently admitted to [facility] on 5/1-5/5 for resection of a large pituitary adenoma and again from 5/8-5/16 for CSF leak requiring repair with lumbar drain and fat fascia bello grafts. Drain pulled 5/15. His hospital course also included two DVTs in the LLE. IVC filter placed on 5/15. He was advised to follow up outpatient to discuss utility of eliquis.     In the ED VSS, afebrile. CT chest abdomen pelvis revealing a large, fairly well-circumscribed heterogenous lesions in the right hemipelvis extending towards the gluteal folds. No PE. Labs  notable for hgb 11.3, no leukocytosis, bmp only notable for Na 134, BNP and troponin wnl, CRP elevated at 75.2.    Overview/Hospital Course:  MRI obtained with 13.4 cm coccygeal mass concerning for malignancy, 1.7 cm lesion in S1 concerning for metastasis. Extensive DVT in IVC and iliac veins with IVC filter in place, possible hematoma along right iliac vessels, Cauda equina and epidural enhancement in the lower lumbar spine, likely inflammatory from a recent lumbar drain, and soft tissue edema in right anterolateral thigh likely postoperative from recent graft harvest. S/p biopsy with IR, still pending pathology results. Family and patient updated. Pain control with gabapentin and PRN morphine. LFTs and Tbili elvated on 5/26. RUQ US with doppler notable for hepatic steatosis with no noted lesions or thrombi. Case discussed with oncologist NP at [facility] who asked us to obtain a CT head. CT head noted right subdural fluid collection with 8mm midline shift, stable on repeat imaging. NSGY consulted who advised contacting [facility] for further workup. Remains without acute neuro changes. Trying to establish on oral pain regimen in anticipation of d/c for follow up at [facility]. Rad onc consulted, recommending treatment at [facility] for continuity of care. Continuing conversation with [facility] regarding anticoagulation and transfer.     Interval History: NAEON. VSS, afebrile. L toe pain improved after arthrocentesis with podiatry. Continuing conversation with [facility] regarding anticoagulation and transfer.     Review of Systems   Genitourinary:  Negative for difficulty urinating.   Musculoskeletal:  Positive for joint swelling (L 1st toe). Negative for arthralgias and myalgias.        Pelvic pain  R thigh pain   Skin:  Positive for wound (well healing surgical wounds on low back and right leg).   Neurological:  Negative for weakness.     Objective:     Vital Signs (Most Recent):  Temp: 97.9 °F (36.6 °C)  (06/03/25 0709)  Pulse: 79 (06/03/25 0709)  Resp: 18 (06/03/25 0905)  BP: 111/72 (06/03/25 0709)  SpO2: 99 % (06/03/25 0709) Vital Signs (24h Range):  Temp:  [97.6 °F (36.4 °C)-98.2 °F (36.8 °C)] 97.9 °F (36.6 °C)  Pulse:  [78-92] 79  Resp:  [18] 18  SpO2:  [97 %-100 %] 99 %  BP: (100-115)/(65-78) 111/72     Weight: 106.8 kg (235 lb 5.5 oz)  Body mass index is 30.22 kg/m².    Intake/Output Summary (Last 24 hours) at 6/3/2025 1221  Last data filed at 6/3/2025 0446  Gross per 24 hour   Intake 540 ml   Output 800 ml   Net -260 ml         Physical Exam  Constitutional:       General: He is not in acute distress.     Appearance: Normal appearance. He is not ill-appearing.   HENT:      Head: Normocephalic and atraumatic.      Mouth/Throat:      Mouth: Mucous membranes are moist.      Pharynx: Oropharynx is clear.   Eyes:      Extraocular Movements: Extraocular movements intact.      Conjunctiva/sclera: Conjunctivae normal.   Cardiovascular:      Rate and Rhythm: Normal rate and regular rhythm.      Pulses: Normal pulses.      Heart sounds: Normal heart sounds.   Pulmonary:      Effort: Pulmonary effort is normal. No respiratory distress.      Breath sounds: Normal breath sounds.   Abdominal:      General: There is no distension.      Palpations: Abdomen is soft.      Tenderness: There is no abdominal tenderness.   Genitourinary:     Comments: Pelvic tenderness  Pelvic fullness on palpation  Musculoskeletal:         General: Swelling (BLE R>L) and tenderness (L 1st toe) present.      Right lower leg: No edema.      Left lower leg: No edema.   Skin:     General: Skin is warm.      Capillary Refill: Capillary refill takes less than 2 seconds.      Comments: Well healing surgical scars (low back, R lateral thigh)   Neurological:      General: No focal deficit present.      Mental Status: He is alert and oriented to person, place, and time.      Cranial Nerves: No cranial nerve deficit.      Sensory: No sensory deficit.       Motor: No weakness.   Psychiatric:         Mood and Affect: Mood normal.         Thought Content: Thought content normal.               Significant Labs: All pertinent labs within the past 24 hours have been reviewed.    Significant Imaging: I have reviewed all pertinent imaging results/findings within the past 24 hours.      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 CTA revealing large, fairly well-circumscribed heterogenous lesions in the right hemipelvis extending towards the gluteal folds. Leftward shift of pelvic organs. Recent IVC filter placed for DVTs. Recent lumbar csf drain removed on 5/15.     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  Differential considerations include chordoma, chondrosarcoma, and giant cell tumor.  1.7 cm lesion in the S1 vertebral body with similar signal characteristics, concerning for metastasis.  Extensive DVT involving the IVC and iliac veins, with an IVC filter.  Ill-defined fluid collection extending along the right iliac vessels, new from earlier today, compatible with hematoma.  Cauda equina and epidural enhancement in the lower lumbar spine, likely inflammatory from a recent lumbar drain although infection cannot be excluded.  No organized epidural collection.  Soft tissue edema in the right anterolateral thigh, which may be postoperative from recent graft harvest.  Hemorrhage and infection are difficult to exclude.    s/p biopsy with IR revealing chordoma. antibiotics d/c after cx negative at 48h.     - consulted rad onc regarding inpatient care. Recommend treatment at [facility] for continuity of care.   - consulted PT/OT for assistance with mobility and for any mobility aid recommendations. Recommend high intensity therapy.   - bowel reg added. Senna and psyllium   - MM pain control               - MS contin 30mg BID               - PRN dialudid 4mg Q4H, breakthrough dilaudid 0.5mg Q4H               -  gabapentin 600mg TID     Subdural fluid collection  CT head obtained at recommendation of [facility] on 5/28 - New subdural collection along the right cerebral convexity with 0.8 cm of leftward midline shift.  Favored to represent remote subdural hemorrhage versus chronic collection.  Operative change pituitary mass resection.  No evidence of recurrence.  Sinusitis.    - repeat CT head with stable findings   - discussing with [facility] team for input on ability to anticoagulate    Elevated LFTs  Hyperbilirubinemia  Elevated LFTs and Tbili noted on 5/26. Mild RUQ tenderness. Concern for shock liver or portal thrombosis given hypercoagulable state.      - holding atorvastatin, resume when able.  - hepatitis and HIV negative  - RUQ US with hepatic steatosis, no lesions or thrombi        History of pituitary surgery  Postoperative CSF leak  Pituitary adenoma  Recent pituitary resection at [facility] on 5/1. Follow up admission 5/8-5/16 for CSF leak. Lumbar drain pulled on 5/15. No indication of recurrence of CSF leak. Only noted mass effect symptoms include asymmetric pupils.     - monitor for CSF rhinorrhea or vision changes      BPH with urinary obstruction  Noted, continue to monitor for urinary retention    Mixed hyperlipidemia  Holding statin for elevated LFTs    Obesity (BMI 30-39.9)  Body mass index is 30.22 kg/m². Morbid obesity complicates all aspects of disease management from diagnostic modalities to treatment. Weight loss encouraged and health benefits explained to patient.     Hyponatremia  Hyponatremia is likely due to dehydration. The patient's most recent sodium results are listed below.  Recent Labs     06/01/25  0358 06/02/25  0453 06/03/25  0632   * 131* 134*     Plan  - urine Na low, serum and urine osm wnl  - Monitor sodium Daily.   - Patient hyponatremia is stable  Anemia  Anemia is likely due to unclear etiology. Most recent hemoglobin and hematocrit are listed below.  Recent Labs      06/01/25  0358 06/02/25  0453 06/03/25  0632   HGB 9.3* 8.6* 9.1*   HCT 28.7* 26.4* 27.9*     Plan  - Monitor serial CBC: Daily  - Transfuse PRBC if patient becomes hemodynamically unstable, symptomatic or H/H drops below 7/21.  - Patient has not received any PRBC transfusions to date  - Patient's anemia is currently being monitored  Acute deep vein thrombosis (DVT) of left lower extremity  Presence of IVC filter  B/L LE US 5/13/25 at [facility] with occlusive thrombus within the posterior tibial vein as well as nearly occlusive thrombus within the peroneal vein. Positive LLE DVT study. IVC filter in place    Plan:  - holding AC and VTE ppx pending additional workup and possible intervention   - DVT US with extensive DVTs, discussed with vascular surgery who would not recommend thrombectomy w/o ability to anticoagulate  - will discuss with [facility] team ability to anticoagulate based on CT head imaging    Toe pain, left  10/10 pain in his L forefoot. There is a focus of erythema along the base of his big toe. Tender to light touch. concerning for gout, pseudogout, septic arthritis. Uric acid WNL.     Plan:   - consulted podiatry, appreciate recs   - Left 1st MTP joint aspirated, specimen sent to micro and path.   - Recommend starting patient on colchicine. If symptoms persist, consider Medrol dosepack   - Gram stain negative   VTE Risk Mitigation (From admission, onward)           Ordered     Reason for No Pharmacological VTE Prophylaxis  Once        Question:  Reasons:  Answer:  Physician Provided (leave comment)  Comment:  Possible active bleed, pending possible intervention    05/22/25 1140     IP VTE HIGH RISK PATIENT  Once         05/22/25 1140     Place sequential compression device  Until discontinued         05/22/25 1140                    Discharge Planning   PORTER: 6/4/2025     Code Status: Full Code   Medical Readiness for Discharge Date:   Discharge Plan A: Rehab, Hospital Transfer (Disp plan to be  determined based on [facility] review of CT Scan and MRI. Pt will then determine if he would proceed with inpt rehab or be transferred to [facility] for further care.)   Discharge Delays: None known at this time            Please place Justification for DME        Smitha Putnam MD  Department of Hospital Medicine   [facility]

## 2025-06-03 NOTE — ASSESSMENT & PLAN NOTE
Sudden onset pelvic pain morning of 5/22. CTA revealing large, fairly well-circumscribed heterogenous lesions in the right hemipelvis extending towards the gluteal folds. Leftward shift of pelvic organs. Recent IVC filter placed for DVTs. Recent lumbar csf drain removed on 5/15.     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  Differential considerations include chordoma, chondrosarcoma, and giant cell tumor.  1.7 cm lesion in the S1 vertebral body with similar signal characteristics, concerning for metastasis.  Extensive DVT involving the IVC and iliac veins, with an IVC filter.  Ill-defined fluid collection extending along the right iliac vessels, new from earlier today, compatible with hematoma.  Cauda equina and epidural enhancement in the lower lumbar spine, likely inflammatory from a recent lumbar drain although infection cannot be excluded.  No organized epidural collection.  Soft tissue edema in the right anterolateral thigh, which may be postoperative from recent graft harvest.  Hemorrhage and infection are difficult to exclude.    s/p biopsy with IR revealing chordoma. antibiotics d/c after cx negative at 48h.     - consulted rad onc regarding inpatient care. Recommend treatment at [facility] for continuity of care.   - consulted PT/OT for assistance with mobility and for any mobility aid recommendations. Recommend high intensity therapy.   - bowel reg added. Senna and psyllium   - MM pain control               - MS contin 30mg BID               - PRN dialudid 4mg Q4H, breakthrough dilaudid 0.5mg Q4H               - gabapentin 600mg TID

## 2025-06-03 NOTE — ASSESSMENT & PLAN NOTE
Recent pituitary resection at [facility] on 5/1. Follow up admission 5/8-5/16 for CSF leak. Lumbar drain pulled on 5/15. No indication of recurrence of CSF leak. Only noted mass effect symptoms include asymmetric pupils.     - monitor for CSF rhinorrhea or vision changes

## 2025-06-03 NOTE — ASSESSMENT & PLAN NOTE
Sudden onset pelvic pain morning of 5/22. CTA revealing large, fairly well-circumscribed heterogenous lesions in the right hemipelvis extending towards the gluteal folds. Leftward shift of pelvic organs. Recent IVC filter placed for DVTs. Recent lumbar csf drain removed on 5/15.     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  Differential considerations include chordoma, chondrosarcoma, and giant cell tumor.  1.7 cm lesion in the S1 vertebral body with similar signal characteristics, concerning for metastasis.  Extensive DVT involving the IVC and iliac veins, with an IVC filter.  Ill-defined fluid collection extending along the right iliac vessels, new from earlier today, compatible with hematoma.  Cauda equina and epidural enhancement in the lower lumbar spine, likely inflammatory from a recent lumbar drain although infection cannot be excluded.  No organized epidural collection.  Soft tissue edema in the right anterolateral thigh, which may be postoperative from recent graft harvest.  Hemorrhage and infection are difficult to exclude.    s/p biopsy with IR revealing chordoma. antibiotics d/c after cx negative at 48h.     - consulted rad onc regarding inpatient care. Recommend treatment at [facility] for continuity of care.   - consulted PT/OT for assistance with mobility and for any mobility aid recommendations. Recommend high intensity therapy.   - bowel reg added. Senna and psyllium   - MM pain control               - MS contin 30mg BID               - PRN dialudid 4mg Q4H, breakthrough dilaudid 0.5mg Q4H               - gabapentin 600mg TID

## 2025-06-03 NOTE — ASSESSMENT & PLAN NOTE
Recent pituitary resection at [facility] on 5/1. Follow up admission 5/8-5/16 for CSF leak. Lumbar drain pulled on 5/15. No indication of recurrence of CSF leak. Only noted mass effect symptoms include asymmetric pupils.     - monitor for CSF rhinorrhea or vision changes

## 2025-06-03 NOTE — PT/OT/SLP PROGRESS
Physical Therapy Treatment    Patient Name:  Gallo Maria Jr.   MRN:  [id number]    Recommendations:     Discharge Recommendations: High Intensity Therapy  Discharge Equipment Recommendations: wheelchair, walker, rolling, bedside commode, bath bench  Barriers to discharge: Decreased caregiver support and increased need for skilled assistance    Assessment:     Gallo Maria Jr. is a 44 y.o. 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  He presents with the following impairments/functional limitations: weakness, impaired functional mobility, impaired endurance, gait instability, impaired self care skills, impaired balance, decreased lower extremity function, decreased coordination, decreased safety awareness, pain. Pt was very agreeable to therapy and progressed within the session. He had decreased px during this tx due to the use of Darco shoe on LLE for heel support. Patient is progressing within functional limits.    Rehab Prognosis: Good; patient would benefit from acute skilled PT services to address these deficits and reach maximum level of function.    Recent Surgery: * No surgery found *      Plan:     During this hospitalization, patient to be seen 4 x/week to address the identified rehab impairments via gait training, therapeutic activities, therapeutic exercises, neuromuscular re-education and progress toward the following goals:    Plan of Care Expires:  06/24/25    Subjective     Chief Complaint: minor pain in heel when weightbearing   Patient/Family Comments/goals: to return to PLOF  Pain/Comfort:  Pain Rating 1: other (see comments) (not rated)      Objective:     Communicated with RN prior to session.  Patient found HOB elevated with   upon PT entry to room.     General Precautions: Standard, fall  Orthopedic Precautions: N/A  Braces: N/A  Respiratory Status: Room air     Functional Mobility:  Bed Mobility:     Supine to Sit: stand by assistance  Sit to  Supine: stand by assistance  Transfers:     Sit to Stand:  contact guard assistance with rolling walker  Gait: ~20 ft CGA w/ RW. Pt used Darco shoe on LLE due to px in heel.    Balance: Static Sitting: SBA. Static Standing: CGA w/ RW.       AM-PAC 6 CLICK MOBILITY  Turning over in bed (including adjusting bedclothes, sheets and blankets)?: 4  Sitting down on and standing up from a chair with arms (e.g., wheelchair, bedside commode, etc.): 3  Moving from lying on back to sitting on the side of the bed?: 4  Moving to and from a bed to a chair (including a wheelchair)?: 3  Need to walk in hospital room?: 3  Climbing 3-5 steps with a railing?: 1  Basic Mobility Total Score: 18       Treatment & Education:  Pt educated on the importance of OOB activity to promote strength and mobility.   Pt educated on proper use of Darco shoe.   All questions answered within the PTA scope of practice and to the patient's satisfaction.       Patient left HOB elevated with all lines intact and call button in reach..    GOALS:   Multidisciplinary Problems       Physical Therapy Goals          Problem: Physical Therapy    Goal Priority Disciplines Outcome Interventions   Physical Therapy Goal     PT, PT/OT Progressing    Description: Goals to be met by: 2025     Patient will increase functional independence with mobility by performin. Supine to sit with Stand-by Assistance  2. Sit to supine with Stand-by Assistance  3. Sit to stand transfer with Stand-by Assistance using no AD or LRAD.  4. Bed to chair transfer with Stand-by Assistance using no AD or LRAD.  5. Gait  x 150 feet with Stand-by Assistance using no AD or lRAD.   6. Ascend/Descend 8 inch curb step with Stand-by Assistance using no AD or LRAD.  7. Lower extremity exercise program x10 reps per handout, with supervision                         DME Justifications:  Gallo Maria Jr. has a mobility limitation that significantly impairs his ability to participate in one or  more mobility related activities of daily living (MRADLs) such as toileting, feeding, dressing, grooming, and bathing in customary locations in the home.  The mobility limitation cannot be sufficiently resolved by the use of a cane or walker.   The use of a manual wheelchair will significantly improve the patients ability to participate in MRADLS and the patient will use it on regular basis in the home.  Gallo Maria Jr. has expressed his willingness to use a manual wheelchair in the home. Patients upper body strength is sufficient for propulsion.  He also has a caregiver who is available, willing, and able to provide assistance with the wheelchair when needed.      Time Tracking:     PT Received On: 06/03/25  PT Start Time: 1343     PT Stop Time: 1355  PT Total Time (min): 12 min     Billable Minutes: Gait Training 12    Treatment Type: Treatment  PT/PTA: PTA     Number of PTA visits since last PT visit: 2     06/03/2025

## 2025-06-03 NOTE — ASSESSMENT & PLAN NOTE
Anemia is likely due to unclear etiology. Most recent hemoglobin and hematocrit are listed below.  Recent Labs     06/01/25  0358 06/02/25  0453 06/03/25  0632   HGB 9.3* 8.6* 9.1*   HCT 28.7* 26.4* 27.9*     Plan  - Monitor serial CBC: Daily  - Transfuse PRBC if patient becomes hemodynamically unstable, symptomatic or H/H drops below 7/21.  - Patient has not received any PRBC transfusions to date  - Patient's anemia is currently being monitored

## 2025-06-03 NOTE — ASSESSMENT & PLAN NOTE
Hyponatremia is likely due to dehydration. The patient's most recent sodium results are listed below.  Recent Labs     06/01/25  0358 06/02/25  0453 06/03/25  0632   * 131* 134*     Plan  - urine Na low, serum and urine osm wnl  - Monitor sodium Daily.   - Patient hyponatremia is stable

## 2025-06-03 NOTE — PT/OT/SLP PROGRESS
"Occupational Therapy   Treatment    Name: Gallo Maria Jr.  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 is a 44 y.o. 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 Performance deficits affecting function are weakness, impaired functional mobility, impaired endurance, gait instability, impaired self care skills, impaired balance, decreased lower extremity function, decreased coordination, decreased safety awareness, pain. Pt agreeable to therapy and tolerated well. Pt c/o pain in ball of L foot with WB, heel WB during mobility. OT consulted podiatry for Darco shoe to allow for heel WB safely with mobility.  Pt remains limited in ADLs, functional mobility, and functional transfers. Patient has demonstrated sufficient progression to warrant high intensity therapy evidenced by objectives noted below.    Rehab Prognosis:  Good; patient would benefit from acute skilled OT services to address these deficits and reach maximum level of function.       Plan:     Patient to be seen 4 x/week to address the above listed problems via self-care/home management, therapeutic activities, therapeutic exercises, neuromuscular re-education  Plan of Care Expires: 06/22/25  Plan of Care Reviewed with: patient    Subjective     Chief Complaint: pain in heel  Patient/Family Comments/goals: "people can be very nosey"  Pain/Comfort:  Pain Rating 1: other (see comments) (not rated)  Location - Orientation 1:  (ball of L foot)    Objective:     Communicated with: Nurse prior to session.  Patient found HOB elevated with   upon OT entry to room.    General Precautions: Standard, fall    Orthopedic Precautions:N/A  Braces: " N/A  Respiratory Status: Room air     Occupational Performance:     Bed Mobility:    Patient completed Scooting/Bridging with contact guard assistance  Patient completed Supine to Sit with contact guard assistance  Patient completed Sit to Supine with contact guard assistance     Functional Mobility/Transfers:  Patient completed Sit <> Stand Transfer with contact guard assistance  with  rolling walker  from EOB  Functional Mobility: Pt engaged in functional mobility throughout hospital room ~20 ft with RW and  CGA  to maximize functional endurance and standing balance required for home/community mobility and occupational engagement. Pt with heel WB to L foot only 2/2 increased pain through ball of foot and toes.     Activities of Daily Living:  Upper Body Dressing: contact guard assistance donned hospital gown over back sitting EOB  Lower Body Dressing: maximal assistance pt donned slide on shoes sitting EOB without assistance. Shoes hurt pt's foot, so option for  socks given. OT donned/doffed  socks 2/2 pt unable to reach down       [facility] 6 Click ADL: 19    Treatment & Education:  -Education on energy conservation and task modification to maximize safety and (I) during ADLs and mobility  -Education on importance of OOB activity to improve overall activity tolerance and promote recovery  -Pt educated to call for assistance and to transfer with hospital staff only  -Provided education regarding role of OT, POC, & discharge recommendations with pt verbalizing understanding.  Pt had no further questions & when asked whether there were any concerns pt reported none.      Patient left HOB elevated with all lines intact, call button in reach, and nurse notified    GOALS:   Multidisciplinary Problems       Occupational Therapy Goals          Problem: Occupational Therapy    Goal Priority Disciplines Outcome Interventions   Occupational Therapy Goal     OT, PT/OT Progressing    Description: Goals to be met by:  6/22/25.     Patient will increase functional independence with ADLs by performing:    UE Dressing with Stand-by Assistance.  LE Dressing with Stand-by Assistance.  Grooming while standing at sink with Stand-by Assistance.  Toileting from toilet with Stand-by Assistance for hygiene and clothing management.   Toilet transfer to toilet with Stand-by Assistance.  Supine to sit with Stand-by Assistance.  Sit to stand transfer with Stand-by Assistance.  Functional mobility to simulate household/community distances with Stand-by Assistance and utilizing LRAD in order to maximize functional activity tolerance and standing balance required for engagement in occupations of choice.                         Time Tracking:     OT Date of Treatment: 06/03/25  OT Start Time: 0928  OT Stop Time: 0947  OT Total Time (min): 19 min    Billable Minutes:Therapeutic Activity 19 min    OT/ISIAH: OT     Number of ISIAH visits since last OT visit: 1    6/3/2025

## 2025-06-03 NOTE — CONSULTS
"Consult acknowledged. Please see Podiatry progress note 6/02. HPI below:      "Gallo Maria Jr. Is a 43yo male with a hx of BPH with urinary obstruction and pituitary adenoma s/p resection presenting for acute onset lower back pain that has radiating down his right leg, pelvic pain, and shortness of breath. The pain was sudden onset when he awoke from sleep this morning located in his lower back radiating down his right leg to mid thigh. The pelvic pain expands into his right groin. His shortness of breath is mainly with activity and has improved with rest. He also notes severe flushing and pre-syncopal symptoms while attempting to get to his car requiring him to take frequent breaks. His pain is still present when re-adjusting in bed but otherwise symptoms have largely improved with rest. He re[prts frequent headaches most recently yesterday unrelieved by tylenol #3. Otherwise he denies any vision changes, nasal leakage, congestion, cough, abdominal pain, N/V/D, constipation, urinary incontinence, or perianal numbness. Ambulation is limited by pain, not weakness.      He was recently admitted to [facility] on 5/1-5/5 for resection of a large pituitary adenoma and again from 5/8-5/16 for CSF leak requiring repair with lumbar drain and fat fascia bello grafts. Drain pulled 5/15. His hospital course also included two DVTs in the LLE. IVC filter placed on 5/15. He was advised to follow up outpatient to discuss utility of eliquis."    Podiatry consulted for left foot pain x 1 day. Pain occurred randomly while in bed after waking up. Pain is localized to left 1st MTPJ. Sharp, severe pain when walking or moving  big toe. No pain at rest. Has not had this happen before. VSS, afebrile. WBC elevated. Radiographs demonstrate No acute fracture or dislocation.  No erosive osseous changes.  Joint spaces are well maintained.  Small dorsal calcaneal spur.  Soft tissues are unremarkable     Cyrus Osorio DPM " PGY-2  Podiatric Medicine & Surgery  [facility]  Secure Chat Preferred  Pager: [phone]

## 2025-06-03 NOTE — ASSESSMENT & PLAN NOTE
44 y.o. 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 Podiatry consulted for Left 1st MTPJ pain. Radiographs demonstrate No acute fracture or dislocation; soft tissues unremarkable.     Plan:  - Following path and culture results of joint aspiration.   - Continue colchicine. Trending condition.  If symptoms persist, consider Medrol dosepack   - No surgical intervention at this time  - Weight bearing status: WBAT Darco shoe.   - Podiatry will continue to follow

## 2025-06-03 NOTE — ASSESSMENT & PLAN NOTE
CT head obtained at recommendation of [facility] on 5/28 - New subdural collection along the right cerebral convexity with 0.8 cm of leftward midline shift.  Favored to represent remote subdural hemorrhage versus chronic collection.  Operative change pituitary mass resection.  No evidence of recurrence.  Sinusitis.    - repeat CT head with stable findings   - discussing with [facility] team for input on ability to anticoagulate

## 2025-06-03 NOTE — PROGRESS NOTES
[facility] - [facility]  Podiatry  Progress Note    Patient Name: Gallo Maria Jr.  MRN: [id number]  Admission Date: 5/22/2025  Hospital Length of Stay: 11 days  Attending Physician: Chau Pierce III*  Primary Care Provider: Jerome Mederos MD     Subjective:     Interval History:  Patient seen and evaluated bedside by Podiatry during afternoon rounds.  Endorses ongoing pain to the left great toe, no change in improvement.  Examination otherwise stable.        Scheduled Meds:   colchicine  0.6 mg Oral Daily    gabapentin  600 mg Oral TID    morphine  30 mg Oral Q12H    psyllium husk  1 packet Oral Daily    senna-docusate  1 tablet Oral BID     Continuous Infusions:  PRN Meds:  Current Facility-Administered Medications:     dextrose 50%, 12.5 g, Intravenous, PRN    dextrose 50%, 25 g, Intravenous, PRN    glucagon (human recombinant), 1 mg, Intramuscular, PRN    glucose, 16 g, Oral, PRN    glucose, 24 g, Oral, PRN    HYDROmorphone, 0.5 mg, Intravenous, Q4H PRN    HYDROmorphone, 4 mg, Oral, Q4H PRN    melatonin, 6 mg, Oral, Nightly PRN    naloxone, 0.02 mg, Intravenous, PRN    sodium chloride 0.9%, 10 mL, Intravenous, Q12H PRN    Review of Systems   Constitutional:  Negative for chills and fever.   Gastrointestinal:  Negative for diarrhea and vomiting.   Musculoskeletal:  Positive for joint swelling (Left great toe).   Skin:  Negative for wound.     Objective:     Vital Signs (Most Recent):  Temp: 98.3 °F (36.8 °C) (06/03/25 1253)  Pulse: 84 (06/03/25 1253)  Resp: 18 (06/03/25 0905)  BP: 128/79 (06/03/25 1253)  SpO2: 100 % (06/03/25 1253) Vital Signs (24h Range):  Temp:  [97.6 °F (36.4 °C)-98.3 °F (36.8 °C)] 98.3 °F (36.8 °C)  Pulse:  [78-92] 84  Resp:  [18] 18  SpO2:  [97 %-100 %] 100 %  BP: (100-128)/(65-79) 128/79     Weight: 106.8 kg (235 lb 5.5 oz)  Body mass index is 30.22 kg/m².    Foot Exam    General  General Appearance: appears stated age and healthy   Orientation: alert and oriented to person,  place, and time   Affect: appropriate       Left Foot/Ankle      Inspection and Palpation  Left foot ecchymosis: plantar 1s MTPJ.  Tenderness: great toe metatarsophalangeal joint   Swelling: none   Skin Exam: abnormal color; no drainage, no dry skin, no maceration, no cellulitis and no erythema     Neurovascular  Dorsalis pedis: 1+  Posterior tibial: 1+  Saphenous nerve sensation: normal  Tibial nerve sensation: normal  Superficial peroneal nerve sensation: normal  Deep peroneal nerve sensation: normal  Sural nerve sensation: normal    Comments  Left foot without open wounds or obvious deformity. Ecchymosis like discoloration noted to left plantar 1st MTPJ with tenderness to palpation. ROM limited secondary to pain with extension/flexion. No edema.       Laboratory:  CBC:   Recent Labs   Lab 06/03/25  0632   WBC 11.78   RBC 3.41*   HGB 9.1*   HCT 27.9*   *   MCV 82   MCH 26.7*   MCHC 32.6     CMP:   Recent Labs   Lab 06/03/25  0632   GLU 99   CALCIUM 9.3   ALBUMIN 2.8*   PROT 8.1   *   K 4.9   CO2 25   CL 98   BUN 14   CREATININE 1.0   ALKPHOS 146   *   *   BILITOT 1.0     Microbiology Results (last 7 days)       Procedure Component Value Units Date/Time    Culture, Anaerobic [[id number]] Collected: 06/02/25 1454    Order Status: Completed Specimen: Joint Fluid from Foot, Left Updated: 06/03/25 0738     Anaerobe Culture Culture In Progress    Aerobic culture [[id number]] Collected: 06/02/25 1454    Order Status: Completed Specimen: Joint Fluid from Foot, Left Updated: 06/03/25 0729     CULTURE, AEROBIC No Growth To Date    Gram stain [[id number]] Collected: 06/02/25 1454    Order Status: Completed Specimen: Joint Fluid from Foot, Left Updated: 06/02/25 1936     GRAM STAIN No WBCs      No organisms seen    Afb Culture Stain [[id number]] Collected: 06/02/25 1454    Order Status: Sent Specimen: Joint Fluid from Foot, Left Updated: 06/02/25 1519    AFB Culture & Smear [[id number]] Collected:  06/02/25 1454    Order Status: Resulted Specimen: Joint Fluid from Foot, Left Updated: 06/02/25 1519          Specimen (24h ago, onward)       Start     Ordered    06/02/25 1447  Specimen to Pathology Podiatry  Once        Comments: Left 1st MPJ arthrocentesis, gout vs septic arthritis     References:    Click here for ordering Quick Tip   Question Answer Comment   Service Line: Podiatry    Specimen Source Foot, Left    Specimen Class: Other (Specify)    Procedure Type: Biopsy    Release to patient Immediate        06/02/25 1448                  All pertinent labs reviewed within the last 24 hours.    Diagnostic Results:  I have reviewed all pertinent imaging results/findings within the past 24 hours.    Clinical Findings: left foot          Assessment/Plan:     Orthopedic  Toe pain, left  44 y.o. 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 Podiatry consulted for Left 1st MTPJ pain. Radiographs demonstrate No acute fracture or dislocation; soft tissues unremarkable.     Plan:  - Following path and culture results of joint aspiration.   - Continue colchicine. Trending condition.  If symptoms persist, consider Medrol dosepack   - No surgical intervention at this time  - Weight bearing status: WBAT Darco shoe.   - Podiatry will continue to follow       Cyrus Osorio DPM PGY-2  Podiatric Medicine & Surgery  [facility]  Secure Chat Preferred  Pager: [phone]    [facility] - Med Surg

## 2025-06-03 NOTE — PROGRESS NOTES
Discussed TB recommendation for S1 biopsy. Discussed with neuro IR. Referral placed.     He is set up to meet with [facility] regarding his Chordoma.       Chuck Stauffer MD  Radiation Oncology

## 2025-06-03 NOTE — SUBJECTIVE & OBJECTIVE
Interval History: NAEON. VSS, afebrile. L toe pain improved after arthrocentesis with podiatry. Continuing conversation with [facility] regarding anticoagulation and transfer.     Review of Systems   Genitourinary:  Negative for difficulty urinating.   Musculoskeletal:  Positive for joint swelling (L 1st toe). Negative for arthralgias and myalgias.        Pelvic pain  R thigh pain   Skin:  Positive for wound (well healing surgical wounds on low back and right leg).   Neurological:  Negative for weakness.     Objective:     Vital Signs (Most Recent):  Temp: 97.9 °F (36.6 °C) (06/03/25 0709)  Pulse: 79 (06/03/25 0709)  Resp: 18 (06/03/25 0905)  BP: 111/72 (06/03/25 0709)  SpO2: 99 % (06/03/25 0709) Vital Signs (24h Range):  Temp:  [97.6 °F (36.4 °C)-98.2 °F (36.8 °C)] 97.9 °F (36.6 °C)  Pulse:  [78-92] 79  Resp:  [18] 18  SpO2:  [97 %-100 %] 99 %  BP: (100-115)/(65-78) 111/72     Weight: 106.8 kg (235 lb 5.5 oz)  Body mass index is 30.22 kg/m².    Intake/Output Summary (Last 24 hours) at 6/3/2025 1221  Last data filed at 6/3/2025 0446  Gross per 24 hour   Intake 540 ml   Output 800 ml   Net -260 ml         Physical Exam  Constitutional:       General: He is not in acute distress.     Appearance: Normal appearance. He is not ill-appearing.   HENT:      Head: Normocephalic and atraumatic.      Mouth/Throat:      Mouth: Mucous membranes are moist.      Pharynx: Oropharynx is clear.   Eyes:      Extraocular Movements: Extraocular movements intact.      Conjunctiva/sclera: Conjunctivae normal.   Cardiovascular:      Rate and Rhythm: Normal rate and regular rhythm.      Pulses: Normal pulses.      Heart sounds: Normal heart sounds.   Pulmonary:      Effort: Pulmonary effort is normal. No respiratory distress.      Breath sounds: Normal breath sounds.   Abdominal:      General: There is no distension.      Palpations: Abdomen is soft.      Tenderness: There is no abdominal tenderness.   Genitourinary:     Comments: Pelvic  tenderness  Pelvic fullness on palpation  Musculoskeletal:         General: Swelling (BLE R>L) and tenderness (L 1st toe) present.      Right lower leg: No edema.      Left lower leg: No edema.   Skin:     General: Skin is warm.      Capillary Refill: Capillary refill takes less than 2 seconds.      Comments: Well healing surgical scars (low back, R lateral thigh)   Neurological:      General: No focal deficit present.      Mental Status: He is alert and oriented to person, place, and time.      Cranial Nerves: No cranial nerve deficit.      Sensory: No sensory deficit.      Motor: No weakness.   Psychiatric:         Mood and Affect: Mood normal.         Thought Content: Thought content normal.               Significant Labs: All pertinent labs within the past 24 hours have been reviewed.    Significant Imaging: I have reviewed all pertinent imaging results/findings within the past 24 hours.

## 2025-06-04 VITALS
DIASTOLIC BLOOD PRESSURE: 80 MMHG | OXYGEN SATURATION: 98 % | WEIGHT: 235.31 LBS | BODY MASS INDEX: 30.2 KG/M2 | HEART RATE: 87 BPM | TEMPERATURE: 98 F | RESPIRATION RATE: 18 BRPM | HEIGHT: 74 IN | SYSTOLIC BLOOD PRESSURE: 112 MMHG

## 2025-06-04 DIAGNOSIS — C41.2: Primary | ICD-10-CM

## 2025-06-04 LAB
ABSOLUTE EOSINOPHIL (OHS): 0.52 K/UL
ABSOLUTE MONOCYTE (OHS): 1.16 K/UL (ref 0.3–1)
ABSOLUTE NEUTROPHIL COUNT (OHS): 8.44 K/UL (ref 1.8–7.7)
ALBUMIN SERPL BCP-MCNC: 2.6 G/DL (ref 3.5–5.2)
ALP SERPL-CCNC: 126 UNIT/L (ref 40–150)
ALT SERPL W/O P-5'-P-CCNC: 115 UNIT/L (ref 10–44)
ANION GAP (OHS): 10 MMOL/L (ref 8–16)
AST SERPL-CCNC: 70 UNIT/L (ref 11–45)
BASOPHILS # BLD AUTO: 0.05 K/UL
BASOPHILS NFR BLD AUTO: 0.4 %
BILIRUB SERPL-MCNC: 0.7 MG/DL (ref 0.1–1)
BUN SERPL-MCNC: 14 MG/DL (ref 6–20)
CALCIUM SERPL-MCNC: 9 MG/DL (ref 8.7–10.5)
CHLORIDE SERPL-SCNC: 101 MMOL/L (ref 95–110)
CO2 SERPL-SCNC: 24 MMOL/L (ref 23–29)
CREAT SERPL-MCNC: 1 MG/DL (ref 0.5–1.4)
ERYTHROCYTE [DISTWIDTH] IN BLOOD BY AUTOMATED COUNT: 15.4 % (ref 11.5–14.5)
ESTROGEN SERPL-MCNC: NORMAL PG/ML
GFR SERPLBLD CREATININE-BSD FMLA CKD-EPI: >60 ML/MIN/1.73/M2
GLUCOSE SERPL-MCNC: 91 MG/DL (ref 70–110)
HCT VFR BLD AUTO: 26.3 % (ref 40–54)
HGB BLD-MCNC: 8.3 GM/DL (ref 14–18)
IMM GRANULOCYTES # BLD AUTO: 0.26 K/UL (ref 0–0.04)
IMM GRANULOCYTES NFR BLD AUTO: 2 % (ref 0–0.5)
INSULIN SERPL-ACNC: NORMAL U[IU]/ML
LAB AP GROSS DESCRIPTION: NORMAL
LAB AP PERFORMING LOCATION(S): NORMAL
LAB AP REPORT FOOTNOTES: NORMAL
LYMPHOCYTES # BLD AUTO: 2.51 K/UL (ref 1–4.8)
Lab: NORMAL
MAGNESIUM SERPL-MCNC: 2.3 MG/DL (ref 1.6–2.6)
MCH RBC QN AUTO: 26.3 PG (ref 27–31)
MCHC RBC AUTO-ENTMCNC: 31.6 G/DL (ref 32–36)
MCV RBC AUTO: 84 FL (ref 82–98)
NUCLEATED RBC (/100WBC) (OHS): 0 /100 WBC
PHOSPHATE SERPL-MCNC: 4.6 MG/DL (ref 2.7–4.5)
PLATELET # BLD AUTO: 578 K/UL (ref 150–450)
PMV BLD AUTO: 9.2 FL (ref 9.2–12.9)
POTASSIUM SERPL-SCNC: 4.8 MMOL/L (ref 3.5–5.1)
PROT SERPL-MCNC: 7.7 GM/DL (ref 6–8.4)
RBC # BLD AUTO: 3.15 M/UL (ref 4.6–6.2)
RELATIVE EOSINOPHIL (OHS): 4 %
RELATIVE LYMPHOCYTE (OHS): 19.4 % (ref 18–48)
RELATIVE MONOCYTE (OHS): 9 % (ref 4–15)
RELATIVE NEUTROPHIL (OHS): 65.2 % (ref 38–73)
SODIUM SERPL-SCNC: 135 MMOL/L (ref 136–145)
WBC # BLD AUTO: 12.94 K/UL (ref 3.9–12.7)

## 2025-06-04 PROCEDURE — 85025 COMPLETE CBC W/AUTO DIFF WBC: CPT

## 2025-06-04 PROCEDURE — 97530 THERAPEUTIC ACTIVITIES: CPT

## 2025-06-04 PROCEDURE — 36415 COLL VENOUS BLD VENIPUNCTURE: CPT

## 2025-06-04 PROCEDURE — 84100 ASSAY OF PHOSPHORUS: CPT

## 2025-06-04 PROCEDURE — 25000003 PHARM REV CODE 250

## 2025-06-04 PROCEDURE — 25000242 PHARM REV CODE 250 ALT 637 W/ HCPCS

## 2025-06-04 PROCEDURE — 83735 ASSAY OF MAGNESIUM: CPT

## 2025-06-04 PROCEDURE — 97530 THERAPEUTIC ACTIVITIES: CPT | Mod: CQ

## 2025-06-04 PROCEDURE — 84460 ALANINE AMINO (ALT) (SGPT): CPT

## 2025-06-04 RX ORDER — GABAPENTIN 300 MG/1
600 CAPSULE ORAL 3 TIMES DAILY
Qty: 180 CAPSULE | Refills: 11 | Status: SHIPPED | OUTPATIENT
Start: 2025-06-04 | End: 2026-06-04

## 2025-06-04 RX ORDER — MORPHINE SULFATE 30 MG/1
30 TABLET, FILM COATED, EXTENDED RELEASE ORAL EVERY 12 HOURS
Qty: 14 TABLET | Refills: 0 | Status: SHIPPED | OUTPATIENT
Start: 2025-06-04

## 2025-06-04 RX ORDER — POLYETHYLENE GLYCOL 3350 17 G/17G
17 POWDER, FOR SOLUTION ORAL DAILY
Qty: 510 G | Refills: 0 | Status: SHIPPED | OUTPATIENT
Start: 2025-06-04

## 2025-06-04 RX ORDER — COLCHICINE 0.6 MG/1
0.6 TABLET ORAL DAILY
Qty: 7 TABLET | Refills: 0 | Status: SHIPPED | OUTPATIENT
Start: 2025-06-05 | End: 2026-06-05

## 2025-06-04 RX ORDER — HYDROMORPHONE HYDROCHLORIDE 4 MG/1
4 TABLET ORAL EVERY 6 HOURS PRN
Qty: 28 TABLET | Refills: 0 | Status: SHIPPED | OUTPATIENT
Start: 2025-06-04

## 2025-06-04 RX ORDER — SENNOSIDES 8.6 MG/1
1 TABLET ORAL DAILY
Qty: 30 TABLET | Refills: 0 | Status: SHIPPED | OUTPATIENT
Start: 2025-06-04

## 2025-06-04 RX ORDER — HYDROMORPHONE HYDROCHLORIDE 4 MG/1
4 TABLET ORAL EVERY 4 HOURS PRN
Qty: 42 TABLET | Refills: 0 | Status: SHIPPED | OUTPATIENT
Start: 2025-06-04 | End: 2025-06-04

## 2025-06-04 RX ORDER — METHYLPREDNISOLONE 4 MG/1
TABLET ORAL
Qty: 21 EACH | Refills: 0 | Status: SHIPPED | OUTPATIENT
Start: 2025-06-04 | End: 2025-06-25

## 2025-06-04 RX ORDER — MORPHINE SULFATE 30 MG/1
30 TABLET, FILM COATED, EXTENDED RELEASE ORAL EVERY 12 HOURS
Qty: 14 TABLET | Refills: 0 | Status: SHIPPED | OUTPATIENT
Start: 2025-06-04 | End: 2025-06-04

## 2025-06-04 RX ADMIN — PSYLLIUM HUSK 1 PACKET: 3.4 POWDER ORAL at 08:06

## 2025-06-04 RX ADMIN — MORPHINE SULFATE 30 MG: 30 TABLET, EXTENDED RELEASE ORAL at 08:06

## 2025-06-04 RX ADMIN — APIXABAN 10 MG: 5 TABLET, FILM COATED ORAL at 10:06

## 2025-06-04 RX ADMIN — GABAPENTIN 600 MG: 300 CAPSULE ORAL at 08:06

## 2025-06-04 RX ADMIN — COLCHICINE 0.6 MG: 0.6 TABLET, FILM COATED ORAL at 08:06

## 2025-06-04 NOTE — ASSESSMENT & PLAN NOTE
Anemia is likely due to unclear etiology. Most recent hemoglobin and hematocrit are listed below.  Recent Labs     06/02/25  0453 06/03/25  0632 06/04/25  0437   HGB 8.6* 9.1* 8.3*   HCT 26.4* 27.9* 26.3*     Plan  - Monitor serial CBC: Daily  - Transfuse PRBC if patient becomes hemodynamically unstable, symptomatic or H/H drops below 7/21.  - Patient has not received any PRBC transfusions to date  - Patient's anemia is currently being monitored

## 2025-06-04 NOTE — ASSESSMENT & PLAN NOTE
Hyponatremia is likely due to dehydration. The patient's most recent sodium results are listed below.  Recent Labs     06/02/25  0453 06/03/25  0632 06/04/25  0437   * 134* 135*     Plan  - urine Na low, serum and urine osm wnl  - Monitor sodium Daily.   - Patient hyponatremia is stable

## 2025-06-04 NOTE — PLAN OF CARE
CHW contacted central scheduling to obtain hospital follow up appointment with patient PCP. A message has been sent to provider. Patient will be contacted for follow up pending availability.

## 2025-06-04 NOTE — ASSESSMENT & PLAN NOTE
CT head obtained at recommendation of [facility] on 5/28 - New subdural collection along the right cerebral convexity with 0.8 cm of leftward midline shift.  Favored to represent remote subdural hemorrhage versus chronic collection.  Operative change pituitary mass resection.  No evidence of recurrence.  Sinusitis.    - repeat CT head with stable findings, likely hygroma after lumbar drain removal   - discussing with [facility] team for input on ability to anticoagulate

## 2025-06-04 NOTE — PLAN OF CARE
[facility] - [facility]  Discharge Final Note    Primary Care Provider: Jerome Mederos MD    Expected Discharge Date: 6/4/2025    SW met with patient and wife (Shanel) to discuss discharge plan. Not interested in placement at this time. Rolling walker delivered to bedside.   Pt's wife reported pt is able to ambulate and should do ok with rolling walker.  Pt's wife will provide transportation home.  No other case management needs identified at this time.  Nursing staff notified pt cleared with case management.      Pt's wife forwarded CD with CT scans and MRI to [facility] and received confirmation of delivery.      Discharge Plan A and Plan B have been determined by review of patient's clinical status, future medical and therapeutic needs, and coverage/benefits for post-acute care in coordination with multidisciplinary team members.    Future Appointments   Date Time Provider Department Center   6/13/2025 10:00 AM Jerome Mederos MD [facility]         Final Discharge Note (most recent)       Final Note - 06/04/25 1445          Final Note    Assessment Type Final Discharge Note     Anticipated Discharge Disposition Home or Self Care     Hospital Resources/Appts/Education Provided Provided patient/caregiver with written discharge plan information        Post-Acute Status    Post-Acute Authorization HME;Placement     Post-Acute Placement Status Patient declined/refused     HME Status Set-up Complete/Auth obtained   Rolling Walker    Discharge Delays None known at this time                     Important Message from Medicare             Contact Info       Jerome Mederos MD   Specialty: Family Medicine   Relationship: PCP - General    [address]  [place] [address]   Phone: [phone]       Next Steps: Follow up          Kaci Smiley LMSW  Part-Time-  [facility]  Ext. [phone]

## 2025-06-04 NOTE — ASSESSMENT & PLAN NOTE
Recent pituitary resection at [facility] on 5/1. Follow up admission 5/8-5/16 for CSF leak. Lumbar drain pulled on 5/15. No indication of recurrence of CSF leak. Only noted mass effect symptoms include asymmetric pupils.     - monitor for CSF rhinorrhea or vision changes

## 2025-06-04 NOTE — ASSESSMENT & PLAN NOTE
Recent pituitary resection at [facility] on 5/1. Follow up admission 5/8-5/16 for CSF leak. Lumbar drain pulled on 5/15. No indication of recurrence of CSF leak. Only noted mass effect symptoms include asymmetric pupils.     - monitor for CSF rhinorrhea or vision changes

## 2025-06-04 NOTE — ASSESSMENT & PLAN NOTE
Sudden onset pelvic pain morning of 5/22. CTA revealing large, fairly well-circumscribed heterogenous lesions in the right hemipelvis extending towards the gluteal folds. Leftward shift of pelvic organs. Recent IVC filter placed for DVTs. Recent lumbar csf drain removed on 5/15.     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  Differential considerations include chordoma, chondrosarcoma, and giant cell tumor.  1.7 cm lesion in the S1 vertebral body with similar signal characteristics, concerning for metastasis.  Extensive DVT involving the IVC and iliac veins, with an IVC filter.  Ill-defined fluid collection extending along the right iliac vessels, new from earlier today, compatible with hematoma.  Cauda equina and epidural enhancement in the lower lumbar spine, likely inflammatory from a recent lumbar drain although infection cannot be excluded.  No organized epidural collection.  Soft tissue edema in the right anterolateral thigh, which may be postoperative from recent graft harvest.  Hemorrhage and infection are difficult to exclude.    s/p biopsy with IR revealing chordoma. antibiotics d/c after cx negative at 48h.     - consulted rad onc regarding inpatient care. Recommend treatment at [facility] for continuity of care.   - consulted PT/OT for assistance with mobility and for any mobility aid recommendations. Recommend high intensity therapy.   - bowel reg added. Senna and psyllium   - MM pain control               - MS contin 30mg BID               - PRN dialudid 4mg Q4H, breakthrough dilaudid 0.5mg Q4H               - gabapentin 600mg TID

## 2025-06-04 NOTE — PROGRESS NOTES
Neuro IR was consulted for biopsy S1, but I was notified that [facility] and patient would like to undergo all further workup at [facility]. Bx at [facility] cancelled. He has follow up set up at [facility].

## 2025-06-04 NOTE — ASSESSMENT & PLAN NOTE
Sudden onset pelvic pain morning of 5/22. CTA revealing large, fairly well-circumscribed heterogenous lesions in the right hemipelvis extending towards the gluteal folds. Leftward shift of pelvic organs. Recent IVC filter placed for DVTs. Recent lumbar csf drain removed on 5/15.     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  Differential considerations include chordoma, chondrosarcoma, and giant cell tumor.  1.7 cm lesion in the S1 vertebral body with similar signal characteristics, concerning for metastasis.  Extensive DVT involving the IVC and iliac veins, with an IVC filter.  Ill-defined fluid collection extending along the right iliac vessels, new from earlier today, compatible with hematoma.  Cauda equina and epidural enhancement in the lower lumbar spine, likely inflammatory from a recent lumbar drain although infection cannot be excluded.  No organized epidural collection.  Soft tissue edema in the right anterolateral thigh, which may be postoperative from recent graft harvest.  Hemorrhage and infection are difficult to exclude.    s/p biopsy with IR revealing chordoma. antibiotics d/c after cx negative at 48h.     - consulted rad onc regarding inpatient care. Recommend treatment at [facility] for continuity of care.   - consulted PT/OT for assistance with mobility and for any mobility aid recommendations. Recommend high intensity therapy.   - bowel reg added. Senna and psyllium   - MM pain control               - MS contin 30mg BID               - PRN dialudid 4mg Q4H, breakthrough dilaudid 0.5mg Q4H               - gabapentin 600mg TID

## 2025-06-04 NOTE — PT/OT/SLP PROGRESS
Physical Therapy Treatment    Patient Name:  Gallo Maria Jr.   MRN:  [id number]    Recommendations:     Discharge Recommendations: High Intensity Therapy  Discharge Equipment Recommendations: wheelchair, walker, rolling, bedside commode, bath bench  Barriers to discharge: Decreased caregiver support and increased need for skilled assistance     Assessment:     Gallo Maria Jr. is a 44 y.o. 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  He presents with the following impairments/functional limitations: weakness, decreased safety awareness, impaired endurance, impaired functional mobility, decreased lower extremity function, gait instability. Pt declined functional mobility today due to having worked with OT approx. 30 mins before PT entry per pt reports. Pt was agreeable and receptive of education on his discharge home and of therapeutic exercises to perform. Recommend high intensity therapy following discharge once medically stable in order to reduce fall risk, reduce caregiver burden, improve quality of life, and maximize functional independence. Pt would continue to benefit from skilled acute PT in order to address current deficits and progress functional mobility.     Rehab Prognosis: Good; patient would benefit from acute skilled PT services to address these deficits and reach maximum level of function.    Recent Surgery: * No surgery found *      Plan:     During this hospitalization, patient to be seen 4 x/week to address the identified rehab impairments via gait training, therapeutic activities, therapeutic exercises, neuromuscular re-education and progress toward the following goals:    Plan of Care Expires:  06/24/25    Subjective     Chief Complaint: none stated   Patient/Family Comments/goals: none stated  Pain/Comfort:  Pain Rating 1: 0/10      Objective:     Communicated with RN prior to session.  Patient found up in chair with   upon PT entry to room.      General Precautions: Standard, fall  Orthopedic Precautions: N/A  Braces: N/A  Respiratory Status: Room air     Functional Mobility:  Declined functional mobility       AM-PAC 6 CLICK MOBILITY  Turning over in bed (including adjusting bedclothes, sheets and blankets)?: 4  Sitting down on and standing up from a chair with arms (e.g., wheelchair, bedside commode, etc.): 3  Moving from lying on back to sitting on the side of the bed?: 4  Moving to and from a bed to a chair (including a wheelchair)?: 3  Climbing 3-5 steps with a railing?: 1       Treatment & Education:  Educated pt on PT role/POC  Educated pt on importance of OOB activity and daily ambulation  Time provided for active listening, education, counseling and discussion of health disposition in regards to patient's current status   Questions/concerns addressed within PTA scope of practice. Answered to pt satisfaction, no further questions  White board updated accordingly   RN aware of patient's mobility needs and status  TherEx education     Patient left up in chair with all lines intact, call button in reach, RN notified, and spouse present..    GOALS:   Multidisciplinary Problems       Physical Therapy Goals          Problem: Physical Therapy    Goal Priority Disciplines Outcome Interventions   Physical Therapy Goal     PT, PT/OT Progressing    Description: Goals to be met by: 2025     Patient will increase functional independence with mobility by performin. Supine to sit with Stand-by Assistance  2. Sit to supine with Stand-by Assistance  3. Sit to stand transfer with Stand-by Assistance using no AD or LRAD.  4. Bed to chair transfer with Stand-by Assistance using no AD or LRAD.  5. Gait  x 150 feet with Stand-by Assistance using no AD or lRAD.   6. Ascend/Descend 8 inch curb step with Stand-by Assistance using no AD or LRAD.  7. Lower extremity exercise program x10 reps per handout, with supervision                         DME  Justifications:  Gallo Maria Jr. has a mobility limitation that significantly impairs his ability to participate in one or more mobility related activities of daily living (MRADLs) such as toileting, feeding, dressing, grooming, and bathing in customary locations in the home.  The mobility limitation cannot be sufficiently resolved by the use of a cane or walker.   The use of a manual wheelchair will significantly improve the patients ability to participate in MRADLS and the patient will use it on regular basis in the home.  Gallo Maria Jr. has expressed his willingness to use a manual wheelchair in the home. Patients upper body strength is sufficient for propulsion.  He also has a caregiver who is available, willing, and able to provide assistance with the wheelchair when needed.      Time Tracking:     PT Received On: 06/04/25  PT Start Time: 1341     PT Stop Time: 1351  PT Total Time (min): 10 min     Billable Minutes: Therapeutic Activity 10    Treatment Type: Treatment  PT/PTA: PTA     Number of PTA visits since last PT visit: 3     06/04/2025

## 2025-06-04 NOTE — PROGRESS NOTES
AVS virtually reviewed with patient and his wife in its entirety with emphasis on diet, medications, follow-up appointments and reasons to return to the ED. Patient also encouraged to utilize their patient portal. Ease and convenience of use reiterated. Education complete and patient voiced understanding. All questions answered. Discharge teaching complete.

## 2025-06-04 NOTE — ASSESSMENT & PLAN NOTE
B/L LE US 5/13/25 at [facility] with occlusive thrombus within the posterior tibial vein as well as nearly occlusive thrombus within the peroneal vein. Positive LLE DVT study. IVC filter in place    Plan:  - holding AC and VTE ppx pending additional workup and possible intervention   - DVT US with extensive DVTs, discussed with vascular surgery who would not recommend thrombectomy w/o ability to anticoagulate  - will discuss with [facility] team ability to anticoagulate based on CT head imaging  - starting on eliquis after risks vs benefits discussion with patient and family

## 2025-06-04 NOTE — NURSING
IV discontinued with catheter tip intact. Medications delivered to bedside. Patient transported off unit via wheelchair.

## 2025-06-04 NOTE — DISCHARGE SUMMARY
[facility] Medicine  Discharge Summary      Patient Name: Gallo Maria Jr.  MRN: [id number]  NITO: [id number]  Patient Class: IP- Inpatient  Admission Date: 5/22/2025  Hospital Length of Stay: 12 days  Discharge Date and Time: 06/04/2025 11:03 AM  Attending Physician: Chau Pierce III*   Discharging Provider: Yodit Hopper MD  Primary Care Provider: Jerome Mederos MD  [facility] Medicine Team: [facility] Yodit Hopper MD  Primary Care Team: [facility]    HPI:   Gallo Maria Jr. Is a 45yo male with a hx of BPH with urinary obstruction and pituitary adenoma s/p resection presenting for acute onset lower back pain that has radiating down his right leg, pelvic pain, and shortness of breath. The pain was sudden onset when he awoke from sleep this morning located in his lower back radiating down his right leg to mid thigh. The pelvic pain expands into his right groin. His shortness of breath is mainly with activity and has improved with rest. He also notes severe flushing and pre-syncopal symptoms while attempting to get to his car requiring him to take frequent breaks. His pain is still present when re-adjusting in bed but otherwise symptoms have largely improved with rest. He re[prts frequent headaches most recently yesterday unrelieved by tylenol #3. Otherwise he denies any vision changes, nasal leakage, congestion, cough, abdominal pain, N/V/D, constipation, urinary incontinence, or perianal numbness. Ambulation is limited by pain, not weakness.     He was recently admitted to [facility] on 5/1-5/5 for resection of a large pituitary adenoma and again from 5/8-5/16 for CSF leak requiring repair with lumbar drain and fat fascia bello grafts. Drain pulled 5/15. His hospital course also included two DVTs in the LLE. IVC filter placed on 5/15. He was advised to follow up outpatient to discuss utility of eliquis.     In the ED VSS, afebrile. CT chest abdomen pelvis  revealing a large, fairly well-circumscribed heterogenous lesions in the right hemipelvis extending towards the gluteal folds. No PE. Labs notable for hgb 11.3, no leukocytosis, bmp only notable for Na 134, BNP and troponin wnl, CRP elevated at 75.2.    * No surgery found *      Hospital Course:   Patient admitted to hospital medicine with severe pelvic pressure and radicular pain to BLE. MRI obtained with showed a 13.4 cm coccygeal mass concerning for malignancy, and a 1.7 cm lesion in S1 concerning for metastasis. 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 Discussed with surg-onc and rad-onc who recommended patient continue care with established team at [facility]. MRI also showed extensive DVT in BLE including iliac veins and IVC filter in place with possible hematoma along right iliac vessels. CT head obtained after discussion with patient's established [facility] neurosurgery team as patient reported headaches prior to admission which showed R subdural fluid collection w/ 8mm midline shift, and was stable on repeat CT head. Scan most concerning for hygroma given recent lumbar drain. He had no acute neuro changes. NSGY contacted who recommended we discuss with patient's established neurosurgeon at [facility]. After scans sent to [facility] and discussion with [facility] neurosurgery team, discussed risks vs benefits of anticoagulation with patient and wife who want to proceed with Eliquis. Started on loading dose Eliquis to treat extensive DVTs. Patient should have repeat CT head after discharge to ensure stability. During admission LFTs and T bili elevated, w/u unremarkable except for steatosis on liver US w/ doppler. He also developed gout during admission, s/p arthrocentesis with podiatry and started on colchicine which patient should take until improvement in symptoms. Gram stain negative for organisms. Pain control titrated to patient  acceptable level prior to discharge with long acting oral agents and PRN hydromorphone. Patient has follow up scheduled with [facility] team.        Gallo's mobility limitation cannot be sufficiently resolved by the use of a cane. His functional mobility deficit can be sufficiently resolved with the use of a Rolling Walker. Patient's mobility limitation significantly impairs their ability to participate in one of more activities of daily living.  The use of a RW will significantly improve the patient's ability to participate in MRADLS and the patient will use it on regular basis in the home.       Goals of Care Treatment Preferences:  Code Status: Full Code      SDOH Screening:  The patient was screened for utility difficulties, food insecurity, transport difficulties, housing insecurity, and interpersonal safety and there were no concerns identified this admission.     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 CTA revealing large, fairly well-circumscribed heterogenous lesions in the right hemipelvis extending towards the gluteal folds. Leftward shift of pelvic organs. Recent IVC filter placed for DVTs. Recent lumbar csf drain removed on 5/15.     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  Differential considerations include chordoma, chondrosarcoma, and giant cell tumor.  1.7 cm lesion in  the S1 vertebral body with similar signal characteristics, concerning for metastasis.  Extensive DVT involving the IVC and iliac veins, with an IVC filter.  Ill-defined fluid collection extending along the right iliac vessels, new from earlier today, compatible with hematoma.  Cauda equina and epidural enhancement in the lower lumbar spine, likely inflammatory from a recent lumbar drain although infection cannot be excluded.  No organized epidural collection.  Soft tissue edema in the right anterolateral thigh, which may be postoperative from recent graft harvest.  Hemorrhage and infection are difficult to exclude.    s/p biopsy with IR revealing chordoma. antibiotics d/c after cx negative at 48h.     - consulted rad onc regarding inpatient care. Recommend treatment at [facility] for continuity of care.   - consulted PT/OT for assistance with mobility and for any mobility aid recommendations. Recommend high intensity therapy.   - bowel reg added. Senna and psyllium   - MM pain control               - MS contin 30mg BID               - PRN dialudid 4mg Q4H, breakthrough dilaudid 0.5mg Q4H               - gabapentin 600mg TID     Subdural fluid collection  CT head obtained at recommendation of [facility] on 5/28 - New subdural collection along the right cerebral convexity with 0.8 cm of leftward midline shift.  Favored to represent remote subdural hemorrhage versus chronic collection.  Operative change pituitary mass resection.  No evidence of recurrence.  Sinusitis.    - repeat CT head with stable findings, likely hygroma after lumbar drain removal   - discussing with [facility] team for input on ability to anticoagulate    Elevated LFTs  Hyperbilirubinemia  Elevated LFTs and Tbili noted on 5/26. Mild RUQ tenderness. Concern for shock liver or portal thrombosis given hypercoagulable state.      - holding atorvastatin, resume when able.  - hepatitis and HIV negative  - RUQ US with hepatic steatosis, no lesions or  thrombi        History of pituitary surgery  Postoperative CSF leak  Pituitary adenoma  Recent pituitary resection at [facility] on 5/1. Follow up admission 5/8-5/16 for CSF leak. Lumbar drain pulled on 5/15. No indication of recurrence of CSF leak. Only noted mass effect symptoms include asymmetric pupils.     - monitor for CSF rhinorrhea or vision changes      BPH with urinary obstruction  Noted, continue to monitor for urinary retention    Mixed hyperlipidemia  Holding statin for elevated LFTs    Obesity (BMI 30-39.9)  Body mass index is 30.22 kg/m². Morbid obesity complicates all aspects of disease management from diagnostic modalities to treatment. Weight loss encouraged and health benefits explained to patient.     Hyponatremia  Hyponatremia is likely due to dehydration. The patient's most recent sodium results are listed below.  Recent Labs     06/02/25 0453 06/03/25  0632 06/04/25  0437   * 134* 135*     Plan  - urine Na low, serum and urine osm wnl  - Monitor sodium Daily.   - Patient hyponatremia is stable  Anemia  Anemia is likely due to unclear etiology. Most recent hemoglobin and hematocrit are listed below.  Recent Labs     06/02/25 0453 06/03/25 0632 06/04/25  0437   HGB 8.6* 9.1* 8.3*   HCT 26.4* 27.9* 26.3*     Plan  - Monitor serial CBC: Daily  - Transfuse PRBC if patient becomes hemodynamically unstable, symptomatic or H/H drops below 7/21.  - Patient has not received any PRBC transfusions to date  - Patient's anemia is currently being monitored  Acute deep vein thrombosis (DVT) of left lower extremity  Presence of IVC filter  B/L LE US 5/13/25 at [facility] with occlusive thrombus within the posterior tibial vein as well as nearly occlusive thrombus within the peroneal vein. Positive LLE DVT study. IVC filter in place    Plan:  - holding AC and VTE ppx pending additional workup and possible intervention   - DVT US with extensive DVTs, discussed with vascular surgery who would not  "recommend thrombectomy w/o ability to anticoagulate  - will discuss with [facility] team ability to anticoagulate based on CT head imaging  - starting on eliquis after risks vs benefits discussion with patient and family    Toe pain, left  10/10 pain in his L forefoot. There is a focus of erythema along the base of his big toe. Tender to light touch. concerning for gout, pseudogout, septic arthritis. Uric acid WNL.     Plan:   - consulted podiatry, appreciate recs   - Left 1st MTP joint aspirated, specimen sent to micro and path.   - Recommend starting patient on colchicine. 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 [C41.2] 05/31/2025 Yes    Toe pain, left [M79.675] 06/02/2025 Unknown    Subdural fluid collection [G93.89] 05/29/2025 No    Elevated LFTs [R79.89] 05/26/2025 No    Hyperbilirubinemia [E80.6] 05/26/2025 No    Pelvic mass [R19.00] 05/23/2025 Yes    Hyponatremia [E87.1] 05/22/2025 Yes    Anemia [D64.9] 05/22/2025 Yes    Pelvic fluid collection [R18.8] 05/22/2025 Yes    Acute deep vein thrombosis (DVT) of left lower extremity [I82.402] 05/22/2025 Yes    Presence of IVC filter [Z95.828] 05/22/2025 Not Applicable    Postoperative CSF leak [G97.82, G96.00] 05/22/2025 No    History of pituitary surgery [Z98.890] 10/29/2019 Not Applicable    Obesity (BMI 30-39.9) [E66.9] 06/19/2019 Yes    Mixed hyperlipidemia [E78.2] 05/28/2019 Yes    BPH with urinary obstruction [N40.1, N13.8] 02/05/2015 Yes    Pituitary adenoma [D35.2] 08/09/2012 Yes      Problems Resolved During this Admission:       Discharged Condition: stable    Disposition: home    Follow Up: PCP, oncology with [facility]    Patient Instructions:      WALKER FOR HOME USE     Order Specific Question Answer Comments   Type of Walker: Adult (5'4"-6'6")    With wheels? Yes    Height: 6' 2" (1.88 m)    Weight: 106.8 " kg (235 lb 5.5 oz)    Length of need (1-99 months): 99    Does patient have medical equipment at home? none    Please check all that apply: Patient's condition impairs ambulation.    Please check all that apply: Patient is unable to safely ambulate without equipment.        Significant Diagnostic Studies: N/A    Pending Diagnostic Studies:       None           Medications:  Reconciled Home Medications:      Medication List        START taking these medications      apixaban 5 mg Tab  Commonly known as: ELIQUIS  Take 2 tablets (10 mg total) by mouth 2 (two) times daily for 7 days, THEN 1 tablet (5 mg total) 2 (two) times daily for 23 days.  Start taking on: June 4, 2025     colchicine 0.6 mg tablet  Commonly known as: COLCRYS  Take 1 tablet (0.6 mg total) by mouth once daily. Stop taking once gout pain resolved.  Start taking on: June 5, 2025     gabapentin 300 MG capsule  Commonly known as: NEURONTIN  Take 2 capsules (600 mg total) by mouth 3 (three) times daily.     HYDROmorphone 4 MG tablet  Commonly known as: DILAUDID  Take 1 tablet (4 mg total) by mouth every 6 (six) hours as needed for Pain.     methylPREDNISolone 4 mg tablet  Commonly known as: MEDROL DOSEPACK  Start taking if you develop shooting pain down back of legs. Follow package directions.     morphine 30 MG 12 hr tablet  Commonly known as: MS CONTIN  Take 1 tablet (30 mg total) by mouth every 12 (twelve) hours.     polyethylene glycol 17 gram/dose powder  Commonly known as: MIRALAX  Use cap to measure 17 grams, mix with liquid, and take by mouth once daily.     senna 8.6 mg tablet  Commonly known as: SENNA  Take 1 tablet by mouth once daily.            STOP taking these medications      acetaZOLAMIDE 250 MG tablet  Commonly known as: DIAMOX     PSYLLIUM HUSK ORAL              Indwelling Lines/Drains at time of discharge:   Lines/Drains/Airways       None                   Time spent on the discharge of patient: 55 minutes         Yodit Hopper  MD  Department of Hospital Medicine  [facility] Surg

## 2025-06-04 NOTE — PT/OT/SLP PROGRESS
"Occupational Therapy   Treatment    Name: Gallo Maria Jr.  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 is a 44 y.o. 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  He presents with the following performance deficits affecting function: weakness, impaired endurance, impaired self care skills, impaired functional mobility, gait instability, impaired balance, decreased lower extremity function, pain.     Pt agreeable to session, motivated to participate, and tolerated well. Pt demonstrating ability to perform ADL tasks with minimal to no assistance this date. Pt would continue to benefit from skilled OT services to improve activity tolerance and functional endurance, increase participation in self-care routines, improve functional strength needed for safety with functional transfers and mobility, and facilitate a return to PLOF and least restrictive home environment.     Rehab Prognosis:  Good; patient would benefit from acute skilled OT services to address these deficits and reach maximum level of function.       Plan:     Patient to be seen 4 x/week to address the above listed problems via self-care/home management, therapeutic activities, therapeutic exercises, neuromuscular re-education  Plan of Care Expires: 06/22/25  Plan of Care Reviewed with: patient, significant other    Subjective     Chief Complaint: none reported  Patient/Family Comments/goals: "I've been feeling much better lately. The shoe has helped a lot."  Pain/Comfort:  Pain Rating 1: other (see comments) (not rated)  Location - Side 1: Left  Location - Orientation 1: generalized  Location 1: foot  Pain Addressed 1: Distraction    Objective: "     Communicated with: Nursing prior to session.  Patient found ambulatory in room/[place] with Other (comments) (no active lines) upon OT entry to room.    General Precautions: Standard, fall    Orthopedic Precautions:N/A  Braces: N/A  Respiratory Status: Room air     Occupational Performance:     Bed Mobility:    Not observed secondary to pt up ambulatory in bathroom upon entry and in chair upon exit      Functional Mobility/Transfers:  Patient completed Sit <> Stand Transfer with independence  with  no assistive device   Functional Mobility: Pt able to tolerate ambulating throughout room and bathroom to simulate household/community distances with no AD and supervision provided for safety. No LOB or SOB noted.     Activities of Daily Living:  Pt found ambulating in bathroom upon OT entry to room. Pt presenting to session fully dressed in personal clothing and darco shoe on LLE. Pt stating he received min assist for lower body dressing this AM; however, pt able to perform all additional self-care tasks without physical assistance. Pt with no additional ADL needs at time of session.      [facility] 6 Click ADL: 23    Treatment & Education:  Provided education on the role of OT, POC, and therapy goals while in the acute care setting.   Provided education on the importance of continued mobilization and participation in OOB activities to increase functional endurance and activity tolerance for increased participation in ADL routines.   All questions/concerns within the scope of OT answered/addressed - pt verbalized understanding.   Instructed pt to call for assistance when wanting to ambulate or participate in OOB activities to promote safety and prevent falls.     Patient left up in chair with call button in reach and significant other present    GOALS:   Multidisciplinary Problems       Occupational Therapy Goals          Problem: Occupational Therapy    Goal Priority Disciplines Outcome Interventions   Occupational Therapy  Goal     OT, PT/OT Progressing    Description: Goals to be met by: 6/22/25.     Patient will increase functional independence with ADLs by performing:    UE Dressing with Stand-by Assistance.  LE Dressing with Stand-by Assistance.  Grooming while standing at sink with Stand-by Assistance.  Toileting from toilet with Stand-by Assistance for hygiene and clothing management.   Toilet transfer to toilet with Stand-by Assistance.  Supine to sit with Stand-by Assistance.  Sit to stand transfer with Stand-by Assistance.  Functional mobility to simulate household/community distances with Stand-by Assistance and utilizing LRAD in order to maximize functional activity tolerance and standing balance required for engagement in occupations of choice.                         DME Justifications:   Gallo's mobility limitation cannot be sufficiently resolved by the use of a cane. His functional mobility deficit can be sufficiently resolved with the use of a Rolling Walker. Patient's mobility limitation significantly impairs their ability to participate in one of more activities of daily living.  The use of a RW will significantly improve the patient's ability to participate in MRADLS and the patient will use it on regular basis in the home.    Time Tracking:     OT Date of Treatment: 06/04/25  OT Start Time: 1314  OT Stop Time: 1322  OT Total Time (min): 8 min    Billable Minutes:Therapeutic Activity 8 minutes    OT/ISIAH: OT     Number of ISIAH visits since last OT visit: 1 6/4/2025

## 2025-06-05 LAB — BACTERIA SPEC AEROBE CULT: NO GROWTH

## 2025-06-09 LAB
BACTERIA SPEC ANAEROBE CULT: NORMAL
DHEA SERPL-MCNC: NORMAL
ESTRIOL SERPL-MCNC: NORMAL NG/ML
ESTROGEN SERPL-MCNC: NORMAL PG/ML
INSULIN SERPL-ACNC: NORMAL U[IU]/ML
LAB AP CLINICAL INFORMATION: NORMAL
LAB AP GROSS DESCRIPTION: NORMAL
LAB AP PERFORMING LOCATION(S): NORMAL
LAB AP REPORT FOOTNOTES: NORMAL
MYCOBACTERIUM SPEC QL CULT: NORMAL
T3RU NFR SERPL: NORMAL %

## 2025-07-03 ENCOUNTER — PATIENT MESSAGE (OUTPATIENT)
Dept: HEMATOLOGY/ONCOLOGY | Facility: CLINIC | Age: 45
End: 2025-07-03
Payer: COMMERCIAL

## 2025-07-03 ENCOUNTER — PATIENT MESSAGE (OUTPATIENT)
Dept: FAMILY MEDICINE | Facility: CLINIC | Age: 45
End: 2025-07-03
Payer: COMMERCIAL

## 2025-07-07 DIAGNOSIS — Z12.11 SCREEN FOR COLON CANCER: Primary | ICD-10-CM

## 2025-07-08 ENCOUNTER — CLINICAL SUPPORT (OUTPATIENT)
Dept: ENDOSCOPY | Facility: HOSPITAL | Age: 45
End: 2025-07-08
Attending: FAMILY MEDICINE
Payer: COMMERCIAL

## 2025-07-08 ENCOUNTER — TELEPHONE (OUTPATIENT)
Dept: ENDOSCOPY | Facility: HOSPITAL | Age: 45
End: 2025-07-08

## 2025-07-08 VITALS — BODY MASS INDEX: 30.16 KG/M2 | WEIGHT: 235 LBS | HEIGHT: 74 IN

## 2025-07-08 DIAGNOSIS — Z12.11 ENCOUNTER FOR SCREENING COLONOSCOPY: Primary | ICD-10-CM

## 2025-07-08 DIAGNOSIS — Z12.11 SCREEN FOR COLON CANCER: ICD-10-CM

## 2025-07-08 RX ORDER — SODIUM, POTASSIUM,MAG SULFATES 17.5-3.13G
1 SOLUTION, RECONSTITUTED, ORAL ORAL DAILY
Qty: 1 KIT | Refills: 0 | Status: SHIPPED | OUTPATIENT
Start: 2025-07-08 | End: 2025-07-12

## 2025-07-08 NOTE — PATIENT INSTRUCTIONS
.    Colonoscopy Procedure Prep Instructions      Date of procedure: 7/17/25 Arrive at: 1:00PM    Location of Department:   [facility] [address] [place] [address]  Take the Elevators to 2nd Floor Endoscopy Procedural Area    As soon as possible:   your prep from pharmacy and over the counter DULCOLAX LAXATIVE TABLETS     On the day before your procedure   What You CAN do:   You may have clear liquids ONLY -see below for list.     Liquids That Are OK to Drink:   Water  Sports drinks (Gatorade, Power-Aid)  Coffee or tea (no cream or nondairy creamer)  Clear juices without pulp (apple, white grape)  Gelatin desserts (no fruit or toppings)  Clear soda (sprite, coke, ginger ale)  Chicken broth (until 12 midnight the night before procedure)      What You CANNOT do:   Do not EAT solid food, drink milk or anything   colored red.  Do not drink alcohol.  Do not take oral medications within 1 hour of starting   each dose of SUPREP.  No gum chewing or candy morning of procedure.      Note:   (Please disregard the insert instructions from pharmacy).  SUPREP Bowel Prep Kit is indicated for cleansing of the colon as a preparation for colonoscopy in adults.   Be sure to tell your doctor about all the medicines you take, including prescription and non-prescription medicines, vitamins, and herbal supplements. SUPREP Bowel Prep Kit may affect how other medicines work.  Medication taken by mouth may not be absorbed properly when taken within 1 hour before the start of each dose of SUPREP Bowel Prep Kit.    It is not uncommon to experience some abdominal cramping, nausea and/or vomiting when taking the prep. If you have nausea and/or vomiting while taking the prep, stop drinking for 20 to 30 minutes then continue.    How to take prep:    SUPREP Bowel Prep Kit is a (2-day) prep.   Both 6-ounce bottles are required for a complete preparation for colonoscopy. Dilute the solution concentrate as  directed prior to use. You must drink water with each dose of SUPREP, and additional water after each dose.    DOSE 1--Day Before Colonoscopy 7/16/25    Drink at least 6 to 8 glasses of clear liquids from time you wake up until you begin your prep and then continue until bedtime to avoid dehydration.     12:00 pm (NOON) Take four (4) Dulcolax (Bisacodyl) tablets with at least 8 ounces or more of clear liquids.      6:00 pm:    You must complete Steps 1 through 4 using one (1) 6-ounce bottle before going to bed as shown below:    Step 1-Pour ONE (1) 6-ounce bottle of SUPREP liquid into the mixing container.  Step 2-Add cool drinking water to the 16-ounce line on the container and mix.  Step 3-Drink ALL the liquid in the container.  Step 4-You must drink two (2) more 16-ounce containers of water over the next 1 hour.  IMPORTANT: If you experience preparation-related symptoms (for example, nausea, bloating, or cramping), stop, or slow the rate of drinking the additional water until your symptoms decrease.    DOSE 2--Day of the Colonoscopy 7/17/25 at 2-3 AM.    For this dose, repeat Steps 1 through 4 shown above using the other 6-ounce bottle.   You may continue drinking water/clear liquids until   4 hours before your colonoscopy or as directed by the scheduling nurse  10:00AM.    For information about your procedure, two (2) things to view prior to colonoscopy:  Please watch this informational video. It is important to watch this animated consent video prior to your arrival. If you haven't watched the video prior to arriving, you are required to watch it during admission which can causes delays.    Options for viewing:   Using a keyboard:  press and hold the control tab (Ctrl) and left mouse click to follow links.           Colonoscopy Instructional Video                                                                                   OR    Type link address into your web browser's address  bar:  [email or web address]      Educational Booklet with pictures:      Colonoscopy Prep - Liquid      Comments:   .     IMPORTANT INFORMATION TO KNOW BEFORE YOUR PROCEDURE    [facility] 2nd Floor     If your procedure requires the administration of anesthesia, it is necessary for a responsible adult to drive you home. The designated adult is strongly encouraged to remain in the endoscopy area until the patient is discharged. (Medical Transportation, Uber, Lyft, Taxi, etc. may ONLY be used if a responsible adult is present to accompany you home. The responsible adult CANNOT be the  of the service.)     person must be available to return to pick you up within 15 minutes of being notified of discharge.     Please bring a picture ID, insurance card, and copayment.     Take Medications as directed below:        1) Stop taking Lovenox (enoxaparin) 1 day (prior to the procedure) on:  7/16/25         If you begin taking any blood thinning medications, injectable weight loss/diabetes medications (other than insulin) , Adipex (Phentermine) , please contact the endoscopy scheduling department listed below as soon as possible.    If you are diabetic see the attached instruction sheet regarding your medication.     If you take HEART, BLOOD PRESSURE, SEIZURE, PAIN, LUNG (including inhalers/nebulizers), ANTI-REJECTION (transplant patients), or PSYCHIATRIC medications, please take at your regular times with a sip of water or as directed by the scheduling nurse.     Important contact information:    Endoscopy [phone] Hours of operation Monday-Friday 8:00-4:30pm.    Questions about insurance or financial obligations call [phone] or [phone].    If you have questions regarding the prep or need to reschedule, please call [phone]. After hours questions requiring immediate assistance, contact [facility] nurse line at [phone] or  [phone].   NOTE:     On occasion, unforeseen circumstances may cause a delay in your procedure start time. We respect your time and appreciate your patience during these circumstances.      Comments:

## 2025-07-09 ENCOUNTER — TELEPHONE (OUTPATIENT)
Dept: ENDOSCOPY | Facility: HOSPITAL | Age: 45
End: 2025-07-09
Payer: COMMERCIAL

## 2025-07-09 NOTE — PATIENT INSTRUCTIONS
Colonoscopy Procedure Prep Instructions        Date of procedure: 7/17/25 Arrive at: 1:45PM     Location of Department:   [facility] [address] [place] [address]  Take the Elevators to 2nd Floor Endoscopy Procedural Area     As soon as possible:   your prep from pharmacy and over the counter DULCOLAX LAXATIVE TABLETS      On the day before your procedure   What You CAN do:   You may have clear liquids ONLY -see below for list.      Liquids That Are OK to Drink:   Water  Sports drinks (Gatorade, Power-Aid)  Coffee or tea (no cream or nondairy creamer)  Clear juices without pulp (apple, white grape)  Gelatin desserts (no fruit or toppings)  Clear soda (sprite, coke, ginger ale)  Chicken broth (until 12 midnight the night before procedure)        What You CANNOT do:   Do not EAT solid food, drink milk or anything   colored red.  Do not drink alcohol.  Do not take oral medications within 1 hour of starting   each dose of SUPREP.  No gum chewing or candy morning of procedure.        Note:   (Please disregard the insert instructions from pharmacy).  SUPREP Bowel Prep Kit is indicated for cleansing of the colon as a preparation for colonoscopy in adults.   Be sure to tell your doctor about all the medicines you take, including prescription and non-prescription medicines, vitamins, and herbal supplements. SUPREP Bowel Prep Kit may affect how other medicines work.  Medication taken by mouth may not be absorbed properly when taken within 1 hour before the start of each dose of SUPREP Bowel Prep Kit.     It is not uncommon to experience some abdominal cramping, nausea and/or vomiting when taking the prep. If you have nausea and/or vomiting while taking the prep, stop drinking for 20 to 30 minutes then continue.     How to take prep:     SUPREP Bowel Prep Kit is a (2-day) prep.   Both 6-ounce bottles are required for a complete preparation for colonoscopy. Dilute the solution  concentrate as directed prior to use. You must drink water with each dose of SUPREP, and additional water after each dose.     DOSE 1--Day Before Colonoscopy 7/16/25     Drink at least 6 to 8 glasses of clear liquids from time you wake up until you begin your prep and then continue until bedtime to avoid dehydration.      12:00 pm (NOON) Take four (4) Dulcolax (Bisacodyl) tablets with at least 8 ounces or more of clear liquids.       6:00 pm:     You must complete Steps 1 through 4 using one (1) 6-ounce bottle before going to bed as shown below:     Step 1-Pour ONE (1) 6-ounce bottle of SUPREP liquid into the mixing container.  Step 2-Add cool drinking water to the 16-ounce line on the container and mix.  Step 3-Drink ALL the liquid in the container.  Step 4-You must drink two (2) more 16-ounce containers of water over the next 1 hour.  IMPORTANT: If you experience preparation-related symptoms (for example, nausea, bloating, or cramping), stop, or slow the rate of drinking the additional water until your symptoms decrease.     DOSE 2--Day of the Colonoscopy 7/17/25 at 2-3 AM.     For this dose, repeat Steps 1 through 4 shown above using the other 6-ounce bottle.   You may continue drinking water/clear liquids until   4 hours before your colonoscopy or as directed by the scheduling nurse  10:45AM.     For information about your procedure, two (2) things to view prior to colonoscopy:  Please watch this informational video. It is important to watch this animated consent video prior to your arrival. If you haven't watched the video prior to arriving, you are required to watch it during admission which can causes delays.     Options for viewing:   Using a keyboard:  press and hold the control tab (Ctrl) and left mouse click to follow links.            Colonoscopy Instructional Video                                                                                   OR     Type link address into your web browser's address  bar:  [email or web address]        Educational Booklet with pictures:        Colonoscopy Prep - Liquid        Comments:   .               IMPORTANT INFORMATION TO KNOW BEFORE YOUR PROCEDURE     [facility] 2nd Floor      If your procedure requires the administration of anesthesia, it is necessary for a responsible adult to drive you home. The designated adult is strongly encouraged to remain in the endoscopy area until the patient is discharged. (Medical Transportation, Uber, Lyft, Taxi, etc. may ONLY be used if a responsible adult is present to accompany you home. The responsible adult CANNOT be the  of the service.)      person must be available to return to pick you up within 15 minutes of being notified of discharge.      Please bring a picture ID, insurance card, and copayment.      Take Medications as directed below:           1) Stop taking Lovenox (enoxaparin) 24hrs (last dose administered should be 50% of total daily dose)on:  7/16/25                      If you begin taking any blood thinning medications, injectable weight loss/diabetes medications (other than insulin) , Adipex (Phentermine) , please contact the endoscopy scheduling department listed below as soon as possible.     If you are diabetic see the attached instruction sheet regarding your medication.      If you take HEART, BLOOD PRESSURE, SEIZURE, PAIN, LUNG (including inhalers/nebulizers), ANTI-REJECTION (transplant patients), or PSYCHIATRIC medications, please take at your regular times with a sip of water or as directed by the scheduling nurse.      Important contact information:     Endoscopy [phone] Hours of operation Monday-Friday 8:00-4:30pm.     Questions about insurance or financial obligations call [phone] or [phone].     If you have questions regarding the prep or need to reschedule, please call [phone]. After hours questions requiring  immediate assistance, contact [facility] nurse line at [phone] or [phone].   NOTE:      On occasion, unforeseen circumstances may cause a delay in your procedure start time. We respect your time and appreciate your patience during these circumstances.            Comments:

## 2025-07-10 ENCOUNTER — TELEPHONE (OUTPATIENT)
Dept: ENDOSCOPY | Facility: HOSPITAL | Age: 45
End: 2025-07-10
Payer: COMMERCIAL

## 2025-07-10 NOTE — TELEPHONE ENCOUNTER
Department of Endoscopy      Dear Dr. Finch,    Your patient, Gallo Maria Jr. 1980 is being scheduled for Colonoscopy and our records indicate they are taking Lovenox (enoxaparin).    Please indicate if and when the patient can safely stop their medication prior to the procedure by completing one of the following:    Do not discontinue medication: _____    Medication can be discontinued _____ days prior to the procedure.    Please take into consideration that therapeutic maneuvers may be performed during the procedure that requires delay in restarting anticoagulation therapy.      Signature: ______________________________________________ Date:__________________    Please sign and date this letter and return fax to : [phone] If you have any questions or concerns, please call [phone] Monday-Friday, 8:00 am-3:00 pm.     Thank you for your prompt response,    [facility]       Medication                                                                Hold Time                                                                                                                                                        ANTIPLATELETS   Persantine (dipyridamole) 2 days   Aggrenox (ASA/dipyridamole) 2 days     Pletal (cilostazol) 2 days     Plavix (clopidogrel) 5 days     Effient (prasugrel) 5 days     Ticlid (ticlidopine) 10 days     Brilinta (ticagrelor) 3 days     Zontivity (vorapaxar) 5 days   ANTICOAGULANTS   Coumadin (warfarin) 5 days   Lovenox (enoxaparin)  -last dose administered to be 50% of total daily dose-  If taking twice/day hold PM dose day before and AM dose morning of procedure 24 hours   Fragmin (dalteparin)   -last dose administered to be 50% of total daily dose 24 hours   Arixtra (fondaparinux) 2 days     Xarelto (rivaroxaban)   2 days     Eliquis (apixaban)   2 days     Savaysa (edoxaban)   2 days     Pradaxa (dabigatran)   2 days     Iprivask (desirudin)    10 hrs

## 2025-07-10 NOTE — TELEPHONE ENCOUNTER
----- Message from Med Assistant Avinash sent at 2025  4:13 PM CDT -----  Regardin/17 BT  The patient is currently under an external cardiologist, Joseph Rose care. Is patient okay to hold blood thinner Lovenox (enoxaparin) for their upcoming scheduled Colonoscopy on 25.           External provider information:    Physician name: Dr. Joseph Finch  Facility/Location: [facility]  [phone]

## 2025-07-11 ENCOUNTER — TELEPHONE (OUTPATIENT)
Dept: ENDOSCOPY | Facility: HOSPITAL | Age: 45
End: 2025-07-11
Payer: COMMERCIAL

## 2025-07-11 ENCOUNTER — HOSPITAL ENCOUNTER (OUTPATIENT)
Dept: RADIOLOGY | Facility: HOSPITAL | Age: 45
Discharge: HOME | End: 2025-07-11
Attending: NURSE PRACTITIONER
Payer: COMMERCIAL

## 2025-07-11 DIAGNOSIS — D35.2 PITUITARY ADENOMA: ICD-10-CM

## 2025-07-11 DIAGNOSIS — Z09 SURGICAL FOLLOW-UP CARE: ICD-10-CM

## 2025-07-11 PROCEDURE — 70450 CT HEAD/BRAIN W/O DYE: CPT | Mod: TC

## 2025-07-11 PROCEDURE — 70450 CT HEAD/BRAIN W/O DYE: CPT | Mod: 26,,, | Performed by: RADIOLOGY

## 2025-07-11 NOTE — TELEPHONE ENCOUNTER
Contacted patient regarding Lovenox for colonoscopy.  Informed him that we received approval for him to hold the medication.  Explained the holding information.  Stated understanding.  Sent updated instructions to portal.

## 2025-07-11 NOTE — TELEPHONE ENCOUNTER
Patient has been approved to hold Lovenox (enoxaparin) for 1 day per Dr. Finch-approval scanned into chart.

## 2025-07-17 ENCOUNTER — HOSPITAL ENCOUNTER (OUTPATIENT)
Facility: HOSPITAL | Age: 45
Discharge: HOME | End: 2025-07-17
Attending: INTERNAL MEDICINE | Admitting: INTERNAL MEDICINE
Payer: COMMERCIAL

## 2025-07-17 ENCOUNTER — ANESTHESIA EVENT (OUTPATIENT)
Dept: ENDOSCOPY | Facility: HOSPITAL | Age: 45
End: 2025-07-17
Payer: COMMERCIAL

## 2025-07-17 ENCOUNTER — ANESTHESIA (OUTPATIENT)
Dept: ENDOSCOPY | Facility: HOSPITAL | Age: 45
End: 2025-07-17
Payer: COMMERCIAL

## 2025-07-17 VITALS
BODY MASS INDEX: 29 KG/M2 | DIASTOLIC BLOOD PRESSURE: 74 MMHG | TEMPERATURE: 98 F | HEART RATE: 79 BPM | OXYGEN SATURATION: 93 % | RESPIRATION RATE: 18 BRPM | HEIGHT: 74 IN | WEIGHT: 226 LBS | SYSTOLIC BLOOD PRESSURE: 118 MMHG

## 2025-07-17 DIAGNOSIS — Z12.11 COLON CANCER SCREENING: Primary | ICD-10-CM

## 2025-07-17 DIAGNOSIS — Z12.11 SCREEN FOR COLON CANCER: ICD-10-CM

## 2025-07-17 PROCEDURE — 37000008 HC ANESTHESIA 1ST 15 MINUTES: Performed by: INTERNAL MEDICINE

## 2025-07-17 PROCEDURE — 37000009 HC ANESTHESIA EA ADD 15 MINS: Performed by: INTERNAL MEDICINE

## 2025-07-17 PROCEDURE — 25000003 PHARM REV CODE 250: Performed by: INTERNAL MEDICINE

## 2025-07-17 PROCEDURE — G0121 COLON CA SCRN NOT HI RSK IND: HCPCS | Performed by: INTERNAL MEDICINE

## 2025-07-17 PROCEDURE — 94761 N-INVAS EAR/PLS OXIMETRY MLT: CPT

## 2025-07-17 PROCEDURE — G0121 COLON CA SCRN NOT HI RSK IND: HCPCS | Mod: ,,, | Performed by: INTERNAL MEDICINE

## 2025-07-17 PROCEDURE — 99900035 HC TECH TIME PER 15 MIN (STAT)

## 2025-07-17 PROCEDURE — 63600175 PHARM REV CODE 636 W HCPCS: Performed by: NURSE ANESTHETIST, CERTIFIED REGISTERED

## 2025-07-17 RX ORDER — LIDOCAINE HYDROCHLORIDE 20 MG/ML
INJECTION, SOLUTION EPIDURAL; INFILTRATION; INTRACAUDAL; PERINEURAL
Status: DISCONTINUED | OUTPATIENT
Start: 2025-07-17 | End: 2025-07-17

## 2025-07-17 RX ORDER — SODIUM CHLORIDE 9 MG/ML
INJECTION, SOLUTION INTRAVENOUS CONTINUOUS
Status: DISCONTINUED | OUTPATIENT
Start: 2025-07-17 | End: 2025-07-17 | Stop reason: HOSPADM

## 2025-07-17 RX ORDER — PROPOFOL 10 MG/ML
VIAL (ML) INTRAVENOUS
Status: DISCONTINUED | OUTPATIENT
Start: 2025-07-17 | End: 2025-07-17

## 2025-07-17 RX ORDER — PROPOFOL 10 MG/ML
INJECTION, EMULSION INTRAVENOUS CONTINUOUS PRN
Status: DISCONTINUED | OUTPATIENT
Start: 2025-07-17 | End: 2025-07-17

## 2025-07-17 RX ADMIN — SODIUM CHLORIDE: 0.9 INJECTION, SOLUTION INTRAVENOUS at 01:07

## 2025-07-17 RX ADMIN — PROPOFOL 125 MCG/KG/MIN: 10 INJECTION, EMULSION INTRAVENOUS at 03:07

## 2025-07-17 RX ADMIN — LIDOCAINE HYDROCHLORIDE 100 MG: 20 INJECTION, SOLUTION EPIDURAL; INFILTRATION; INTRACAUDAL; PERINEURAL at 03:07

## 2025-07-17 RX ADMIN — PROPOFOL 80 MG: 10 INJECTION, EMULSION INTRAVENOUS at 03:07

## 2025-07-17 NOTE — ANESTHESIA PREPROCEDURE EVALUATION
07/17/2025  Gallo Maria Jr. is a 44 y.o., male.  Past Medical History:   Diagnosis Date    ADD (attention deficit disorder)     treated by outside psychiatrist.    Asthma     BPH with urinary obstruction 02/05/2015    Decreased libido 08/31/2015    Depression     Erectile dysfunction 10/27/2015    Family history of prostate cancer 07/31/2013    History of migraine headaches     History of pituitary tumor 09/25/2015    S/p removal 2016      Hypogonadism male 10/27/2015    Insomnia     Pituitary adenoma 08/09/2012     Past Surgical History:   Procedure Laterality Date    BIOPSY OF TONSILS Right 9/9/2019    Procedure: BIOPSY, TONSILS;  Surgeon: Bruno Umaña MD;  Location: [facility] OR [address] [place] [address];  Service: ENT;  Laterality: Right;    COLONOSCOPY N/A 9/3/2024    Procedure: COLONOSCOPY;  Surgeon: Ander Wolfe MD;  Location: [facility];  Service: Endoscopy;  Laterality: N/A;    SURGICAL REMOVAL OF PITUITARY TUMOR BY TRANSSPHENOIDAL APPROACH  2015    Pituitary Adeonma via Dr. Blair 2015    SURGICAL REMOVAL OF PITUITARY TUMOR BY TRANSSPHENOIDAL APPROACH N/A 4/19/2023    Procedure: RESECTION, NEOPLASM, PITUITARY, TRANSSPHENOIDAL APPROACH;  Surgeon: Derek Blair MD;  Location: [facility] OR [address] [place] [address];  Service: Neurosurgery;  Laterality: N/A;  TRANSPHENOIDAL RESECTION PITUITARY ADENOMA/ 2 UNITS RBC, TEDS & SCD, FLOURO, TRANSPHENOIDAL SET, MICROSCOPE, [facility] SURGEON.    WISDOM TOOTH EXTRACTION             Pre-op Assessment    I have reviewed the Patient Summary Reports.     I have reviewed the Nursing Notes. I have reviewed the NPO Status.   I have reviewed the Medications.     Review of Systems  Anesthesia Hx:  No problems with previous Anesthesia                Hematology/Oncology:  Hematology Normal   Oncology Normal                                   EENT/Dental:  EENT/Dental Normal            Cardiovascular:  Cardiovascular Normal                                              Pulmonary:    Asthma       Asthma:               Hepatic/GI:      Liver Disease,            Liver Disease        Musculoskeletal:  Musculoskeletal Normal                Neurological:  Neurology Normal                                      Endocrine:  Endocrine Normal            Dermatological:  Skin Normal    Psych:  Psychiatric History                     Anesthesia Plan  Type of Anesthesia, risks & benefits discussed:    Anesthesia Type: Gen Natural Airway  Intra-op Monitoring Plan: Standard ASA Monitors  Induction:  IV  Informed Consent: Informed consent signed with the Patient and all parties understand the risks and agree with anesthesia plan.  All questions answered.   ASA Score: 3  Day of Surgery Review of History & Physical: H&P Update referred to the surgeon/provider.    Ready For Surgery From Anesthesia Perspective.     .

## 2025-07-17 NOTE — H&P
Short Stay Endoscopy History and Physical    PCP - Jerome Mederos MD    Procedure - Colonoscopy  Sedation: GA  ASA - per anesthesia  Mallampati - per anesthesia  History of Anesthesia problems - no  Family history Anesthesia problems -  no     HPI:  This is a 44 y.o. male here for evaluation of : Screening for CRC    Reflux - no  Dysphagia - no  Abdominal pain - no  Diarrhea - no    ROS:  Constitutional: No fevers, chills, No weight loss  ENT: No allergies  CV: No chest pain  Pulm: No cough, No shortness of breath  Ophtho: No vision changes  GI: see HPI  Medical History:  has a past medical history of ADD (attention deficit disorder), Asthma, BPH with urinary obstruction (02/05/2015), Decreased libido (08/31/2015), Depression, Erectile dysfunction (10/27/2015), Family history of prostate cancer (07/31/2013), History of migraine headaches, History of pituitary tumor (09/25/2015), Hypogonadism male (10/27/2015), Insomnia, and Pituitary adenoma (08/09/2012).    Surgical History:  has a past surgical history that includes Simpson tooth extraction; Surgical removal of pituitary tumor by transsphenoidal approach (2015); Biopsy of tonsils (Right, 9/9/2019); Surgical removal of pituitary tumor by transsphenoidal approach (N/A, 4/19/2023); and Colonoscopy (N/A, 9/3/2024).    Family History: family history includes Benign prostatic hyperplasia in his father; Cancer in his father and paternal grandfather; Dementia in his paternal grandmother; Diabetes in his maternal grandfather and maternal grandmother; Hyperlipidemia in his father; No Known Problems in his brother, brother, mother, and sister; Prostate cancer in his paternal grandfather and paternal uncle.. Otherwise no colon cancer, inflammatory bowel disease, or GI malignancies.    Social History:  reports that he has never smoked. He has never used smokeless tobacco. He reports that he does not drink alcohol and does not use drugs.    Review of patient's  allergies indicates:  No Known Allergies    Medications:   Prescriptions Prior to Admission[1]    Objective Findings:    Vital Signs: Per nursing notes.    Physical Exam:  General Appearance: Well appearing in no acute distress  Head:   Normocephalic, without obvious abnormality  Eyes:    No scleral icterus  Airway: Open  Neck: No restriction in mobility  Lungs: CTA bilaterally in anterior and posterior fields, no wheezes, no crackles.  Heart:  Regular rate and rhythm, S1, S2 normal, no murmurs heard  Abdomen: Soft, non tender, non distended      Labs:  Lab Results   Component Value Date    WBC 12.94 (H) 06/04/2025    HGB 8.3 (L) 06/04/2025    HCT 26.3 (L) 06/04/2025     (H) 06/04/2025    CHOL 215 (H) 05/16/2023    TRIG 74 05/16/2023    HDL 58 05/16/2023     (H) 06/04/2025    AST 70 (H) 06/04/2025     (L) 06/04/2025    K 4.8 06/04/2025     06/04/2025    CREATININE 1.0 06/04/2025    BUN 14 06/04/2025    CO2 24 06/04/2025    TSH 0.679 05/23/2025    PSA 1.1 02/12/2020    INR 1.4 (H) 05/23/2025    HGBA1C 5.4 05/16/2023         I have explained the risks and benefits of endoscopy procedures to the patient including but not limited to bleeding, perforation, infection, and death.    Thank you so much for allowing me to participate in the care of Gallo Maria Jr.    Javid Cooney MD         [1]   Medications Prior to Admission   Medication Sig Dispense Refill Last Dose/Taking    enoxaparin (LOVENOX) 120 mg/0.8 mL Syrg Inject 0.7 mLs (105 mg total) into the skin every 12 (twelve) hours. 48 mL 1 7/16/2025 Morning    colchicine (COLCRYS) 0.6 mg tablet Take 1 tablet (0.6 mg total) by mouth once daily. Stop taking once gout pain resolved. 7 tablet 0     gabapentin (NEURONTIN) 300 MG capsule Take 2 capsules (600 mg total) by mouth 3 (three) times daily. 180 capsule 11     HYDROmorphone (DILAUDID) 4 MG tablet Take 1 tablet (4 mg total) by mouth every 6 (six) hours as needed for Pain. 28 tablet 0  More than a month    morphine (MS CONTIN) 30 MG 12 hr tablet Take 1 tablet (30 mg total) by mouth every 12 (twelve) hours. 14 tablet 0     morphine (MS CONTIN) 30 MG 12 hr tablet Take 1 tablet (30 mg total) by mouth every 12 (twelve) hours. 60 tablet 0     polyethylene glycol (MIRALAX) 17 gram/dose powder Use cap to measure 17 grams, mix with liquid, and take by mouth once daily. 510 g 0 7/14/2025    SENNA 8.6 mg tablet Take 1 tablet by mouth once daily. 30 tablet 0     sodium,potassium,mag sulfates (SUPREP BOWEL PREP KIT) 17.5-3.13-1.6 gram SolR Take 177 mLs by mouth once daily. Take as instructed by Endoscopy nurse  for 2 days 1 kit 0

## 2025-07-17 NOTE — PLAN OF CARE
Pt arrived to Endoscopy recovery area via stretcher per GI team. Bedside report received. Pt attached to bedside monitor. VSS. Pt _sedated, awoke upon arrival__ post procedure. Pt on room air oxygen; sats _100___%. Pt IV access infusing NS. Will continue to monitor.

## 2025-07-17 NOTE — TRANSFER OF CARE
"Anesthesia Transfer of Care Note    Patient: Gallo Maria Jr.    Procedure(s) Performed: Procedure(s) (LRB):  COLONOSCOPY, SCREENING, LOW RISK PATIENT (N/A)    Patient location: PACU    Anesthesia Type: general    Transport from OR: Transported from OR on room air with adequate spontaneous ventilation    Post pain: adequate analgesia    Post assessment: no apparent anesthetic complications    Post vital signs: stable    Level of consciousness: responds to stimulation and awake    Nausea/Vomiting: no nausea/vomiting    Complications: none    Transfer of care protocol was followed      Last vitals: Visit Vitals  /69   Pulse 96   Temp 36.4 °C (97.5 °F) (Temporal)   Resp 16   Ht 6' 2" (1.88 m)   Wt 102.5 kg (226 lb)   SpO2 96%   BMI 29.02 kg/m²     "

## 2025-07-17 NOTE — PROVATION PATIENT INSTRUCTIONS
Discharge Summary/Instructions after an Endoscopic Procedure  Patient Name: Gallo Maria  Patient MRN: [id number]  Patient YOB: 1980 Thursday, July 17, 2025  Javid Cooney MD  Dear patient,  As a result of recent federal legislation (The Federal Cures Act), you may   receive lab or pathology results from your procedure in your [facility]   account before your physician is able to contact you. Your physician or   their representative will relay the results to you with their   recommendations at their soonest availability.  Thank you,  RESTRICTIONS:  During your procedure today, you received medications for sedation.  These   medications may affect your judgment, balance and coordination.  Therefore,   for 24 hours, you have the following restrictions:   - DO NOT drive a car, operate machinery, make legal/financial decisions,   sign important papers or drink alcohol.    ACTIVITY:  Today: no heavy lifting, straining or running due to procedural   sedation/anesthesia.  The following day: return to full activity including work.  DIET:  Eat and drink normally unless instructed otherwise.     TREATMENT FOR COMMON SIDE EFFECTS:  - Mild abdominal pain, nausea, belching, bloating or excessive gas:  rest,   eat lightly and use a heating pad.  - Sore Throat: treat with throat lozenges and/or gargle with warm salt   water.  - Because air was used during the procedure, expelling large amounts of air   from your rectum or belching is normal.  - If a bowel prep was taken, you may not have a bowel movement for 1-3 days.    This is normal.  SYMPTOMS TO WATCH FOR AND REPORT TO YOUR PHYSICIAN:  1. Abdominal pain or bloating, other than gas cramps.  2. Chest pain.  3. Back pain.  4. Signs of infection such as: chills or fever occurring within 24 hours   after the procedure.  5. Rectal bleeding, which would show as bright red, maroon, or black stools.   (A tablespoon of blood from the rectum is not serious, especially if    hemorrhoids are present.)  6. Vomiting.  7. Weakness or dizziness.  GO DIRECTLY TO THE NEAREST EMERGENCY ROOM IF YOU HAVE ANY OF THE FOLLOWING:      Difficulty breathing              Chills and/or fever over 101 F   Persistent vomiting and/or vomiting blood   Severe abdominal pain   Severe chest pain   Black, tarry stools   Bleeding- more than one tablespoon   Any other symptom or condition that you feel may need urgent attention  Your doctor recommends these additional instructions:  If any biopsies were taken, your doctors clinic will contact you in 1 to 2   weeks with any results.  - Patient has a contact number available for emergencies.  The signs and   symptoms of potential delayed complications were discussed with the   patient.  Return to normal activities tomorrow.  Written discharge   instructions were provided to the patient.   - Discharge patient to home.   - Resume previous diet.   - Continue present medications.   - Repeat colonoscopy in 10 years for screening purposes.   For questions, problems or results please call your physician - Javid Cooney MD at Work:  [phone].  [facility] [place], EMERGENCY ROOM PHONE NUMBER: [phone]  IF A COMPLICATION OR EMERGENCY SITUATION ARISES AND YOU ARE UNABLE TO REACH   YOUR PHYSICIAN - GO DIRECTLY TO THE EMERGENCY ROOM.  Javid Cooney MD  7/17/2025 3:30:32 PM  This report has been verified and signed electronically.  Dear patient,  As a result of recent federal legislation (The Federal Cures Act), you may   receive lab or pathology results from your procedure in your [facility]   account before your physician is able to contact you. Your physician or   their representative will relay the results to you with their   recommendations at their soonest availability.  Thank you,  [employer]ATION

## 2025-07-18 NOTE — ANESTHESIA POSTPROCEDURE EVALUATION
Anesthesia Post Evaluation    Patient: Gallo Maria     Procedure(s) Performed: Procedure(s) (LRB):  COLONOSCOPY, SCREENING, LOW RISK PATIENT (N/A)    Final Anesthesia Type: general      Patient location during evaluation: GI PACU  Patient participation: Yes- Able to Participate  Level of consciousness: awake and alert  Post-procedure vital signs: reviewed and stable  Pain management: adequate  Airway patency: patent    PONV status at discharge: No PONV  Anesthetic complications: no      Cardiovascular status: blood pressure returned to baseline and hemodynamically stable  Respiratory status: unassisted  Hydration status: euvolemic  Follow-up not needed.          Vitals Value Taken Time   /74 07/17/25 15:46   Temp 36.4 °C (97.5 °F) 07/17/25 15:24   Pulse 73 07/17/25 15:48   Resp 19 07/17/25 15:47   SpO2 99 % 07/17/25 15:48   Vitals shown include unfiled device data.      Event Time   Out of Recovery 15:45:00         Pain/Francheska Score: Francheska Score: 10 (7/17/2025  3:45 PM)

## (undated) DEVICE — DRAPE C-ARM ELAS CLIP 42X120IN

## (undated) DEVICE — ELECTRODE BLADE INSULATED 1 IN

## (undated) DEVICE — RUBBERBAND STERILE 3X1/8IN

## (undated) DEVICE — BUR BONE CUT MICRO TPS 3X3.8MM

## (undated) DEVICE — GOWN POLY REINF BRTH SLV XL

## (undated) DEVICE — COTTON BALLS 1/2IN

## (undated) DEVICE — SUT CHROMIC 3-0 SH 27IN GUT

## (undated) DEVICE — SUCTION COAGULATOR 10FR 6IN

## (undated) DEVICE — NDL SPINAL SPINOCAN 22GX3.5

## (undated) DEVICE — SEE MEDLINE ITEM 146417

## (undated) DEVICE — SUT VICRYL PLUS 3-0 SH 18IN

## (undated) DEVICE — Device

## (undated) DEVICE — DIFFUSER

## (undated) DEVICE — DRESSING SURGICAL 1/2X1/2

## (undated) DEVICE — PACKING NASAL L 4CM X 4CM ST

## (undated) DEVICE — DRAPE THREE-QTR REINF 53X77IN

## (undated) DEVICE — SUT MCRYL PLUS 4-0 PS2 27IN

## (undated) DEVICE — GOWN SMART IMP BREATHABLE XXLG

## (undated) DEVICE — CONTAINER SPECIMEN STRL 4OZ

## (undated) DEVICE — DISPOSABLE BIPOLAR FORCEPS WITH CORD

## (undated) DEVICE — PAPER 8-1/2 X 11 GRAY #67

## (undated) DEVICE — DRAPE OPMI STERILE

## (undated) DEVICE — ELECTRODE REM PLYHSV RETURN 9

## (undated) DEVICE — TRAY CATH FOL SIL URIMTR 16FR

## (undated) DEVICE — TUBE FRAZIER 5MM 2FT SOFT TIP

## (undated) DEVICE — SHEET EENT SPLIT

## (undated) DEVICE — PAD CURAD NONADH 3X4IN

## (undated) DEVICE — CATH ALL PUR URTHL RR 10FR

## (undated) DEVICE — SEE MEDLINE ITEM 156905

## (undated) DEVICE — PENCIL ROCKER SWITCH 10FT CORD

## (undated) DEVICE — HEMOSTAT SURGICEL 4X8IN

## (undated) DEVICE — TRAY ENT 4/CS

## (undated) DEVICE — BLADE 4IN EDGE INSULATED

## (undated) DEVICE — DRAPE T THYROID STERILE

## (undated) DEVICE — BURR RND FLUT SFT TOUCH 2.0MM

## (undated) DEVICE — BLADE SURG CARBON STEEL SZ11

## (undated) DEVICE — KIT SURGIFLO HEMOSTATIC MATRIX

## (undated) DEVICE — SPLINT REUTER BIVALVE 0.5MM

## (undated) DEVICE — CORD BIPOLAR 12 FOOT

## (undated) DEVICE — SPONGE TONSIL LARGE

## (undated) DEVICE — DRAPE STERI INSTRUMENT 1018

## (undated) DEVICE — CARTRIDGE OIL

## (undated) DEVICE — PINS SKULL ADULT MAYFIELD
Type: IMPLANTABLE DEVICE | Site: CRANIAL | Status: NON-FUNCTIONAL
Removed: 2023-04-19

## (undated) DEVICE — STAPLER SKIN PROXIMATE WIDE